# Patient Record
Sex: FEMALE | Race: WHITE | Employment: FULL TIME | URBAN - METROPOLITAN AREA
[De-identification: names, ages, dates, MRNs, and addresses within clinical notes are randomized per-mention and may not be internally consistent; named-entity substitution may affect disease eponyms.]

---

## 2017-03-22 ENCOUNTER — OFFICE VISIT (OUTPATIENT)
Dept: INTERNAL MEDICINE CLINIC | Facility: CLINIC | Age: 62
End: 2017-03-22

## 2017-03-22 VITALS
WEIGHT: 293 LBS | SYSTOLIC BLOOD PRESSURE: 152 MMHG | HEIGHT: 66 IN | OXYGEN SATURATION: 95 % | TEMPERATURE: 98 F | HEART RATE: 92 BPM | BODY MASS INDEX: 47.09 KG/M2 | DIASTOLIC BLOOD PRESSURE: 96 MMHG

## 2017-03-22 DIAGNOSIS — I10 ESSENTIAL HYPERTENSION: Primary | ICD-10-CM

## 2017-03-22 DIAGNOSIS — R05.9 COUGH: ICD-10-CM

## 2017-03-22 PROCEDURE — 99212 OFFICE O/P EST SF 10 MIN: CPT | Performed by: INTERNAL MEDICINE

## 2017-03-22 PROCEDURE — 99213 OFFICE O/P EST LOW 20 MIN: CPT | Performed by: INTERNAL MEDICINE

## 2017-03-22 RX ORDER — METOPROLOL SUCCINATE 50 MG/1
50 TABLET, EXTENDED RELEASE ORAL DAILY
Qty: 30 TABLET | Refills: 3 | Status: SHIPPED | OUTPATIENT
Start: 2017-03-22 | End: 2017-09-17

## 2017-03-22 RX ORDER — FLUTICASONE PROPIONATE 50 MCG
2 SPRAY, SUSPENSION (ML) NASAL DAILY
Qty: 1 BOTTLE | Refills: 3 | Status: SHIPPED | OUTPATIENT
Start: 2017-03-22 | End: 2018-03-25

## 2017-03-22 NOTE — PATIENT INSTRUCTIONS
ASSESSMENT/PLAN:   Diagnoses and all orders for this visit:    Essential hypertension  Patient intolerant of her last 2 BP meds, will try another medication. Recommend that she try toprol 50mg daily for her BP.   Also advised her that she needs to let me

## 2017-03-22 NOTE — PROGRESS NOTES
HPI:    Patient ID: Bette Avelar is a 62year old female. HPI  Hypertension  Patient is here for follow up of hypertension.  BP at home: not checking  Has not been compliant with medications (stopped her losartan in January due to cough) Exercise lev Comment:Other reaction(s): NAPROXEN  Nortriptyline               Comment:Other reaction(s): vivid, scary dreams  Sulfa Antibiotics           Comment:Other reaction(s): Rash - Moderate  PHYSICAL EXAM:    Physical Exam   Vitals reviewed.    Constitu

## 2017-04-21 ENCOUNTER — OFFICE VISIT (OUTPATIENT)
Dept: INTERNAL MEDICINE CLINIC | Facility: CLINIC | Age: 62
End: 2017-04-21

## 2017-04-21 VITALS
BODY MASS INDEX: 47.09 KG/M2 | HEART RATE: 78 BPM | SYSTOLIC BLOOD PRESSURE: 132 MMHG | HEIGHT: 66 IN | WEIGHT: 293 LBS | DIASTOLIC BLOOD PRESSURE: 78 MMHG | TEMPERATURE: 98 F | OXYGEN SATURATION: 95 %

## 2017-04-21 DIAGNOSIS — I10 ESSENTIAL HYPERTENSION: Primary | ICD-10-CM

## 2017-04-21 PROCEDURE — 99213 OFFICE O/P EST LOW 20 MIN: CPT | Performed by: INTERNAL MEDICINE

## 2017-04-21 PROCEDURE — 99212 OFFICE O/P EST SF 10 MIN: CPT | Performed by: INTERNAL MEDICINE

## 2017-04-21 RX ORDER — AMLODIPINE BESYLATE 2.5 MG/1
2.5 TABLET ORAL DAILY
Qty: 30 TABLET | Refills: 3 | Status: SHIPPED | OUTPATIENT
Start: 2017-04-21 | End: 2017-07-14

## 2017-04-21 NOTE — PATIENT INSTRUCTIONS
ASSESSMENT/PLAN:   Diagnoses and all orders for this visit:    Essential hypertension  BP overall much better, but she is concerned about the metoprolol. Reviewed with patient, she has had difficulty with multiple medications.  Recommend a low dose of 2 med

## 2017-04-21 NOTE — PROGRESS NOTES
HPI:    Patient ID: Melinda Bowman is a 62year old female. HPI  Hypertension  Patient is here for follow up of hypertension. BP at home: not checking  Has been compliant with medications.   Exercise level: somewhat active and has been following low sa vivid, scary dreams  Sulfa Antibiotics           Comment:Other reaction(s): Rash - Moderate  PHYSICAL EXAM:    Physical Exam   Vitals reviewed. Cardiovascular: Normal rate and regular rhythm. Edema not present.   Pulmonary/Chest: Effort normal and kelly

## 2017-06-02 ENCOUNTER — OFFICE VISIT (OUTPATIENT)
Dept: INTERNAL MEDICINE CLINIC | Facility: CLINIC | Age: 62
End: 2017-06-02

## 2017-06-02 VITALS
SYSTOLIC BLOOD PRESSURE: 138 MMHG | WEIGHT: 293 LBS | DIASTOLIC BLOOD PRESSURE: 86 MMHG | TEMPERATURE: 99 F | HEART RATE: 96 BPM | OXYGEN SATURATION: 94 % | BODY MASS INDEX: 48.82 KG/M2 | HEIGHT: 65 IN

## 2017-06-02 DIAGNOSIS — I10 ESSENTIAL HYPERTENSION: Primary | ICD-10-CM

## 2017-06-02 PROCEDURE — 99213 OFFICE O/P EST LOW 20 MIN: CPT | Performed by: INTERNAL MEDICINE

## 2017-06-02 PROCEDURE — 99212 OFFICE O/P EST SF 10 MIN: CPT | Performed by: INTERNAL MEDICINE

## 2017-06-02 RX ORDER — HYDROCHLOROTHIAZIDE 12.5 MG/1
12.5 TABLET ORAL DAILY
Qty: 30 TABLET | Refills: 3 | Status: SHIPPED | OUTPATIENT
Start: 2017-06-02 | End: 2017-10-21

## 2017-06-02 NOTE — PATIENT INSTRUCTIONS
ASSESSMENT/PLAN:   Diagnoses and all orders for this visit:    Essential hypertension  BP overall improved but concerned about the achiness of her joints, may be related to some fluid retention.  Recommend that we add a very small dose of water pill which m

## 2017-06-02 NOTE — PROGRESS NOTES
HPI:    Patient ID: Angela Wu is a 62year old female. HPI   Stress  Patient very stressed, her older sister is having open heart next week-valve replacement (72 yo). Hypertension  Patient is here for follow up of hypertension. BP at home:    Ness Ast Rfl: 3   Metoprolol Succinate ER 50 MG Oral Tablet 24 Hr Take 1 tablet (50 mg total) by mouth daily. (Patient taking differently: Take 0.25 mg by mouth daily.  ) Disp: 30 tablet Rfl: 3     Allergies:   Ace Inhibitors          Coughing  Azithromycin

## 2017-07-14 ENCOUNTER — OFFICE VISIT (OUTPATIENT)
Dept: INTERNAL MEDICINE CLINIC | Facility: CLINIC | Age: 62
End: 2017-07-14

## 2017-07-14 VITALS
WEIGHT: 293 LBS | DIASTOLIC BLOOD PRESSURE: 80 MMHG | SYSTOLIC BLOOD PRESSURE: 122 MMHG | HEIGHT: 65 IN | HEART RATE: 77 BPM | BODY MASS INDEX: 48.82 KG/M2 | TEMPERATURE: 99 F

## 2017-07-14 DIAGNOSIS — I10 ESSENTIAL HYPERTENSION: Primary | ICD-10-CM

## 2017-07-14 DIAGNOSIS — R06.02 SHORTNESS OF BREATH: ICD-10-CM

## 2017-07-14 LAB
ALBUMIN SERPL BCP-MCNC: 3.8 G/DL (ref 3.5–4.8)
ALBUMIN/GLOB SERPL: 1 {RATIO} (ref 1–2)
ALP SERPL-CCNC: 68 U/L (ref 32–100)
ALT SERPL-CCNC: 23 U/L (ref 14–54)
ANION GAP SERPL CALC-SCNC: 8 MMOL/L (ref 0–18)
AST SERPL-CCNC: 24 U/L (ref 15–41)
BASOPHILS # BLD: 0.1 K/UL (ref 0–0.2)
BASOPHILS NFR BLD: 1 %
BILIRUB SERPL-MCNC: 0.6 MG/DL (ref 0.3–1.2)
BUN SERPL-MCNC: 24 MG/DL (ref 8–20)
BUN/CREAT SERPL: 30.4 (ref 10–20)
CALCIUM SERPL-MCNC: 9.3 MG/DL (ref 8.5–10.5)
CHLORIDE SERPL-SCNC: 102 MMOL/L (ref 95–110)
CO2 SERPL-SCNC: 28 MMOL/L (ref 22–32)
CREAT SERPL-MCNC: 0.79 MG/DL (ref 0.5–1.5)
EOSINOPHIL # BLD: 0.2 K/UL (ref 0–0.7)
EOSINOPHIL NFR BLD: 2 %
ERYTHROCYTE [DISTWIDTH] IN BLOOD BY AUTOMATED COUNT: 14.3 % (ref 11–15)
GLOBULIN PLAS-MCNC: 3.8 G/DL (ref 2.5–3.7)
GLUCOSE SERPL-MCNC: 89 MG/DL (ref 70–99)
HCT VFR BLD AUTO: 44.2 % (ref 35–48)
HGB BLD-MCNC: 14.7 G/DL (ref 12–16)
LYMPHOCYTES # BLD: 2.3 K/UL (ref 1–4)
LYMPHOCYTES NFR BLD: 26 %
MCH RBC QN AUTO: 29.5 PG (ref 27–32)
MCHC RBC AUTO-ENTMCNC: 33.2 G/DL (ref 32–37)
MCV RBC AUTO: 88.8 FL (ref 80–100)
MONOCYTES # BLD: 0.6 K/UL (ref 0–1)
MONOCYTES NFR BLD: 7 %
NEUTROPHILS # BLD AUTO: 5.9 K/UL (ref 1.8–7.7)
NEUTROPHILS NFR BLD: 65 %
OSMOLALITY UR CALC.SUM OF ELEC: 290 MOSM/KG (ref 275–295)
PLATELET # BLD AUTO: 230 K/UL (ref 140–400)
PMV BLD AUTO: 10 FL (ref 7.4–10.3)
POTASSIUM SERPL-SCNC: 4.2 MMOL/L (ref 3.3–5.1)
PROT SERPL-MCNC: 7.6 G/DL (ref 5.9–8.4)
RBC # BLD AUTO: 4.98 M/UL (ref 3.7–5.4)
SODIUM SERPL-SCNC: 138 MMOL/L (ref 136–144)
TSH SERPL-ACNC: 2.12 UIU/ML (ref 0.45–5.33)
VIT B12 SERPL-MCNC: 435 PG/ML (ref 181–914)
WBC # BLD AUTO: 9.1 K/UL (ref 4–11)

## 2017-07-14 PROCEDURE — 99214 OFFICE O/P EST MOD 30 MIN: CPT | Performed by: INTERNAL MEDICINE

## 2017-07-14 PROCEDURE — 36415 COLL VENOUS BLD VENIPUNCTURE: CPT | Performed by: INTERNAL MEDICINE

## 2017-07-14 PROCEDURE — 99212 OFFICE O/P EST SF 10 MIN: CPT | Performed by: INTERNAL MEDICINE

## 2017-07-14 NOTE — PATIENT INSTRUCTIONS
ASSESSMENT/PLAN:   Diagnoses and all orders for this visit:    Essential hypertension  BP better but she states she is intolerant of the norvasc. Recommend that she stop the norvasc and continue the toprol 1/2 and water pill.  Needs to exercise more regular

## 2017-07-14 NOTE — PROGRESS NOTES
HPI:    Patient ID: Daniel Quesada is a 62year old female. HPI  Hypertension  Patient is here for follow up of hypertension. BP at home: not checking  Has been compliant with medications, but she feels that the BP meds are making her achy.  She also f Disp: 1 Bottle Rfl: 3   Metoprolol Succinate ER 50 MG Oral Tablet 24 Hr Take 1 tablet (50 mg total) by mouth daily.  (Patient taking differently: Take 0.25 mg by mouth daily.  ) Disp: 30 tablet Rfl: 3   ibuprofen 600 MG Oral Tab Take 1 tablet (600 mg total)

## 2017-07-17 LAB — 25(OH)D3 SERPL-MCNC: 20.9 NG/ML

## 2017-08-23 ENCOUNTER — HOSPITAL ENCOUNTER (OUTPATIENT)
Dept: CV DIAGNOSTICS | Facility: HOSPITAL | Age: 62
Discharge: HOME | End: 2017-08-23
Attending: INTERNAL MEDICINE
Payer: COMMERCIAL

## 2017-08-23 ENCOUNTER — HOSPITAL ENCOUNTER (OUTPATIENT)
Dept: GENERAL RADIOLOGY | Facility: HOSPITAL | Age: 62
Discharge: HOME | End: 2017-08-23
Attending: INTERNAL MEDICINE
Payer: COMMERCIAL

## 2017-08-23 DIAGNOSIS — R06.02 SHORTNESS OF BREATH: ICD-10-CM

## 2017-08-23 PROCEDURE — 93306 TTE W/DOPPLER COMPLETE: CPT | Performed by: INTERNAL MEDICINE

## 2017-08-23 PROCEDURE — 71020 XR CHEST PA + LAT CHEST (CPT=71020): CPT | Performed by: INTERNAL MEDICINE

## 2017-08-25 ENCOUNTER — MED REC SCAN ONLY (OUTPATIENT)
Dept: INTERNAL MEDICINE CLINIC | Facility: CLINIC | Age: 62
End: 2017-08-25

## 2017-08-25 ENCOUNTER — OFFICE VISIT (OUTPATIENT)
Dept: INTERNAL MEDICINE CLINIC | Facility: CLINIC | Age: 62
End: 2017-08-25

## 2017-08-25 VITALS
TEMPERATURE: 98 F | DIASTOLIC BLOOD PRESSURE: 84 MMHG | BODY MASS INDEX: 48.82 KG/M2 | HEIGHT: 65 IN | SYSTOLIC BLOOD PRESSURE: 144 MMHG | HEART RATE: 79 BPM | WEIGHT: 293 LBS

## 2017-08-25 DIAGNOSIS — I10 ESSENTIAL HYPERTENSION: ICD-10-CM

## 2017-08-25 DIAGNOSIS — Z20.2 STD EXPOSURE: Primary | ICD-10-CM

## 2017-08-25 DIAGNOSIS — F32.2 SEVERE SINGLE CURRENT EPISODE OF MAJOR DEPRESSIVE DISORDER, WITHOUT PSYCHOTIC FEATURES (HCC): ICD-10-CM

## 2017-08-25 PROCEDURE — 99212 OFFICE O/P EST SF 10 MIN: CPT | Performed by: INTERNAL MEDICINE

## 2017-08-25 PROCEDURE — 99213 OFFICE O/P EST LOW 20 MIN: CPT | Performed by: INTERNAL MEDICINE

## 2017-08-25 PROCEDURE — 36415 COLL VENOUS BLD VENIPUNCTURE: CPT | Performed by: INTERNAL MEDICINE

## 2017-08-25 NOTE — PATIENT INSTRUCTIONS
ASSESSMENT/PLAN:   Diagnoses and all orders for this visit:    STD exposure  Patient with HIV exposure, will need STD testing. Reassured her of the current excellent treatments available  Offered counseling which she currently declines.  Will try zoloft f
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
## 2017-08-27 LAB — HSV TYPE 1/2 COMBINED AB, IGG: >22.4 IV

## 2017-08-28 LAB
HIV1+2 AB SERPL QL IA: NONREACTIVE
T PALLIDUM AB SER QL: NEGATIVE

## 2017-09-18 RX ORDER — METOPROLOL SUCCINATE 50 MG/1
TABLET, EXTENDED RELEASE ORAL
Qty: 30 TABLET | Refills: 3 | Status: SHIPPED | OUTPATIENT
Start: 2017-09-18 | End: 2018-02-25

## 2017-10-06 PROBLEM — F32.1 MODERATE SINGLE CURRENT EPISODE OF MAJOR DEPRESSIVE DISORDER (HCC): Status: ACTIVE | Noted: 2017-10-06

## 2017-10-06 NOTE — PATIENT INSTRUCTIONS
ASSESSMENT/PLAN:   Diagnoses and all orders for this visit:    Moderate single current episode of major depressive disorder ([facility] [address]    patient overall improved but still with significant symptoms.  Recommend that she see counselor which she is now agreeable t

## 2017-10-06 NOTE — PROGRESS NOTES
HPI:    Patient ID: Bette Avelar is a 65year old female. HPI   STD exposure  Patient states that she last had sexual relations in June, had her HIV 8/25 which was negative. Hypertension  Patient is here for follow up of hypertension.  BP at home: Disp: 1 Bottle Rfl: 3   ibuprofen 600 MG Oral Tab Take 1 tablet (600 mg total) by mouth every 8 (eight) hours as needed for Pain. Disp: 90 tablet Rfl: 3     Allergies:   Ace Inhibitors          Coughing  Azithromycin                Comment:Other reaction(s)

## 2017-10-23 RX ORDER — IBUPROFEN 600 MG/1
TABLET ORAL
Qty: 90 TABLET | Refills: 3 | Status: SHIPPED | OUTPATIENT
Start: 2017-10-23 | End: 2021-07-26

## 2017-10-23 RX ORDER — HYDROCHLOROTHIAZIDE 12.5 MG/1
TABLET ORAL
Qty: 30 TABLET | Refills: 3 | Status: SHIPPED | OUTPATIENT
Start: 2017-10-23 | End: 2018-01-12

## 2018-01-12 NOTE — PATIENT INSTRUCTIONS
ASSESSMENT/PLAN:   Diagnoses and all orders for this visit:    Moderate single current episode of major depressive disorder [facility])  Patient overall much improved, but still would like to talk with counselor.  She was unable to touch base with the counselor

## 2018-01-12 NOTE — PROGRESS NOTES
HPI:    Patient ID: Darrell Crain is a 65year old female. HPI  Hypertension  Patient is here for follow up of hypertension.  BP at home: not checking Has been compliant with medications (but missed them yesterday) Exercise level: somewhat active or t mouth daily.  Disp: 30 tablet Rfl: 6   hydrochlorothiazide 12.5 MG Oral Tab TAKE 1 TABLET(12.5 MG) BY MOUTH DAILY Disp: 30 tablet Rfl: 6   IBUPROFEN 600 MG Oral Tab TAKE 1 TABLET(600 MG) BY MOUTH EVERY 8 HOURS AS NEEDED FOR PAIN Disp: 90 tablet Rfl: 3   MET

## 2018-02-25 RX ORDER — METOPROLOL SUCCINATE 50 MG/1
TABLET, EXTENDED RELEASE ORAL
Qty: 30 TABLET | Refills: 3 | Status: CANCELLED
Start: 2018-02-25

## 2018-02-25 RX ORDER — METOPROLOL SUCCINATE 50 MG/1
TABLET, EXTENDED RELEASE ORAL
Qty: 90 TABLET | Refills: 0 | Status: SHIPPED | OUTPATIENT
Start: 2018-02-25 | End: 2018-04-20

## 2018-02-26 NOTE — TELEPHONE ENCOUNTER
Refilled per Epic protocol.     Hypertensive Medications  Protocol Criteria:  · Appointment scheduled in the past 6 months or in the next 3 months  · BMP or CMP in the past 12 months  · Creatinine result < 2  Recent Outpatient Visits            1 month ago

## 2018-02-26 NOTE — TELEPHONE ENCOUNTER
From: Sharyle Inch  Sent: 2/25/2018 1:53 PM CST  Subject: Medication Renewal Request    Jennifer Flynn.  [place] would like a refill of the following medications:     METOPROLOL SUCCINATE ER 50 MG Oral Tablet 24 Hr Karishma Shahid MD]    Preferred pharma

## 2018-03-08 ENCOUNTER — PATIENT MESSAGE (OUTPATIENT)
Dept: INTERNAL MEDICINE CLINIC | Facility: CLINIC | Age: 63
End: 2018-03-08

## 2018-03-08 NOTE — TELEPHONE ENCOUNTER
From: Melinda Bowman  To: Arnulfo Lamb MD  Sent: 3/8/2018 1:42 PM CST  Subject: Referral Request    Thank you so much!

## 2018-03-12 ENCOUNTER — PATIENT MESSAGE (OUTPATIENT)
Dept: INTERNAL MEDICINE CLINIC | Facility: CLINIC | Age: 63
End: 2018-03-12

## 2018-03-25 RX ORDER — FLUTICASONE PROPIONATE 50 MCG
SPRAY, SUSPENSION (ML) NASAL
Qty: 1 BOTTLE | Refills: 3 | Status: SHIPPED | OUTPATIENT
Start: 2018-03-25

## 2018-04-11 ENCOUNTER — TELEPHONE (OUTPATIENT)
Dept: INTERNAL MEDICINE CLINIC | Facility: CLINIC | Age: 63
End: 2018-04-11

## 2018-04-11 NOTE — TELEPHONE ENCOUNTER
Tried calling [employer] for prior auth for counselor Abena Rosenbaum at [facility]. I have not NPI or tax ID information  After 40 minutes on the phone, [facility] states only this provider may call for a review of services for prior auth.

## 2018-04-20 NOTE — PROGRESS NOTES
HPI:    Patient ID: Allan Falcon is a 65year old female. HPI  Depression  Patient still quite depressed. She saw a counselor once and then had issues with the insurance and now needs to find another one. This seems to be very stressful for her.  Fee TAKE 1 TABLET(12.5 MG) BY MOUTH DAILY Disp: 30 tablet Rfl: 6   IBUPROFEN 600 MG Oral Tab TAKE 1 TABLET(600 MG) BY MOUTH EVERY 8 HOURS AS NEEDED FOR PAIN Disp: 90 tablet Rfl: 3     Allergies:   Ace Inhibitors          Coughing  Azithromycin                Co

## 2018-04-20 NOTE — PATIENT INSTRUCTIONS
ASSESSMENT/PLAN:   Diagnoses and all orders for this visit:    Moderate single current episode of major depressive disorder ([facility] [address]  Patient overall improved but still struggling.  She has benefited greatly from her therapy but the insurance is declining to p

## 2018-05-25 ENCOUNTER — PATIENT MESSAGE (OUTPATIENT)
Dept: INTERNAL MEDICINE CLINIC | Facility: CLINIC | Age: 63
End: 2018-05-25

## 2018-05-25 ENCOUNTER — OFFICE VISIT (OUTPATIENT)
Dept: INTERNAL MEDICINE CLINIC | Facility: CLINIC | Age: 63
End: 2018-05-25

## 2018-05-25 VITALS
BODY MASS INDEX: 48.82 KG/M2 | HEIGHT: 65 IN | DIASTOLIC BLOOD PRESSURE: 76 MMHG | HEART RATE: 73 BPM | SYSTOLIC BLOOD PRESSURE: 136 MMHG | WEIGHT: 293 LBS

## 2018-05-25 DIAGNOSIS — I10 ESSENTIAL HYPERTENSION: Primary | ICD-10-CM

## 2018-05-25 DIAGNOSIS — Z12.11 SCREENING FOR MALIGNANT NEOPLASM OF COLON: ICD-10-CM

## 2018-05-25 DIAGNOSIS — Z12.31 SCREENING MAMMOGRAM, ENCOUNTER FOR: ICD-10-CM

## 2018-05-25 DIAGNOSIS — Z12.11 SCREENING FOR COLON CANCER: Primary | ICD-10-CM

## 2018-05-25 DIAGNOSIS — F32.1 MODERATE SINGLE CURRENT EPISODE OF MAJOR DEPRESSIVE DISORDER (HCC): ICD-10-CM

## 2018-05-25 PROCEDURE — 99213 OFFICE O/P EST LOW 20 MIN: CPT | Performed by: INTERNAL MEDICINE

## 2018-05-25 PROCEDURE — 99212 OFFICE O/P EST SF 10 MIN: CPT | Performed by: INTERNAL MEDICINE

## 2018-05-25 NOTE — PROGRESS NOTES
HPI:    Patient ID: Greg Osman is a 65year old female. HPI   Depression  Patient on increased zoloft and seems to be improving. Seeing therapist and seems to be helping more. Hypertension  Patient is here for follow up of hypertension.  Patricia Fish AND USE 2 SPRAYS IN EACH NOSTRIL DAILY Disp: 1 Bottle Rfl: 3   hydrochlorothiazide 12.5 MG Oral Tab TAKE 1 TABLET(12.5 MG) BY MOUTH DAILY Disp: 30 tablet Rfl: 6   IBUPROFEN 600 MG Oral Tab TAKE 1 TABLET(600 MG) BY MOUTH EVERY 8 HOURS AS NEEDED FOR PAIN Dis

## 2018-05-25 NOTE — PATIENT INSTRUCTIONS
ASSESSMENT/PLAN:   Hypertension  BP sees to be doing much better.      Moderate single current episode of major depressive disorder ([facility] [address]  Patient overall improving, continue current meds    Patient advised to have her colonoscopy and mammogram, she will th

## 2018-07-26 ENCOUNTER — TELEPHONE (OUTPATIENT)
Dept: GASTROENTEROLOGY | Facility: CLINIC | Age: 63
End: 2018-07-26

## 2018-07-26 ENCOUNTER — OFFICE VISIT (OUTPATIENT)
Dept: GASTROENTEROLOGY | Facility: CLINIC | Age: 63
End: 2018-07-26

## 2018-07-26 VITALS
HEART RATE: 81 BPM | DIASTOLIC BLOOD PRESSURE: 89 MMHG | HEIGHT: 65 IN | TEMPERATURE: 99 F | BODY MASS INDEX: 48.82 KG/M2 | WEIGHT: 293 LBS | RESPIRATION RATE: 18 BRPM | SYSTOLIC BLOOD PRESSURE: 145 MMHG

## 2018-07-26 DIAGNOSIS — Z12.11 COLON CANCER SCREENING: Primary | ICD-10-CM

## 2018-07-26 DIAGNOSIS — Z12.12 SCREENING FOR COLORECTAL CANCER: ICD-10-CM

## 2018-07-26 DIAGNOSIS — K43.9 VENTRAL HERNIA WITHOUT OBSTRUCTION OR GANGRENE: ICD-10-CM

## 2018-07-26 DIAGNOSIS — R10.32 LLQ DISCOMFORT: Primary | ICD-10-CM

## 2018-07-26 DIAGNOSIS — Z12.11 SCREENING FOR COLORECTAL CANCER: ICD-10-CM

## 2018-07-26 PROCEDURE — 99243 OFF/OP CNSLTJ NEW/EST LOW 30: CPT | Performed by: INTERNAL MEDICINE

## 2018-07-26 PROCEDURE — 99212 OFFICE O/P EST SF 10 MIN: CPT | Performed by: INTERNAL MEDICINE

## 2018-07-26 RX ORDER — POLYETHYLENE GLYCOL 3350, SODIUM CHLORIDE, SODIUM BICARBONATE, POTASSIUM CHLORIDE 420; 11.2; 5.72; 1.48 G/4L; G/4L; G/4L; G/4L
4 POWDER, FOR SOLUTION ORAL ONCE
Qty: 4000 ML | Refills: 0 | Status: SHIPPED | OUTPATIENT
Start: 2018-07-26 | End: 2018-07-26

## 2018-07-26 RX ORDER — CHOLECALCIFEROL (VITAMIN D3) 125 MCG
CAPSULE ORAL DAILY
COMMUNITY

## 2018-07-26 NOTE — TELEPHONE ENCOUNTER
Scheduled for:  Colonoscopy 30133  Provider Name:   Date:  10/1/18  Location:  [facility]  Sedation:  Mac  Time:  2022 / Arrival 0630  Prep:Split dose Trilyte  Meds/Allergies Reconciled?:  Physician reviewed  Diagnosis with codes:  Colon Cancer Screening Z12

## 2018-07-27 NOTE — PROGRESS NOTES
HPI:    Patient ID: Melinda Bowman is a 59year old female. HPI  The patient is referred by Dr. Angel Connolly for colorectal cancer screening and for evaluation of \"a fluttering sensation\" in the left lower quadrant.   She has not had a prior colonoscopy Succinate ER 50 MG Oral Tablet 24 Hr Take 1 tablet (50 mg total) by mouth 2 (two) times daily. Disp: 180 tablet Rfl: 1   Sertraline HCl 100 MG Oral Tab Take 1 tablet (100 mg total) by mouth daily.  Disp: 90 tablet Rfl: 1   hydrochlorothiazide 12.5 MG Oral T Psychiatric: Her behavior is normal. Her mood appears anxious. Nursing note and vitals reviewed.     Component      Latest Ref Rng & Units 1/12/2018 7/14/2017   Glucose      70 - 99 mg/dL 99 89   Sodium      136 - 144 mmol/L 138 138   Potassium      3.3 left lower quadrant \"fluttering\" and chronic hemorrhoidal symptoms, the patient is asymptomatic from a lower gastrointestinal tract perspective and of average risk for colon cancer. The fluttering is likely functional or related to muscular spasm.   I do

## 2018-08-24 ENCOUNTER — TELEPHONE (OUTPATIENT)
Dept: INTERNAL MEDICINE CLINIC | Facility: CLINIC | Age: 63
End: 2018-08-24

## 2018-08-24 ENCOUNTER — OFFICE VISIT (OUTPATIENT)
Dept: INTERNAL MEDICINE CLINIC | Facility: CLINIC | Age: 63
End: 2018-08-24

## 2018-08-24 VITALS
BODY MASS INDEX: 52 KG/M2 | HEART RATE: 78 BPM | WEIGHT: 293 LBS | TEMPERATURE: 98 F | DIASTOLIC BLOOD PRESSURE: 88 MMHG | SYSTOLIC BLOOD PRESSURE: 139 MMHG

## 2018-08-24 DIAGNOSIS — L98.9 SKIN LESION OF HAND: ICD-10-CM

## 2018-08-24 DIAGNOSIS — M54.2 NECK PAIN ON LEFT SIDE: Primary | ICD-10-CM

## 2018-08-24 PROCEDURE — 99213 OFFICE O/P EST LOW 20 MIN: CPT | Performed by: INTERNAL MEDICINE

## 2018-08-24 RX ORDER — METOPROLOL SUCCINATE 100 MG/1
50 TABLET, EXTENDED RELEASE ORAL 2 TIMES DAILY
Qty: 90 TABLET | Refills: 1 | Status: SHIPPED | OUTPATIENT
Start: 2018-08-24 | End: 2019-09-20

## 2018-08-24 RX ORDER — HYDROCHLOROTHIAZIDE 12.5 MG/1
TABLET ORAL
Qty: 90 TABLET | Refills: 1 | Status: SHIPPED | OUTPATIENT
Start: 2018-08-24 | End: 2019-04-03
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
## 2018-08-24 NOTE — PATIENT INSTRUCTIONS
ASSESSMENT/PLAN:   Diagnoses and all orders for this visit:    Neck pain on left side  Patient with moderate left neck pain but good range of motion of her left shoulder. Recommend regular stretching and try heat and massage.

## 2018-08-24 NOTE — TELEPHONE ENCOUNTER
[facility] don[facility], tried to look him up and don't find him

## 2018-10-01 ENCOUNTER — ANESTHESIA EVENT (OUTPATIENT)
Dept: ENDOSCOPY | Facility: HOSPITAL | Age: 63
End: 2018-10-01

## 2018-10-01 ENCOUNTER — ANESTHESIA (OUTPATIENT)
Dept: ENDOSCOPY | Facility: HOSPITAL | Age: 63
End: 2018-10-01

## 2018-10-01 ENCOUNTER — HOSPITAL ENCOUNTER (OUTPATIENT)
Facility: HOSPITAL | Age: 63
Setting detail: HOSPITAL OUTPATIENT SURGERY
Discharge: HOME | End: 2018-10-01
Attending: INTERNAL MEDICINE | Admitting: INTERNAL MEDICINE
Payer: COMMERCIAL

## 2018-10-01 VITALS
HEART RATE: 72 BPM | BODY MASS INDEX: 47.09 KG/M2 | WEIGHT: 293 LBS | RESPIRATION RATE: 18 BRPM | HEIGHT: 66 IN | OXYGEN SATURATION: 97 % | DIASTOLIC BLOOD PRESSURE: 67 MMHG | SYSTOLIC BLOOD PRESSURE: 119 MMHG

## 2018-10-01 DIAGNOSIS — D17.9 LIPOMA: ICD-10-CM

## 2018-10-01 DIAGNOSIS — K57.90 DIVERTICULOSIS: ICD-10-CM

## 2018-10-01 DIAGNOSIS — K64.9 HEMORRHOIDS: ICD-10-CM

## 2018-10-01 DIAGNOSIS — K63.5 COLON POLYP: Primary | ICD-10-CM

## 2018-10-01 DIAGNOSIS — Z12.11 COLON CANCER SCREENING: ICD-10-CM

## 2018-10-01 PROCEDURE — 45385 COLONOSCOPY W/LESION REMOVAL: CPT | Performed by: INTERNAL MEDICINE

## 2018-10-01 PROCEDURE — 0DBL8ZX EXCISION OF TRANSVERSE COLON, VIA NATURAL OR ARTIFICIAL OPENING ENDOSCOPIC, DIAGNOSTIC: ICD-10-PCS | Performed by: INTERNAL MEDICINE

## 2018-10-01 PROCEDURE — 45380 COLONOSCOPY AND BIOPSY: CPT | Performed by: INTERNAL MEDICINE

## 2018-10-01 PROCEDURE — 0DBH8ZX EXCISION OF CECUM, VIA NATURAL OR ARTIFICIAL OPENING ENDOSCOPIC, DIAGNOSTIC: ICD-10-PCS | Performed by: INTERNAL MEDICINE

## 2018-10-01 RX ORDER — LIDOCAINE HYDROCHLORIDE 10 MG/ML
INJECTION, SOLUTION EPIDURAL; INFILTRATION; INTRACAUDAL; PERINEURAL AS NEEDED
Status: DISCONTINUED | OUTPATIENT
Start: 2018-10-01 | End: 2018-10-01 | Stop reason: SURG

## 2018-10-01 RX ORDER — NALOXONE HYDROCHLORIDE 0.4 MG/ML
80 INJECTION, SOLUTION INTRAMUSCULAR; INTRAVENOUS; SUBCUTANEOUS AS NEEDED
Status: DISCONTINUED | OUTPATIENT
Start: 2018-10-01 | End: 2018-10-01

## 2018-10-01 RX ORDER — SODIUM CHLORIDE, SODIUM LACTATE, POTASSIUM CHLORIDE, CALCIUM CHLORIDE 600; 310; 30; 20 MG/100ML; MG/100ML; MG/100ML; MG/100ML
INJECTION, SOLUTION INTRAVENOUS CONTINUOUS
Status: DISCONTINUED | OUTPATIENT
Start: 2018-10-01 | End: 2018-10-01

## 2018-10-01 RX ADMIN — SODIUM CHLORIDE, SODIUM LACTATE, POTASSIUM CHLORIDE, CALCIUM CHLORIDE: 600; 310; 30; 20 INJECTION, SOLUTION INTRAVENOUS at 08:01:00

## 2018-10-01 RX ADMIN — SODIUM CHLORIDE, SODIUM LACTATE, POTASSIUM CHLORIDE, CALCIUM CHLORIDE: 600; 310; 30; 20 INJECTION, SOLUTION INTRAVENOUS at 07:35:00

## 2018-10-01 RX ADMIN — LIDOCAINE HYDROCHLORIDE 50 MG: 10 INJECTION, SOLUTION EPIDURAL; INFILTRATION; INTRACAUDAL; PERINEURAL at 07:39:00

## 2018-10-01 NOTE — ANESTHESIA POSTPROCEDURE EVALUATION
Patient: Shanon Epley    Procedure Summary     Date:  10/01/18 Room / Location:  [facility] ENDOSCOPY 01 / [facility] ENDOSCOPY    Anesthesia Start:  9810 Anesthesia Stop:  5681    Procedure:  COLONOSCOPY (N/A ) Diagnosis:       Colon cancer screening      (divertic

## 2018-10-01 NOTE — OPERATIVE REPORT
[facility] Endoscopy Report      Date of Procedure:  10/01/18      Preoperative Diagnosis:  Colorectal cancer screening      Postoperative Diagnosis:  1. Diminutive colon polyps  2. Ascending colon lipoma  3.   Diverticulosis left colon  4 other colonic polyps, mass lesions, vascular anomalies or signs of inflammation seen. Retroflexion in the rectum revealed internal hemorrhoids. The procedure was well tolerated without immediate complication. Impression:  1.   Diminutive colon polyps

## 2018-10-01 NOTE — H&P
History & Physical Examination    Patient Name: Tameka Cavazos  MRN: [id number]  CSN: [id number]  YOB: 1955    Diagnosis: Colorectal cancer screening        Medications Prior to Admission:  hydrochlorothiazide 12.5 MG Oral Tab TAKE 1 TABLET nausea and vomiting)    • Pulmonary embolism ([facility] [address] 2012    post op     Past Surgical History:  No date: CHOLECYSTECTOMY  No date: [facility] IMPLANT OCULAR DEVICE INTRAOPERATIVE DETACHED RETINA   2012: HIP REPLACEMENT SURGERY  No date: HYSTERECTOMY      Comme

## 2018-10-01 NOTE — ANESTHESIA PREPROCEDURE EVALUATION
Anesthesia PreOp Note    HPI:     Karen Dunbar is a 59year old female who presents for preoperative consultation requested by: Adam Schwab MD    Date of Surgery: 10/1/2018    Procedure(s):  COLONOSCOPY  Indication: Colon cancer screening [Z1 TURMERIC OR Take by mouth. Disp:  Rfl:  9/30/2018 at 0800   Sertraline HCl 100 MG Oral Tab Take 1 tablet (100 mg total) by mouth daily.  Disp: 90 tablet Rfl: 1 9/30/2018 at 0800   FLUTICASONE PROPIONATE 50 MCG/ACT Nasal Suspension SHAKE LIQUID AND USE 2 SPR Sexual activity: Not on file    Other Topics      Concerns:         Service: No        Blood Transfusions: No        Caffeine Concern: Yes        Occupational Exposure: No        Hobby Hazards: No        Sleep Concern: Yes        Stress Concern I have informed [facility] and/or legal guardian or family member of the nature of the anesthetic plan, benefits, risks including possible dental damage if relevant, major complications, and any alternative forms of anesthetic management.    All of th

## 2018-10-03 ENCOUNTER — TELEPHONE (OUTPATIENT)
Dept: GASTROENTEROLOGY | Facility: CLINIC | Age: 63
End: 2018-10-03

## 2018-10-03 NOTE — TELEPHONE ENCOUNTER
----- Message from Buddy Connor MD sent at 10/2/2018  6:03 PM CDT -----  I spoke to the patient. Her polyps were subcentimeter hyperplastic polyps. I have discussed the significance. We discussed the lipoma and the diverticulosis.   I have recomm

## 2018-10-23 ENCOUNTER — OFFICE VISIT (OUTPATIENT)
Dept: INTERNAL MEDICINE CLINIC | Facility: CLINIC | Age: 63
End: 2018-10-23

## 2018-10-23 VITALS
BODY MASS INDEX: 49.41 KG/M2 | HEART RATE: 68 BPM | DIASTOLIC BLOOD PRESSURE: 92 MMHG | WEIGHT: 293 LBS | HEIGHT: 64.75 IN | SYSTOLIC BLOOD PRESSURE: 122 MMHG | OXYGEN SATURATION: 95 % | TEMPERATURE: 98 F

## 2018-10-23 DIAGNOSIS — I10 ESSENTIAL HYPERTENSION: Primary | ICD-10-CM

## 2018-10-23 DIAGNOSIS — Z12.39 BREAST CANCER SCREENING: ICD-10-CM

## 2018-10-23 DIAGNOSIS — Z00.00 ANNUAL PHYSICAL EXAM: ICD-10-CM

## 2018-10-23 DIAGNOSIS — M54.2 NECK PAIN ON LEFT SIDE: ICD-10-CM

## 2018-10-23 DIAGNOSIS — E55.9 VITAMIN D DEFICIENCY: ICD-10-CM

## 2018-10-23 DIAGNOSIS — M25.561 ACUTE PAIN OF RIGHT KNEE: ICD-10-CM

## 2018-10-23 PROCEDURE — 99214 OFFICE O/P EST MOD 30 MIN: CPT | Performed by: INTERNAL MEDICINE

## 2018-10-23 PROCEDURE — 90686 IIV4 VACC NO PRSV 0.5 ML IM: CPT | Performed by: INTERNAL MEDICINE

## 2018-10-23 PROCEDURE — 90471 IMMUNIZATION ADMIN: CPT | Performed by: INTERNAL MEDICINE

## 2018-10-23 RX ORDER — TIZANIDINE HYDROCHLORIDE 4 MG/1
4 CAPSULE, GELATIN COATED ORAL NIGHTLY
Qty: 5 CAPSULE | Refills: 0 | Status: SHIPPED | OUTPATIENT
Start: 2018-10-23 | End: 2018-10-28

## 2018-10-23 RX ORDER — METHYLPREDNISOLONE 4 MG/1
TABLET ORAL
Qty: 1 KIT | Refills: 0 | Status: SHIPPED | OUTPATIENT
Start: 2018-10-23
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
## 2018-10-23 NOTE — PATIENT INSTRUCTIONS
ASSESSMENT/PLAN:   Essential hypertension  (primary encounter diagnosis) ? Control. Careful with diet and excercise at least 30 minutes 3-4 times a week. Check blood pressures at different times on different days. Can purchase own blood pressure monitor.  I

## 2019-04-03 RX ORDER — SERTRALINE HYDROCHLORIDE 100 MG/1
TABLET, FILM COATED ORAL
Qty: 90 TABLET | Refills: 0 | Status: SHIPPED | OUTPATIENT
Start: 2019-04-03 | End: 2019-07-03

## 2019-04-03 RX ORDER — HYDROCHLOROTHIAZIDE 12.5 MG/1
TABLET ORAL
Qty: 90 TABLET | Refills: 0 | Status: SHIPPED | OUTPATIENT
Start: 2019-04-03 | End: 2019-07-03

## 2019-07-01 RX ORDER — HYDROCHLOROTHIAZIDE 12.5 MG/1
TABLET ORAL
Qty: 90 TABLET | Refills: 1 | OUTPATIENT
Start: 2019-07-01

## 2019-07-03 RX ORDER — SERTRALINE HYDROCHLORIDE 100 MG/1
TABLET, FILM COATED ORAL
Qty: 90 TABLET | Refills: 1 | Status: SHIPPED | OUTPATIENT
Start: 2019-07-03 | End: 2021-03-22

## 2019-07-03 RX ORDER — HYDROCHLOROTHIAZIDE 12.5 MG/1
TABLET ORAL
Qty: 90 TABLET | Refills: 1 | OUTPATIENT
Start: 2019-07-03

## 2019-07-03 RX ORDER — HYDROCHLOROTHIAZIDE 12.5 MG/1
TABLET ORAL
Qty: 90 TABLET | Refills: 0 | Status: SHIPPED | OUTPATIENT
Start: 2019-07-03 | End: 2019-10-19

## 2019-07-03 NOTE — TELEPHONE ENCOUNTER
Refill passed per [facility] protocol.   Refill Protocol Appointment Criteria  · Appointment scheduled in the past 6 months or in the next 3 months  Recent Outpatient Visits            8 months ago Essential hypertension    [facility], [address]

## 2019-08-07 PROBLEM — Z12.39 BREAST CANCER SCREENING: Status: ACTIVE | Noted: 2019-08-07

## 2019-08-08 ENCOUNTER — OFFICE VISIT (OUTPATIENT)
Dept: INTERNAL MEDICINE CLINIC | Facility: CLINIC | Age: 64
End: 2019-08-08
Payer: COMMERCIAL

## 2019-08-08 VITALS
BODY MASS INDEX: 49.41 KG/M2 | HEART RATE: 74 BPM | HEIGHT: 64.75 IN | SYSTOLIC BLOOD PRESSURE: 136 MMHG | OXYGEN SATURATION: 99 % | WEIGHT: 293 LBS | TEMPERATURE: 98 F | RESPIRATION RATE: 17 BRPM | DIASTOLIC BLOOD PRESSURE: 85 MMHG

## 2019-08-08 DIAGNOSIS — G89.29 CHRONIC PAIN OF RIGHT KNEE: ICD-10-CM

## 2019-08-08 DIAGNOSIS — E66.01 CLASS 3 SEVERE OBESITY DUE TO EXCESS CALORIES WITHOUT SERIOUS COMORBIDITY WITH BODY MASS INDEX (BMI) OF 50.0 TO 59.9 IN ADULT (HCC): ICD-10-CM

## 2019-08-08 DIAGNOSIS — Z12.39 BREAST CANCER SCREENING: ICD-10-CM

## 2019-08-08 DIAGNOSIS — Z12.83 SKIN EXAM, SCREENING FOR CANCER: ICD-10-CM

## 2019-08-08 DIAGNOSIS — H54.7 VISION PROBLEMS: ICD-10-CM

## 2019-08-08 DIAGNOSIS — Z00.00 ADULT GENERAL MEDICAL EXAM: ICD-10-CM

## 2019-08-08 DIAGNOSIS — I10 ESSENTIAL HYPERTENSION: Primary | ICD-10-CM

## 2019-08-08 DIAGNOSIS — M25.561 CHRONIC PAIN OF RIGHT KNEE: ICD-10-CM

## 2019-08-08 PROBLEM — H91.93 BILATERAL HEARING LOSS: Status: ACTIVE | Noted: 2019-08-08

## 2019-08-08 LAB
APPEARANCE: CLEAR
BILIRUBIN: NEGATIVE
GLUCOSE (URINE DIPSTICK): NEGATIVE MG/DL
KETONES (URINE DIPSTICK): NEGATIVE MG/DL
LEUKOCYTES: NEGATIVE
MULTISTIX LOT#: ABNORMAL NUMERIC
NITRITE, URINE: NEGATIVE
OCCULT BLOOD: NEGATIVE
PH, URINE: 5 (ref 4.5–8)
PROTEIN (URINE DIPSTICK): NEGATIVE MG/DL
SPECIFIC GRAVITY: >=1.03 (ref 1–1.03)
URINE-COLOR: YELLOW
UROBILINOGEN,SEMI-QN: 0.2 MG/DL (ref 0–1.9)

## 2019-08-08 PROCEDURE — 99396 PREV VISIT EST AGE 40-64: CPT | Performed by: INTERNAL MEDICINE

## 2019-08-08 PROCEDURE — 99214 OFFICE O/P EST MOD 30 MIN: CPT | Performed by: INTERNAL MEDICINE

## 2019-08-08 PROCEDURE — 81003 URINALYSIS AUTO W/O SCOPE: CPT | Performed by: INTERNAL MEDICINE

## 2019-08-08 RX ORDER — ACETAMINOPHEN AND CODEINE PHOSPHATE 300; 30 MG/1; MG/1
1 TABLET ORAL EVERY 6 HOURS PRN
Qty: 20 TABLET | Refills: 0 | Status: SHIPPED | OUTPATIENT
Start: 2019-08-08 | End: 2019-12-30

## 2019-08-08 NOTE — PROGRESS NOTES
HPI:    Patient ID: Gaetano Santamaria is a 62year old female. Blood Pressure   Associated symptoms include arthralgias (R knee pain worsening. More when begins to walk. No weakness. NO swelling. Ibuprofen. ).  Pertinent negatives include no abdominal andree  (H) 12/21/2016 12:53 PM    NONHDLC 142 (H) 12/21/2016 12:53 PM       No results found for: A1C   No results found for: VITD      Mammogram due on 07/21/2012      Current Outpatient Medications:  Acetaminophen-Codeine (TYLENOL WITH CODEINE #3) 300-3 Macular degeneration Mother    • Other (spinal stenosis [Other]) Mother    • Breast Cancer Sister    • Pacemaker Sister       Social History:  Social History    Socioeconomic History      Marital status:       Spouse name: Not on file      Number of  (H) 12/21/2016 12:53 PM    NONHDLC 142 (H) 12/21/2016 12:53 PM         Review of Systems   Constitutional: Negative. Negative for activity change, appetite change, chills, diaphoresis, fatigue, fever and unexpected weight change.    HENT: Negative All other systems reviewed and are negative. Current Outpatient Medications:  Acetaminophen-Codeine (TYLENOL WITH CODEINE #3) 300-30 MG Oral Tab Take 1 tablet by mouth every 6 (six) hours as needed for Pain.  Disp: 20 tablet Rfl: 0   Sertraline Fiona Moeller MD at [address] [place] [address] ENDOSCOPY   • [facility] IMPLANT OCULAR DEVICE INTRAOPERATIVE DETACHED RETINA      • HIP REPLACEMENT SURGERY  2012   • HYSTERECTOMY      2010   • LAPAROSCOPIC CHOLECYSTECTOMY  04/01/1990      Family History   Problem Relation Age of Onset tenderness and no frontal sinus tenderness. Left sinus exhibits no maxillary sinus tenderness and no frontal sinus tenderness.    Mouth/Throat: Uvula is midline, oropharynx is clear and moist and mucous membranes are normal. Mucous membranes are not pale, n No apnea, no tachypnea and no bradypnea. She is not intubated. No respiratory distress. She has no decreased breath sounds. She has no wheezes. She has no rhonchi. She has no rales. She exhibits no tenderness.  Right breast exhibits no inverted nipple, no m preauricular, no posterior auricular and no occipital adenopathy present. She has no cervical adenopathy. Right cervical: No superficial cervical, no deep cervical and no posterior cervical adenopathy present.        Left cervical: No superficial Reflex To Free T4 [E]      Meds This Visit:  Requested Prescriptions     Signed Prescriptions Disp Refills   • Acetaminophen-Codeine (TYLENOL WITH CODEINE #3) 300-30 MG Oral Tab 20 tablet 0     Sig: Take 1 tablet by mouth every 6 (six) hours as needed for

## 2019-08-08 NOTE — PATIENT INSTRUCTIONS
ASSESSMENT/PLAN:   Essential hypertension  (primary encounter diagnosis) Good control. Careful with diet and excercise at least 30 minutes 3-4 times a week. Check blood pressures at different times on different days.  Can purchase own blood pressure monit

## 2019-09-20 RX ORDER — METOPROLOL SUCCINATE 100 MG/1
TABLET, EXTENDED RELEASE ORAL
Qty: 90 TABLET | Refills: 1 | Status: SHIPPED | OUTPATIENT
Start: 2019-09-20 | End: 2020-04-01

## 2019-10-21 RX ORDER — HYDROCHLOROTHIAZIDE 12.5 MG/1
TABLET ORAL
Qty: 90 TABLET | Refills: 1 | Status: SHIPPED | OUTPATIENT
Start: 2019-10-21 | End: 2020-02-17

## 2019-10-22 NOTE — TELEPHONE ENCOUNTER
Please review; protocol failed.     Requested Prescriptions     Pending Prescriptions Disp Refills   • HYDROCHLOROTHIAZIDE 12.5 MG Oral Tab [Pharmacy Med Name: HYDROCHLOROTHIAZIDE 12.5MG TABLETS] 90 tablet 0     Sig: TAKE 1 TABLET BY MOUTH DAILY         Rec

## 2019-12-02 ENCOUNTER — OFFICE VISIT (OUTPATIENT)
Dept: INTERNAL MEDICINE CLINIC | Facility: CLINIC | Age: 64
End: 2019-12-02
Payer: COMMERCIAL

## 2019-12-02 VITALS
TEMPERATURE: 98 F | HEIGHT: 64.75 IN | OXYGEN SATURATION: 94 % | HEART RATE: 76 BPM | BODY MASS INDEX: 49.41 KG/M2 | SYSTOLIC BLOOD PRESSURE: 131 MMHG | WEIGHT: 293 LBS | DIASTOLIC BLOOD PRESSURE: 84 MMHG

## 2019-12-02 DIAGNOSIS — J06.9 URTI (ACUTE UPPER RESPIRATORY INFECTION): Primary | ICD-10-CM

## 2019-12-02 DIAGNOSIS — R05.9 COUGH: ICD-10-CM

## 2019-12-02 PROCEDURE — 99213 OFFICE O/P EST LOW 20 MIN: CPT | Performed by: INTERNAL MEDICINE

## 2019-12-02 RX ORDER — CETIRIZINE HYDROCHLORIDE 10 MG/1
10 TABLET ORAL DAILY
Qty: 10 TABLET | Refills: 0 | Status: SHIPPED | OUTPATIENT
Start: 2019-12-02

## 2019-12-02 RX ORDER — AMOXICILLIN AND CLAVULANATE POTASSIUM 875; 125 MG/1; MG/1
1 TABLET, FILM COATED ORAL 2 TIMES DAILY
Qty: 14 TABLET | Refills: 0 | Status: SHIPPED | OUTPATIENT
Start: 2019-12-02 | End: 2019-12-09

## 2019-12-02 RX ORDER — ALBUTEROL SULFATE 90 UG/1
2 AEROSOL, METERED RESPIRATORY (INHALATION) EVERY 6 HOURS PRN
Qty: 1 INHALER | Refills: 0 | Status: SHIPPED | OUTPATIENT
Start: 2019-12-02 | End: 2019-12-30

## 2019-12-02 NOTE — PROGRESS NOTES
Bette Avelar is a 62year old female. Patient presents with:  Cough: cough with green phlegm, sinus pressure, headache, otc meds not helping, wheezing, onset 10 days ago    HPI:   81-year-old pleasant lady with a past medical history of hypertension her (MEDROL) 4 MG Oral Tablet Therapy Pack As directed.  1 kit 0      Past Medical History:   Diagnosis Date   • Essential hypertension    • Moderate single current episode of major depressive disorder ([facility] [address] 10/6/2017   • Osteoarthrosis, pelvic region and thigh Genitourinary: Negative for hematuria.      Wt Readings from Last 5 Encounters:  12/02/19 : (!) 318 lb (144.2 kg)  08/08/19 : (!) 317 lb (143.8 kg)  10/23/18 : (!) 315 lb 9.6 oz (143.2 kg)  10/01/18 : (!) 312 lb (141.5 kg)  08/24/18 : (!) 313 lb (142 kg) above      The patient indicates understanding of these issues and agrees to the plan. No follow-ups on file.

## 2019-12-02 NOTE — PATIENT INSTRUCTIONS
ASSESSMENT AND PLAN:   1. URTI (acute upper respiratory infection)  -In light of duration of almost 2 weeks and fever and chills along with wheezing, I would like to get a chest x-ray. I have given her Augmentin to take for 7 days.   I have given her p

## 2019-12-03 ENCOUNTER — HOSPITAL ENCOUNTER (OUTPATIENT)
Dept: GENERAL RADIOLOGY | Age: 64
Discharge: HOME | End: 2019-12-03
Attending: INTERNAL MEDICINE
Payer: COMMERCIAL

## 2019-12-03 DIAGNOSIS — J06.9 URTI (ACUTE UPPER RESPIRATORY INFECTION): ICD-10-CM

## 2019-12-03 DIAGNOSIS — G89.29 CHRONIC PAIN OF RIGHT KNEE: ICD-10-CM

## 2019-12-03 DIAGNOSIS — R05.9 COUGH: ICD-10-CM

## 2019-12-03 DIAGNOSIS — M25.561 CHRONIC PAIN OF RIGHT KNEE: ICD-10-CM

## 2019-12-03 PROCEDURE — 73560 X-RAY EXAM OF KNEE 1 OR 2: CPT | Performed by: INTERNAL MEDICINE

## 2019-12-03 PROCEDURE — 71046 X-RAY EXAM CHEST 2 VIEWS: CPT | Performed by: INTERNAL MEDICINE

## 2019-12-30 RX ORDER — ALBUTEROL SULFATE 90 UG/1
AEROSOL, METERED RESPIRATORY (INHALATION)
Qty: 18 G | Refills: 0 | Status: SHIPPED | OUTPATIENT
Start: 2019-12-30

## 2019-12-30 RX ORDER — ACETAMINOPHEN AND CODEINE PHOSPHATE 300; 30 MG/1; MG/1
TABLET ORAL
Qty: 20 TABLET | Refills: 0 | Status: SHIPPED | OUTPATIENT
Start: 2019-12-30 | End: 2020-06-10

## 2019-12-31 NOTE — TELEPHONE ENCOUNTER
Refill passed per [place] [facility] protocol. However rx approved x1 on 12/02/19. pls advise if refills appropriate?       Requested Prescriptions   Pending Prescriptions Disp Refills   • ALBUTEROL SULFATE  (90 Base) MCG/ACT Inhalation Kavin Medeiros

## 2019-12-31 NOTE — TELEPHONE ENCOUNTER
Review pended refill request as it does not fall under a protocol.     Last Rx: 8/8/19  LOV: 4 weeks ago

## 2020-02-17 NOTE — TELEPHONE ENCOUNTER
Please review; protocol failed.     Requested Prescriptions     Pending Prescriptions Disp Refills   • HYDROCHLOROTHIAZIDE 12.5 MG Oral Tab [Pharmacy Med Name: HYDROCHLOROTHIAZIDE 12.5MG TABLETS] 90 tablet 1     Sig: TAKE 1 TABLET BY MOUTH DAILY         Rec

## 2020-02-18 RX ORDER — HYDROCHLOROTHIAZIDE 12.5 MG/1
TABLET ORAL
Qty: 90 TABLET | Refills: 1 | Status: SHIPPED | OUTPATIENT
Start: 2020-02-18 | End: 2020-06-10

## 2020-04-01 ENCOUNTER — PATIENT MESSAGE (OUTPATIENT)
Dept: INTERNAL MEDICINE CLINIC | Facility: CLINIC | Age: 65
End: 2020-04-01

## 2020-04-01 RX ORDER — METOPROLOL SUCCINATE 100 MG/1
TABLET, EXTENDED RELEASE ORAL
Qty: 90 TABLET | Refills: 1 | Status: SHIPPED | OUTPATIENT
Start: 2020-04-01 | End: 2020-04-01

## 2020-04-01 RX ORDER — METOPROLOL SUCCINATE 100 MG/1
TABLET, EXTENDED RELEASE ORAL
Qty: 90 TABLET | Refills: 1 | Status: SHIPPED | OUTPATIENT
Start: 2020-04-01 | End: 2020-09-21

## 2020-04-01 NOTE — TELEPHONE ENCOUNTER
Wilbert Conrad MD  Em Rn Triage 33 minutes ago (2:35 PM)      Erx approved x1 patient due for routine f/u appt in 3 to 6 months

## 2020-04-02 NOTE — TELEPHONE ENCOUNTER
Medication reordered as per Dr. Salinas Credit today and sent to [facility] in [address] per patient request

## 2020-04-02 NOTE — TELEPHONE ENCOUNTER
From: Tameka Cavazos  To: Edith Sue. Dana Martinez MD  Sent: 4/1/2020 11:54 AM CDT  Subject: Prescription Question    I need a refil on my hbp meds - metoprolol succinate er 100mg tablets 90 day refil.  Pls refill it at [address] [facility] in [facility].

## 2020-06-10 RX ORDER — HYDROCHLOROTHIAZIDE 12.5 MG/1
12.5 TABLET ORAL DAILY
Qty: 90 TABLET | Refills: 1 | Status: SHIPPED | OUTPATIENT
Start: 2020-06-10 | End: 2021-03-22

## 2020-06-10 RX ORDER — ACETAMINOPHEN AND CODEINE PHOSPHATE 300; 30 MG/1; MG/1
1 TABLET ORAL EVERY 6 HOURS PRN
Qty: 20 TABLET | Refills: 0 | Status: SHIPPED | OUTPATIENT
Start: 2020-06-10 | End: 2021-07-26

## 2020-09-21 RX ORDER — METOPROLOL SUCCINATE 100 MG/1
TABLET, EXTENDED RELEASE ORAL
Qty: 90 TABLET | Refills: 1 | Status: SHIPPED | OUTPATIENT
Start: 2020-09-21 | End: 2021-03-22

## 2020-09-21 NOTE — TELEPHONE ENCOUNTER
Medication approved. Couple of things  1. She needs to make appointment last seen December 2019    2.   She needs to make appointment for mammography

## 2020-09-22 ENCOUNTER — PATIENT MESSAGE (OUTPATIENT)
Dept: INTERNAL MEDICINE CLINIC | Facility: CLINIC | Age: 65
End: 2020-09-22

## 2020-09-23 RX ORDER — HYDROCHLOROTHIAZIDE 12.5 MG/1
12.5 TABLET ORAL DAILY
Qty: 90 TABLET | Refills: 1 | OUTPATIENT
Start: 2020-09-23

## 2020-09-23 NOTE — TELEPHONE ENCOUNTER
It's been little more than a year since I seen the patient. She saw Dr. Pia Vang for cough 9 months ago. She has no upcoming appointments. Can refill as soon as she makes an appointment.
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
## 2020-10-08 ENCOUNTER — TELEPHONE (OUTPATIENT)
Dept: INTERNAL MEDICINE CLINIC | Facility: CLINIC | Age: 65
End: 2020-10-08

## 2020-10-08 NOTE — TELEPHONE ENCOUNTER
[10/8/2020 10:19 AM]  Lisandra Garcia MD:      Karen Damon (Legal)     60year old, 7/27/1955         she needs to identify her PCP.  If its me she needs an appt in 4 weeks and she need to complete her mammogram . If its  [facility], please update PCP name Thanks

## 2020-10-30 ENCOUNTER — VIRTUAL PHONE E/M (OUTPATIENT)
Dept: INTERNAL MEDICINE CLINIC | Facility: CLINIC | Age: 65
End: 2020-10-30
Payer: COMMERCIAL

## 2020-10-30 ENCOUNTER — TELEPHONE (OUTPATIENT)
Dept: INTERNAL MEDICINE CLINIC | Facility: CLINIC | Age: 65
End: 2020-10-30

## 2020-10-30 DIAGNOSIS — R50.9 FEVER WITH EXPOSURE TO COVID-19 VIRUS: Primary | ICD-10-CM

## 2020-10-30 DIAGNOSIS — Z20.822 FEVER WITH EXPOSURE TO COVID-19 VIRUS: Primary | ICD-10-CM

## 2020-10-30 PROCEDURE — 99213 OFFICE O/P EST LOW 20 MIN: CPT | Performed by: PHYSICIAN ASSISTANT

## 2020-10-30 NOTE — TELEPHONE ENCOUNTER
Action Requested: Summary for Provider     []  Critical Lab, Recommendations Needed  [] Need Additional Advice  []   FYI    []   Need Orders  [] Need Medications Sent to Pharmacy  []  Other     SUMMARY: Virtual Visit scheduled with PA for further eval.

## 2020-10-30 NOTE — PROGRESS NOTES
Telephone Check-In    Aimee Walters verbally consents to a Virtual/Telephone Check-In service on 10/30/20. Patient understands and accepts financial responsibility for any deductible, co-insurance and/or co-pays associated with this service.     Pamela

## 2020-10-31 ENCOUNTER — APPOINTMENT (OUTPATIENT)
Dept: LAB | Facility: HOSPITAL | Age: 65
End: 2020-10-31
Attending: PHYSICIAN ASSISTANT
Payer: COMMERCIAL

## 2020-10-31 DIAGNOSIS — R50.9 FEVER WITH EXPOSURE TO COVID-19 VIRUS: ICD-10-CM

## 2020-10-31 DIAGNOSIS — Z20.822 FEVER WITH EXPOSURE TO COVID-19 VIRUS: ICD-10-CM

## 2021-03-21 PROBLEM — E55.9 VITAMIN D DEFICIENCY: Status: ACTIVE | Noted: 2021-03-21

## 2021-03-21 PROBLEM — Z71.85 VACCINE COUNSELING: Status: ACTIVE | Noted: 2021-03-21

## 2021-03-21 PROBLEM — Z00.00 ADULT GENERAL MEDICAL EXAM: Status: ACTIVE | Noted: 2021-03-21

## 2021-03-22 ENCOUNTER — OFFICE VISIT (OUTPATIENT)
Dept: INTERNAL MEDICINE CLINIC | Facility: CLINIC | Age: 66
End: 2021-03-22
Payer: COMMERCIAL

## 2021-03-22 ENCOUNTER — TELEPHONE (OUTPATIENT)
Dept: INTERNAL MEDICINE CLINIC | Facility: CLINIC | Age: 66
End: 2021-03-22

## 2021-03-22 VITALS
TEMPERATURE: 99 F | OXYGEN SATURATION: 96 % | RESPIRATION RATE: 18 BRPM | SYSTOLIC BLOOD PRESSURE: 144 MMHG | HEIGHT: 64.75 IN | WEIGHT: 293 LBS | HEART RATE: 76 BPM | DIASTOLIC BLOOD PRESSURE: 85 MMHG | BODY MASS INDEX: 49.41 KG/M2

## 2021-03-22 DIAGNOSIS — Z12.31 ENCOUNTER FOR SCREENING MAMMOGRAM FOR MALIGNANT NEOPLASM OF BREAST: ICD-10-CM

## 2021-03-22 DIAGNOSIS — Z00.00 ADULT GENERAL MEDICAL EXAM: ICD-10-CM

## 2021-03-22 DIAGNOSIS — Z00.00 ENCOUNTER FOR ANNUAL HEALTH EXAMINATION: ICD-10-CM

## 2021-03-22 DIAGNOSIS — I10 ESSENTIAL HYPERTENSION: Primary | ICD-10-CM

## 2021-03-22 DIAGNOSIS — E55.9 VITAMIN D DEFICIENCY: ICD-10-CM

## 2021-03-22 DIAGNOSIS — Z71.85 VACCINE COUNSELING: ICD-10-CM

## 2021-03-22 DIAGNOSIS — H91.93 BILATERAL HEARING LOSS, UNSPECIFIED HEARING LOSS TYPE: ICD-10-CM

## 2021-03-22 LAB
APPEARANCE: CLEAR
MULTISTIX LOT#: 5077 NUMERIC
PH, URINE: 5 (ref 4.5–8)
PROTEIN (URINE DIPSTICK): 30 MG/DL
SPECIFIC GRAVITY: 1.03 (ref 1–1.03)
URINE-COLOR: YELLOW
UROBILINOGEN,SEMI-QN: 1 MG/DL (ref 0–1.9)

## 2021-03-22 PROCEDURE — G0439 PPPS, SUBSEQ VISIT: HCPCS | Performed by: INTERNAL MEDICINE

## 2021-03-22 PROCEDURE — 3077F SYST BP >= 140 MM HG: CPT | Performed by: INTERNAL MEDICINE

## 2021-03-22 PROCEDURE — 3008F BODY MASS INDEX DOCD: CPT | Performed by: INTERNAL MEDICINE

## 2021-03-22 PROCEDURE — 81003 URINALYSIS AUTO W/O SCOPE: CPT | Performed by: INTERNAL MEDICINE

## 2021-03-22 PROCEDURE — 99497 ADVNCD CARE PLAN 30 MIN: CPT | Performed by: INTERNAL MEDICINE

## 2021-03-22 PROCEDURE — 3079F DIAST BP 80-89 MM HG: CPT | Performed by: INTERNAL MEDICINE

## 2021-03-22 RX ORDER — HYDROCHLOROTHIAZIDE 12.5 MG/1
12.5 TABLET ORAL DAILY
Qty: 90 TABLET | Refills: 1 | Status: SHIPPED | OUTPATIENT
Start: 2021-03-22 | End: 2021-07-26

## 2021-03-22 RX ORDER — METOPROLOL SUCCINATE 100 MG/1
50 TABLET, EXTENDED RELEASE ORAL 2 TIMES DAILY
Qty: 90 TABLET | Refills: 1 | Status: SHIPPED | OUTPATIENT
Start: 2021-03-22 | End: 2021-11-17

## 2021-03-22 NOTE — PATIENT INSTRUCTIONS
ASSESSMENT AND OTHER RELEVANT CHRONIC CONDITIONS:   Shad Sanchez is a 60year old female who presents for a Medicare Assessment. PLAN SUMMARY:   Diagnoses and all orders for this visit:    Essential hypertension ? Control.  Careful with diet and exc following:   • Overweight (BMI ³25 but <30)   • Family history of diabetes   • Age 72 years or older   • History of gestational diabetes or birth of baby weighing more than 9 pounds     Covered at least every 3 years,         Glucose (mg/dL)   Date Value flowsheet data found.  OK to schedule if you are in this risk group, make sure you have a referral   Bone Density Screening      Bone density screening   Covered every 2 yrs after age 72    Covered yearly for Long term Glucocorticoid medication (Steroids) R covered by Medicare Part B) No orders found for this or any previous visit.  This may be covered with your prescription benefits, but Medicare does not cover unless Medically needed    Zoster (Not covered by Medicare Part B) No orders found for this or any

## 2021-03-22 NOTE — PROGRESS NOTES
HPI:    Patient ID: Bette Avelar is a 60year old female.     Bette Avelar is a 60year old female who presents for a complete physical exam.   HPI:   Patient presents with:  Physical: annual exam     Hypertension  Patient is here for follow up of h 07/14/2017 02:48 PM     07/14/2017 02:48 PM      Lab Results   Component Value Date/Time    GLU 99 01/12/2018 02:55 PM     01/12/2018 02:55 PM    K 4.0 01/12/2018 02:55 PM     01/12/2018 02:55 PM    CO2 28 01/12/2018 02:55 PM    CREATSER IBUPROFEN 600 MG Oral Tab TAKE 1 TABLET(600 MG) BY MOUTH EVERY 8 HOURS AS NEEDED FOR PAIN 90 tablet 3   • methylPREDNISolone (MEDROL) 4 MG Oral Tablet Therapy Pack As directed.  (Patient not taking: Reported on 3/22/2021 ) 1 kit 0      Past Medical History: Expenses:   Food Insecurity:       Worried About [address] Your Mortgage in the Last Year:       Ran Out of Food in the Last Year:   Transportation Needs:       Lack of Transportation (Medical):       Lack of Transportation (Non-Medical):   Physical Activity: sores, nosebleeds, postnasal drip, rhinorrhea, sinus pressure, sinus pain, sneezing, sore throat, tinnitus, trouble swallowing and voice change. Eyes: Negative for photophobia, pain, discharge, redness, itching and visual disturbance.    Respiratory: Neg ALLERGY) 10 MG Oral Tab Take 1 tablet (10 mg total) by mouth daily. 10 tablet 0   • Sertraline HCl 100 MG Oral Tab TAKE 1 TABLET(100 MG) BY MOUTH DAILY 90 tablet 1   • Ascorbic Acid (VITAMIN C OR) Take by mouth.      • Cyanocobalamin (VITAMIN B-12 OR) Take Social History:   Social History    Tobacco Use      Smoking status: Never Smoker      Smokeless tobacco: Never Used    Alcohol use: No      Alcohol/week: 0.0 standard drinks    Drug use: No       PHYSICAL EXAM:   /85 (BP Location: Right arm, Pa eye: No foreign body, discharge or hordeolum. Left eye: No foreign body, discharge or hordeolum. Extraocular Movements:      Right eye: Normal extraocular motion and no nystagmus. Left eye: Normal extraocular motion and no nystagmus. There is no abdominal tenderness. There is no guarding or rebound. Genitourinary:     Exam position: Supine. Musculoskeletal:      Cervical back: Neck supple.    Lymphadenopathy:      Head:      Right side of head: No submental, submandibular, preauricu She has been screened for Falls and is low risk: Fall/Risk Scorin    Cognitive Assessment   She had a completely normal cognitive assessment- see flowsheet entries    Functional Ability/Status   Delfino Bridges has some abnormal functions as listed (H) 12/21/2016          Last Chemistry Labs:   Lab Results   Component Value Date    AST 24 07/14/2017    ALT 23 07/14/2017    CA 9.1 01/12/2018    ALB 3.8 07/14/2017    TSH 2.12 07/14/2017    CREATSERUM 0.77 01/12/2018    GLU 99 01/12/2018        CBC  (mo ; Laparoscopic cholecystectomy (04/01/1990); and colonoscopy (N/A, 10/1/2018).     Her family history includes Arthritis in her mother; Breast Cancer in her sister; Diabetes in her father; Macular degeneration in her mother; Pacemaker in her sister; sp social activities because I cannot hear well and fear I will reply improperly: No   Family members and friends have told me they think I may have hearing loss:  No               Visual Acuity  Right Eye Visual Acuity: Corrected Right Eye Chart Acuity: 20/20 Decrease sertaline 25 mg a day. RTC 4 months for follow up HTN. Diet assessment: fair     PLAN:  The patient indicates understanding of these issues and agrees to the plan. Reinforced healthy diet, lifestyle, and exercise.   Prescription medicati MALIKA 05/24/2007   No flowsheet data found. Pap and Pelvic      Pap: Every 3 yrs age 21-68 or Pap+HPV every 5 yrs age 33-67, age 72 and older at high risk There are no preventive care reminders to display for this patient.  Update Health Maintenance if ap Creatinine (mg/dL)   Date Value   01/12/2018 0.77    No flowsheet data found. Drug Serum Conc  Annually No results found for: DIGOXIN, DIG, VALP No flowsheet data found.        Advance Care Planning done today:     She does NOT have a Living Will on

## 2021-04-01 ENCOUNTER — TELEPHONE (OUTPATIENT)
Dept: INTERNAL MEDICINE CLINIC | Facility: CLINIC | Age: 66
End: 2021-04-01

## 2021-04-01 NOTE — TELEPHONE ENCOUNTER
Left message for patient letting her know she is due for her mammogram.    Mammogram order in system on 3/22/2021.

## 2021-04-30 ENCOUNTER — TELEPHONE (OUTPATIENT)
Dept: INTERNAL MEDICINE CLINIC | Facility: CLINIC | Age: 66
End: 2021-04-30

## 2021-04-30 NOTE — TELEPHONE ENCOUNTER
Left message for patient letting her know that she is due for her Mammogram since 2012. Order in the system. Left patient the central scheduling phone number.

## 2021-05-01 RX ORDER — SERTRALINE HYDROCHLORIDE 100 MG/1
TABLET, FILM COATED ORAL
Qty: 90 TABLET | Refills: 1 | Status: SHIPPED | OUTPATIENT
Start: 2021-05-01 | End: 2021-07-26 | Stop reason: DRUGHIGH

## 2021-07-26 ENCOUNTER — TELEPHONE (OUTPATIENT)
Dept: INTERNAL MEDICINE CLINIC | Facility: CLINIC | Age: 66
End: 2021-07-26

## 2021-07-26 ENCOUNTER — OFFICE VISIT (OUTPATIENT)
Dept: INTERNAL MEDICINE CLINIC | Facility: CLINIC | Age: 66
End: 2021-07-26
Payer: COMMERCIAL

## 2021-07-26 VITALS
HEIGHT: 64.75 IN | BODY MASS INDEX: 49.41 KG/M2 | WEIGHT: 293 LBS | SYSTOLIC BLOOD PRESSURE: 118 MMHG | DIASTOLIC BLOOD PRESSURE: 83 MMHG | OXYGEN SATURATION: 98 % | HEART RATE: 116 BPM | RESPIRATION RATE: 18 BRPM | TEMPERATURE: 99 F

## 2021-07-26 DIAGNOSIS — Z12.83 SKIN EXAM, SCREENING FOR CANCER: ICD-10-CM

## 2021-07-26 DIAGNOSIS — Z71.85 VACCINE COUNSELING: ICD-10-CM

## 2021-07-26 DIAGNOSIS — E55.9 VITAMIN D DEFICIENCY: ICD-10-CM

## 2021-07-26 DIAGNOSIS — H61.21 IMPACTED CERUMEN OF RIGHT EAR: ICD-10-CM

## 2021-07-26 DIAGNOSIS — Z01.00 EYE EXAM, ROUTINE: ICD-10-CM

## 2021-07-26 DIAGNOSIS — Z78.0 POST-MENOPAUSAL: ICD-10-CM

## 2021-07-26 DIAGNOSIS — I10 ESSENTIAL HYPERTENSION: Primary | ICD-10-CM

## 2021-07-26 PROCEDURE — 69210 REMOVE IMPACTED EAR WAX UNI: CPT | Performed by: INTERNAL MEDICINE

## 2021-07-26 PROCEDURE — 3008F BODY MASS INDEX DOCD: CPT | Performed by: INTERNAL MEDICINE

## 2021-07-26 PROCEDURE — 99215 OFFICE O/P EST HI 40 MIN: CPT | Performed by: INTERNAL MEDICINE

## 2021-07-26 PROCEDURE — 3074F SYST BP LT 130 MM HG: CPT | Performed by: INTERNAL MEDICINE

## 2021-07-26 PROCEDURE — 3079F DIAST BP 80-89 MM HG: CPT | Performed by: INTERNAL MEDICINE

## 2021-07-26 RX ORDER — ACETAMINOPHEN AND CODEINE PHOSPHATE 300; 30 MG/1; MG/1
1 TABLET ORAL EVERY 6 HOURS PRN
Qty: 20 TABLET | Refills: 0 | Status: SHIPPED | OUTPATIENT
Start: 2021-07-26 | End: 2022-01-24

## 2021-07-26 RX ORDER — IBUPROFEN 600 MG/1
600 TABLET ORAL EVERY 6 HOURS PRN
Qty: 90 TABLET | Refills: 1 | Status: SHIPPED | OUTPATIENT
Start: 2021-07-26 | End: 2022-01-24

## 2021-07-26 RX ORDER — SERTRALINE HYDROCHLORIDE 100 MG/1
TABLET, FILM COATED ORAL
Qty: 90 TABLET | Refills: 1 | Status: SHIPPED | OUTPATIENT
Start: 2021-07-26 | End: 2021-08-05 | Stop reason: DRUGHIGH

## 2021-07-26 RX ORDER — HYDROCHLOROTHIAZIDE 12.5 MG/1
12.5 TABLET ORAL DAILY
Qty: 90 TABLET | Refills: 1 | Status: SHIPPED | OUTPATIENT
Start: 2021-07-26 | End: 2022-01-24

## 2021-07-26 NOTE — PROGRESS NOTES
HPI:    Patient ID: Melinda Bowman is a 73year old female. Hypertension  Patient is here for follow up of hypertension. BP at home:   Not check. Has been compliant with medications.   Exercise level: trying to do more and has been following low salt d 12/21/2016 12:53 PM     (H) 12/21/2016 12:53 PM    NONHDLC 142 (H) 12/21/2016 12:53 PM          Ear Problem   There is pain in the right ear. This is a recurrent problem. The current episode started more than 1 month ago.  The problem occurs constant dental problem, drooling, ear discharge, ear pain, facial swelling, mouth sores, nosebleeds, postnasal drip, rhinorrhea, sinus pressure, sinus pain, sneezing, sore throat, tinnitus, trouble swallowing and voice change.     Eyes: Negative for photophobia, pa HOURS AS NEEDED FOR WHEEZING 18 g 0   • cetirizine (ZYRTEC ALLERGY) 10 MG Oral Tab Take 1 tablet (10 mg total) by mouth daily. 10 tablet 0   • Ascorbic Acid (VITAMIN C OR) Take by mouth. • Cyanocobalamin (VITAMIN B-12 OR) Take by mouth.      • Cholecalc tobacco: Never Used    Alcohol use: No      Alcohol/week: 0.0 standard drinks    Drug use: No       PHYSICAL EXAM:   /83 (BP Location: Left arm, Patient Position: Sitting, Cuff Size: large)   Pulse 116   Temp 98.6 °F (37 °C) (Tympanic)   Resp 18   Ht No complications. Neck:      Thyroid: No thyroid mass or thyromegaly. Trachea: Trachea and phonation normal.   Cardiovascular:      Rate and Rhythm: Normal rate and regular rhythm. Pulses: Normal pulses.            Carotid pulses are 2+ on the ri excercise at least 30 minutes 3-4 times a week. Check blood pressures at different times on different days. Can purchase own blood pressure monitor. If not, check at local pharmacy. Bake foods more and grill occasionally. Avoid fried foods. No salt.  Use ot

## 2021-07-26 NOTE — PATIENT INSTRUCTIONS
ASSESSMENT/PLAN:   Essential hypertension  (primary encounter diagnosis) Good control. Careful with diet and excercise at least 30 minutes 3-4 times a week. Check blood pressures at different times on different days.  Can purchase own blood pressure monitor stays inactive in the nerve cells. Years later, the virus can become active again and travel along the nerve to the skin. Most people have shingles only once. But it's possible to have it more than once. Who is at risk for shingles?   Anyone who has ever h away. To reduce symptoms:  · Apply ice packs or cool compresses, or soak in a cool bath. To make an ice pack, put ice cubes in a plastic bag that seals at the top. Wrap the bag in a clean, thin towel or cloth.  Never put ice or an ice pack directly on the s the past. Someone who never had chickenpox can get the virus from you. But instead of have shingles, the person may get chickenpox.  Until your blisters form scabs, don't have any contact with others, especially the following:  · Pregnant women who have nev help. Text STOP to end. Zoster Vaccine, Recombinant injection  Brand Name: [facility]  What is this medicine? ZOSTER VACCINE (ZOS ter vak SEEN) is used to prevent shingles in adults 48years old and over.  This vaccine is not used to treat shingles or nerv preservatives  · pregnant or trying to get pregnant  · breast-feeding  What should I watch for while using this medicine? Visit your doctor for regular check ups. This vaccine, like all vaccines, may not fully protect everyone.   NOTE:This sheet is a summ

## 2021-07-27 NOTE — TELEPHONE ENCOUNTER
Message # [phone]         2021 05:06p   [[email or web address]]  To:  From:  MICHAEAL Alexandra MD:  Phone#:  ----------------------------------------------------------------------  Aaliyah Clancy [phone] RE: Karishma Neumann,  57, DRUG INTERACTION, PCP C

## 2021-07-27 NOTE — TELEPHONE ENCOUNTER
[employer], spoke with  Anais Buckner And clarify what medications were they looking for the drug interaction? Stated that IBUPROFEN and SERTRALINE  Will increase risk of bleeding , asking it  this is ok to dispense.   Advised that per Dr Sharon Gama it is ok to d

## 2021-08-02 ENCOUNTER — TELEPHONE (OUTPATIENT)
Dept: INTERNAL MEDICINE CLINIC | Facility: CLINIC | Age: 66
End: 2021-08-02

## 2021-08-02 NOTE — TELEPHONE ENCOUNTER
Pharmacy would like to clarify if patient is taking 100 mg or 50 mg. Patient picked up the 100 mg script.     Sertraline HCl 100 MG Oral Tab 90 tablet 1 7/26/2021    Sig:   TAKE 1 TABLET(100 MG) BY MOUTH DAILY     Route:   (none)     Order #:   [id number]

## 2021-08-03 NOTE — TELEPHONE ENCOUNTER
On July 26 when she was here I gave her 100. I believe she should be on 100. We did increase it or decrease it on March to 25 mg . We can check with pt.

## 2021-08-03 NOTE — TELEPHONE ENCOUNTER
Contacted [facility], spoke with Joseph Causey and informed of last dose that was sent in on 7/26/2021 for Sertraline 100mh. RN reviewed chart from 7-26-21 visit, no documentation regarding change of dosage from 50 to 100.      Sertraline HCl 100 MG Oral Tab 90 ta

## 2021-08-05 RX ORDER — SERTRALINE HYDROCHLORIDE 25 MG/1
25 TABLET, FILM COATED ORAL DAILY
Qty: 90 TABLET | Refills: 0 | Status: SHIPPED | OUTPATIENT
Start: 2021-08-05 | End: 2021-11-18

## 2021-08-05 NOTE — TELEPHONE ENCOUNTER
Pt states that as of March 2021, she was prescribed 50mg tablets which she then cut in half. She has been taking 25mg since March. This has been working very well for her. She is asking for a refill now of the 50mg tablets. Please advise.

## 2021-08-06 NOTE — TELEPHONE ENCOUNTER
Refill of Sertraline 25 mg was sent by provider to pharmacy    sertraline 25 MG Oral Tab 90 tablet 0 8/5/2021     Sig - Route:  Take 1 tablet (25 mg total) by mouth daily. - Oral    Sent to pharmacy as: Sertraline HCl 25 MG Oral Tablet (ZOLOFT)    E-Prescri

## 2021-09-25 NOTE — TELEPHONE ENCOUNTER
Left a message to patient to see if she can come back to do the ear irrigation, but to come no later than 4:30pm
Message left for pt that she can schedule a nurse visit only to check her ears.
OK o do RN only visit and have me check her ears.
Patient called back she states if she can come back tomorrow after 4:30 to have ear irrigation
Please see message bellow.
cocaine, marihuana  and crystal meth per pt report. Last date of use reported for cocaine last week/Yes

## 2021-11-17 RX ORDER — METOPROLOL SUCCINATE 100 MG/1
50 TABLET, EXTENDED RELEASE ORAL 2 TIMES DAILY
Qty: 90 TABLET | Refills: 0 | Status: SHIPPED | OUTPATIENT
Start: 2021-11-17 | End: 2022-01-24

## 2021-11-17 NOTE — TELEPHONE ENCOUNTER
Please review. Protocol failed / No protocol.   Requested Prescriptions   Pending Prescriptions Disp Refills    METOPROLOL SUCCINATE 100 MG Oral Tablet 24 Hr [Pharmacy Med Name: METOPROLOL ER SUCCINATE 100MG TABS] 90 tablet 1     Sig: TAKE 1/2 TABLET(50 MG) BY MOUTH TWICE DAILY        Hypertensive Medications Protocol Failed - 11/17/2021  1:36 PM        Failed - CMP or BMP in past 12 months        Passed - Appointment in past 6 or next 3 months        Passed - GFR Non- > 50     Lab Results   Component Value Date    GFRNAA >60 01/12/2018                      Recent Outpatient Visits              3 months ago Essential hypertension    Laly Velez MD    Office Visit    8 months ago Essential hypertension    Laly Velez MD    Office Visit    1 year ago Fever with exposure to COVID-19 virus    [facility], [facility], [address] [place], [place] [address], [place]    Virtual Phone E/M    1 year ago URTI (acute upper respiratory infection)    Leila Velez MD    Office Visit    2 years ago Essential hypertension    Laly Velez MD    Office Visit          Future Appointments         Provider Department Appt Notes    In 3 weeks [facility] DEXA RM1; [address] Just the test    In 3 weeks [facility] DEV RM1; [address] Just the teat    In 2 months Mary Fisher MD [facility], [facility], [address] [facility] f/u

## 2021-11-18 RX ORDER — SERTRALINE HYDROCHLORIDE 25 MG/1
25 TABLET, FILM COATED ORAL DAILY
Qty: 90 TABLET | Refills: 1 | Status: SHIPPED | OUTPATIENT
Start: 2021-11-18 | End: 2022-08-23

## 2021-11-18 NOTE — TELEPHONE ENCOUNTER
Please see drug interaction and duplicate therapy. Medication pended for your review and approval.      Refill passed per [place] [facility] protocol.    Requested Prescriptions   Pending Prescriptions Disp Refills    SERTRALINE 25 MG Oral Tab [Pharmacy Med Name: SERTRALINE 25MG TABLETS] 90 tablet 0     Sig: TAKE 1 TABLET(25 MG) BY MOUTH DAILY        Psychiatric Non-Scheduled (Anti-Anxiety) Passed - 11/18/2021  3:43 PM        Passed - Appointment in last 6 or next 3 months               Recent Outpatient Visits              3 months ago Essential hypertension    [address] [facility], Bon Beth MD    Office Visit    8 months ago Essential hypertension    [facility] [address], Juju Santoyo MD    Office Visit    1 year ago Fever with exposure to COVID-19 virus    [facility] [address], [place], NANCY Nix    Virtual Phone E/M    1 year ago URTI (acute upper respiratory infection)    [facility] [address], MD Eva    Office Visit    2 years ago Essential hypertension    [facility], Bon Salvador., MD    Office Visit            Future Appointments         Provider Department Appt Notes    In 3 weeks [facility] DEXA RM1; [address] Just the test    In 2 months Vijaya Vee MD [facility] [address] [facility] 6 mth f/u

## 2021-12-14 ENCOUNTER — HOSPITAL ENCOUNTER (OUTPATIENT)
Dept: MAMMOGRAPHY | Age: 66
Discharge: HOME | End: 2021-12-14
Attending: INTERNAL MEDICINE
Payer: COMMERCIAL

## 2021-12-14 DIAGNOSIS — Z00.00 ADULT GENERAL MEDICAL EXAM: ICD-10-CM

## 2021-12-14 DIAGNOSIS — E55.9 VITAMIN D DEFICIENCY: ICD-10-CM

## 2021-12-14 DIAGNOSIS — Z12.31 ENCOUNTER FOR SCREENING MAMMOGRAM FOR MALIGNANT NEOPLASM OF BREAST: ICD-10-CM

## 2021-12-14 PROCEDURE — 77067 SCR MAMMO BI INCL CAD: CPT | Performed by: INTERNAL MEDICINE

## 2021-12-14 PROCEDURE — 77063 BREAST TOMOSYNTHESIS BI: CPT | Performed by: INTERNAL MEDICINE

## 2022-01-24 ENCOUNTER — OFFICE VISIT (OUTPATIENT)
Dept: INTERNAL MEDICINE CLINIC | Facility: CLINIC | Age: 67
End: 2022-01-24
Payer: COMMERCIAL

## 2022-01-24 VITALS
SYSTOLIC BLOOD PRESSURE: 107 MMHG | HEIGHT: 64 IN | DIASTOLIC BLOOD PRESSURE: 71 MMHG | WEIGHT: 263 LBS | BODY MASS INDEX: 44.9 KG/M2 | HEART RATE: 91 BPM

## 2022-01-24 DIAGNOSIS — I10 ESSENTIAL HYPERTENSION: Primary | ICD-10-CM

## 2022-01-24 DIAGNOSIS — E55.9 VITAMIN D DEFICIENCY: ICD-10-CM

## 2022-01-24 DIAGNOSIS — Z71.85 VACCINE COUNSELING: ICD-10-CM

## 2022-01-24 DIAGNOSIS — Z00.00 ADULT GENERAL MEDICAL EXAM: ICD-10-CM

## 2022-01-24 DIAGNOSIS — M17.11 PRIMARY OSTEOARTHRITIS OF RIGHT KNEE: ICD-10-CM

## 2022-01-24 PROCEDURE — 99214 OFFICE O/P EST MOD 30 MIN: CPT | Performed by: INTERNAL MEDICINE

## 2022-01-24 PROCEDURE — 3008F BODY MASS INDEX DOCD: CPT | Performed by: INTERNAL MEDICINE

## 2022-01-24 PROCEDURE — 3074F SYST BP LT 130 MM HG: CPT | Performed by: INTERNAL MEDICINE

## 2022-01-24 PROCEDURE — 90662 IIV NO PRSV INCREASED AG IM: CPT | Performed by: INTERNAL MEDICINE

## 2022-01-24 PROCEDURE — 3078F DIAST BP <80 MM HG: CPT | Performed by: INTERNAL MEDICINE

## 2022-01-24 PROCEDURE — 90471 IMMUNIZATION ADMIN: CPT | Performed by: INTERNAL MEDICINE

## 2022-01-24 RX ORDER — METOPROLOL SUCCINATE 100 MG/1
50 TABLET, EXTENDED RELEASE ORAL 2 TIMES DAILY
Qty: 90 TABLET | Refills: 1 | Status: SHIPPED | OUTPATIENT
Start: 2022-01-24

## 2022-01-24 RX ORDER — ACETAMINOPHEN AND CODEINE PHOSPHATE 300; 30 MG/1; MG/1
1 TABLET ORAL EVERY 6 HOURS PRN
Qty: 20 TABLET | Refills: 0 | Status: SHIPPED | OUTPATIENT
Start: 2022-01-24

## 2022-01-24 RX ORDER — IBUPROFEN 600 MG/1
600 TABLET ORAL EVERY 6 HOURS PRN
Qty: 90 TABLET | Refills: 1 | Status: SHIPPED | OUTPATIENT
Start: 2022-01-24

## 2022-01-24 RX ORDER — HYDROCHLOROTHIAZIDE 12.5 MG/1
12.5 TABLET ORAL DAILY
Qty: 90 TABLET | Refills: 1 | Status: SHIPPED | OUTPATIENT
Start: 2022-01-24

## 2022-01-24 NOTE — PROGRESS NOTES
HPI:    Patient ID: Erika Díaz is a 73year old female. Sister in hospital with CAD and MRSA CAP. Stressed. Hypertension  Patient is here for follow up of hypertension. BP at home: not check. Has been compliant with medications.   Exercise le (H) 12/21/2016 12:53 PM    NONHDLC 142 (H) 12/21/2016 12:53 PM          Shoulder Pain   The pain is present in the right shoulder. This is a recurrent problem. The current episode started more than 1 month ago. The problem occurs constantly.  The problem ha stridor. Cardiovascular: Negative for chest pain, palpitations and leg swelling. Gastrointestinal: Negative for abdominal distention and abdominal pain. Endocrine: Negative for cold intolerance, heat intolerance, polydipsia, polyphagia and polyuria. EACH NOSTRIL DAILY 1 Bottle 3     Allergies:   Ace Inhibitors          Coughing  Azithromycin                Comment:Other reaction(s): AZITHROMYCIN  Erythromycin                Comment:Other reaction(s): Rash - Mild  Naproxen                    Comment:Oth Constitutional:       General: She is not in acute distress. Appearance: Normal appearance. She is well-developed. She is not ill-appearing, toxic-appearing or diaphoretic. Interventions: She is not intubated.   Neck:      Thyroid: No thyroid mas lacerations, tenderness, bony tenderness or crepitus. Normal range of motion. Normal strength. Right knee: Swelling, deformity and crepitus present. No effusion, erythema, ecchymosis, lacerations or bony tenderness. Decreased range of motion.  No tende Encounter      FLU VACC HIGH DOSE PRSV FREE      Meds This Visit:  Requested Prescriptions     Signed Prescriptions Disp Refills   • metoprolol succinate 100 MG Oral Tablet 24 Hr 90 tablet 1     Sig: Take 0.5 tablets (50 mg total) by mouth 2 (two) times da

## 2022-01-24 NOTE — PATIENT INSTRUCTIONS
ASSESSMENT/PLAN:   Essential hypertension  (primary encounter diagnosis) Good control. Careful with diet and excercise at least 30 minutes 3-4 times a week. Check blood pressures at different times on different days.  Can purchase own blood pressure restricted movement in the shoulder. If you have frozen shoulder, this stretch may cause discomfort, especially when you first get started. A few months may pass before you achieve the results you want.  But once your shoulder heals, it rarely becomes froze program. If you have any questions, be sure to ask your doctor. Aron last reviewed this educational content on 3/1/2018  © 7287-2067 The Y[employer] 4037. 1407 [facility], [address] [place] [address]. All rights reserved.  This information is not int are given. If you feel pain, stop the exercise. If the pain continues after stopping, call your healthcare provider:  · Stand with your shoulder about 2 feet from the wall.   · Raise your arm to shoulder level and gently “walk” your fingers up the wall as h and keep your head level. Avoid arching your back or rounding your shoulders. Using a chair with arms may help you keep your balance. · Raise your arms, elbows bent, to shoulder height. · Slowly move your forearms together. Hold for 5 seconds.   · Return educational content on 11/1/2017  © 2695-8840 The [employer]37. 1407 [facility], [facility] [employer] [place]. All rights reserved. This information is not intended as a substitute for professional medical care.  Always follow your healthcare professio noting pain at the time. Other causes include:  · Overexertion, lifting, pushing, pulling incorrectly or too aggressively. · Sudden twisting, bending or stretching from an accident (car or fall), or accidental movement.   · Poor posture  · Poor conditionin problems or are taking other medicines. · You may use over-the-counter medicine to control pain, unless another pain medicine was prescribed.  If you have chronic conditions like diabetes, liver or kidney disease, stomach ulcers,  gastrointestinal bleeding tendons. Causes of cervical strain  Different types of stress on the neck can damage muscles and tendons (soft tissues) and cause cervical strain.  Cervical tissues can be damaged by:  · The neck being forced past its normal range of motion, such as in a c flat on the floor, or stand up. Relax your shoulders. 9. Look straight ahead. Gently glide your chin straight back. It’s a small movement. Don’t tilt your head up or down, or bend your neck forward. 10. Hold for 5 seconds. Then relax.   11. Repeat 5 times Keep your shoulders and neck aligned and your elbows behind your shoulders:  · With your palms facing the ceiling, turn your fingers inward. · Take a deep breath. Breathe out, and lower your elbows toward your buttocks.  Hold for 5 seconds, then return to 3/1/2018  © 8097-9641 [employer]. 1407 [facility], [facility] [place]. All rights reserved. This information is not intended as a substitute for professional medical care. Always follow your healthcare professional's instructions. shoulders, and hips aligned, but don’t press your lower back to the floor. Rest your hands on your pelvis. Breathe deeply and relax.   Here are the steps for the active neck rotation:  · Use your neck muscles to turn your head to one side until you feel a s are the steps for passive neck rotation:   · With your neck relaxed, place the palm of one hand on your forehead. Use your hand to turn your head to one side until you feel a stretch in the neck muscles. Do not push through pain. · Hold for 5 seconds.  The Repeat 5 times. For your safety, check with your healthcare provider before starting an exercise program.  Date Last Reviewed: 11/1/2017  © 8730-9481 The Yael 4037. 1407 [facility] [place], [address] [place] [address]. All rights reserved.  This informati help relieve your pain:  · Lying down for a short time takes pressure from the head off the neck. · Ice and heat can help reduce pain. To bring down swelling, rest an ice pack wrapped in a thin towel on your neck for 10 to 15 minutes.  To relax sore muscle help relieve pain and swelling. In some cases, surgery may be needed to treat neck problems. Date Last Reviewed: 10/1/2017  © 4597-6246 The [employer]. 1407 [facility], [address] [place] [address]. All rights reserved.  This information is not inte practice good body mechanics. Standing   To protect your neck while standing:  · Carry objects close to your body. · Keep your ears and shoulders in a line while standing or walking. · To lower yourself, bend at the knees with a straight back.  Do this i disease, arthritis in the spinal joints or spinal stenosis (narrowing of the spinal canal) can become chronic and last for months or years. Back and neck pain are common problems.  Most people feel better in 1 or 2 weeks, and most of the rest in 1 to 2 mon towards your chest and a pillow between your knees. · At first, do not try to stretch out the sore spots. If there is a strain, it is not like the good soreness you get after exercising without an injury. In this case, stretching may make it worse.   · Raul Priest breathing  · Confusion  · Very drowsy or trouble awakening  · Fainting or loss of consciousness  · Rapid or very slow heart rate  · Loss of bowel or bladder control  When to seek medical advice  Call your healthcare provider right away if any of these occu include:  · Over-the-counter or prescription pain medicine. These help relieve pain and inflammation. · Stretching exercises to decrease neck stiffness. · Massage to decrease neck stiffness. · Cold or heat pack. These help reduce pain and swelling.   Alejandro or sagging). Keep your ears in line with your shoulders. Hold for a few seconds before starting the exercise:  16. Tighten your belly muscles and raise one arm straight in front of you, palm down. Hold for 5 seconds, then lower. Repeat 5 times.   17. Do the position for a few seconds before starting your exercise. This helps increase your awareness of proper posture. · Imagine that your right shoulder is the center of a clock.  With the outer point of your shoulder, roll it around to slowly trace the outer ed Yael 4037. 1407 [facility], [address] [place] [address]. All rights reserved. This information is not intended as a substitute for professional medical care. Always follow your healthcare professional's instructions.         Shoulder Squeeze Exerci bent and feet flat on the floor. Keep your ears, shoulders, and hips aligned, but don’t press your lower back to the floor. Rest your hands on your pelvis. Breathe deeply and relax. Tighten the belly muscles to keep the back from arching.   Here are the christin wrist with your left hand. 20. Try to push your right arm outward, while pulling back with your left arm. Try not to let either arm move. Push and pull both arms firmly in opposite directions. 21. Hold for 5 seconds. Then relax. 22. Repeat 5 times.   23. switch to a counterclockwise direction, and repeat the exercise 5 times, or as instructed. Challenge yourself  You can also do this exercise while sitting at your work desk. Sit up straight with your back supported firmly against your chair.  You can do neck pain, your healthcare provider may suggest physical therapy. Physical therapy is done by a specialist trained to treat injuries and musculoskeletal disorders.  Your physical therapist (PT) will teach you how to strengthen muscles, improve the spine’s a matter? Posture is the way you hold your body. For many of us, this means hunching over, thrusting the chin forward, and slouching the shoulders.  But this kind of poor posture keeps muscles from properly supporting the neck and puts stress on muscles, dis protect your neck while sleeping:  · Sleep on your back with a pillow under your knees or on your side with a pillow between bent knees. This helps align the spine. · Avoid using pillows that are too high or too low.  Instead, use a neck roll or pillow und (stiff) shoulder firmly against your body. 18. Standing in the same spot, rotate your body away from the doorjamb. Stop when you feel the stretch in the shoulder. At first, try to hold the stretch for 5 seconds.   23. Work up to doing 3 sets of this stretc for 30 to 60 seconds. Note: Be sure to push your elbow across your chest, not up toward your chin. Over time, try to push your elbow farther across your chest to enhance the stretch.   Frozen shoulder is another name for adhesive capsulitis, which causes r content on 3/1/2018  © 6032-3226 The [employer]. 1407 [facility], [address] [place]. All rights reserved. This information is not intended as a substitute for professional medical care.  Always follow your healthcare professional's instruc counterclockwise. · Repeat 3 to 5 times. Switch shoulders. Aron last reviewed this educational content on 3/1/2018  © 6428-3330 The Yael 4037. 1407 [facility], [address] [place] [address]. All rights reserved.  This information is not intende professional's instructions. Shoulder Squeeze Exercise    To start, sit in a chair with your feet flat on the floor. Shift your weight slightly forward to avoid rounding your back. Relax.  Keep your ears, shoulders, and hips aligned:  · Raise your ar can spread or radiate upwards, downwards, to the front, or go down your arms  · Muscle spasm may occur. Most of the time mechanical problems with the muscles or spine cause the pain.  it is usually caused by an injury, whether known or not, to the muscles day.   · You can alternate ice and heat therapies. Talk with your healthcare provider about the best treatment for your back or neck pain. As a safety precaution, do not use a heating pad at bedtime.  Sleeping with a heating pad can lead to skin burns or ti   [address], [place], [address] [place]. All rights reserved. This information is not intended as a substitute for professional medical care. Always follow your healthcare professional's instructions.         Understanding Cervical Strain    There are 7 bones (vertebrae) i (38°C) or higher, or as directed  · Pain or stiffness that gets worse  · Symptoms that don’t get better, or get worse  · Numbness, tingling, weakness or shooting pains into the arms or legs  · New symptoms  Date Last Reviewed: 3/10/2016  © 3125-4949 [facility] [address] All rights reserved. This information is not intended as a substitute for professional medical care. Always follow your healthcare professional's instructions.         Shoulder and Upper Back Stretch  To start, stand tall with your ears, shoulders, and hips healthcare professional's instructions. Shoulder Exercises    To start, sit in a chair with your feet flat on the floor. Your weight should be slightly forward so that you’re balanced evenly on your buttocks.  Relax your shoulders and keep your head Return to starting position. · Repeat 5 times. For your safety, check with your healthcare provider before starting an exercise program.   Date Last Reviewed: 11/1/2017  © 4171-8158 The Yael 4037. 1407 [facility], [address] [place] [address]. Date Last Reviewed: 11/1/2017  © 0638-6380 The [employer]37. 1407 [facility], [address] [place]. All rights reserved. This information is not intended as a substitute for professional medical care.  Always follow your healthcare profession instructions. Exercises: Neck Isometrics  To start, sit in a chair with your feet flat on the floor. Your weight should be slightly forward so that you’re balanced evenly on your buttocks. Relax your shoulders and keep your head level.  Using a chair instructions. Neck Problems: Relieving Your Symptoms    The first goal of treatment is to relieve your symptoms. Your healthcare provider may recommend self-care treatments.  These include resting, applying ice and heat, taking medicine, and doing ex PT gently moves your vertebrae to help restore motion in your neck joints and reduce neck pain. · Soft tissue mobilization. The PT massages and stretches the muscles in your neck and shoulders. · Electrical stimulation.  Electrical impulses are sent into down? All these can lead to neck tension and pain. Improving your posture  Follow these steps to improve your posture:  · Pull your shoulders back. · Think of the ears, shoulders, and hips as a series of dots.  Now, adjust your body to connect the dots in follow your healthcare professional's instructions. No orders of the defined types were placed in this encounter.       Meds This Visit:  Requested Prescriptions      No prescriptions requested or ordered in this encounter       Imaging & Referrals:  N

## 2022-06-02 ENCOUNTER — TELEPHONE (OUTPATIENT)
Dept: INTERNAL MEDICINE CLINIC | Facility: CLINIC | Age: 67
End: 2022-06-02

## 2022-06-02 NOTE — TELEPHONE ENCOUNTER
patient came in to the [facility] office to drop off her handicap form to be completed .  Please call when completed to

## 2022-06-06 ENCOUNTER — MED REC SCAN ONLY (OUTPATIENT)
Dept: INTERNAL MEDICINE CLINIC | Facility: CLINIC | Age: 67
End: 2022-06-06

## 2022-06-06 NOTE — TELEPHONE ENCOUNTER
Message left for pt that handicap placard form is ready to be picked up at [address] [place] [address]. office.

## 2022-06-26 PROBLEM — Z12.11 COLON CANCER SCREENING: Status: ACTIVE | Noted: 2022-06-26

## 2022-06-27 ENCOUNTER — OFFICE VISIT (OUTPATIENT)
Dept: INTERNAL MEDICINE CLINIC | Facility: CLINIC | Age: 67
End: 2022-06-27
Payer: COMMERCIAL

## 2022-06-27 VITALS
WEIGHT: 237.38 LBS | OXYGEN SATURATION: 98 % | BODY MASS INDEX: 40.52 KG/M2 | RESPIRATION RATE: 17 BRPM | DIASTOLIC BLOOD PRESSURE: 65 MMHG | HEART RATE: 87 BPM | SYSTOLIC BLOOD PRESSURE: 98 MMHG | HEIGHT: 64 IN | TEMPERATURE: 98 F

## 2022-06-27 DIAGNOSIS — K42.9 UMBILICAL HERNIA WITHOUT OBSTRUCTION AND WITHOUT GANGRENE: ICD-10-CM

## 2022-06-27 DIAGNOSIS — H91.93 BILATERAL HEARING LOSS, UNSPECIFIED HEARING LOSS TYPE: ICD-10-CM

## 2022-06-27 DIAGNOSIS — E55.9 VITAMIN D DEFICIENCY: ICD-10-CM

## 2022-06-27 DIAGNOSIS — G89.29 CHRONIC LEFT SHOULDER PAIN: ICD-10-CM

## 2022-06-27 DIAGNOSIS — Z71.85 VACCINE COUNSELING: ICD-10-CM

## 2022-06-27 DIAGNOSIS — Z12.31 ENCOUNTER FOR SCREENING MAMMOGRAM FOR MALIGNANT NEOPLASM OF BREAST: ICD-10-CM

## 2022-06-27 DIAGNOSIS — M25.512 CHRONIC LEFT SHOULDER PAIN: ICD-10-CM

## 2022-06-27 DIAGNOSIS — Z12.11 COLON CANCER SCREENING: ICD-10-CM

## 2022-06-27 DIAGNOSIS — I10 ESSENTIAL HYPERTENSION: ICD-10-CM

## 2022-06-27 DIAGNOSIS — F32.1 MODERATE SINGLE CURRENT EPISODE OF MAJOR DEPRESSIVE DISORDER (HCC): ICD-10-CM

## 2022-06-27 DIAGNOSIS — Z00.00 ADULT GENERAL MEDICAL EXAM: Primary | ICD-10-CM

## 2022-06-27 DIAGNOSIS — Z86.711 HISTORY OF PULMONARY EMBOLISM: ICD-10-CM

## 2022-06-27 LAB
APPEARANCE: CLEAR
BILIRUBIN: NEGATIVE
GLUCOSE (URINE DIPSTICK): NEGATIVE MG/DL
KETONES (URINE DIPSTICK): NEGATIVE MG/DL
LEUKOCYTES: NEGATIVE
MULTISTIX LOT#: NORMAL NUMERIC
NITRITE, URINE: NEGATIVE
OCCULT BLOOD: NEGATIVE
PH, URINE: 5.5 (ref 4.5–8)
PROTEIN (URINE DIPSTICK): NEGATIVE MG/DL
SPECIFIC GRAVITY: 1.02 (ref 1–1.03)
URINE-COLOR: YELLOW
UROBILINOGEN,SEMI-QN: 1 MG/DL (ref 0–1.9)

## 2022-06-27 PROCEDURE — 81003 URINALYSIS AUTO W/O SCOPE: CPT | Performed by: INTERNAL MEDICINE

## 2022-06-27 PROCEDURE — 3008F BODY MASS INDEX DOCD: CPT | Performed by: INTERNAL MEDICINE

## 2022-06-27 PROCEDURE — 3078F DIAST BP <80 MM HG: CPT | Performed by: INTERNAL MEDICINE

## 2022-06-27 PROCEDURE — 99214 OFFICE O/P EST MOD 30 MIN: CPT | Performed by: INTERNAL MEDICINE

## 2022-06-27 PROCEDURE — 99397 PER PM REEVAL EST PAT 65+ YR: CPT | Performed by: INTERNAL MEDICINE

## 2022-06-27 PROCEDURE — 3074F SYST BP LT 130 MM HG: CPT | Performed by: INTERNAL MEDICINE

## 2022-08-21 RX ORDER — METOPROLOL SUCCINATE 100 MG/1
50 TABLET, EXTENDED RELEASE ORAL 2 TIMES DAILY
Qty: 90 TABLET | Refills: 1 | Status: SHIPPED | OUTPATIENT
Start: 2022-08-21 | End: 2023-03-16

## 2022-08-23 NOTE — TELEPHONE ENCOUNTER
Refill passed per [employer] protocol.      Requested Prescriptions   Pending Prescriptions Disp Refills    SERTRALINE 25 MG Oral Tab [Pharmacy Med Name: SERTRALINE 25MG TABLETS] 90 tablet 1     Sig: TAKE 1 TABLET(25 MG) BY MOUTH DAILY        Psychiatric Non-Scheduled (Anti-Anxiety) Passed - 8/23/2022  3:43 PM        Passed - In person appointment or virtual visit in the past 6 mos or appointment in next 3 mos       Recent Outpatient Visits              1 month ago Adult general medical exam    Corrinne Hilt., MD    Office Visit    7 months ago Essential hypertension    Lorenzo Nielson MD    Office Visit    1 year ago Essential hypertension    Lorenzo Nielson MD    Office Visit    1 year ago Essential hypertension    Lorenzo Nielson MD    Office Visit    1 year ago Fever with exposure to COVID-19 virus    [facility], [address] [facility], Aleksandra Ordonez, PA-C    Virtual Phone E/M     Future Appointments         Provider Department Appt Notes    In 1 month Larisa Banuelos MD [facility], [address], [facility]                       Recent Outpatient Visits              1 month ago Adult general medical exam    Lornezo Nielson MD    Office Visit    7 months ago Essential hypertension    Lorenzo Nielson MD    Office Visit    1 year ago Essential hypertension    Lorenzo Nielson MD    Office Visit    1 year ago Essential hypertension    Lorenzo Nielson MD    Office Visit    1 year ago Fever with exposure to COVID-19 virus    [facility], [address] [facility]-C    Virtual Phone E/M             Future Appointments         Provider Department Appt Notes    In 1 month Maryjane Thomas MD [address], [address] [facility] [address] [facility] [address], Mary

## 2022-08-24 RX ORDER — SERTRALINE HYDROCHLORIDE 25 MG/1
25 TABLET, FILM COATED ORAL DAILY
Qty: 90 TABLET | Refills: 1 | Status: SHIPPED | OUTPATIENT
Start: 2022-08-24 | End: 2023-03-16

## 2022-12-13 RX ORDER — ACETAMINOPHEN AND CODEINE PHOSPHATE 300; 30 MG/1; MG/1
1 TABLET ORAL EVERY 6 HOURS PRN
Qty: 20 TABLET | Refills: 0 | Status: CANCELLED | OUTPATIENT
Start: 2022-12-13

## 2023-03-16 RX ORDER — METOPROLOL SUCCINATE 100 MG/1
TABLET, EXTENDED RELEASE ORAL
Qty: 90 TABLET | Refills: 1 | Status: SHIPPED | OUTPATIENT
Start: 2023-03-16

## 2023-03-16 RX ORDER — SERTRALINE HYDROCHLORIDE 25 MG/1
25 TABLET, FILM COATED ORAL DAILY
Qty: 90 TABLET | Refills: 1 | Status: SHIPPED | OUTPATIENT
Start: 2023-03-16

## 2023-03-16 NOTE — TELEPHONE ENCOUNTER
Protocol failed or has No Protocol, please review  Requested Prescriptions   Pending Prescriptions Disp Refills    METOPROLOL SUCCINATE  MG Oral Tablet 24 Hr [Pharmacy Med Name: METOPROLOL ER SUCCINATE 100MG TABS] 90 tablet 1     Sig: TAKE 1/2 TABLET(50 MG) BY MOUTH TWICE DAILY       Hypertensive Medications Protocol Failed - 3/15/2023  7:54 PM        Failed - CMP or BMP in past 6 months     No results found for this or any previous visit (from the past 4392 hour(s)).             Failed - In person appointment or virtual visit in the past 6 months     Recent Outpatient Visits              8 months ago Adult general medical exam    Gómez Deutsch MD    Office Visit    1 year ago Essential hypertension    Gómez Deutsch., MD    Office Visit    1 year ago Essential hypertension    Gómez Deutsch., MD    Office Visit    1 year ago Essential hypertension    Opheliadaniel Mullen, [address], Gómez Carter MD    Office Visit    2 years ago Fever with exposure to COVID-19 virus    Sergiodaniel Mullen, [address] [facility] Namrata Rosario PA-C    Virtual Phone E/M                      Failed - EGFRCR or GFRNAA > 50     GFR Evaluation            Passed - In person appointment in the past 12 or next 3 months     Recent Outpatient Visits              8 months ago Adult general medical exam    Gómez Deutsch MD    Office Visit    1 year ago Essential hypertension    Gómez Deutsch., MD    Office Visit    1 year ago Essential hypertension    Gómez Deutsch., MD    Office Visit    1 year ago Essential hypertension    Ophelia Sebas, [address], Payton Palmer MD    Office Visit    2 years ago Fever with exposure to COVID-19 virus    [facility], [address] [place] [address], PA-C    Virtual Phone E/M                      Passed - Last BP reading less than 140/90     BP Readings from Last 1 Encounters:  06/27/22 : 98/65                SERTRALINE 25 MG Oral Tab [Pharmacy Med Name: SERTRALINE 25MG TABLETS] 90 tablet 1     Sig: TAKE 1 TABLET(25 MG) BY MOUTH DAILY       Psychiatric Non-Scheduled (Anti-Anxiety) Failed - 3/15/2023  7:54 PM        Failed - In person appointment or virtual visit in the past 6 mos or appointment in next 3 mos     Recent Outpatient Visits              8 months ago Adult general medical exam    Payton Gonzalez MD    Office Visit    1 year ago Essential hypertension    Payton Gonzalez MD    Office Visit    1 year ago Essential hypertension    Payton Gonzalez MD    Office Visit    1 year ago Essential hypertension    Payton Gonzalez MD    Office Visit    2 years ago Fever with exposure to COVID-19 virus    [facility], [address] [facility] Tamie Momin, NANCY    Virtual Phone E/M                           Recent Outpatient Visits              8 months ago Adult general medical exam    Payton Gonzalez MD    Office Visit    1 year ago Essential hypertension    Payton Gonzalez MD    Office Visit    1 year ago Essential hypertension    Payton Gonzalez MD    Office Visit    1 year ago Essential hypertension    [facility], [address] [facility],[address], Payton Palmer MD    Office Visit    2 years ago Fever with exposure to COVID-19 virus    [address], [address] Tamera Latch, NANCY    [employer] E/M

## 2023-10-22 RX ORDER — SERTRALINE HYDROCHLORIDE 25 MG/1
25 TABLET, FILM COATED ORAL DAILY
Qty: 90 TABLET | Refills: 0 | OUTPATIENT
Start: 2023-10-22

## 2023-10-22 RX ORDER — METOPROLOL SUCCINATE 100 MG/1
50 TABLET, EXTENDED RELEASE ORAL 2 TIMES DAILY
Qty: 90 TABLET | Refills: 0 | OUTPATIENT
Start: 2023-10-22

## 2023-10-22 NOTE — TELEPHONE ENCOUNTER
Please review. Protocol failed / No Protocol.         Requested Prescriptions   Pending Prescriptions Disp Refills    SERTRALINE 25 MG Oral Tab [Pharmacy Med Name: SERTRALINE 25MG TABLETS] 90 tablet 1     Sig: TAKE 1 TABLET(25 MG) BY MOUTH DAILY       Psychiatric Non-Scheduled (Anti-Anxiety) Failed - 10/20/2023  7:33 PM        Failed - In person appointment or virtual visit in the past 6 mos or appointment in next 3 mos     Recent Outpatient Visits              1 year ago Adult general medical exam    [address] [facility], Bon Beth MD    Office Visit    1 year ago Essential hypertension    [address] [facility] Bon Beth MD    Office Visit    2 years ago Essential hypertension    [address], [address], Bon Beth MD    Office Visit    2 years ago Essential hypertension    [address], [address], Bon Beth MD    Office Visit    2 years ago Fever with exposure to COVID-19 virus    [facility], [address] [place] [address]    Virtual Phone E/M                        METOPROLOL SUCCINATE  MG Oral Tablet 24 Hr [Pharmacy Med Name: METOPROLOL ER SUCCINATE 100MG TABS] 90 tablet 1     Sig: TAKE 1/2 TABLET(50 MG) BY MOUTH TWICE DAILY       Hypertensive Medications Protocol Failed - 10/20/2023  7:33 PM        Failed - In person appointment in the past 12 or next 3 months     Recent Outpatient Visits              1 year ago Adult general medical exam    [address] [facility], Bon Beth MD    Office Visit    1 year ago Essential hypertension    [address] [facility] Bon Beth MD    Office Visit    2 years ago Essential hypertension    [address], [address], Bon Beth MD    Office Visit    2 years ago Essential hypertension    [address], [address], Rose Cuellar MD    Office Visit    2 years ago Fever with exposure to COVID-19 virus    [facility], [address] [place] [address], [facility]    Virtual Phone E/M                      Failed - CMP or BMP in past 6 months     No results found for this or any previous visit (from the past 4392 hour(s)).             Failed - In person appointment or virtual visit in the past 6 months     Recent Outpatient Visits              1 year ago Adult general medical exam    Rose Gomez., MD    Office Visit    1 year ago Essential hypertension    Rose Gomez., MD    Office Visit    2 years ago Essential hypertension    Rose Gomez MD    Office Visit    2 years ago Essential hypertension    [address], [address], Rose Cuellar MD    Office Visit    2 years ago Fever with exposure to COVID-19 virus    [address], [address] [facility], PA-C    Virtual Phone E/M                      Failed - EGFRCR or GFRNAA > 50     GFR Evaluation            Passed - Last BP reading less than 140/90     BP Readings from Last 1 Encounters:  06/27/22 : 98/65

## 2023-11-11 ENCOUNTER — TELEPHONE (OUTPATIENT)
Dept: INTERNAL MEDICINE CLINIC | Facility: CLINIC | Age: 68
End: 2023-11-11

## 2023-11-11 DIAGNOSIS — Z00.00 ADULT GENERAL MEDICAL EXAM: ICD-10-CM

## 2023-11-11 DIAGNOSIS — I10 ESSENTIAL HYPERTENSION: ICD-10-CM

## 2023-11-11 DIAGNOSIS — Z78.0 POSTMENOPAUSAL: ICD-10-CM

## 2023-11-11 DIAGNOSIS — Z12.31 ENCOUNTER FOR SCREENING MAMMOGRAM FOR MALIGNANT NEOPLASM OF BREAST: ICD-10-CM

## 2023-11-11 DIAGNOSIS — E55.9 VITAMIN D DEFICIENCY: Primary | ICD-10-CM

## 2023-11-13 RX ORDER — METOPROLOL SUCCINATE 100 MG/1
50 TABLET, EXTENDED RELEASE ORAL 2 TIMES DAILY
Qty: 30 TABLET | Refills: 0 | OUTPATIENT
Start: 2023-11-13

## 2023-11-13 RX ORDER — SERTRALINE HYDROCHLORIDE 25 MG/1
25 TABLET, FILM COATED ORAL DAILY
Qty: 30 TABLET | Refills: 0 | OUTPATIENT
Start: 2023-11-13

## 2023-11-13 NOTE — TELEPHONE ENCOUNTER
Please review. Protocol failed / Has no protocol. BULX message sent to patient to schedule an office visit with PCP. Requested Prescriptions   Pending Prescriptions Disp Refills    metoprolol succinate  MG Oral Tablet 24 Hr 90 tablet 1     Sig: Take 0.5 tablets (50 mg total) by mouth 2 (two) times daily. Hypertensive Medications Protocol Failed - 11/11/2023  6:03 PM        Failed - In person appointment in the past 12 or next 3 months     Recent Outpatient Visits              1 year ago Adult general medical exam    [id number] [facility] MD    Office Visit    1 year ago Essential hypertension    [address] [facility] MD    Office Visit    2 years ago Essential hypertension    [address], [address], [facility]., MD    Office Visit    2 years ago Essential hypertension    [address], [address], [facility] MD    Office Visit    3 years ago Fever with exposure to COVID-19 virus    [facility] [address] [place] [address], PA-C    Virtual Phone E/M                      Failed - CMP or BMP in past 6 months     No results found for this or any previous visit (from the past 4392 hour(s)).             Failed - In person appointment or virtual visit in the past 6 months     Recent Outpatient Visits              1 year ago Adult general medical exam    [address] [facility] MD    Office Visit    1 year ago Essential hypertension    [address] [facility] MD    Office Visit    2 years ago Essential hypertension    [address] [facility] MD    Office Visit    2 years ago Essential hypertension    [address], [address], Philip Perez, Adrianna Munoz MD    Office Visit    3 years ago Fever with exposure to COVID-19 virus    [facility], [address] [facility], PARaniC    Virtual Phone E/M                      Failed - EGFRCR or GFRNAA > 50     GFR Evaluation            Passed - Last BP reading less than 140/90     BP Readings from Last 1 Encounters:   06/27/22 98/65                 sertraline 25 MG Oral Tab 90 tablet 1     Sig: Take 1 tablet (25 mg total) by mouth daily.        Psychiatric Non-Scheduled (Anti-Anxiety) Failed - 11/11/2023  6:03 PM        Failed - In person appointment or virtual visit in the past 6 mos or appointment in next 3 mos     Recent Outpatient Visits              1 year ago Adult general medical exam    [facility] Adan MD    Office Visit    1 year ago Essential hypertension    [facility] Adan MD    Office Visit    2 years ago Essential hypertension    [facility] Lorenzo Adan MD    Office Visit    2 years ago Essential hypertension    [address], [address] [facility],[address], Lorenzo Mcgovern MD    Office Visit    3 years ago Fever with exposure to COVID-19 virus    [address], [address] [facility] Bon Flores PA-C    Virtual Phone E/M                            Recent Outpatient Visits              1 year ago Adult general medical exam    [facility] Adan MD    Office Visit    1 year ago Essential hypertension    [facility] Adan MD    Office Visit    2 years ago Essential hypertension    [facility] Lorenzo Adan MD    Office Visit    2 years ago Essential hypertension    [facility] Adan MD Office Visit    3 years ago Fever with exposure to COVID-19 virus    [address] [facility], [facility], Sergio Smith, PA-C    [employer] E/M

## 2023-11-14 NOTE — TELEPHONE ENCOUNTER
It is better patient's make appointment ahead of time. She had no upcoming appointments and and she would not of known unless would called her to make the appointment. We can look at the first available and then put her on a wait list for physical.  I put in orders for blood work and for mammogram and bone density. She can go ahead and call and schedule those.

## 2023-11-14 NOTE — TELEPHONE ENCOUNTER
Dr Saul Parkinson: see mychart response from patient: do you approve to use a res24 or TCM slot? Also patient's labs are  (dated 22 tsh, cmp, lipid)  Can labs be re-ordered?

## 2023-11-15 ENCOUNTER — PATIENT MESSAGE (OUTPATIENT)
Dept: INTERNAL MEDICINE CLINIC | Facility: CLINIC | Age: 68
End: 2023-11-15

## 2023-11-15 ENCOUNTER — HOSPITAL ENCOUNTER (OUTPATIENT)
Dept: MAMMOGRAPHY | Age: 68
Discharge: HOME | End: 2023-11-15
Attending: INTERNAL MEDICINE
Payer: MEDICARE

## 2023-11-15 ENCOUNTER — HOSPITAL ENCOUNTER (OUTPATIENT)
Dept: GENERAL RADIOLOGY | Age: 68
Discharge: HOME | End: 2023-11-15
Attending: INTERNAL MEDICINE
Payer: MEDICARE

## 2023-11-15 DIAGNOSIS — Z12.83 SCREENING FOR MALIGNANT NEOPLASM OF SKIN: ICD-10-CM

## 2023-11-15 DIAGNOSIS — M25.512 CHRONIC LEFT SHOULDER PAIN: ICD-10-CM

## 2023-11-15 DIAGNOSIS — G89.29 CHRONIC LEFT SHOULDER PAIN: ICD-10-CM

## 2023-11-15 DIAGNOSIS — Z12.31 ENCOUNTER FOR SCREENING MAMMOGRAM FOR MALIGNANT NEOPLASM OF BREAST: ICD-10-CM

## 2023-11-15 DIAGNOSIS — I10 ESSENTIAL HYPERTENSION: Primary | ICD-10-CM

## 2023-11-15 DIAGNOSIS — Z01.00 ROUTINE EYE EXAM: ICD-10-CM

## 2023-11-15 PROCEDURE — 73030 X-RAY EXAM OF SHOULDER: CPT | Performed by: INTERNAL MEDICINE

## 2023-11-15 PROCEDURE — 77063 BREAST TOMOSYNTHESIS BI: CPT | Performed by: INTERNAL MEDICINE

## 2023-11-15 PROCEDURE — 77067 SCR MAMMO BI INCL CAD: CPT | Performed by: INTERNAL MEDICINE

## 2023-11-15 RX ORDER — METOPROLOL SUCCINATE 100 MG/1
50 TABLET, EXTENDED RELEASE ORAL 2 TIMES DAILY
Qty: 90 TABLET | Refills: 1 | Status: CANCELLED | OUTPATIENT
Start: 2023-11-15

## 2023-11-15 RX ORDER — SERTRALINE HYDROCHLORIDE 25 MG/1
25 TABLET, FILM COATED ORAL DAILY
Qty: 90 TABLET | Refills: 1 | Status: SHIPPED | OUTPATIENT
Start: 2023-11-15

## 2023-11-15 RX ORDER — SERTRALINE HYDROCHLORIDE 25 MG/1
25 TABLET, FILM COATED ORAL DAILY
Qty: 90 TABLET | Refills: 1 | Status: CANCELLED | OUTPATIENT
Start: 2023-11-15

## 2023-11-15 RX ORDER — METOPROLOL SUCCINATE 100 MG/1
50 TABLET, EXTENDED RELEASE ORAL 2 TIMES DAILY
Qty: 90 TABLET | Refills: 1 | Status: SHIPPED | OUTPATIENT
Start: 2023-11-15

## 2023-11-15 NOTE — TELEPHONE ENCOUNTER
Dr. Jennifer Diaz, patient scheduled in your first availableMay 2024. Please advise on refills or if patient should schedule with open provider.      Future Appointments   Date Time Provider Ann [place]   12/13/2023 11:40 AM WDR DEXA RM1 WDR DEXA EDW J[place]   5/21/2024  2:00 PM Larisa Banuelos MD [id number]

## 2023-11-16 NOTE — TELEPHONE ENCOUNTER
She needs a physical at least once a year. If she is running low they should have a physical set up. She has orders in the system for blood work she can do that fasting. I did enough refills to get her until her appointment.

## 2023-11-20 NOTE — TELEPHONE ENCOUNTER
Dr Brittany Pate, please advise on patient request for referrals to Derm and Ophthalmology, pended for review. She also wants an order to check magnesium level. Pended.

## 2023-12-06 LAB
AMB EXT BILIRUBIN, TOTAL: 0.6 MG/DL
AMB EXT BUN: 12 MG/DL
AMB EXT CALCIUM: 9.1
AMB EXT CARBON DIOXIDE: 25
AMB EXT CHLORIDE: 104
AMB EXT CHOL/HDL RATIO: 3.6
AMB EXT CHOLESTEROL, TOTAL: 115 MG/DL
AMB EXT CMP ALT: 14 U/L
AMB EXT CMP AST: 14 U/L
AMB EXT CREATININE: 0.62 MG/DL
AMB EXT EGFR NON-AA: 98
AMB EXT GLUCOSE: 121 MG/DL
AMB EXT HDL CHOLESTEROL: 32 MG/DL
AMB EXT HEMATOCRIT: 28.5
AMB EXT HEMOGLOBIN: 8.5
AMB EXT LDL CHOLESTEROL, DIRECT: 68 MG/DL
AMB EXT MCV: 71.8
AMB EXT PLATELETS: 652
AMB EXT POSTASSIUM: 4.4 MMOL/L
AMB EXT SODIUM: 140 MMOL/L
AMB EXT TOTAL PROTEIN: 8
AMB EXT TRIGLYCERIDES: 69 MG/DL
AMB EXT VITAMIN D, 25-HYDROXY: 50
AMB EXT WBC: 9.7 X10(3)UL

## 2023-12-07 PROBLEM — D64.9 ANEMIA OF UNKNOWN ETIOLOGY: Status: ACTIVE | Noted: 2023-12-07

## 2023-12-07 LAB
ABSOLUTE BASOPHILS: 78 CELLS/UL (ref 0–200)
ABSOLUTE EOSINOPHILS: 78 CELLS/UL (ref 15–500)
ABSOLUTE LYMPHOCYTES: 2241 CELLS/UL (ref 850–3900)
ABSOLUTE MONOCYTES: 931 CELLS/UL (ref 200–950)
ABSOLUTE NEUTROPHILS: 6373 CELLS/UL (ref 1500–7800)
ALBUMIN/GLOBULIN RATIO: 0.5 (CALC) (ref 1–2.5)
ALBUMIN: 2.8 G/DL (ref 3.6–5.1)
ALKALINE PHOSPHATASE: 97 U/L (ref 37–153)
ALT: 14 U/L (ref 6–29)
AST: 14 U/L (ref 10–35)
BASOPHILS: 0.8 %
BILIRUBIN, TOTAL: 0.6 MG/DL (ref 0.2–1.2)
BUN: 12 MG/DL (ref 7–25)
CALCIUM: 9.1 MG/DL (ref 8.6–10.4)
CARBON DIOXIDE: 25 MMOL/L (ref 20–32)
CHLORIDE: 104 MMOL/L (ref 98–110)
CHOL/HDLC RATIO: 3.6 (CALC)
CHOLESTEROL, TOTAL: 115 MG/DL
CREATININE: 0.62 MG/DL (ref 0.5–1.05)
EGFR: 98 ML/MIN/1.73M2
EOSINOPHILS: 0.8 %
GLOBULIN: 5.2 G/DL (CALC) (ref 1.9–3.7)
GLUCOSE: 121 MG/DL (ref 65–99)
HDL CHOLESTEROL: 32 MG/DL
HEMATOCRIT: 28.5 % (ref 35–45)
HEMOGLOBIN: 8.5 G/DL (ref 11.7–15.5)
LDL-CHOLESTEROL: 68 MG/DL (CALC)
LYMPHOCYTES: 23.1 %
MAGNESIUM: 2.2 MG/DL (ref 1.5–2.5)
MCH: 21.4 PG (ref 27–33)
MCHC: 29.8 G/DL (ref 32–36)
MCV: 71.8 FL (ref 80–100)
MONOCYTES: 9.6 %
MPV: 9.8 FL (ref 7.5–12.5)
NEUTROPHILS: 65.7 %
NON-HDL CHOLESTEROL: 83 MG/DL (CALC)
PLATELET COUNT: 652 THOUSAND/UL (ref 140–400)
POTASSIUM: 4.4 MMOL/L (ref 3.5–5.3)
PROTEIN, TOTAL: 8 G/DL (ref 6.1–8.1)
RDW: 15.9 % (ref 11–15)
RED BLOOD CELL COUNT: 3.97 MILLION/UL (ref 3.8–5.1)
SODIUM: 140 MMOL/L (ref 135–146)
TRIGLYCERIDES: 69 MG/DL
TSH W/REFLEX TO FT4: 1.89 MIU/L (ref 0.4–4.5)
VITAMIN D, 25-OH, TOTAL: 50 NG/ML (ref 30–100)
WHITE BLOOD CELL COUNT: 9.7 THOUSAND/UL (ref 3.8–10.8)

## 2023-12-08 ENCOUNTER — PATIENT MESSAGE (OUTPATIENT)
Dept: INTERNAL MEDICINE CLINIC | Facility: CLINIC | Age: 68
End: 2023-12-08

## 2023-12-10 NOTE — TELEPHONE ENCOUNTER
From: Yesenia Gonsalez  To: Tran Cason  Sent: 12/8/2023 6:09 PM CST  Subject: Dec 7 labs ordered    Hi dr Jessica Cason. Thank you for your response. I see you placed 4 lab test referrals in my chart today. Am I to get all 4 of these asap or is one of them to be done in 3 mos? Each one is numbered so please let me know which referral is to be done in 3 mos. I do not want to mess anything up so I just want a confirmation please. Thank you again.

## 2023-12-22 ENCOUNTER — TELEPHONE (OUTPATIENT)
Dept: INTERNAL MEDICINE CLINIC | Facility: CLINIC | Age: 68
End: 2023-12-22

## 2023-12-22 DIAGNOSIS — D64.9 ANEMIA OF UNKNOWN ETIOLOGY: Primary | ICD-10-CM

## 2023-12-22 DIAGNOSIS — E78.2 MIXED HYPERLIPIDEMIA: ICD-10-CM

## 2023-12-22 DIAGNOSIS — R73.9 HYPERGLYCEMIA: ICD-10-CM

## 2023-12-22 NOTE — TELEPHONE ENCOUNTER
Did not receive any labs after the initial set of labs. So anemia workup is not complete. I see the orders in the system that were dated from December 7. But there is no results.   We pay for extra service to integrate their lab results into our system so I am not sure why they are having difficulty sending me the results that I ordered from [address] [place] [address].

## 2023-12-22 NOTE — TELEPHONE ENCOUNTER
Crayton Callander, CMA   to [address] [place] [address]      12/21/23  7:26 AM  Hello,     Just wanted to let you know that we did receive the results, however, there are a couple of tests still pending, which is probably why they have not sent us a copy nor uploaded into [facility] [address] Just wanted to let you be aware of this. We will call or send you MyChart regarding the other test results we did receive, once the doctor has reviewed them. Last read by Alicia Damian at 11:04 AM on 12/22/2023.

## 2023-12-22 NOTE — TELEPHONE ENCOUNTER
Patient called (identified name and ),   States [facility] in [place]. This RN called Julian, spoke with Ze Smith, gave date. Brody Owen is a hemoglobinopathy result. Still waiting for alpha thalassemia  DNA mutation analysis. He will release the resulted labs to [facility]. States this can take 15 minutes to go through.

## 2023-12-22 NOTE — TELEPHONE ENCOUNTER
From Pt message encounter 12/19 regarding 12/13 [facility] lab blood draw. Per clinical staff, they received partial results. Please advise. December 21, 2023  Amanda Interiano, CMA   to Richard Christine      12/21/23  7:26 AM  Hello,     Just wanted to let you know that we did receive the results, however, there are a couple of tests still pending, which is probably why they have not sent us a copy nor uploaded into [facility] Just wanted to let you be aware of this. We will call or send you MyChart regarding the other test results we did receive, once the doctor has reviewed them. Last read by Cristina Palmer at 11:04 AM on 12/22/2023.

## 2023-12-22 NOTE — TELEPHONE ENCOUNTER
Again please review message. I did not receive the other stuff that was official results became just those results.

## 2023-12-22 NOTE — TELEPHONE ENCOUNTER
Left message to call us back at [phone]  Would like to confirm what dates she had labs drawn. Once we have that we can try to contact [employer] to see if labs can be placed in EPIC for review. What lab location did patient have [facility] labs drawn at?  Was it in [place]?

## 2023-12-22 NOTE — TELEPHONE ENCOUNTER
Verified name and . Patient was advised of the provider's result notes. Patient verbalizes understanding and agrees with plan. Patient states she will await update from RN message to Dr. Jennifer Diaz as seen below.

## 2023-12-22 NOTE — TELEPHONE ENCOUNTER
Left message to call back. See also result notes from labs drawn 12/06 and mychart message from 12/21. Dr. Jessica Ha, please clarify, did you receive the labs that were drawn on 12/13? They were to be faxed to the office. Is anemia work up complete at this time? Recommendation is to repeat labs in 3 months?

## 2023-12-22 NOTE — TELEPHONE ENCOUNTER
Cholesterol looks good except HDL is slightly low. Goal HDL is above 50. At 32 which slightly low. H improves with cardiovascular sustained aerobic exercise at least 30 minutes 3-4 times a week. Recheck in 3 months. Orders written. Kidney and liver functions are okay liver enzymes okay. Anemia is present with a hemoglobin 8.5. Sugar is elevated. Can do an A1c but can do that in 3 months when do the other blood work. Lower carbs. White blood cell cell counts are okay. Vitamin D level looks good. Continue the same B12 level looks okay. Iron levels are little bit low. There is some other blood test in the system from December 7. That was in response to the ones that were done on December 6. But iron storage was not done. Wrote orders to check iron levels and lipid in 3 months. Hemoglobin A1c can be done sooner.

## 2023-12-23 ENCOUNTER — HOSPITAL ENCOUNTER (OUTPATIENT)
Dept: BONE DENSITY | Age: 68
Discharge: HOME | End: 2023-12-23
Attending: INTERNAL MEDICINE
Payer: MEDICARE

## 2023-12-23 DIAGNOSIS — Z78.0 POSTMENOPAUSAL: ICD-10-CM

## 2023-12-23 PROCEDURE — 77080 DXA BONE DENSITY AXIAL: CPT | Performed by: INTERNAL MEDICINE

## 2024-01-03 PROBLEM — M81.0 AGE-RELATED OSTEOPOROSIS WITHOUT CURRENT PATHOLOGICAL FRACTURE: Status: ACTIVE | Noted: 2024-01-03

## 2024-01-12 ENCOUNTER — PATIENT MESSAGE (OUTPATIENT)
Dept: INTERNAL MEDICINE CLINIC | Facility: CLINIC | Age: 69
End: 2024-01-12

## 2024-01-16 ENCOUNTER — TELEPHONE (OUTPATIENT)
Dept: INTERNAL MEDICINE CLINIC | Facility: CLINIC | Age: 69
End: 2024-01-16

## 2024-01-16 NOTE — TELEPHONE ENCOUNTER
Received similar results from [employer].  B12 level looks good.  They did not do the TSH PTH phosphorus cortisol all results.  Even the hemoglobin A1c.  But she will need blood work in 3 months time she can do that then?

## 2024-01-17 NOTE — TELEPHONE ENCOUNTER
Spoke with patient regarding provider's message and recommendations.   Patient verbalized understanding.

## 2024-02-07 ENCOUNTER — LAB ENCOUNTER (OUTPATIENT)
Dept: LAB | Age: 69
End: 2024-02-07
Attending: INTERNAL MEDICINE
Payer: MEDICARE

## 2024-02-07 DIAGNOSIS — M81.0 AGE-RELATED OSTEOPOROSIS WITHOUT CURRENT PATHOLOGICAL FRACTURE: Primary | ICD-10-CM

## 2024-02-07 LAB — IGA SERPL-MCNC: 470.9 MG/DL (ref 70–312)

## 2024-02-07 PROCEDURE — 83970 ASSAY OF PARATHORMONE: CPT | Performed by: INTERNAL MEDICINE

## 2024-02-07 PROCEDURE — 82784 ASSAY IGA/IGD/IGG/IGM EACH: CPT

## 2024-02-07 PROCEDURE — 36415 COLL VENOUS BLD VENIPUNCTURE: CPT | Performed by: INTERNAL MEDICINE

## 2024-02-07 PROCEDURE — 84443 ASSAY THYROID STIM HORMONE: CPT | Performed by: INTERNAL MEDICINE

## 2024-02-07 PROCEDURE — 82533 TOTAL CORTISOL: CPT | Performed by: INTERNAL MEDICINE

## 2024-02-07 PROCEDURE — 86364 TISS TRNSGLTMNASE EA IG CLAS: CPT

## 2024-02-07 PROCEDURE — 84100 ASSAY OF PHOSPHORUS: CPT | Performed by: INTERNAL MEDICINE

## 2024-02-08 LAB — TTG IGA SER-ACNC: 0.8 U/ML (ref ?–7)

## 2024-02-09 ENCOUNTER — LAB ENCOUNTER (OUTPATIENT)
Dept: LAB | Age: 69
End: 2024-02-09
Attending: INTERNAL MEDICINE
Payer: MEDICARE

## 2024-03-07 ENCOUNTER — PATIENT MESSAGE (OUTPATIENT)
Dept: INTERNAL MEDICINE CLINIC | Facility: CLINIC | Age: 69
End: 2024-03-07

## 2024-03-07 DIAGNOSIS — E78.2 MIXED HYPERLIPIDEMIA: ICD-10-CM

## 2024-03-07 DIAGNOSIS — D64.9 ANEMIA OF UNKNOWN ETIOLOGY: ICD-10-CM

## 2024-03-07 DIAGNOSIS — E55.9 VITAMIN D DEFICIENCY: Primary | ICD-10-CM

## 2024-03-08 NOTE — TELEPHONE ENCOUNTER
Dr Stover: does patient need any additional testing?    From: Lary Wallis  To: Fernanda Stover  Sent: 3/7/2024  3:23 PM CST  Subject: Endocrinologist     Hi Dr Stover.  I scheduled my appt to see the oncologist hematologist Dr Nikolay Corado at [facility] location for Mon 3/11/24.      However I can’t find the message to schedule appt with endocrinologist that provided a phone number     and lastly - what blood tests am I supposed to be taking this month (I think it was only one?) and what is the fasting prep and supplement prep for it.  The many tests I have listed on my chart are confusing me.  Thank you for clarifying this matter.

## 2024-03-08 NOTE — TELEPHONE ENCOUNTER
There is lab orders in the system from me from December 7, 2023.  That is from me for iron levels to be checked.  I do not know what the endocrinologist wants.  I would have her send a message to them through [employer] since they have [employer] access and find out what they want.

## 2024-03-11 ENCOUNTER — NURSE ONLY (OUTPATIENT)
Dept: HEMATOLOGY/ONCOLOGY | Facility: HOSPITAL | Age: 69
End: 2024-03-11
Attending: INTERNAL MEDICINE
Payer: MEDICARE

## 2024-03-11 VITALS
WEIGHT: 188 LBS | TEMPERATURE: 99 F | DIASTOLIC BLOOD PRESSURE: 63 MMHG | HEART RATE: 88 BPM | RESPIRATION RATE: 18 BRPM | HEIGHT: 65 IN | BODY MASS INDEX: 31.32 KG/M2 | OXYGEN SATURATION: 96 % | SYSTOLIC BLOOD PRESSURE: 112 MMHG

## 2024-03-11 DIAGNOSIS — D50.9 IRON DEFICIENCY ANEMIA, UNSPECIFIED IRON DEFICIENCY ANEMIA TYPE: Primary | ICD-10-CM

## 2024-03-11 LAB
BASOPHILS # BLD AUTO: 0.09 X10(3) UL (ref 0–0.2)
BASOPHILS NFR BLD AUTO: 0.8 %
DEPRECATED HBV CORE AB SER IA-ACNC: 612.9 NG/ML
DEPRECATED RDW RBC AUTO: 45.4 FL (ref 35.1–46.3)
EOSINOPHIL # BLD AUTO: 0.1 X10(3) UL (ref 0–0.7)
EOSINOPHIL NFR BLD AUTO: 0.9 %
ERYTHROCYTE [DISTWIDTH] IN BLOOD BY AUTOMATED COUNT: 17.2 % (ref 11–15)
HCT VFR BLD AUTO: 27.2 %
HGB BLD-MCNC: 7.9 G/DL
IMM GRANULOCYTES # BLD AUTO: 0.05 X10(3) UL (ref 0–1)
IMM GRANULOCYTES NFR BLD: 0.5 %
IRON SATN MFR SERPL: 15 %
IRON SERPL-MCNC: 28 UG/DL
LYMPHOCYTES # BLD AUTO: 1.95 X10(3) UL (ref 1–4)
LYMPHOCYTES NFR BLD AUTO: 18.1 %
MCH RBC QN AUTO: 21.4 PG (ref 26–34)
MCHC RBC AUTO-ENTMCNC: 29 G/DL (ref 31–37)
MCV RBC AUTO: 73.7 FL
MONOCYTES # BLD AUTO: 1.08 X10(3) UL (ref 0.1–1)
MONOCYTES NFR BLD AUTO: 10 %
NEUTROPHILS # BLD AUTO: 7.49 X10 (3) UL (ref 1.5–7.7)
NEUTROPHILS # BLD AUTO: 7.49 X10(3) UL (ref 1.5–7.7)
NEUTROPHILS NFR BLD AUTO: 69.7 %
PLATELET # BLD AUTO: 651 10(3)UL (ref 150–450)
RBC # BLD AUTO: 3.69 X10(6)UL
TIBC SERPL-MCNC: 185 UG/DL (ref 250–425)
TRANSFERRIN SERPL-MCNC: 124 MG/DL (ref 250–380)
WBC # BLD AUTO: 10.8 X10(3) UL (ref 4–11)

## 2024-03-11 PROCEDURE — 82728 ASSAY OF FERRITIN: CPT | Performed by: INTERNAL MEDICINE

## 2024-03-11 PROCEDURE — 83540 ASSAY OF IRON: CPT | Performed by: INTERNAL MEDICINE

## 2024-03-11 PROCEDURE — 84466 ASSAY OF TRANSFERRIN: CPT | Performed by: INTERNAL MEDICINE

## 2024-03-11 PROCEDURE — 99211 OFF/OP EST MAY X REQ PHY/QHP: CPT

## 2024-03-11 PROCEDURE — 36415 COLL VENOUS BLD VENIPUNCTURE: CPT

## 2024-03-11 PROCEDURE — 85025 COMPLETE CBC W/AUTO DIFF WBC: CPT | Performed by: INTERNAL MEDICINE

## 2024-03-11 NOTE — PROGRESS NOTES
Hematology Consult    Requesting: Dr. Stover    3/11/24    HPI:  68 year old with iron def anemia.     12/23 iron sat 6%, Hgb 8    She denies bruising or bleeding    She feels fatigued    Past Medical History:   Diagnosis Date    Essential hypertension     Mixed hyperlipidemia 12/22/2023    Moderate single current episode of major depressive disorder (HCC) 10/6/2017    Osteoarthrosis, pelvic region and thigh 3/21/2012    PONV (postoperative nausea and vomiting)      Social History     Socioeconomic History    Marital status:     Number of children: 4   Tobacco Use    Smoking status: Never    Smokeless tobacco: Never   Substance and Sexual Activity    Alcohol use: No     Alcohol/week: 0.0 standard drinks of alcohol    Drug use: No   Other Topics Concern     Service No    Blood Transfusions No    Caffeine Concern Yes    Occupational Exposure No    Hobby Hazards No    Sleep Concern Yes    Stress Concern No    Weight Concern Yes    Special Diet No    Back Care No    Exercise No    Bike Helmet No    Seat Belt Yes    Self-Exams Yes     Family History   Problem Relation Age of Onset    Diabetes Father     Arthritis Mother     Macular degeneration Mother     Other (spinal stenosis) Mother     Breast Cancer Sister 62    Pacemaker Sister      Current Outpatient Medications on File Prior to Visit   Medication Sig Dispense Refill    metoprolol succinate  MG Oral Tablet 24 Hr Take 0.5 tablets (50 mg total) by mouth 2 (two) times daily. (Patient taking differently: Take 1 tablet (100 mg total) by mouth daily.) 90 tablet 1    acetaminophen-codeine 300-30 MG Oral Tab Take 1 tablet by mouth every 6 (six) hours as needed for Pain. 20 tablet 0    ibuprofen 600 MG Oral Tab Take 1 tablet (600 mg total) by mouth every 6 (six) hours as needed for Pain. 90 tablet 1    Sertraline HCl 50 MG Oral Tab Take 1 tablet (50 mg total) by mouth daily. TAKE 1 TABLET(50 MG) BY MOUTH DAILY (Patient taking differently: Take 0.5  tablets (25 mg total) by mouth daily.) 90 tablet 1     No current facility-administered medications on file prior to visit.     Vitals:    03/11/24 1414   BP: 112/63   Pulse: 88   Resp: 18   Temp: 98.8 °F (37.1 °C)     Nad, rr, nd, no edema, amaya, nl skin, nl mood, no defomrity    A/P:  68 year old with iron def anemia.   No uterine bleeding.  Suspect chronic GI blood loss  - We will set her up for IV iron  - Will notify her gastroenterologist for consideration of EGD/colon last colonoscopy 2018    Rtc 3 months

## 2024-03-14 ENCOUNTER — TELEPHONE (OUTPATIENT)
Dept: HEMATOLOGY/ONCOLOGY | Facility: HOSPITAL | Age: 69
End: 2024-03-14

## 2024-03-15 ENCOUNTER — TELEPHONE (OUTPATIENT)
Dept: HEMATOLOGY/ONCOLOGY | Facility: HOSPITAL | Age: 69
End: 2024-03-15

## 2024-03-30 ENCOUNTER — TELEPHONE (OUTPATIENT)
Facility: CLINIC | Age: 69
End: 2024-03-30

## 2024-03-30 NOTE — TELEPHONE ENCOUNTER
GI RNs: I received a message from Dr. Corado regarding a diagnosis of iron deficiency anemia.  Can we arrange to see the patient in the office in the next few weeks.

## 2024-04-01 ENCOUNTER — OFFICE VISIT (OUTPATIENT)
Dept: HEMATOLOGY/ONCOLOGY | Facility: HOSPITAL | Age: 69
End: 2024-04-01
Attending: INTERNAL MEDICINE
Payer: MEDICARE

## 2024-04-01 VITALS
WEIGHT: 185.63 LBS | BODY MASS INDEX: 31 KG/M2 | HEART RATE: 76 BPM | TEMPERATURE: 98 F | RESPIRATION RATE: 16 BRPM | OXYGEN SATURATION: 95 % | SYSTOLIC BLOOD PRESSURE: 105 MMHG | DIASTOLIC BLOOD PRESSURE: 48 MMHG

## 2024-04-01 DIAGNOSIS — D50.9 IRON DEFICIENCY ANEMIA, UNSPECIFIED IRON DEFICIENCY ANEMIA TYPE: Primary | ICD-10-CM

## 2024-04-01 PROCEDURE — 96365 THER/PROPH/DIAG IV INF INIT: CPT

## 2024-04-01 PROCEDURE — 96376 TX/PRO/DX INJ SAME DRUG ADON: CPT

## 2024-04-01 NOTE — PROGRESS NOTES
Pt here for Infed with test dose. Pt denies any issues or concerns. Procedure for Infed infusion explained, pt stated understanding.     Ordering MD: Mak Ravi Exp: After this dose     Pt tolerated infusion without difficulty or complaint. Reviewed next apt date/time: 7/1 @ 12pm for labs and follow-up      Education Record  Learner:  Patient and Spouse  Disease / Diagnosis: ROSHNI  Barriers / Limitations:  None  Method:  Reinforcement  General Topics:  Plan of care reviewed  Outcome:  Shows understanding

## 2024-04-05 NOTE — TELEPHONE ENCOUNTER
Called and spoke to the patient, /name verified.    I offered 2024, patient declined.    Your Appointments      2024  3:20 PM  Consult with Shiva Clemente MD  [facility] [address] [address]  [phone]   Please bring in any pertinent lab or diagnostic test results with you to your appointment.

## 2024-04-19 ENCOUNTER — LAB ENCOUNTER (OUTPATIENT)
Dept: LAB | Facility: HOSPITAL | Age: 69
End: 2024-04-19
Attending: INTERNAL MEDICINE
Payer: MEDICARE

## 2024-04-19 ENCOUNTER — OFFICE VISIT (OUTPATIENT)
Facility: CLINIC | Age: 69
End: 2024-04-19
Payer: COMMERCIAL

## 2024-04-19 VITALS
HEART RATE: 96 BPM | HEIGHT: 65 IN | DIASTOLIC BLOOD PRESSURE: 77 MMHG | SYSTOLIC BLOOD PRESSURE: 130 MMHG | BODY MASS INDEX: 30.32 KG/M2 | WEIGHT: 182 LBS

## 2024-04-19 DIAGNOSIS — D50.9 IRON DEFICIENCY ANEMIA, UNSPECIFIED IRON DEFICIENCY ANEMIA TYPE: Primary | ICD-10-CM

## 2024-04-19 LAB
BASOPHILS # BLD AUTO: 0.06 X10(3) UL (ref 0–0.2)
BASOPHILS NFR BLD AUTO: 0.5 %
DEPRECATED HBV CORE AB SER IA-ACNC: 1164.6 NG/ML
DEPRECATED RDW RBC AUTO: 48.1 FL (ref 35.1–46.3)
EOSINOPHIL # BLD AUTO: 0.05 X10(3) UL (ref 0–0.7)
EOSINOPHIL NFR BLD AUTO: 0.4 %
ERYTHROCYTE [DISTWIDTH] IN BLOOD BY AUTOMATED COUNT: 17.9 % (ref 11–15)
HCT VFR BLD AUTO: 29.9 %
HGB BLD-MCNC: 8.3 G/DL
IMM GRANULOCYTES # BLD AUTO: 0.06 X10(3) UL (ref 0–1)
IMM GRANULOCYTES NFR BLD: 0.5 %
IRON SATN MFR SERPL: 21 %
IRON SERPL-MCNC: 34 UG/DL
LYMPHOCYTES # BLD AUTO: 2.17 X10(3) UL (ref 1–4)
LYMPHOCYTES NFR BLD AUTO: 16.8 %
MCH RBC QN AUTO: 20.9 PG (ref 26–34)
MCHC RBC AUTO-ENTMCNC: 27.8 G/DL (ref 31–37)
MCV RBC AUTO: 75.1 FL
MONOCYTES # BLD AUTO: 1.05 X10(3) UL (ref 0.1–1)
MONOCYTES NFR BLD AUTO: 8.1 %
NEUTROPHILS # BLD AUTO: 9.5 X10 (3) UL (ref 1.5–7.7)
NEUTROPHILS # BLD AUTO: 9.5 X10(3) UL (ref 1.5–7.7)
NEUTROPHILS NFR BLD AUTO: 73.7 %
PLATELET # BLD AUTO: 771 10(3)UL (ref 150–450)
RBC # BLD AUTO: 3.98 X10(6)UL
TIBC SERPL-MCNC: 161 UG/DL (ref 250–425)
TRANSFERRIN SERPL-MCNC: 108 MG/DL (ref 250–380)
WBC # BLD AUTO: 12.9 X10(3) UL (ref 4–11)

## 2024-04-19 PROCEDURE — 3075F SYST BP GE 130 - 139MM HG: CPT | Performed by: INTERNAL MEDICINE

## 2024-04-19 PROCEDURE — 3008F BODY MASS INDEX DOCD: CPT | Performed by: INTERNAL MEDICINE

## 2024-04-19 PROCEDURE — 84466 ASSAY OF TRANSFERRIN: CPT | Performed by: INTERNAL MEDICINE

## 2024-04-19 PROCEDURE — 36415 COLL VENOUS BLD VENIPUNCTURE: CPT | Performed by: INTERNAL MEDICINE

## 2024-04-19 PROCEDURE — 83615 LACTATE (LD) (LDH) ENZYME: CPT | Performed by: INTERNAL MEDICINE

## 2024-04-19 PROCEDURE — 83540 ASSAY OF IRON: CPT | Performed by: INTERNAL MEDICINE

## 2024-04-19 PROCEDURE — 85025 COMPLETE CBC W/AUTO DIFF WBC: CPT | Performed by: INTERNAL MEDICINE

## 2024-04-19 PROCEDURE — 99204 OFFICE O/P NEW MOD 45 MIN: CPT | Performed by: INTERNAL MEDICINE

## 2024-04-19 PROCEDURE — 82728 ASSAY OF FERRITIN: CPT | Performed by: INTERNAL MEDICINE

## 2024-04-19 PROCEDURE — 85045 AUTOMATED RETICULOCYTE COUNT: CPT | Performed by: INTERNAL MEDICINE

## 2024-04-19 PROCEDURE — 3078F DIAST BP <80 MM HG: CPT | Performed by: INTERNAL MEDICINE

## 2024-04-20 DIAGNOSIS — D64.9 ANEMIA, UNSPECIFIED TYPE: Primary | ICD-10-CM

## 2024-04-20 LAB
HGB RETIC QN AUTO: 21.4 PG (ref 28.2–36.6)
IMM RETICS NFR: 0.21 RATIO (ref 0.1–0.3)
LDH SERPL L TO P-CCNC: 440 U/L
RETICS # AUTO: 71.2 X10(3) UL (ref 22.5–147.5)
RETICS/RBC NFR AUTO: 1.8 %

## 2024-04-20 NOTE — PATIENT INSTRUCTIONS
1.  Repeat blood work.  2.  Probable upper and lower endoscopy versus a CT scan of the abdomen and pelvis as next steps.   [facility]  General Surgery  Progress Note    Subjective:     Interval History: o/n no acute events. Continues to have some pain at hernia site but states he feels better from previous exams. No N/V. +flatus. Would like to take PO    Post-Op Info:  * No surgery found *          Medications:  Continuous Infusions:   dextrose 5 % and 0.45 % NaCl with KCl 20 mEq 125 mL/hr at 09/24/17 1744     Scheduled Meds:   amlodipine  10 mg Oral Daily    carvedilol  12.5 mg Oral BID    heparin (porcine)  5,000 Units Subcutaneous Q8H    lisinopril  40 mg Oral Daily    pantoprazole  40 mg Intravenous Daily    sodium chloride 0.9%  3 mL Intravenous Q8H     PRN Meds:acetaminophen, morphine, ondansetron, promethazine (PHENERGAN) IVPB     Objective:     Vital Signs (Most Recent):  Temp: 96.8 °F (36 °C) (09/24/17 1600)  Pulse: (!) 52 (09/24/17 1600)  Resp: 19 (09/24/17 1600)  BP: 138/74 (09/24/17 1600)  SpO2: 98 % (09/24/17 1600) Vital Signs (24h Range):  Temp:  [96.8 °F (36 °C)-99.2 °F (37.3 °C)] 96.8 °F (36 °C)  Pulse:  [52-80] 52  Resp:  [18-19] 19  SpO2:  [95 %-100 %] 98 %  BP: (138-189)/(61-84) 138/74       Intake/Output Summary (Last 24 hours) at 09/24/17 1754  Last data filed at 09/24/17 0914   Gross per 24 hour   Intake                0 ml   Output              500 ml   Net             -500 ml       Physical Exam  NAD, AAO  CTABL  RRR  Soft, Nd, Nt, incisions x4 c/d/i, ecchymosis around umbilical site unchanged from previous exam.   Right inguinal hernia soft but tender  No edema    Significant Labs:  CBC:   Recent Labs  Lab 09/24/17  0441   WBC 6.29   RBC 3.57*   HGB 10.7*   HCT 32.4*      MCV 91   MCH 30.0   MCHC 33.0     CMP:   Recent Labs  Lab 09/24/17  0441      CALCIUM 9.3      K 4.6   CO2 20*      BUN 16   CREATININE 1.6*       Significant Diagnostics:  I have reviewed all pertinent imaging results/findings within the past 24 hours.    Assessment/Plan:   83 y/o male s/p  lap kaushal on 9/20 for choledocholithiasis presents with likely post operative ileus vs SBO  Begin clears and see if tolerates  ALEC slightly better Cr from 1.8 to 1.6  IVF  Check labs and KUB in AM    Active Diagnoses:    Diagnosis Date Noted POA    Ileus [K56.7] 09/23/2017 Yes      Problems Resolved During this Admission:    Diagnosis Date Noted Date Resolved POA         Magaly Rae MD  General Surgery  [facility]

## 2024-04-20 NOTE — PROGRESS NOTES
Subjective:   Patient ID: Lary MAGANA Thtavia is a 68 year old female.    HPI  Lary returns today at the request of Dr. Corado to discuss a new onset iron deficiency anemia.  She was last seen by myself at colonoscopy in October 2018.    As per previous notes Lary underwent a screening colonoscopy in October 2018.  She was found to have diminutive nonadenomatous polyps, an ascending colon lipoma, uncomplicated diverticulosis of the left colon and internal hemorrhoids.  Routine screening was advised.    Lary states that beginning in the fall 2023 she felt fatigued.  Her insurance was changing and she presented for full laboratory testing.  She was found to have a hemoglobin of 8.5 with an MCV of 71.  This contrasts with a hemoglobin of 14.7 in July 2017.  Iron saturation was 6%.  Interestingly her ferritin in February was 547.  Iron saturation in March was 15% with a concurrent hemoglobin of 7.9.  Sprue serology was negative.  A hemoglobin electrophoresis was negative for thalassemia.    The patient contracted COVID-19 in October 2020 and lost her sense of taste and smell.  She slowly lost #138 pounds since that time.  Her weight had stabilized for 1 year until the fall of 2023 when her weight declined by an additional several pounds.    The patient endorses a poor appetite which she feels may be related to the lack of return of taste/smell.  She denies nausea, vomiting, dysphagia, odynophagia or heartburn.  She has no abdominal pain.  She has a known large ventral hernia post open cholecystectomy.  She developed constipation after the iron infusion without other bowel changes.  She has symptomatic hemorrhoids but has noticed no significant bleeding.    The patient does not take aspirin or NSAIDs.    She received IV iron (INFeD) on April 1.    History/Other:   Review of Systems  See above    Wt Readings from Last 7 Encounters:   04/19/24 182 lb (82.6 kg)   04/01/24 185 lb 9.6 oz (84.2 kg)   03/11/24 188 lb (85.3 kg)    06/27/22 237 lb 6.4 oz (107.7 kg)   01/24/22 263 lb (119.3 kg)   07/26/21 295 lb 6.4 oz (134 kg)   03/22/21 (!) 307 lb 12.8 oz (139.6 kg)       Current Outpatient Medications   Medication Sig Dispense Refill    metoprolol succinate  MG Oral Tablet 24 Hr Take 0.5 tablets (50 mg total) by mouth 2 (two) times daily. (Patient taking differently: Take 1 tablet (100 mg total) by mouth daily.) 90 tablet 1    Sertraline HCl 50 MG Oral Tab Take 1 tablet (50 mg total) by mouth daily. TAKE 1 TABLET(50 MG) BY MOUTH DAILY (Patient taking differently: Take 0.5 tablets (25 mg total) by mouth daily.) 90 tablet 1    acetaminophen-codeine 300-30 MG Oral Tab Take 1 tablet by mouth every 6 (six) hours as needed for Pain. (Patient not taking: Reported on 4/19/2024) 20 tablet 0    ibuprofen 600 MG Oral Tab Take 1 tablet (600 mg total) by mouth every 6 (six) hours as needed for Pain. (Patient not taking: Reported on 4/19/2024) 90 tablet 1     Allergies:  Allergies   Allergen Reactions    Ace Inhibitors Coughing    Azithromycin      Other reaction(s): AZITHROMYCIN    Erythromycin      Other reaction(s): Rash - Mild    Naproxen      Other reaction(s): NAPROXEN    Nortriptyline      Other reaction(s): vivid, scary dreams    Sulfa Antibiotics      Other reaction(s): Rash - Moderate       Objective:   Physical Exam  Vitals and nursing note reviewed.   Constitutional:       General: She is not in acute distress.     Appearance: She is well-developed.   HENT:      Head: Normocephalic and atraumatic.      Mouth/Throat:      Pharynx: No oropharyngeal exudate.   Eyes:      General: No scleral icterus.     Conjunctiva/sclera: Conjunctivae normal.   Neck:      Thyroid: No thyromegaly.   Cardiovascular:      Rate and Rhythm: Normal rate and regular rhythm.      Heart sounds: Normal heart sounds.   Pulmonary:      Effort: Pulmonary effort is normal. No respiratory distress.      Breath sounds: Normal breath sounds. No wheezing or rales.    Abdominal:      General: Bowel sounds are normal. There is no distension.      Palpations: Abdomen is soft. There is no mass.      Tenderness: There is no abdominal tenderness. There is no guarding or rebound.      Hernia: A hernia is present.      Comments: Upper midline surgical scar.  Large ventral hernia that is reducible.   Musculoskeletal:      Cervical back: Neck supple.   Lymphadenopathy:      Cervical: No cervical adenopathy.   Neurological:      Mental Status: She is alert and oriented to person, place, and time.   Psychiatric:         Behavior: Behavior normal.       Component      Latest Ref Rng 7/14/2017 12/6/2023 12/13/2023 2/7/2024   WBC      4.0 - 11.0 x10(3) uL 9.1  9.7      WBC        9.7 (E)     RBC      3.80 - 5.30 x10(6)uL 4.98  3.97  3.90     Hemoglobin      12.0 - 16.0 g/dL 14.7  8.5 (L)  8.4 (L)     Hemoglobin        8.5 (E)     Hematocrit      35.0 - 48.0 % 44.2  28.5 (L)  29.4 (L)     Hematocrit        28.5 (E)     MCV      80.0 - 100.0 fL 88.8  71.8 (L)  75.4 (L)     MCV        71.8 (E)     MCH      26.0 - 34.0 pg 29.5  21.4 (L)  21.5 (L)     MCHC      31.0 - 37.0 g/dL 33.2  29.8 (L)  28.6 (L)     RDW-SD      35.1 - 46.3 fL       RDW      11.0 - 15.0 % 14.3  15.9 (H)  16.3 (H)     Platelet Count      150.0 - 450.0 10(3)uL 230  652 (H)      Platelet Count        652 (E)     Prelim Neutrophil Abs      1.50 - 7.70 x10 (3) uL       Neutrophils Absolute      1.50 - 7.70 x10(3) uL 5.9  6,373      Lymphocytes Absolute      1.00 - 4.00 x10(3) uL 2.3  2,241      Monocytes Absolute      0.10 - 1.00 x10(3) uL 0.6  931      Eosinophils Absolute      0.00 - 0.70 x10(3) uL 0.2  78      Basophils Absolute      0.00 - 0.20 x10(3) uL 0.1  78      Immature Granulocyte Absolute      0.00 - 1.00 x10(3) uL       Neutrophils %      % 65  65.7      Lymphocytes %      % 26  23.1      Monocytes %      % 7  9.6      Eosinophils %      % 2  0.8      Basophils %      % 1  0.8      Immature Granulocyte %      %        MEAN PLATELET VOLUME      7.4 - 10.3 fL 10.0       MPV      7.5 - 12.5 fL  9.8      Glucose      65 - 99 mg/dL  121 (H)      BUN      7 - 25 mg/dL  12      CREATININE      0.50 - 1.05 mg/dL  0.62      EGFR      > OR = 60 mL/min/1.73m2  98      BUN/CREATININE RATIO      6 - 22 (calc)  SEE NOTE:      Sodium      135 - 146 mmol/L  140      Potassium      3.5 - 5.3 mmol/L  4.4      Chloride      98 - 110 mmol/L  104      Carbon Dioxide, Total      20 - 32 mmol/L  25      CALCIUM      8.6 - 10.4 mg/dL  9.1      PROTEIN, TOTAL      6.1 - 8.1 g/dL  8.0      Albumin      3.6 - 5.1 g/dL  2.8 (L)      Globulin      1.9 - 3.7 g/dL (calc)  5.2 (H)      A/G Ratio      1.0 - 2.5 (calc)  0.5 (L)      Total Bilirubin      0.2 - 1.2 mg/dL  0.6      Alkaline Phosphatase      37 - 153 U/L  97      AST (SGOT)      10 - 35 U/L  14      ALT (SGPT)      6 - 29 U/L  14      INTERPRETATION   see note     REVIEWED BY   see note     HEMOGLOBIN A      >96.0 %   98.0     HEMOGLOBIN F      <2.0 %   0.0     HEMOGLOBIN A2 (QUANT)      2.2 - 3.2 %   2.0 (L)     C-Z ELECTROPHORESIS   Confirms     FERRITIN      18.0 - 340.0 ng/mL   547 (H)     Iron, Serum      50 - 170 ug/dL       Transferrin      250 - 380 mg/dL       Iron Bind.Cap.(TIBC)      250 - 425 ug/dL       Iron Saturation      15 - 50 %       IRON, TOTAL      45 - 160 mcg/dL   14 (L)     IRON BINDING CAPACITY      250 - 450 mcg/dL (calc)   253     % SATURATION      16 - 45 % (calc)   6 (L)     LD      120 - 250 U/L   265 (H)     Vitamin B12      200 - 1,100 pg/mL   448     IMMUNOGLOBULIN A      70.00 - 312.00 mg/dL    470.90 (H)    Tissue Transglutaminase IgA Ab      <7.0 U/mL    0.8      Component      Latest Ref Rng 3/11/2024   WBC      4.0 - 11.0 x10(3) uL 10.8    RBC      3.80 - 5.30 x10(6)uL 3.69 (L)    Hemoglobin      12.0 - 16.0 g/dL 7.9 (L)    Hematocrit      35.0 - 48.0 % 27.2 (L)    MCV      80.0 - 100.0 fL 73.7 (L)    MCH      26.0 - 34.0 pg 21.4 (L)    MCHC      31.0 - 37.0  g/dL 29.0 (L)    RDW-SD      35.1 - 46.3 fL 45.4    RDW      11.0 - 15.0 % 17.2 (H)    Platelet Count      150.0 - 450.0 10(3)uL 651.0 (H)    Prelim Neutrophil Abs      1.50 - 7.70 x10 (3) uL 7.49    Neutrophils Absolute      1.50 - 7.70 x10(3) uL 7.49    Lymphocytes Absolute      1.00 - 4.00 x10(3) uL 1.95    Monocytes Absolute      0.10 - 1.00 x10(3) uL 1.08 (H)    Eosinophils Absolute      0.00 - 0.70 x10(3) uL 0.10    Basophils Absolute      0.00 - 0.20 x10(3) uL 0.09    Immature Granulocyte Absolute      0.00 - 1.00 x10(3) uL 0.05    Neutrophils %      % 69.7    Lymphocytes %      % 18.1    Monocytes %      % 10.0    Eosinophils %      % 0.9    Basophils %      % 0.8    Immature Granulocyte %      % 0.5    MEAN PLATELET VOLUME      7.4 - 10.3 fL    MPV      7.5 - 12.5 fL    Glucose      65 - 99 mg/dL    BUN      7 - 25 mg/dL    CREATININE      0.50 - 1.05 mg/dL    EGFR      > OR = 60 mL/min/1.73m2    BUN/CREATININE RATIO      6 - 22 (calc)    Sodium      135 - 146 mmol/L    Potassium      3.5 - 5.3 mmol/L    Chloride      98 - 110 mmol/L    Carbon Dioxide, Total      20 - 32 mmol/L    CALCIUM      8.6 - 10.4 mg/dL    PROTEIN, TOTAL      6.1 - 8.1 g/dL    Albumin      3.6 - 5.1 g/dL    Globulin      1.9 - 3.7 g/dL (calc)    A/G Ratio      1.0 - 2.5 (calc)    Total Bilirubin      0.2 - 1.2 mg/dL    Alkaline Phosphatase      37 - 153 U/L    AST (SGOT)      10 - 35 U/L    ALT (SGPT)      6 - 29 U/L    INTERPRETATION    REVIEWED BY    HEMOGLOBIN A      >96.0 %    HEMOGLOBIN F      <2.0 %    HEMOGLOBIN A2 (QUANT)      2.2 - 3.2 %    C-Z ELECTROPHORESIS    FERRITIN      18.0 - 340.0 ng/mL 612.9 (H)    Iron, Serum      50 - 170 ug/dL 28 (L)    Transferrin      250 - 380 mg/dL 124 (L)    Iron Bind.Cap.(TIBC)      250 - 425 ug/dL 185 (L)    Iron Saturation      15 - 50 % 15    IRON, TOTAL      45 - 160 mcg/dL    IRON BINDING CAPACITY      250 - 450 mcg/dL (calc)    % SATURATION      16 - 45 % (calc)    LD      120 - 250  U/L    Vitamin B12      200 - 1,100 pg/mL    IMMUNOGLOBULIN A      70.00 - 312.00 mg/dL    Tissue Transglutaminase IgA Ab      <7.0 U/mL       Legend:  (L) Low  (H) High  (E) External lab result    Component      Latest Ref Rng 12/13/2023   LD      120 - 250 U/L 265 (H)       Legend:  (H) High  Assessment & Plan:   1. Iron deficiency anemia, unspecified iron deficiency anemia type    Lary presents with a significant microcytic anemia since at least December 2023, however, the patient's last previous recorded CBC was in 2017.  Although the iron saturation was initially 6%, the ferritin was elevated and the patient's hemoglobin remained at 7.9 despite an iron saturation that had increased to 15%.  Although the patient has a component of iron deficiency, the ferritin level and subsequent near normal iron saturation is somewhat unusual.  The history of a profound weight loss is noted.  We have discussed possibilities including inflammatory, vascular or neoplastic lesions of the upper or lower digestive tract versus a concurrent hematologic abnormality.  I am recommending that we repeat a CBC and iron studies today.  We discussed next steps which could include upper and lower endoscopy versus initial CT scanning.  I will await the results of today's laboratory testing and we will contact the patient with next steps.      Orders Placed This Encounter   Procedures    Ferritin    Iron And Tibc    CBC W Differential W Platelet       Meds This Visit:  Requested Prescriptions      No prescriptions requested or ordered in this encounter       Imaging & Referrals:  None

## 2024-04-23 ENCOUNTER — TELEPHONE (OUTPATIENT)
Facility: CLINIC | Age: 69
End: 2024-04-23

## 2024-04-23 DIAGNOSIS — R63.4 WEIGHT LOSS: Primary | ICD-10-CM

## 2024-04-23 RX ORDER — SODIUM, POTASSIUM,MAG SULFATES 17.5-3.13G
SOLUTION, RECONSTITUTED, ORAL ORAL
Qty: 1 EACH | Refills: 0 | Status: SHIPPED | OUTPATIENT
Start: 2024-04-23

## 2024-04-23 NOTE — TELEPHONE ENCOUNTER
GI RNs: Please contact the patient to schedule a colonoscopy and upper endoscopy for an iron deficiency anemia following a split dose Suprep and monitored anesthesia care.  I would prefer May 10 if the patient is available.  I also sent a [employer] message that I would like a CT scan of the abdomen and pelvis to be done before the endoscopies (at least 3 days before).  I have entered the order.

## 2024-05-06 ENCOUNTER — TELEPHONE (OUTPATIENT)
Dept: INTERNAL MEDICINE CLINIC | Facility: CLINIC | Age: 69
End: 2024-05-06

## 2024-05-06 NOTE — TELEPHONE ENCOUNTER
Patient is looking for the names of the two endocrinologists the doctor recommended to her back in February or March.

## 2024-05-10 PROCEDURE — 88305 TISSUE EXAM BY PATHOLOGIST: CPT | Performed by: INTERNAL MEDICINE

## 2024-05-10 PROCEDURE — 88312 SPECIAL STAINS GROUP 1: CPT | Performed by: INTERNAL MEDICINE

## 2024-05-14 ENCOUNTER — TELEPHONE (OUTPATIENT)
Facility: CLINIC | Age: 69
End: 2024-05-14

## 2024-05-14 NOTE — TELEPHONE ENCOUNTER
----- Message from Shiva Clemente sent at 5/11/2024  1:46 PM CDT -----  Please see the following kapturemt message sent to the patient:    \"Denis Barth,    As you know, your colonoscopy was normal with the exception of diverticulosis.    The upper endoscopic examination revealed a hiatal hernia.  The small stomach polyp is a fundic gland polyp which is not precancerous, completely benign and of no concern.  The remainder of the biopsies look good.    Maintain a high-fiber diet for the diverticulosis.  Since you have had #2 negative colonoscopies, I would not recommend a repeat examination unless anything changes.    I will call you with the results of your upcoming CT scan.    Please let me know if you have any questions.    Dr. Clemente\"    GI RNs: Please enter in health maintenance that a colonoscopy was performed.  No recall.

## 2024-05-19 ENCOUNTER — HOSPITAL ENCOUNTER (OUTPATIENT)
Dept: CT IMAGING | Facility: HOSPITAL | Age: 69
Discharge: HOME | End: 2024-05-19
Attending: INTERNAL MEDICINE

## 2024-05-19 ENCOUNTER — HOSPITAL ENCOUNTER (OUTPATIENT)
Dept: CT IMAGING | Facility: HOSPITAL | Age: 69
End: 2024-05-19
Attending: INTERNAL MEDICINE

## 2024-05-19 DIAGNOSIS — R63.4 WEIGHT LOSS: ICD-10-CM

## 2024-05-19 LAB
CREAT BLD-MCNC: 0.6 MG/DL
EGFRCR SERPLBLD CKD-EPI 2021: 98 ML/MIN/1.73M2 (ref 60–?)

## 2024-05-19 PROCEDURE — 74177 CT ABD & PELVIS W/CONTRAST: CPT | Performed by: INTERNAL MEDICINE

## 2024-05-19 PROCEDURE — 82565 ASSAY OF CREATININE: CPT

## 2024-05-20 ENCOUNTER — TELEPHONE (OUTPATIENT)
Dept: HEMATOLOGY/ONCOLOGY | Facility: HOSPITAL | Age: 69
End: 2024-05-20

## 2024-05-20 ENCOUNTER — TELEPHONE (OUTPATIENT)
Facility: CLINIC | Age: 69
End: 2024-05-20

## 2024-05-20 NOTE — TELEPHONE ENCOUNTER
Called patient to coordinate medical oncology consult re:  5/19/24 CT a/p, suspicious for renal cell carcinoma.    Scheduled with Dr. Weile on 5/21/24 at 4 pm.  Has appt with PCP at 2 pm.  May be late for 4 pm consult but will drive right over after 2 pm appt.  Encouraged to call prn.

## 2024-05-20 NOTE — TELEPHONE ENCOUNTER
Ramakrishna, can you arrange for Lary to be seen by one of the oncologist as soon as possible.  She was seeing Dr. Corado previously.  A CT scan was done for unexplained anemia and weight loss with findings of a large renal cell mass with apparent metastatic disease.  She is aware of the CT findings.    HOLLAND Michael.

## 2024-05-21 ENCOUNTER — OFFICE VISIT (OUTPATIENT)
Dept: HEMATOLOGY/ONCOLOGY | Facility: HOSPITAL | Age: 69
End: 2024-05-21
Attending: INTERNAL MEDICINE

## 2024-05-21 ENCOUNTER — OFFICE VISIT (OUTPATIENT)
Dept: INTERNAL MEDICINE CLINIC | Facility: CLINIC | Age: 69
End: 2024-05-21

## 2024-05-21 VITALS
HEIGHT: 65 IN | HEART RATE: 106 BPM | WEIGHT: 176.38 LBS | OXYGEN SATURATION: 100 % | SYSTOLIC BLOOD PRESSURE: 103 MMHG | TEMPERATURE: 98 F | BODY MASS INDEX: 29.38 KG/M2 | DIASTOLIC BLOOD PRESSURE: 64 MMHG

## 2024-05-21 VITALS
WEIGHT: 176.38 LBS | RESPIRATION RATE: 20 BRPM | HEIGHT: 65 IN | HEART RATE: 102 BPM | DIASTOLIC BLOOD PRESSURE: 51 MMHG | TEMPERATURE: 98 F | OXYGEN SATURATION: 100 % | BODY MASS INDEX: 29.38 KG/M2 | SYSTOLIC BLOOD PRESSURE: 101 MMHG

## 2024-05-21 DIAGNOSIS — E55.9 VITAMIN D DEFICIENCY: ICD-10-CM

## 2024-05-21 DIAGNOSIS — Z00.00 ADULT GENERAL MEDICAL EXAM: Primary | ICD-10-CM

## 2024-05-21 DIAGNOSIS — I10 ESSENTIAL HYPERTENSION: ICD-10-CM

## 2024-05-21 DIAGNOSIS — C79.9 METASTATIC DISEASE (HCC): ICD-10-CM

## 2024-05-21 DIAGNOSIS — F32.1 MODERATE SINGLE CURRENT EPISODE OF MAJOR DEPRESSIVE DISORDER (HCC): ICD-10-CM

## 2024-05-21 DIAGNOSIS — R31.21 ASYMPTOMATIC MICROSCOPIC HEMATURIA: ICD-10-CM

## 2024-05-21 DIAGNOSIS — E78.2 MIXED HYPERLIPIDEMIA: ICD-10-CM

## 2024-05-21 DIAGNOSIS — Z12.11 COLON CANCER SCREENING: ICD-10-CM

## 2024-05-21 DIAGNOSIS — D64.9 ANEMIA OF UNKNOWN ETIOLOGY: ICD-10-CM

## 2024-05-21 DIAGNOSIS — D50.8 OTHER IRON DEFICIENCY ANEMIA: ICD-10-CM

## 2024-05-21 DIAGNOSIS — R93.5 ABNORMAL CT OF THE ABDOMEN: ICD-10-CM

## 2024-05-21 DIAGNOSIS — R73.9 HYPERGLYCEMIA: ICD-10-CM

## 2024-05-21 DIAGNOSIS — Z86.711 HISTORY OF PULMONARY EMBOLISM: ICD-10-CM

## 2024-05-21 DIAGNOSIS — H91.8X3 OTHER SPECIFIED HEARING LOSS OF BOTH EARS: ICD-10-CM

## 2024-05-21 DIAGNOSIS — M17.11 PRIMARY OSTEOARTHRITIS OF RIGHT KNEE: ICD-10-CM

## 2024-05-21 DIAGNOSIS — Z00.00 ENCOUNTER FOR ANNUAL HEALTH EXAMINATION: ICD-10-CM

## 2024-05-21 DIAGNOSIS — Z12.31 ENCOUNTER FOR SCREENING MAMMOGRAM FOR MALIGNANT NEOPLASM OF BREAST: ICD-10-CM

## 2024-05-21 DIAGNOSIS — N28.89 RENAL MASS: Primary | ICD-10-CM

## 2024-05-21 DIAGNOSIS — D64.9 ANEMIA, UNSPECIFIED TYPE: ICD-10-CM

## 2024-05-21 DIAGNOSIS — M81.0 AGE-RELATED OSTEOPOROSIS WITHOUT CURRENT PATHOLOGICAL FRACTURE: ICD-10-CM

## 2024-05-21 DIAGNOSIS — Z71.85 VACCINE COUNSELING: ICD-10-CM

## 2024-05-21 LAB
APPEARANCE: CLEAR
BILIRUB UR QL: NEGATIVE
COLOR UR: YELLOW
GLUCOSE (URINE DIPSTICK): NEGATIVE MG/DL
GLUCOSE UR-MCNC: NORMAL MG/DL
HYALINE CASTS #/AREA URNS AUTO: PRESENT /LPF
KETONES UR-MCNC: NEGATIVE MG/DL
LEUKOCYTE ESTERASE UR QL STRIP.AUTO: 250
MULTISTIX LOT#: ABNORMAL NUMERIC
NITRITE UR QL STRIP.AUTO: NEGATIVE
NITRITE, URINE: NEGATIVE
PH UR: 5.5 [PH] (ref 5–8)
PH, URINE: 5.5 (ref 4.5–8)
PROT UR-MCNC: 50 MG/DL
PROTEIN (URINE DIPSTICK): 30 MG/DL
SP GR UR STRIP: 1.02 (ref 1–1.03)
SPECIFIC GRAVITY: 1.02 (ref 1–1.03)
UROBILINOGEN UR STRIP-ACNC: 3
UROBILINOGEN,SEMI-QN: 2 MG/DL (ref 0–1.9)

## 2024-05-21 PROCEDURE — 99215 OFFICE O/P EST HI 40 MIN: CPT | Performed by: INTERNAL MEDICINE

## 2024-05-21 PROCEDURE — 99397 PER PM REEVAL EST PAT 65+ YR: CPT | Performed by: INTERNAL MEDICINE

## 2024-05-21 PROCEDURE — 81003 URINALYSIS AUTO W/O SCOPE: CPT | Performed by: INTERNAL MEDICINE

## 2024-05-21 PROCEDURE — G2212 PROLONG OUTPT/OFFICE VIS: HCPCS | Performed by: STUDENT IN AN ORGANIZED HEALTH CARE EDUCATION/TRAINING PROGRAM

## 2024-05-21 PROCEDURE — 3008F BODY MASS INDEX DOCD: CPT | Performed by: INTERNAL MEDICINE

## 2024-05-21 PROCEDURE — 99215 OFFICE O/P EST HI 40 MIN: CPT | Performed by: STUDENT IN AN ORGANIZED HEALTH CARE EDUCATION/TRAINING PROGRAM

## 2024-05-21 PROCEDURE — G2211 COMPLEX E/M VISIT ADD ON: HCPCS | Performed by: STUDENT IN AN ORGANIZED HEALTH CARE EDUCATION/TRAINING PROGRAM

## 2024-05-21 PROCEDURE — 1124F ACP DISCUSS-NO DSCNMKR DOCD: CPT | Performed by: INTERNAL MEDICINE

## 2024-05-21 PROCEDURE — 3074F SYST BP LT 130 MM HG: CPT | Performed by: INTERNAL MEDICINE

## 2024-05-21 PROCEDURE — 3078F DIAST BP <80 MM HG: CPT | Performed by: INTERNAL MEDICINE

## 2024-05-21 PROCEDURE — 99497 ADVNCD CARE PLAN 30 MIN: CPT | Performed by: INTERNAL MEDICINE

## 2024-05-21 RX ORDER — METOPROLOL SUCCINATE 100 MG/1
50 TABLET, EXTENDED RELEASE ORAL DAILY
Qty: 90 TABLET | Refills: 1 | Status: SHIPPED | OUTPATIENT
Start: 2024-05-21

## 2024-05-21 RX ORDER — METOPROLOL SUCCINATE 100 MG/1
50 TABLET, EXTENDED RELEASE ORAL 2 TIMES DAILY
Qty: 90 TABLET | Refills: 1 | Status: SHIPPED | OUTPATIENT
Start: 2024-05-21 | End: 2024-05-21

## 2024-05-21 RX ORDER — CELECOXIB 200 MG/1
200 CAPSULE ORAL DAILY PRN
Qty: 90 CAPSULE | Refills: 1 | Status: SHIPPED | OUTPATIENT
Start: 2024-05-21

## 2024-05-21 RX ORDER — ONDANSETRON 4 MG/1
4 TABLET, ORALLY DISINTEGRATING ORAL EVERY 8 HOURS PRN
Qty: 30 TABLET | Refills: 2 | Status: SHIPPED | OUTPATIENT
Start: 2024-05-21

## 2024-05-21 RX ORDER — SERTRALINE HYDROCHLORIDE 25 MG/1
25 TABLET, FILM COATED ORAL DAILY
Qty: 90 TABLET | Refills: 1 | Status: SHIPPED | OUTPATIENT
Start: 2024-05-21

## 2024-05-21 NOTE — PATIENT INSTRUCTIONS
ASSESSMENT/PLAN:     Encounter Diagnoses   Name Primary?    Adult general medical exam Check urine.    Yes    Age-related osteoporosis without current pathological fracture Take calcium 600 every 12 hrs. With vitamin D 400 IU every  12 hrs. Excerise at least 30 minutes 3-4 times a week. May use calcium citrate as opposed to calcium carbonate which may be better absorbed in the setting of PPI use.     lifelong physical activity at all ages is strongly endorsed by the [employer]opor[employer]. Exercise recommendations generally should include weight-bearing, muscle-strengthening, and balance training exercises for 30 minutes 5 days per week or 75 minutes twice weekly, often consistent with other general health recommendations.   Weight Bearing  There are two types of osteoporosis exercises that are important for building and maintaining bone density: weight-bearing and muscle-strengthening exercises.  Weight-bearing Exercises  These exercises include activities that make you move against gravity while staying upright. Weight-bearing exercises can be high-impact or low-impact.  High-impact weight-bearing exercises help build bones and keep them strong. If you have broken a bone due to osteoporosis or are at risk of breaking a bone, you may need to avoid high-impact exercises. If you’re not sure, you should check with your healthcare provider.  Examples of high-impact weight-bearing exercises are:  Dancing   Doing high-impact aerobics   Hiking   Jogging/running   Jumping Rope   Stair climbing   Tennis  Low-impact weight-bearing exercises can also help keep bones strong and are a safe alternative if you cannot do high-impact exercises. Examples of low-impact weight-bearing exercises are:  Using elliptical training machines   Doing low-impact aerobics   Using stair-step machines   Fast walking on a treadmill or outside            Anemia of unknown etiology Stable.        Other specified hearing loss of both  ears Stable.        Encounter for screening mammogram for malignant neoplasm of breast Check mammogram. Continue self breast exam every month.         Colon cancer screening Up to date.        Essential hypertension Lower. Lower metoprolol to 50 mg a day. Careful with diet and excercise at least 30 minutes 3-4 times a week. Call in 2 weeks. Check blood pressures at different times on different days. Can purchase own blood pressure monitor. If not, check at local pharmacy. Bake foods more and grill occasionally. Avoid fried foods. No salt. Use other seasonings.         History of pulmonary embolism       Hyperglycemia Stable.        Other iron deficiency anemia Stable.        Vitamin D deficiency Stable.        Vaccine counseling Holding shingrix and PCV 20.        Primary osteoarthritis of right knee Stable.        Moderate single current episode of major depressive disorder (HCC) Hold counselor.        Mixed hyperlipidemia       Encounter for annual health examination      Nausea. Not sure if related to GERD.  History of hiatal hernia.  Careful with diet. Avoid hot spicy foods and caffeine and caffinated beverages. Careful with acidic foods. Elevate head of bed. Wait 2 hrs. after eating before going to bed to allow digestion. Avoid caffeinated or carbonated beverages, fried foods, spicy foods or highly acidic foods (citrus, onions, tomatoes, etc  -don’t lay down 20-30 minutes after eating or drinking  -use wedge pillow under mattress or use cinder blocks to elevate head of bed-this will prevent reflux at night  -take medications as directed  -quit smoking if applicable  -call if symptoms do not improve within 2-3 days or if symptoms worsen  Follow up with Gastroenterologist  STOP ibuprofen. Try celebrex 200 mg a day. Discussed with patient of side effects and use of these medications.  Try zofran as needed. Discussed with patient of side effects and use of these medications.      AbNL CT abd.   Has jameel't with  oncology today. Check urine.     Umbilical hernia.  Pl will need surgery.  ER if worsening or changing symptoms.      Orders Placed This Encounter   Procedures    URINALYSIS, AUTO, W/O SCOPE    Advance Care Planning- 1st 30 minutes       Meds This Visit:  Requested Prescriptions     Signed Prescriptions Disp Refills    sertraline 25 MG Oral Tab 90 tablet 1     Sig: Take 1 tablet (25 mg total) by mouth daily.    metoprolol succinate  MG Oral Tablet 24 Hr 90 tablet 1     Sig: Take 0.5 tablets (50 mg total) by mouth daily.       Imaging & Referrals:  [facility] 2D+3D SCREENING BILAT (CPT=77067/29737)      RTC 1-2 months for FU.     Understanding Advance Care Planning  Advance care planning is the process of deciding one’s own future medical care. It helps assure that if you can’t speak for yourself, your wishes can still be carried out. The plan is a series of legal documents that note a person’s wishes. The documents vary by state. Advance care planning should be discussed at a regular office visit with your primary care provider before an acute illness. Advance care planning is encouraged when a person has a serious illness that is expected to get worse. It may also be done before major surgery. And it can help you and your family be prepared in case of a major illness or injury. Advance care planning helps with making decisions at these times.     Who will speak on your behalf?  A healthcare proxy is a person who acts as the voice of a patient when the patient can’t speak for himself or herself. The name of this role varies by state. It may be called a Durable Medical Power of  or Durable Power of  for Healthcare. It may be called an agent, surrogate, or advocate. Or it may be called a representative or decision maker. It's an official duty that is identified by a legal document. The document also varies by state.   Why is advance care planning important?   If a person communicates his or her  healthcare wishes:   He or she will be given medical care that matches his or her values and goals.  Family members will not be forced to make decisions in a crisis with no guidance.  Creating a plan  Making an advance care plan is often done in 3 steps:   Thinking about one’s wishes. To create an advance care plan, think about what kind of medical treatment you would want if you lose the ability to communicate. Are there any situations in which you would refuse or stop treatment? Are there therapies you would want or not want? And whom do you want to make decisions for you? There are many places to learn more about how to plan for your care. Ask your healthcare provider or  for resources.  Picking a healthcare proxy. This means choosing a trusted person to speak for you only when you can’t speak for yourself. When you can't make medical decisions, your proxy makes sure the instructions in your advance care plan are followed. A proxy doesn't make decisions based on his or her own opinions. They must put aside those opinions and values if needed, and carry out your wishes.  Filling out the legal documents. There are several kinds of legal documents for advance care planning. Each one tells healthcare providers your wishes. The documents may vary by state. They must be signed and may need to be witnessed or notarized. You can cancel or change them whenever you wish. Depending on your state, the documents may include a Healthcare Proxy form, Living Will, Durable Medical Power of , and Advance Directive.  The family’s role  The best help a family can give is to support their loved one’s wishes. Open and honest communication is vital. Family should express any concerns they have about the patient’s choices while the patient can still make decisions in the event that his or her illness prevents communicating those wishes at a later time.    Aron last reviewed this educational content on  12/1/2019 © 2000-2021 [employer]. All rights reserved. This information is not intended as a substitute for professional medical care. Always follow your healthcare professional's instructions.          Advance Medical Directive  An advance medical directive is a form that lets you plan ahead for the care you’d want if you could no longer express your wishes. This statement outlines the medical treatment you’d want or names the person you’d wish to make healthcare decisions for you. Be aware that laws vary from state to state, and it may be worthwhile to talk with an .     Writing down your wishes  An advance directive is important whether you’re young or old. Injury or illness can strike at any age.  Decide what is important to you and the kind of treatment you’d want, or not want to have.  Some states allow only one kind of advance directive. Some let you do both a durable power of  for healthcare and a living will. Some states put both kinds on the same form.    A durable power of  (POA) for healthcare   This form lets you name someone else that you have chosen and trust to speak and make decisions on your behalf.  This person can decide on treatment for you only when you can’t speak for yourself.  You don't need to be at the end of your life. They could speak for you if you were in a coma but were likely to recover.    A living will  This form lets you list the care you want at the end of your life.  A living will applies only if you won’t live without medical treatment. It would apply if you had advanced cancer, a massive stroke, or other serious illness from which you will not recover.  It takes effect only when you can no longer express your wishes yourself.    F[employer] last reviewed this educational content on 2/1/2021    © 1312-9338 [employer]. All rights reserved. This information is not intended as a substitute for professional medical care. Always  follow your healthcare professional's instructions.          Choosing an Agent  A durable power of  for healthcare is only as good as the person you name to be your agent. Your agent is the person you have chosen to speak and make decisions on your behalf. If this person knows your treatment wishes and is willing to carry them out, you’ll probably be well represented. Be sure to tell your agent what’s important to you.     Who to choose  Here are suggestions for choosing an agent:  You can name a family member, close friend, , , or rabbi.  You should name one person as your agent. Then name one or two alternates. You need a backup person in case your first choice can’t be reached when needed.  Talk with each person you're thinking of naming as your agent or alternate. Do this before you decide who should carry out your wishes.  Your agent should be ...  A competent adult, age 18 or older  Someone you trust and can talk to about the care you want and what's important to you  Someone who supports your treatment choices    In many states, your agent can’t be ...   Your healthcare provider  An employee of your provider or of a hospital, nursing home, or hospice program where you receive care  Some states have other restrictions on who can be named as an agent for an advance directive.   Be prepared  Tip: It's a good idea to write down your wishes and give a copy to your agent and all others who are involved with your healthcare.   Aron last reviewed this educational content on 8/1/2021    © 1015-7362 [employer]. All rights reserved. This information is not intended as a substitute for professional medical care. Always follow your healthcare professional's instructions.          Understanding DNR Orders  Do not resuscitate (DNR) orders tell hospital staff not to do potentially life-restoring measures, such as CPR, if you or your loved one's heart and lungs stop working. It allows  a natural death, and prevents healthcare staff from artificially reviving a person and placing them on life support. In many states, a DNR order also applies to staff outside the hospital such as in nursing homes and emergency medical services. A DNR order must be written by a healthcare provider. Or in some cases, certain other healthcare workers write it. This can only be done with the person’s or family’s consent. If a person has not written an advance directive, their family will decide on a DNR with the help of the healthcare team.   The person can cancel a DNR order at any time. The healthcare team can answer questions about the DNR form. Have copies of a DNR form are readily available so that your wishes can be faithfully followed.   Writing a DNR order  When might a DNR order be written? When the person’s health condition is such that, in the case of cardiac arrest, CPR and other resuscitation methods are not desired. This could be because the chance of successful resuscitation is very low. Or it could be because the care plan now focuses on comfort measures instead of life-sustaining measures. Coma and terminal illness are instances when a DNR order might be used.   Irreversible coma  In a coma, a person does not respond to sight, sound, or touch. The heart and lungs could be working, but brain function is damaged due to trauma or disease.   Terminal illness  In the last stages of heart disease, AIDS, cancer, and other illnesses, some people don’t want to prolong their suffering. If recovery isn’t likely and quality of life is poor or getting worse, a person or their family may agree to a DNR order.   DNR orders and hospice care  A hospice program can offer care during the final weeks of life. Hospice programs provide dignity, pain control and comfort care in the home or at special facilities. Hospice does not provide aggressive treatment. In fact, a DNR order will likely be discussed before a person is  admitted to hospice. A  or  may be able to help you arrange for hospice support.   Documenting end-of-life wishes  In addition to DNR orders, a person with a serious, life-limiting illness may wish to document their treatment wishes. This is called a POST form or by different names, depending on the state. It's meant to provide a portable medical order to help guide healthcare providers on the specific medical treatments a person wants during a medical emergency. It's meant to complement a DNR order, not replace it. Your healthcare provider can tell you more and help you complete the forms. The form may be called one of these:   MOLST (medical orders for life-sustaining treatment)  POLST (physician orders for life-sustaining treatment)  MOST (medical orders for scope of treatment)  POST (physician orders for scope of treatment)  TPOPP (transportable physician orders for patient preferences)  Aron last reviewed this educational content on 7/1/2021    © 8305-7142 [employer]. All rights reserved. This information is not intended as a substitute for professional medical care. Always follow your healthcare professional's instructions.          An Agent’s Role for Durable Power of  for Health Care   It’s impossible to know which medical treatment choices you might face in the future. What if you aren't able to make these decisions for yourself? A durable power of  for healthcare lets you name an agent to speak and carry out your wishes on your behalf. This happens only if you can’t express your wishes yourself.     An agent’s duty  Your agent respects your wishes in the following ways:    Your agent’s duty is to see that your wishes are followed.  If your wishes aren’t known, your agent should try to decide what you want.  Your agent’s choices come before anyone else’s wishes for you.  A durable power of  for healthcare doesn't not give your agent control  over your money . Your agent also can’t be made to pay your bills.  Find out what your agent can do  Restrictions on what an agent can and can’t do vary by state. Check your state laws. In most states your agent can:   Choose or refuse life-sustaining and other medical treatment on your behalf  Consent to treatment, and then stop treatment if your condition doesn’t improve  Access and release your medical records  Request an autopsy and donate your organs, unless you’ve stated otherwise in your advance directive  Find out whether your state allows your agent to do the following:   Refuse or withdraw life-enhancing care  Refuse or stop tube feeding or other life-sustaining care--even if you haven’t stated on your advance directive that you don’t want these treatments  Order sterilization or   Aron last reviewed this educational content on 2021 [employer]. All rights reserved. This information is not intended as a substitute for professional medical care. Always follow your healthcare professional's instructions.          Being a Healthcare Proxy  A healthcare proxy is someone who represents a person who can’t speak for themself. The name of this role varies by state. It may be called a Durable Medical Power of . It may be called a Durable Power of  for Healthcare. It may be called an agent, surrogate, or advocate. Or it may be called a representative or decision maker. It's an official duty that is noted by a legal document. The document also varies by state. The person must name you as his or her proxy on the document.      A healthcare proxy speaks for another person when he or she is not able to do so. The proxy helps make sure the person’s healthcare wishes are known and followed.     What it means to be a healthcare proxy   Your role as healthcare proxy starts when the person can’t make medical decisions. This assessment can only be made by a licensed  doctor. You then make the healthcare decisions as needed. You do this by carrying out the person’s wishes. These wishes are noted in his or her advance care planning documents. These declare what kind of treatment the person wishes to have or not have. You may need to put aside your own values and opinions to carry out the person’s wishes. This may include refusing or stopping life-sustaining treatments.   Documenting end-of-life wishes   As a healthcare proxy, encourage the person to discuss his or her wishes, while they are able. They can do this with their healthcare provider and then document the wishes as a medical order. The provider can help the person complete the form. The forms are known by different names depending on the state. The form may be called one of these:     MOLST (medical orders for life-sustaining treatment)  POLST (physician orders for life-sustaining treatment)  MOST (medical orders for scope of treatment)  POST (physician orders for scope of treatment)  TPOPP (transportable physician orders for patient preferences)    The form documents the person’s wishes at the end of life. It's not tied to a certain healthcare provider or facility. It's different than a living will. The form is an order written according to state regulations by a healthcare provider. To complete one, the person must express his or her wishes to an advanced healthcare provider. If the person can’t make his or her own decisions, then this is done by the person’s healthcare proxy.   Carrying out your role  Your duties depend on what the person’s advance care planning documents say. Your duties may also depend on state law. In general:   Before accepting a role as a proxy, talk with the person. Be sure you know his or her wishes. Ask questions. This will help you be his or her voice if and when it is needed.  Be sure that the person’s healthcare team knows that you are his or her proxy. Carry a copy of the document and  proof of your identity.  Make sure the healthcare team has a copy of the advance care planning documents.  Talk to the healthcare team. Ask questions as often as you need. Stay informed about the person’s condition.  Ask for any help you need to understand the medical situation. Ask about the person’s condition and prognosis. Ask about risks and benefits of tests and treatments. Find out all the facts and options.  Speak on the person’s behalf with the healthcare team when needed.  Talk with family members and keep them informed.  Know your rights. You have the right to ask for information. You can ask for consultations and second opinions. You have the right to request or refuse treatment for the person. You may be able to review his or her medical chart. You can authorize the person’s transfer to another facility. You can also request a new healthcare provider for him or her. If you are not sure what your rights are at any time, ask a .  When it’s time to make decisions  If the person’s wishes are clear in the advance care plan documents, ask for them to be carried out as noted. If they are not clear, talk with the healthcare team. Listen to the team’s recommendations. Talk with a  or counselor. It may be hard for you to make a decision at times. You may feel sad or upset about a decision. Being a healthcare proxy is not an easy role. But it's an important one. Remember that the person trusts you to carry out his or her wishes.   If you need help  Ask the healthcare team if you have trouble with a decision. The healthcare team will help you.  Encourage the person you are helping to have a conversation with their provider about their end-of-life wishes. The provider can help them fill out the form.  You may need help in resolving family conflicts. Ask the hospital or clinic , ethics consultant, or a  for help.  If you are having trouble talking with the  healthcare team while the person is in the hospital, reach out to the patient relations department. Or ask to speak to the hospital ombudsman or ethics committee.    Aron last reviewed this educational content on 9/1/2019 © 2000-2021 [employer]. All rights reserved. This information is not intended as a substitute for professional medical care. Always follow your healthcare professional's instructions.          Life Support  If you understand how specific treatments may affect your quality of life, you can decide which ones you’d choose or refuse. You may want to talk to your healthcare provider about the possible benefits and risks of treatments. The chance of good results from these therapies varies based on each individual clinical situation and can be very hard to predict. Medical treatment, if your life is in danger, falls into 3 main categories.     Life supporting  This care keeps your heart and lungs going when they can no longer work on their own.  CPR restarts your heart and lungs if they stop working.  A respirator (or ventilator) keeps you breathing. Air is pumped into your lungs through a tube that’s put into your windpipe.    Life sustaining  This care keeps you alive longer when you have an illness that can’t be cured.  Tube feeding or TPN (total parenteral nutrition) provides food and fluids through a tube or IV (intravenous). It is given if you can’t chew or swallow on your own.  Dialysis is a kidney machine that cleans your blood when your kidneys can no longer work on their own.    Life enhancing  This care controls pain and discomfort, such as nausea or trouble breathing. This type of care is not designed to prolong your life, but to enhance comfort and quality of life. Nothing is done to keep you alive longer.  Hospice care is comfort care. It might provide food and fluids by mouth or help with bathing. Hospice care is given during the last stages of a terminal  illness.  Strong pain medicine can be given to help keep you comfortable.    Do Not Resuscitate (DNR)  Would you want CPR if your heart stops while you’re a patient in a hospital or nursing home? If not, talk to your healthcare provider about issuing a DNR (Do-Not-Resuscitate) order.   DNRs and advance directives may not apply during anesthesia, in emergency rooms, or when emergency medical teams respond to a 911 call. Ask your healthcare provider how you can make sure your wishes will be followed. Also, a DNR will not prevent you from getting other kinds of needed medical care such as treatment for pain, or bleeding.   Art[employer] last reviewed this educational content on 8/1/2021 © 2000-2021 [employer]. All rights reserved. This information is not intended as a substitute for professional medical care. Always follow your healthcare professional's instructions.          Stopping Life-Sustaining Treatments  Certain treatments can help sustain life when you have a serious illness. But as your illness progresses, there may come a time when these treatments are no longer a benefit. Decisions must then be made whether to continue or stop these treatments. This can be a hard task for you and your loved ones, but know that you’re not alone. Your healthcare provider and healthcare team will guide you through these treatment decisions. They will also help answer any questions you may have.     What are life-sustaining treatments?  Life-sustaining treatments help keep you alive if a vital body function fails. These treatments can include CPR and the use of machines to help with heart, lung, or kidney function. They can also include the use of tubes to deliver food, fluids, blood, and medicines to the body. Blood transfusions and antibiotics are also types of life-sustaining treatments.   What happens if life-sustaining treatments are continued?  These treatments can help extend your life. But they will not  cure your illness. If you are near the end of your life, you may find it hard to handle the side effects and problems that can occur with these treatments. In this case, the treatments may be too much of a burden on your body. They may cause more harm than good. They may also disrupt the natural dying process and prolong suffering.   What happens if life-sustaining treatments are stopped?  If these treatments are stopped, the focus of treatment will shift to comfort care. This involves measures to control pain and other symptoms you may have. These measures are not meant to cure your illness or help you live longer. Instead, they are meant to improve your quality of life during the time you have left. If you are in the end stage of your illness, you may be referred to hospice by your healthcare provider. Hospice provides end-of-life care. This includes emotional, spiritual, and social support for you and your loved ones.   How do I decide whether to stop life-sustaining treatments?  Your healthcare provider and healthcare team will talk with you about the specific treatments that are part of your care plan. If you want, you may include family and friends in these meetings. Here are some questions to think about or ask your healthcare team:   Is there is a chance that my illness will improve? Or will it continue to get worse?  What are my goals of care? Do I want to extend the time I have left? Or do I want to focus my care on comfort and managing symptoms?  How will stopping or continuing these treatments affect my health? Will they enhance my comfort and quality of life? Or will they cause more problems?  Consider your own values or sarah beth. Also ask for advice from those who share your values.  How do I state my decisions about life-sustaining treatments?  Once you’ve made your decision to continue or stop specific treatments, you can tell your healthcare provider directly. It's best to also put your treatment  wishes in writing with advance directives. These are legal forms related to healthcare decisions. Laws about advance directives vary from state to state. Ask your healthcare provider about what forms are needed to make sure your wishes will be followed. Some common forms include:   A durable power of  for healthcare or a healthcare proxy form.  This form lets you name a person to make treatment decisions for you when you can’t. This person is often called a healthcare proxy, medical or healthcare power of , or agent.  A living will.  This form tells others the kinds of treatment you want or don’t want if you become too ill or injured to speak for yourself.  Orders for life-sustaining treatments.  These are actual healthcare provider's orders that must be followed by other medical providers. The form belongs to you, not to the healthcare provider or hospital.). These are legal forms obtained from your healthcare provider or hospital that document your wishes. The forms are known by different names depending on the state. Common names include:  MOLST (medical orders for life-sustaining treatment)  POLST (physician orders for life-sustaining treatment)  MOST (medical orders for scope of treatment)  POST (physician orders for scope of treatment)  TPOPP (transportable physician orders for patient preferences)     Keep in mind that you can change or cancel an advance directive at any time. Make it a practice to review your decisions each time there is a change in your health or goals of care. Also be sure to tell your healthcare proxy and loved ones of any changes in your decisions.   Deciding whether to stop life-sustaining treatments for a loved one   The decision to stop treatment ideally is made with the person’s consent. If the person is not capable of making decisions and has no advance directive, the decision falls to the person’s healthcare proxy or other adult. If you need to make a decision about  stopping treatment for a loved one, start by talking to his or her healthcare provider. Review the goals of care and the benefits and burdens of specific treatments on your loved one’s health. Also think about your loved one’s wishes and values. If needed, seek advice from other healthcare team members, like a  or .   [employer] last reviewed this educational content on 12/1/2019    © 2753-7871 [employer]. All rights reserved. This information is not intended as a substitute for professional medical care. Always follow your healthcare professional's instructions.   Lary Wallis's SCREENING SCHEDULE   Tests on this list are recommended by your physician but may not be covered, or covered at this frequency, by your insurer.   Please check with your insurance carrier before scheduling to verify coverage.   PREVENTATIVE SERVICES FREQUENCY &  COVERAGE DETAILS LAST COMPLETION DATE   Diabetes Screening    Fasting Blood Sugar /  Glucose    One screening every 12 months if never tested or if previously tested but not diagnosed with pre-diabetes   One screening every 6 months if diagnosed with pre-diabetes Lab Results   Component Value Date    GLUCOSE 93 05/21/2007     (H) 12/06/2023        Cardiovascular Disease Screening    Lipid Panel  Cholesterol  Lipoprotein (HDL)  Triglycerides Covered every 5 years for all Medicare beneficiaries without apparent signs or symptoms of cardiovascular disease Lab Results   Component Value Date    CHOLEST 115 12/06/2023    HDL 32 (L) 12/06/2023    LDL 68 12/06/2023    LDLD 68 12/06/2023    TRIG 69 12/06/2023         Electrocardiogram (EKG)   Covered if needed at Welcome to Medicare, and non-screening if indicated for medical reasons -      Ultrasound Screening for Abdominal Aortic Aneurysm (AAA) Covered once in a lifetime for one of the following risk factors    Men who are 65-75 years old and have ever smoked    Anyone with a family  history -     Colorectal Cancer Screening  Covered for ages 50-85; only need ONE of the following:    Colonoscopy   Covered every 10 years    Covered every 2 years if patient is at high risk or previous colonoscopy was abnormal 05/10/2024    Health Maintenance   Topic Date Due    Colorectal Cancer Screening  05/10/2034       Flexible Sigmoidoscopy   Covered every 4 years -    Fecal Occult Blood Test Covered annually -   Bone Density Screening    Bone density screening    Covered every 2 years after age 65 if diagnosed with risk of osteoporosis or estrogen deficiency.    Covered yearly for long-term glucocorticoid medication use (Steroids) Last Dexa Scan:    XR DEXA BONE DENSITOMETRY (CPT=77080) 12/28/2023      No recommendations at this time   Pap and Pelvic    Pap   Covered every 2 years for women at normal risk; Annually if at high risk -  No recommendations at this time    Chlamydia Annually if high risk -  No recommendations at this time   Screening Mammogram    Mammogram     Recommend annually for all female patients aged 40 and older    One baseline mammogram covered for patients aged 35-39 11/15/2023    Health Maintenance   Topic Date Due    Mammogram  11/15/2024       Immunizations    Influenza Covered once per flu season  Please get every year -  No recommendations at this time    Pneumococcal Each vaccine ([id number] & [id number]) covered once after 65 Prevnar 13: -    [id number]: -     Pneumococcal Vaccination(1 of 1 - PCV) Never done    Hepatitis B One screening covered for patients with certain risk factors   -  No recommendations at this time    Tetanus Toxoid Not covered by Medicare Part B unless medically necessary (cut with metal); may be covered with your pharmacy prescription benefits -    Tetanus, Diptheria and Pertusis TD and TDaP Not covered by Medicare Part B -  No recommendations at this time    Zoster Not covered by Medicare Part B; may be covered with your pharmacy  prescription benefits  -  Zoster Vaccines(1 of 2) Never done

## 2024-05-21 NOTE — PROGRESS NOTES
HPI:    Patient ID: Lary Wallis is a 68 year old female.    Lary Wallis is a 68 year old female who presents for a complete physical exam.   Lary Wallis is a 68 year old female who presents for a Medicare Assessment.     Chief Complaint   Patient presents with    Wellness Visit     Patient states she is here for Medicare Wellness Visit.         Labs:   Lab Results   Component Value Date/Time    WBC 12.9 (H) 04/19/2024 04:29 PM    HGB 8.3 (L) 04/19/2024 04:29 PM    .0 (H) 04/19/2024 04:29 PM      Lab Results   Component Value Date/Time     (H) 12/06/2023 01:57 PM     12/06/2023 01:57 PM    K 4.4 12/06/2023 01:57 PM     12/06/2023 01:57 PM    CO2 25 12/06/2023 01:57 PM    CREATSERUM 0.62 12/06/2023 01:57 PM    CA 9.1 12/06/2023 01:57 PM    ALB 2.8 (L) 12/06/2023 01:57 PM    TP 8.0 12/06/2023 01:57 PM    ALKPHO 97 12/06/2023 01:57 PM    AST 14 12/06/2023 01:57 PM    ALT 14 12/06/2023 01:57 PM    BILT 0.6 12/06/2023 01:57 PM    TSH 2.484 02/07/2024 02:25 PM        Lab Results   Component Value Date/Time    CHOLEST 115 12/06/2023 01:57 PM    HDL 32 (L) 12/06/2023 01:57 PM    TRIG 69 12/06/2023 01:57 PM    LDL 68 12/06/2023 01:57 PM    NONHDLC 83 12/06/2023 01:57 PM       No results found for: \"A1C\"   Lab Results   Component Value Date    VITD 50 12/06/2023         Health Maintenance   Topic Date Due    Mammogram  11/15/2024       Allergies:  Allergies   Allergen Reactions    Ace Inhibitors Coughing    Azithromycin      Other reaction(s): AZITHROMYCIN    Erythromycin      Other reaction(s): Rash - Mild    Naproxen      Other reaction(s): NAPROXEN    Nortriptyline      Other reaction(s): vivid, scary dreams    Sulfa Antibiotics      Other reaction(s): Rash - Moderate     Current Outpatient Medications   Medication Sig Dispense Refill    sertraline 25 MG Oral Tab Take 1 tablet (25 mg total) by mouth daily. 90 tablet 1    metoprolol succinate  MG Oral Tablet 24 Hr Take 0.5  tablets (50 mg total) by mouth daily. 90 tablet 1    Na Sulfate-K Sulfate-Mg Sulf (SUPREP BOWEL PREP KIT) 17.5-3.13-1.6 GM/177ML Oral Solution Take as directed 1 each 0    acetaminophen-codeine 300-30 MG Oral Tab Take 1 tablet by mouth every 6 (six) hours as needed for Pain. (Patient not taking: Reported on 4/19/2024) 20 tablet 0    ibuprofen 600 MG Oral Tab Take 1 tablet (600 mg total) by mouth every 6 (six) hours as needed for Pain. (Patient not taking: Reported on 4/19/2024) 90 tablet 1      Past Medical History:    Essential hypertension    High blood pressure    Mixed hyperlipidemia    Moderate single current episode of major depressive disorder (HCC)    Osteoarthrosis, pelvic region and thigh    PONV (postoperative nausea and vomiting)      Past Surgical History:   Procedure Laterality Date    Cholecystectomy      Colonoscopy N/A 10/1/2018    Procedure: COLONOSCOPY;  Surgeon: Shiva Clemente MD;  Location: [facility] ENDOSCOPY    Hc implant ocular device intraoperative detached retina [id number]      Hip replacement surgery  2012    Hysterectomy      2010    Laparoscopic cholecystectomy  04/01/1990      Family History   Problem Relation Age of Onset    Diabetes Father     Arthritis Mother     Macular degeneration Mother     Other (spinal stenosis) Mother     Breast Cancer Sister 62    Pacemaker Sister       Social History:  Social History     Socioeconomic History    Marital status:     Number of children: 4   Tobacco Use    Smoking status: Never    Smokeless tobacco: Never   Vaping Use    Vaping status: Never Used   Substance and Sexual Activity    Alcohol use: Yes     Comment: occasional    Drug use: No   Other Topics Concern     Service No    Blood Transfusions No    Caffeine Concern Yes    Occupational Exposure No    Hobby Hazards No    Sleep Concern Yes    Stress Concern No    Weight Concern Yes    Special Diet No    Back Care No    Exercise No    Bike Helmet No    Seat Belt Yes    Self-Exams Yes        History/Other:     Patient Active Problem List   Diagnosis    History of pulmonary embolism    Osteoarthrosis, pelvic region and thigh    Essential hypertension    Moderate single current episode of major depressive disorder (HCC)    Breast cancer screening    Bilateral hearing loss    Adult general medical exam    Vitamin D deficiency    Vaccine counseling    Primary osteoarthritis of right knee    Colon cancer screening    Anemia of unknown etiology    Hyperglycemia    Mixed hyperlipidemia    Age-related osteoporosis without current pathological fracture    Iron deficiency anemia     OB History    Para Term  AB Living   4 4 0 0 0 0   SAB IAB Ectopic Multiple Live Births   0 0 0 0 0        No LMP recorded. Patient has had a hysterectomy.    Hx of Pap: all Paps normal           Hypertension  Patient is here for follow up of hypertension. BP at home: not take.   Patient was only taking half of 100 mg tablet a day.  Has been compliant with medications.  Was decreased after blood pressure was lower.  Was never on 100 twice a day.  Exercise level: not active, somewhat active and has been following low salt diet.  Weight has been stable. Cooks more. No added salt. Eats more salty.   Wt Readings from Last 3 Encounters:   24 176 lb 6.4 oz (80 kg)   24 180 lb (81.6 kg)   24 182 lb (82.6 kg)     BP Readings from Last 3 Encounters:   24 103/64   05/10/24 98/58   24 130/77     Labs:   Lab Results   Component Value Date/Time     (H) 2023 01:57 PM     2023 01:57 PM    K 4.4 2023 01:57 PM     2023 01:57 PM    CO2 25 2023 01:57 PM    CREATSERUM 0.62 2023 01:57 PM    CA 9.1 2023 01:57 PM    AST 14 2023 01:57 PM    ALT 14 2023 01:57 PM    TSH 2.484 2024 02:25 PM        Lab Results   Component Value Date/Time    CHOLEST 115 2023 01:57 PM    HDL 32 (L) 2023 01:57 PM    TRIG 69 2023 01:57 PM     LDL 68 12/06/2023 01:57 PM    NONHDLC 83 12/06/2023 01:57 PM            Tobacco:  She has never smoked tobacco.    CAGE Alcohol Screen:   CAGE screening score of 0 on 5/19/2024, showing low risk of alcohol abuse.        Patient Care Team:  Fernanda Stover MD as PCP - General (Internal Medicine)  Take 400 of ibuprofen and felt a little nauseous.  Has a hiatal hernia on CAT scan.  Does not eat late at night.  He eats and waits 4 hours before going to bed.  Has 1 cup of coffee in the morning.    Nausea  This is a new problem. The current episode started more than 1 month ago. The problem occurs daily. The problem has been waxing and waning. Associated symptoms include nausea. Pertinent negatives include no abdominal pain, change in bowel habit, chest pain, chills, congestion, coughing, diaphoresis, fatigue, fever (Was stable with weight loss then noticed approximately 1 to 2 pounds per week was losing but also was eating last and more active doing things.), headaches, numbness, rash, sore throat, vomiting or weakness. Nothing aggravates the symptoms. She has tried nothing for the symptoms.       Review of Systems   Constitutional:  Positive for unexpected weight change (12-23 decorating for holidays and worn out.). Negative for activity change, appetite change, chills, diaphoresis, fatigue and fever (Was stable with weight loss then noticed approximately 1 to 2 pounds per week was losing but also was eating last and more active doing things.).   HENT:  Negative for congestion, dental problem, drooling, ear discharge, ear pain, facial swelling, hearing loss, mouth sores, nosebleeds, postnasal drip, rhinorrhea, sinus pressure, sinus pain, sneezing, sore throat, tinnitus, trouble swallowing and voice change.    Eyes:  Negative for photophobia, pain, discharge, redness, itching and visual disturbance.   Respiratory:  Negative for apnea, cough, choking, chest tightness, shortness of breath, wheezing and stridor.     Cardiovascular:  Negative for chest pain, palpitations and leg swelling.   Gastrointestinal:  Positive for nausea. Negative for abdominal distention, abdominal pain, anal bleeding, blood in stool, change in bowel habit, constipation, diarrhea, rectal pain and vomiting.   Endocrine: Negative for cold intolerance, heat intolerance, polydipsia, polyphagia and polyuria.   Genitourinary:  Negative for decreased urine volume, difficulty urinating, dysuria, flank pain, frequency, hematuria, menstrual problem, pelvic pain, urgency, vaginal bleeding, vaginal discharge and vaginal pain.   Skin:  Negative for rash.   Neurological:  Negative for dizziness, tremors, seizures, syncope, facial asymmetry, speech difficulty, weakness, light-headedness, numbness and headaches.   Psychiatric/Behavioral:  Positive for agitation (Due to recent CT results.) and sleep disturbance. Negative for behavioral problems, confusion, decreased concentration, dysphoric mood, hallucinations, self-injury and suicidal ideas. The patient is not nervous/anxious and is not hyperactive.    All other systems reviewed and are negative.          Functional Ability/Status:   Lary Wallis has some abnormal functions as listed below:  She has difficulties Shopping for Groceries based on screening of functional status. She has Vision problems based on screening of functional status. She has Walking problems based on screening of functional status.         Fall Risk Assessment:   She has been screened for Falls and is High Risk. Fall Prevention information provided to patient in After Visit Summary.    Do you feel unsteady when standing or walking?: Yes  Do you worry about falling?: Yes  Have you fallen in the past year?: Yes  How many times have you fallen?: (P) 2  Were you injured?: (P) No       Medicare Hearing Assessment:   Hearing Screening    Time taken: 5/21/2024  2:00 PM  Entry User: Juanis Gama  Screening Method: Finger Rub  Finger Rub Result:  Pass         Depression Screening (PHQ-2/PHQ-9): PHQ-2 SCORE: 0  , done 5/19/2024          Cognitive Assessment:   She had a completely normal cognitive assessment - see flowsheet entries       Supplementary Documentation:     In the past six months, have you lost more than 10 pounds without trying?: 3 - Don't know  Has your appetite been poor?: Yes  Type of Diet: Balanced  How does the patient maintain a good energy level?: Stretching;Other  How would you describe your daily physical activity?: None  How would you describe your current health state?: Fair  How do you maintain positive mental well-being?: Social Interaction;Puzzles;Visiting Friends;Visiting Family  On a scale of 0 to 10, with 0 being no pain and 10 being severe pain, what is your pain level?: 6 - (Moderate)  In the past six months, have you experienced urine leakage?: 1-Yes  At any time do you feel concerned for the safety/well-being of yourself and/or your children, in your home or elsewhere?: No  Have you had any immunizations at another office such as Influenza, Hepatitis B, Tetanus, or Pneumococcal?: No    Visual Acuity:   Right Eye Visual Acuity: Corrected Right Eye Chart Acuity: 20/70   Left Eye Visual Acuity: Corrected Left Eye Chart Acuity: 20/70   Both Eyes Visual Acuity: Corrected Both Eyes Chart Acuity: 20/70   Able To Tolerate Visual Acuity: Yes        PHYSICAL EXAM:   /64 (BP Location: Left arm, Patient Position: Sitting, Cuff Size: adult)   Pulse 106   Temp 98.2 °F (36.8 °C) (Oral)   Ht 5' 5\" (1.651 m)   Wt 176 lb 6.4 oz (80 kg)   SpO2 100%   BMI 29.35 kg/m²   BP Readings from Last 3 Encounters:   05/21/24 103/64   05/10/24 98/58   04/19/24 130/77     Wt Readings from Last 3 Encounters:   05/21/24 176 lb 6.4 oz (80 kg)   05/06/24 180 lb (81.6 kg)   04/19/24 182 lb (82.6 kg)      Body mass index is 29.35 kg/m².   Physical Exam  Vitals and nursing note reviewed.   Constitutional:       General: She is not in acute distress.      Appearance: Normal appearance. She is well-developed and well-groomed. She is not ill-appearing, toxic-appearing or diaphoretic.      Interventions: She is not intubated.  HENT:      Head: Normocephalic and atraumatic.      Right Ear: Tympanic membrane, ear canal and external ear normal. No decreased hearing noted. No laceration, drainage, swelling or tenderness. No middle ear effusion. There is no impacted cerumen. No foreign body. No mastoid tenderness. No PE tube. No hemotympanum. Tympanic membrane is not injected, scarred, perforated, erythematous, retracted or bulging. Tympanic membrane has normal mobility.      Left Ear: Tympanic membrane, ear canal and external ear normal. No decreased hearing noted. No laceration, drainage, swelling or tenderness.  No middle ear effusion. There is no impacted cerumen. No foreign body. No mastoid tenderness. No PE tube. No hemotympanum. Tympanic membrane is not injected, scarred, perforated, erythematous, retracted or bulging. Tympanic membrane has normal mobility.      Nose:      Right Sinus: No maxillary sinus tenderness or frontal sinus tenderness.      Left Sinus: No maxillary sinus tenderness or frontal sinus tenderness.      Mouth/Throat:      Lips: Pink. No lesions.      Mouth: Mucous membranes are moist. No injury, lacerations, oral lesions or angioedema.      Dentition: No dental tenderness, gingival swelling, dental abscesses or gum lesions.      Tongue: No lesions. Tongue does not deviate from midline.      Palate: No mass and lesions.      Pharynx: Oropharynx is clear. Uvula midline. No pharyngeal swelling, oropharyngeal exudate, posterior oropharyngeal erythema or uvula swelling.      Tonsils: No tonsillar exudate or tonsillar abscesses.   Eyes:      General: Lids are normal. No scleral icterus.        Right eye: No foreign body, discharge or hordeolum.         Left eye: No foreign body, discharge or hordeolum.      Extraocular Movements: Extraocular movements  intact.      Right eye: Normal extraocular motion and no nystagmus.      Left eye: Normal extraocular motion and no nystagmus.      Conjunctiva/sclera: Conjunctivae normal.      Right eye: Right conjunctiva is not injected. No chemosis, exudate or hemorrhage.     Left eye: Left conjunctiva is not injected. No chemosis, exudate or hemorrhage.     Pupils: Pupils are equal, round, and reactive to light.   Neck:      Thyroid: No thyroid mass, thyromegaly or thyroid tenderness.      Vascular: Normal carotid pulses. No carotid bruit, hepatojugular reflux or JVD.      Trachea: Trachea and phonation normal. No tracheal tenderness, tracheostomy, abnormal tracheal secretions or tracheal deviation.   Cardiovascular:      Rate and Rhythm: Normal rate and regular rhythm.      Pulses: Normal pulses.           Carotid pulses are 2+ on the right side and 2+ on the left side.       Radial pulses are 2+ on the right side and 2+ on the left side.        Dorsalis pedis pulses are 2+ on the right side and 2+ on the left side.        Posterior tibial pulses are 2+ on the right side and 2+ on the left side.      Heart sounds: Normal heart sounds, S1 normal and S2 normal.   Pulmonary:      Effort: Pulmonary effort is normal. No tachypnea, bradypnea, accessory muscle usage, prolonged expiration, respiratory distress or retractions. She is not intubated.      Breath sounds: Normal breath sounds and air entry. No stridor, decreased air movement or transmitted upper airway sounds. No decreased breath sounds, wheezing, rhonchi or rales.   Chest:      Chest wall: No tenderness.   Breasts:     Breasts are symmetrical.      Right: No swelling, bleeding, inverted nipple, mass, nipple discharge, skin change or tenderness.      Left: No swelling, bleeding, inverted nipple, mass, nipple discharge, skin change or tenderness.   Abdominal:      General: Bowel sounds are normal. There is no distension.      Palpations: Abdomen is soft. Abdomen is not  rigid. There is no fluid wave, hepatomegaly, splenomegaly or mass.      Tenderness: There is no abdominal tenderness. There is no right CVA tenderness, left CVA tenderness, guarding or rebound.      Hernia: A hernia is present. Hernia is present in the umbilical area. Right inguinal: Reducible nontender to palpation..      Genitourinary:     Exam position: Supine.   Musculoskeletal:      Cervical back: Neck supple. No tenderness.      Right lower leg: No edema.      Left lower leg: No edema.   Lymphadenopathy:      Head:      Right side of head: No submental, submandibular, preauricular, posterior auricular or occipital adenopathy.      Left side of head: No submental, submandibular, preauricular, posterior auricular or occipital adenopathy.      Cervical: No cervical adenopathy.      Right cervical: No superficial, deep or posterior cervical adenopathy.     Left cervical: No superficial, deep or posterior cervical adenopathy.      Upper Body:      Right upper body: No supraclavicular, axillary or pectoral adenopathy.      Left upper body: No supraclavicular, axillary or pectoral adenopathy.   Skin:     General: Skin is warm and dry.      Coloration: Skin is not pale.      Findings: No erythema or rash.   Neurological:      Mental Status: She is alert and oriented to person, place, and time.   Psychiatric:         Mood and Affect: Mood normal.         Speech: Speech normal.         Behavior: Behavior normal. Behavior is cooperative.              ASSESSMENT/PLAN:     Encounter Diagnoses   Name Primary?    Adult general medical exam Check urine.    Yes    Age-related osteoporosis without current pathological fracture Take calcium 600 every 12 hrs. With vitamin D 400 IU every  12 hrs. Excerise at least 30 minutes 3-4 times a week. May use calcium citrate as opposed to calcium carbonate which may be better absorbed in the setting of PPI use.   lifelong physical activity at all ages is strongly endorsed by the National  Osteoporosis Foundation. Exercise recommendations generally should include weight-bearing, muscle-strengthening, and balance training exercises for 30 minutes 5 days per week or 75 minutes twice weekly, often consistent with other general health recommendations.   Weight Bearing  There are two types of osteoporosis exercises that are important for building and maintaining bone density: weight-bearing and muscle-strengthening exercises.  Weight-bearing Exercises  These exercises include activities that make you move against gravity while staying upright. Weight-bearing exercises can be high-impact or low-impact.  High-impact weight-bearing exercises help build bones and keep them strong. If you have broken a bone due to osteoporosis or are at risk of breaking a bone, you may need to avoid high-impact exercises. If you’re not sure, you should check with your healthcare provider.  Examples of high-impact weight-bearing exercises are:  Dancing   Doing high-impact aerobics   Hiking   Jogging/running   Jumping Rope   Stair climbing   Tennis  Low-impact weight-bearing exercises can also help keep bones strong and are a safe alternative if you cannot do high-impact exercises. Examples of low-impact weight-bearing exercises are:  Using elliptical training machines   Doing low-impact aerobics   Using stair-step machines   Fast walking on a treadmill or outside            Anemia of unknown etiology Stable.        Other specified hearing loss of both ears Stable.        Encounter for screening mammogram for malignant neoplasm of breast Check mammogram. Continue self breast exam every month.         Colon cancer screening Up to date.        Essential hypertension Lower. Lower metoprolol to 50 mg a day. Careful with diet and excercise at least 30 minutes 3-4 times a week. Call in 2 weeks. Check blood pressures at different times on different days. Can purchase own blood pressure monitor. If not, check at local pharmacy. Ruby  foods more and grill occasionally. Avoid fried foods. No salt. Use other seasonings.         History of pulmonary embolism       Hyperglycemia Stable.        Other iron deficiency anemia Stable.        Vitamin D deficiency Stable.        Vaccine counseling Holding shingrix and PCV 20.        Primary osteoarthritis of right knee Stable.         Moderate single current episode of major depressive disorder (HCC) Hold counselor.        Mixed hyperlipidemia Stable.        Encounter for annual health examination Check urine.       Nausea.  Not sure if related to GERD.  History of hiatal hernia.  Careful with diet. Avoid hot spicy foods and caffeine and caffinated beverages. Careful with acidic foods. Elevate head of bed. Wait 2 hrs. after eating before going to bed to allow digestion. Avoid caffeinated or carbonated beverages, fried foods, spicy foods or highly acidic foods (citrus, onions, tomatoes, etc  -don’t lay down 20-30 minutes after eating or drinking  -use wedge pillow under mattress or use cinder blocks to elevate head of bed-this will prevent reflux at night  -take medications as directed  -quit smoking if applicable  -call if symptoms do not improve within 2-3 days or if symptoms worsen  Follow up with Gastroenterologist  STOP ibuprofen. Try celebrex 200 mg a day. Discussed with patient of side effects and use of these medications.  Try zofran as needed. Discussed with patient of side effects and use of these medications.  Hold other medications for now per patient.    AbNL CT abd.   Has jameel't with oncology today. Check urine.      Umbilical hernia.  Pl will need surgery.  ER if worsening or changing symptoms.      Orders Placed This Encounter   Procedures    URINALYSIS, AUTO, W/O SCOPE    Advance Care Planning- 1st 30 minutes       Meds This Visit:  Requested Prescriptions     Signed Prescriptions Disp Refills    sertraline 25 MG Oral Tab 90 tablet 1     Sig: Take 1 tablet (25 mg total) by mouth daily.     metoprolol succinate  MG Oral Tablet 24 Hr 90 tablet 1     Sig: Take 0.5 tablets (50 mg total) by mouth daily.       Imaging & Referrals:  [facility] CAMILO 2D+3D SCREENING BILAT (CPT=77067/64579)      RTC 1-2 months for FU.     1. Adult general medical exam (Primary)  2. Age-related osteoporosis without current pathological fracture  3. Anemia of unknown etiology  Overview:  Endoscopy and colonoscopy done May 10, 2024: 1.  Uncomplicated colonic diverticulosis as described above  2.  Otherwise normal colonoscopy to the terminal ileum  3.  2-2.5 cm hiatal hernia without Leroy's erosions  4.  Gastric polyp likely fundic gland in nature  5.  No cause for the patient's symptoms/signs seen on these studies  4. Other specified hearing loss of both ears  5. Encounter for screening mammogram for malignant neoplasm of breast  -     [facility] CAMILO 2D+3D SCREENING BILAT (CPT=77067/22092); Future; Expected date: 11/15/2024  6. Colon cancer screening  7. Essential hypertension  8. History of pulmonary embolism  Overview:  Post op, treated for 6 months  9. Hyperglycemia  -     URINALYSIS, AUTO, W/O SCOPE  10. Other iron deficiency anemia  11. Vitamin D deficiency  12. Vaccine counseling  13. Primary osteoarthritis of right knee  14. Moderate single current episode of major depressive disorder (HCC)  15. Mixed hyperlipidemia  16. Encounter for annual health examination  -     Advance Care Planning- 1st 30 minutes  Other orders  -     Sertraline HCl; Take 1 tablet (25 mg total) by mouth daily.  Dispense: 90 tablet; Refill: 1  -     Discontinue: Metoprolol Succinate ER; Take 0.5 tablets (50 mg total) by mouth in the morning and 0.5 tablets (50 mg total) before bedtime.  Dispense: 90 tablet; Refill: 1  -     Metoprolol Succinate ER; Take 0.5 tablets (50 mg total) by mouth daily.  Dispense: 90 tablet; Refill: 1      The patient indicates understanding of these issues and agrees to the plan.  Consult ordered.  Further testing  ordered.  Imaging studies ordered.  Lab work ordered.  Reinforced healthy diet, lifestyle, and exercise.      No follow-ups on file.    Advanced Directives:   She does NOT have a Living Will. [Do you have a living will?: No]  She does NOT have a Power of  for Health Care. [Do you have a healthcare power of ?: No]  Discussed Advance Care Planning with patient (and family/surrogate if present). Standard forms made available to patient in After Visit Summary.  16+ minutes was spent with all Advanced Care Planning elements today (up to 30 minutes for CPT 17248)    MD Lary Chan's SCREENING SCHEDULE   Tests on this list are recommended by your physician but may not be covered, or covered at this frequency, by your insurer.   Please check with your insurance carrier before scheduling to verify coverage.   PREVENTATIVE SERVICES FREQUENCY &  COVERAGE DETAILS LAST COMPLETION DATE   Diabetes Screening    Fasting Blood Sugar /  Glucose    One screening every 12 months if never tested or if previously tested but not diagnosed with pre-diabetes   One screening every 6 months if diagnosed with pre-diabetes Lab Results   Component Value Date    GLUCOSE 93 05/21/2007     (H) 12/06/2023        Cardiovascular Disease Screening    Lipid Panel  Cholesterol  Lipoprotein (HDL)  Triglycerides Covered every 5 years for all Medicare beneficiaries without apparent signs or symptoms of cardiovascular disease Lab Results   Component Value Date    CHOLEST 115 12/06/2023    HDL 32 (L) 12/06/2023    LDL 68 12/06/2023    LDLD 68 12/06/2023    TRIG 69 12/06/2023         Electrocardiogram (EKG)   Covered if needed at Welcome to Medicare, and non-screening if indicated for medical reasons -      Ultrasound Screening for Abdominal Aortic Aneurysm (AAA) Covered once in a lifetime for one of the following risk factors    Men who are 65-75 years old and have ever smoked    Anyone with a family history  -     Colorectal Cancer Screening  Covered for ages 50-85; only need ONE of the following:    Colonoscopy   Covered every 10 years    Covered every 2 years if patient is at high risk or previous colonoscopy was abnormal 05/10/2024    Health Maintenance   Topic Date Due    Colorectal Cancer Screening  05/10/2034       Flexible Sigmoidoscopy   Covered every 4 years -    Fecal Occult Blood Test Covered annually -   Bone Density Screening    Bone density screening    Covered every 2 years after age 65 if diagnosed with risk of osteoporosis or estrogen deficiency.    Covered yearly for long-term glucocorticoid medication use (Steroids) Last Dexa Scan:    XR DEXA BONE DENSITOMETRY (CPT=77080) 12/28/2023      No recommendations at this time   Pap and Pelvic    Pap   Covered every 2 years for women at normal risk; Annually if at high risk -  No recommendations at this time    Chlamydia Annually if high risk -  No recommendations at this time   Screening Mammogram    Mammogram     Recommend annually for all female patients aged 40 and older    One baseline mammogram covered for patients aged 35-39 11/15/2023    Health Maintenance   Topic Date Due    Mammogram  11/15/2024       Immunizations    Influenza Covered once per flu season  Please get every year -  No recommendations at this time    Pneumococcal Each vaccine (Shsezeh87 & [id number]) covered once after 65 Prevnar 13: -    Zcedyshwa82: -     Pneumococcal Vaccination(1 of 1 - PCV) Never done    Hepatitis B One screening covered for patients with certain risk factors   -  No recommendations at this time    Tetanus Toxoid Not covered by Medicare Part B unless medically necessary (cut with metal); may be covered with your pharmacy prescription benefits -    Tetanus, Diptheria and Pertusis TD and TDaP Not covered by Medicare Part B -  No recommendations at this time    Zoster Not covered by Medicare Part B; may be covered with your pharmacy  prescription benefits -  Zoster  Vaccines(1 of 2) Never done                Understanding Advance Care Planning  Advance care planning is the process of deciding one’s own future medical care. It helps assure that if you can’t speak for yourself, your wishes can still be carried out. The plan is a series of legal documents that note a person’s wishes. The documents vary by state. Advance care planning should be discussed at a regular office visit with your primary care provider before an acute illness. Advance care planning is encouraged when a person has a serious illness that is expected to get worse. It may also be done before major surgery. And it can help you and your family be prepared in case of a major illness or injury. Advance care planning helps with making decisions at these times.     Who will speak on your behalf?  A healthcare proxy is a person who acts as the voice of a patient when the patient can’t speak for himself or herself. The name of this role varies by state. It may be called a Durable Medical Power of  or Durable Power of  for Healthcare. It may be called an agent, surrogate, or advocate. Or it may be called a representative or decision maker. It's an official duty that is identified by a legal document. The document also varies by state.   Why is advance care planning important?   If a person communicates his or her healthcare wishes:   He or she will be given medical care that matches his or her values and goals.  Family members will not be forced to make decisions in a crisis with no guidance.  Creating a plan  Making an advance care plan is often done in 3 steps:   Thinking about one’s wishes. To create an advance care plan, think about what kind of medical treatment you would want if you lose the ability to communicate. Are there any situations in which you would refuse or stop treatment? Are there therapies you would want or not want? And whom do you want to make decisions for you? There are many  places to learn more about how to plan for your care. Ask your healthcare provider or  for resources.  Picking a healthcare proxy. This means choosing a trusted person to speak for you only when you can’t speak for yourself. When you can't make medical decisions, your proxy makes sure the instructions in your advance care plan are followed. A proxy doesn't make decisions based on his or her own opinions. They must put aside those opinions and values if needed, and carry out your wishes.  Filling out the legal documents. There are several kinds of legal documents for advance care planning. Each one tells healthcare providers your wishes. The documents may vary by state. They must be signed and may need to be witnessed or notarized. You can cancel or change them whenever you wish. Depending on your state, the documents may include a Healthcare Proxy form, Living Will, Durable Medical Power of , and Advance Directive.  The family’s role  The best help a family can give is to support their loved one’s wishes. Open and honest communication is vital. Family should express any concerns they have about the patient’s choices while the patient can still make decisions in the event that his or her illness prevents communicating those wishes at a later time.    [employer] last reviewed this educational content on 12/1/2019    © 5464-0618 [employer]. All rights reserved. This information is not intended as a substitute for professional medical care. Always follow your healthcare professional's instructions.          Advance Medical Directive  An advance medical directive is a form that lets you plan ahead for the care you’d want if you could no longer express your wishes. This statement outlines the medical treatment you’d want or names the person you’d wish to make healthcare decisions for you. Be aware that laws vary from state to state, and it may be worthwhile to talk with an .      Writing down your wishes  An advance directive is important whether you’re young or old. Injury or illness can strike at any age.  Decide what is important to you and the kind of treatment you’d want, or not want to have.  Some states allow only one kind of advance directive. Some let you do both a durable power of  for healthcare and a living will. Some states put both kinds on the same form.    A durable power of  (POA) for healthcare   This form lets you name someone else that you have chosen and trust to speak and make decisions on your behalf.  This person can decide on treatment for you only when you can’t speak for yourself.  You don't need to be at the end of your life. They could speak for you if you were in a coma but were likely to recover.    A living will  This form lets you list the care you want at the end of your life.  A living will applies only if you won’t live without medical treatment. It would apply if you had advanced cancer, a massive stroke, or other serious illness from which you will not recover.  It takes effect only when you can no longer express your wishes yourself.    [employer] last reviewed this educational content on 2/1/2021 © 2000-2021 [employer]. All rights reserved. This information is not intended as a substitute for professional medical care. Always follow your healthcare professional's instructions.          Choosing an Agent  A durable power of  for healthcare is only as good as the person you name to be your agent. Your agent is the person you have chosen to speak and make decisions on your behalf. If this person knows your treatment wishes and is willing to carry them out, you’ll probably be well represented. Be sure to tell your agent what’s important to you.     Who to choose  Here are suggestions for choosing an agent:  You can name a family member, close friend, , , or rabbi.  You should name one person as your  agent. Then name one or two alternates. You need a backup person in case your first choice can’t be reached when needed.  Talk with each person you're thinking of naming as your agent or alternate. Do this before you decide who should carry out your wishes.  Your agent should be ...  A competent adult, age 18 or older  Someone you trust and can talk to about the care you want and what's important to you  Someone who supports your treatment choices    In many states, your agent can’t be ...   Your healthcare provider  An employee of your provider or of a hospital, nursing home, or hospice program where you receive care  Some states have other restrictions on who can be named as an agent for an advance directive.   Be prepared  Tip: It's a good idea to write down your wishes and give a copy to your agent and all others who are involved with your healthcare.   [employer] last reviewed this educational content on 8/1/2021 © 2000-2021 [employer]. All rights reserved. This information is not intended as a substitute for professional medical care. Always follow your healthcare professional's instructions.          Understanding DNR Orders  Do not resuscitate (DNR) orders tell hospital staff not to do potentially life-restoring measures, such as CPR, if you or your loved one's heart and lungs stop working. It allows a natural death, and prevents healthcare staff from artificially reviving a person and placing them on life support. In many states, a DNR order also applies to staff outside the hospital such as in nursing homes and emergency medical services. A DNR order must be written by a healthcare provider. Or in some cases, certain other healthcare workers write it. This can only be done with the person’s or family’s consent. If a person has not written an advance directive, their family will decide on a DNR with the help of the healthcare team.   The person can cancel a DNR order at any time. The  healthcare team can answer questions about the DNR form. Have copies of a DNR form are readily available so that your wishes can be faithfully followed.   Writing a DNR order  When might a DNR order be written? When the person’s health condition is such that, in the case of cardiac arrest, CPR and other resuscitation methods are not desired. This could be because the chance of successful resuscitation is very low. Or it could be because the care plan now focuses on comfort measures instead of life-sustaining measures. Coma and terminal illness are instances when a DNR order might be used.   Irreversible coma  In a coma, a person does not respond to sight, sound, or touch. The heart and lungs could be working, but brain function is damaged due to trauma or disease.   Terminal illness  In the last stages of heart disease, AIDS, cancer, and other illnesses, some people don’t want to prolong their suffering. If recovery isn’t likely and quality of life is poor or getting worse, a person or their family may agree to a DNR order.   DNR orders and hospice care  A hospice program can offer care during the final weeks of life. Hospice programs provide dignity, pain control and comfort care in the home or at special facilities. Hospice does not provide aggressive treatment. In fact, a DNR order will likely be discussed before a person is admitted to hospice. A  or  may be able to help you arrange for hospice support.   Documenting end-of-life wishes  In addition to DNR orders, a person with a serious, life-limiting illness may wish to document their treatment wishes. This is called a POST form or by different names, depending on the state. It's meant to provide a portable medical order to help guide healthcare providers on the specific medical treatments a person wants during a medical emergency. It's meant to complement a DNR order, not replace it. Your healthcare provider can tell you more and help  you complete the forms. The form may be called one of these:   MOLST (medical orders for life-sustaining treatment)  POLST (physician orders for life-sustaining treatment)  MOST (medical orders for scope of treatment)  POST (physician orders for scope of treatment)  TPOPP (transportable physician orders for patient preferences)  [employer] last reviewed this educational content on 7/1/2021 © 2000-2021 [employer]. All rights reserved. This information is not intended as a substitute for professional medical care. Always follow your healthcare professional's instructions.          An Agent’s Role for Durable Power of  for Health Care   It’s impossible to know which medical treatment choices you might face in the future. What if you aren't able to make these decisions for yourself? A durable power of  for healthcare lets you name an agent to speak and carry out your wishes on your behalf. This happens only if you can’t express your wishes yourself.     An agent’s duty  Your agent respects your wishes in the following ways:    Your agent’s duty is to see that your wishes are followed.  If your wishes aren’t known, your agent should try to decide what you want.  Your agent’s choices come before anyone else’s wishes for you.  A durable power of  for healthcare doesn't not give your agent control over your money . Your agent also can’t be made to pay your bills.  Find out what your agent can do  Restrictions on what an agent can and can’t do vary by state. Check your state laws. In most states your agent can:   Choose or refuse life-sustaining and other medical treatment on your behalf  Consent to treatment, and then stop treatment if your condition doesn’t improve  Access and release your medical records  Request an autopsy and donate your organs, unless you’ve stated otherwise in your advance directive  Find out whether your state allows your agent to do the following:   Refuse or  withdraw life-enhancing care  Refuse or stop tube feeding or other life-sustaining care--even if you haven’t stated on your advance directive that you don’t want these treatments  Order sterilization or   [employer] last reviewed this educational content on 2021 [employer]. All rights reserved. This information is not intended as a substitute for professional medical care. Always follow your healthcare professional's instructions.          Being a Healthcare Proxy  A healthcare proxy is someone who represents a person who can’t speak for themself. The name of this role varies by state. It may be called a Durable Medical Power of . It may be called a Durable Power of  for Healthcare. It may be called an agent, surrogate, or advocate. Or it may be called a representative or decision maker. It's an official duty that is noted by a legal document. The document also varies by state. The person must name you as his or her proxy on the document.      A healthcare proxy speaks for another person when he or she is not able to do so. The proxy helps make sure the person’s healthcare wishes are known and followed.     What it means to be a healthcare proxy   Your role as healthcare proxy starts when the person can’t make medical decisions. This assessment can only be made by a licensed doctor. You then make the healthcare decisions as needed. You do this by carrying out the person’s wishes. These wishes are noted in his or her advance care planning documents. These declare what kind of treatment the person wishes to have or not have. You may need to put aside your own values and opinions to carry out the person’s wishes. This may include refusing or stopping life-sustaining treatments.   Documenting end-of-life wishes   As a healthcare proxy, encourage the person to discuss his or her wishes, while they are able. They can do this with their healthcare provider and then  document the wishes as a medical order. The provider can help the person complete the form. The forms are known by different names depending on the state. The form may be called one of these:     MOLST (medical orders for life-sustaining treatment)  POLST (physician orders for life-sustaining treatment)  MOST (medical orders for scope of treatment)  POST (physician orders for scope of treatment)  TPOPP (transportable physician orders for patient preferences)    The form documents the person’s wishes at the end of life. It's not tied to a certain healthcare provider or facility. It's different than a living will. The form is an order written according to state regulations by a healthcare provider. To complete one, the person must express his or her wishes to an advanced healthcare provider. If the person can’t make his or her own decisions, then this is done by the person’s healthcare proxy.   Carrying out your role  Your duties depend on what the person’s advance care planning documents say. Your duties may also depend on state law. In general:   Before accepting a role as a proxy, talk with the person. Be sure you know his or her wishes. Ask questions. This will help you be his or her voice if and when it is needed.  Be sure that the person’s healthcare team knows that you are his or her proxy. Carry a copy of the document and proof of your identity.  Make sure the healthcare team has a copy of the advance care planning documents.  Talk to the healthcare team. Ask questions as often as you need. Stay informed about the person’s condition.  Ask for any help you need to understand the medical situation. Ask about the person’s condition and prognosis. Ask about risks and benefits of tests and treatments. Find out all the facts and options.  Speak on the person’s behalf with the healthcare team when needed.  Talk with family members and keep them informed.  Know your rights. You have the right to ask for information.  You can ask for consultations and second opinions. You have the right to request or refuse treatment for the person. You may be able to review his or her medical chart. You can authorize the person’s transfer to another facility. You can also request a new healthcare provider for him or her. If you are not sure what your rights are at any time, ask a .  When it’s time to make decisions  If the person’s wishes are clear in the advance care plan documents, ask for them to be carried out as noted. If they are not clear, talk with the healthcare team. Listen to the team’s recommendations. Talk with a  or counselor. It may be hard for you to make a decision at times. You may feel sad or upset about a decision. Being a healthcare proxy is not an easy role. But it's an important one. Remember that the person trusts you to carry out his or her wishes.   If you need help  Ask the healthcare team if you have trouble with a decision. The healthcare team will help you.  Encourage the person you are helping to have a conversation with their provider about their end-of-life wishes. The provider can help them fill out the form.  You may need help in resolving family conflicts. Ask the hospital or clinic , ethics consultant, or a  for help.  If you are having trouble talking with the healthcare team while the person is in the hospital, reach out to the patient relations department. Or ask to speak to the hospital ombudsman or ethics committee.    [employer] last reviewed this educational content on 9/1/2019 © 2000-2021 [employer]. All rights reserved. This information is not intended as a substitute for professional medical care. Always follow your healthcare professional's instructions.          Life Support  If you understand how specific treatments may affect your quality of life, you can decide which ones you’d choose or refuse. You may want to talk to  your healthcare provider about the possible benefits and risks of treatments. The chance of good results from these therapies varies based on each individual clinical situation and can be very hard to predict. Medical treatment, if your life is in danger, falls into 3 main categories.     Life supporting  This care keeps your heart and lungs going when they can no longer work on their own.  CPR restarts your heart and lungs if they stop working.  A respirator (or ventilator) keeps you breathing. Air is pumped into your lungs through a tube that’s put into your windpipe.    Life sustaining  This care keeps you alive longer when you have an illness that can’t be cured.  Tube feeding or TPN (total parenteral nutrition) provides food and fluids through a tube or IV (intravenous). It is given if you can’t chew or swallow on your own.  Dialysis is a kidney machine that cleans your blood when your kidneys can no longer work on their own.    Life enhancing  This care controls pain and discomfort, such as nausea or trouble breathing. This type of care is not designed to prolong your life, but to enhance comfort and quality of life. Nothing is done to keep you alive longer.  Hospice care is comfort care. It might provide food and fluids by mouth or help with bathing. Hospice care is given during the last stages of a terminal illness.  Strong pain medicine can be given to help keep you comfortable.    Do Not Resuscitate (DNR)  Would you want CPR if your heart stops while you’re a patient in a hospital or nursing home? If not, talk to your healthcare provider about issuing a DNR (Do-Not-Resuscitate) order.   DNRs and advance directives may not apply during anesthesia, in emergency rooms, or when emergency medical teams respond to a 911 call. Ask your healthcare provider how you can make sure your wishes will be followed. Also, a DNR will not prevent you from getting other kinds of needed medical care such as treatment for pain,  or bleeding.   [employer] last reviewed this educational content on 8/1/2021 © 2000-2021 [employer]. All rights reserved. This information is not intended as a substitute for professional medical care. Always follow your healthcare professional's instructions.          Stopping Life-Sustaining Treatments  Certain treatments can help sustain life when you have a serious illness. But as your illness progresses, there may come a time when these treatments are no longer a benefit. Decisions must then be made whether to continue or stop these treatments. This can be a hard task for you and your loved ones, but know that you’re not alone. Your healthcare provider and healthcare team will guide you through these treatment decisions. They will also help answer any questions you may have.     What are life-sustaining treatments?  Life-sustaining treatments help keep you alive if a vital body function fails. These treatments can include CPR and the use of machines to help with heart, lung, or kidney function. They can also include the use of tubes to deliver food, fluids, blood, and medicines to the body. Blood transfusions and antibiotics are also types of life-sustaining treatments.   What happens if life-sustaining treatments are continued?  These treatments can help extend your life. But they will not cure your illness. If you are near the end of your life, you may find it hard to handle the side effects and problems that can occur with these treatments. In this case, the treatments may be too much of a burden on your body. They may cause more harm than good. They may also disrupt the natural dying process and prolong suffering.   What happens if life-sustaining treatments are stopped?  If these treatments are stopped, the focus of treatment will shift to comfort care. This involves measures to control pain and other symptoms you may have. These measures are not meant to cure your illness or help you live  longer. Instead, they are meant to improve your quality of life during the time you have left. If you are in the end stage of your illness, you may be referred to hospice by your healthcare provider. Hospice provides end-of-life care. This includes emotional, spiritual, and social support for you and your loved ones.   How do I decide whether to stop life-sustaining treatments?  Your healthcare provider and healthcare team will talk with you about the specific treatments that are part of your care plan. If you want, you may include family and friends in these meetings. Here are some questions to think about or ask your healthcare team:   Is there is a chance that my illness will improve? Or will it continue to get worse?  What are my goals of care? Do I want to extend the time I have left? Or do I want to focus my care on comfort and managing symptoms?  How will stopping or continuing these treatments affect my health? Will they enhance my comfort and quality of life? Or will they cause more problems?  Consider your own values or sarah beth. Also ask for advice from those who share your values.  How do I state my decisions about life-sustaining treatments?  Once you’ve made your decision to continue or stop specific treatments, you can tell your healthcare provider directly. It's best to also put your treatment wishes in writing with advance directives. These are legal forms related to healthcare decisions. Laws about advance directives vary from state to state. Ask your healthcare provider about what forms are needed to make sure your wishes will be followed. Some common forms include:   A durable power of  for healthcare or a healthcare proxy form.  This form lets you name a person to make treatment decisions for you when you can’t. This person is often called a healthcare proxy, medical or healthcare power of , or agent.  A living will.  This form tells others the kinds of treatment you want or don’t  want if you become too ill or injured to speak for yourself.  Orders for life-sustaining treatments.  These are actual healthcare provider's orders that must be followed by other medical providers. The form belongs to you, not to the healthcare provider or hospital.). These are legal forms obtained from your healthcare provider or hospital that document your wishes. The forms are known by different names depending on the state. Common names include:  MOLST (medical orders for life-sustaining treatment)  POLST (physician orders for life-sustaining treatment)  MOST (medical orders for scope of treatment)  POST (physician orders for scope of treatment)  TPOPP (transportable physician orders for patient preferences)     Keep in mind that you can change or cancel an advance directive at any time. Make it a practice to review your decisions each time there is a change in your health or goals of care. Also be sure to tell your healthcare proxy and loved ones of any changes in your decisions.   Deciding whether to stop life-sustaining treatments for a loved one   The decision to stop treatment ideally is made with the person’s consent. If the person is not capable of making decisions and has no advance directive, the decision falls to the person’s healthcare proxy or other adult. If you need to make a decision about stopping treatment for a loved one, start by talking to his or her healthcare provider. Review the goals of care and the benefits and burdens of specific treatments on your loved one’s health. Also think about your loved one’s wishes and values. If needed, seek advice from other healthcare team members, like a  or .   [employer] last reviewed this educational content on 12/1/2019    © 7481-1471 [employer]. All rights reserved. This information is not intended as a substitute for professional medical care. Always follow your healthcare professional's  instructions.  Advance Care Planning done today:   She does NOT have a Living Will. [Do you have a living will?: No]  She does NOT have a Power of  for Health Care. [Do you have a healthcare power of ?: No]     Discussed Advance Care Planning with patient and patient declined resource information.  16+ minutes was spent with all Advanced Care Planning elements today (up to 30 minutes for CPT 50091)  Advance care planning including the explanation and discussion of advance directives standard forms performed Face to Face with patient and Family/surrogate (if present), and forms available to patient in AVS

## 2024-05-22 ENCOUNTER — TELEPHONE (OUTPATIENT)
Dept: HEMATOLOGY/ONCOLOGY | Facility: HOSPITAL | Age: 69
End: 2024-05-22

## 2024-05-22 ENCOUNTER — HOSPITAL ENCOUNTER (OUTPATIENT)
Dept: CT IMAGING | Facility: HOSPITAL | Age: 69
Discharge: HOME | End: 2024-05-22
Attending: STUDENT IN AN ORGANIZED HEALTH CARE EDUCATION/TRAINING PROGRAM

## 2024-05-22 DIAGNOSIS — N28.89 RENAL MASS: Primary | ICD-10-CM

## 2024-05-22 DIAGNOSIS — C79.31 MALIGNANT NEOPLASM METASTATIC TO BRAIN (HCC): ICD-10-CM

## 2024-05-22 DIAGNOSIS — N28.89 RENAL MASS: ICD-10-CM

## 2024-05-22 DIAGNOSIS — C79.9 METASTATIC DISEASE (HCC): ICD-10-CM

## 2024-05-22 PROCEDURE — 71260 CT THORAX DX C+: CPT | Performed by: STUDENT IN AN ORGANIZED HEALTH CARE EDUCATION/TRAINING PROGRAM

## 2024-05-22 PROCEDURE — 70470 CT HEAD/BRAIN W/O & W/DYE: CPT | Performed by: STUDENT IN AN ORGANIZED HEALTH CARE EDUCATION/TRAINING PROGRAM

## 2024-05-22 NOTE — TELEPHONE ENCOUNTER
I called patient, CT head shows a 1.1 cm hypodense lesion of the lateral left temporal lobe most concerning for metastatic lesion.  She has a history of a scleral buckle but this should not be metal.  We will further define with a brain MRI and if findings remain consistent with brain metastasis will refer to interventional radiology.  We will still plan for biopsy of right kidney mass ASAP.    Archie Crocker DO  [facility]  [facility]

## 2024-05-22 NOTE — CONSULTS
[facility]  Initial Oncology Clinic Consult Note    Patient Name: Lary Wallis   YOB: 1955   Medical Record Number: [id number]    Reason for consultation: concern for metastatic RCC    Subjective:   Lary Wallis is a 68 year old female with a hx of HTN, HLD, depression and OA who presents to medical oncology with concerns for metastatic renal cell carcinoma.  Briefly, the patient began to feel fatigued in the Fall 2023 and she was found to have a hemoglobin of 8.5 with an MCV of 71 with last lab values in 2017 showing a normal hemoglobin.  In March 2024, iron saturation was 15% and ferritin was 612.  She was given IV iron and referred to GI which prompted a CT abdomen pelvis which unfortunately revealed an 11 cm enhancing mass within the right kidney and extensive bibasilar pleural metastases concerning for metastatic renal cell carcinoma.  She presents today to discuss these results and the next steps in her care.    Clinically, she complains of fatigue.  Upon reflection, she began feeling tired in the late summer early fall 2023.  She has a history of obesity and over the last couple years intentionally lost 80 to 100 pounds but over the last 6 to 8 months she has lost more weight than she would have expected.  Her appetite is still okay.  Over the last month or 2 she has developed a general discomfort to her right flank and sometimes has her  massage the area.  She denies fevers, soaking night sweats, lymphadenopathy, headaches, vision changes, palpitations, chest pain, shortness of breath, nausea vomiting diarrhea, urinary or bowel complaints.  She has chronic knee pain secondary to osteoarthritis and uses a cane for this but otherwise her performance status is not significantly limited with few comorbidities.    Socially, she is  and her  Christ is present today.  They have 4 grown children.  She worked in disability claims and retired in 2021.   Denies tobacco or alcohol.    Review of Systems:  Hematology/Oncology ROS performed and negative except as above in HPI    History/Other:   Past Medical History:  Past Medical History:    Essential hypertension    High blood pressure    Mixed hyperlipidemia    Moderate single current episode of major depressive disorder (HCC)    Osteoarthrosis, pelvic region and thigh    PONV (postoperative nausea and vomiting)     Past Surgical History:  Past Surgical History:   Procedure Laterality Date    Cholecystectomy      Colonoscopy N/A 10/1/2018    Procedure: COLONOSCOPY;  Surgeon: Shiva Clemente MD;  Location: [facility] ENDOSCOPY    Hc implant ocular device intraoperative detached retina c1784      Hip replacement surgery  2012    Hysterectomy      2010    Laparoscopic cholecystectomy  04/01/1990     Current Medications:   sertraline 25 MG Oral Tab Take 1 tablet (25 mg total) by mouth daily. 90 tablet 1    metoprolol succinate  MG Oral Tablet 24 Hr Take 0.5 tablets (50 mg total) by mouth daily. 90 tablet 1    celecoxib (CELEBREX) 200 MG Oral Cap Take 1 capsule (200 mg total) by mouth daily as needed for Pain. 90 capsule 1    ondansetron 4 MG Oral Tablet Dispersible Take 1 tablet (4 mg total) by mouth every 8 (eight) hours as needed for Nausea. 30 tablet 2     Allergies:   Allergies   Allergen Reactions    Ace Inhibitors Coughing    Azithromycin      Other reaction(s): AZITHROMYCIN    Erythromycin      Other reaction(s): Rash - Mild    Naproxen      Other reaction(s): NAPROXEN    Nortriptyline      Other reaction(s): vivid, scary dreams    Sulfa Antibiotics      Other reaction(s): Rash - Moderate     Family Medical History:  Family History   Problem Relation Age of Onset    Diabetes Father     Arthritis Mother     Macular degeneration Mother     Other (spinal stenosis) Mother     Breast Cancer Sister 62    Pacemaker Sister        Social History:  Social History     Socioeconomic History    Marital status:       Spouse name: Not on file    Number of children: 4    Years of education: Not on file    Highest education level: Not on file   Occupational History    Not on file   Tobacco Use    Smoking status: Never    Smokeless tobacco: Never   Vaping Use    Vaping status: Never Used   Substance and Sexual Activity    Alcohol use: Yes     Comment: occasional    Drug use: No    Sexual activity: Not on file   Other Topics Concern     Service No    Blood Transfusions No    Caffeine Concern Yes    Occupational Exposure No    Hobby Hazards No    Sleep Concern Yes    Stress Concern No    Weight Concern Yes    Special Diet No    Back Care No    Exercise No    Bike Helmet No    Seat Belt Yes    Self-Exams Yes   Social History Narrative    Not on file     Social Determinants of Health     Financial Resource Strain: Not on file   Food Insecurity: Not on file   Transportation Needs: Not on file   Physical Activity: Not on file   Stress: Not on file   Social Connections: Not on file   Housing Stability: Not on file     Objective:   Blood pressure 101/51, pulse 102, temperature 98.2 °F (36.8 °C), temperature source Oral, resp. rate 20, height 1.651 m (5' 5\"), weight 80 kg (176 lb 6.4 oz), SpO2 100%, not currently breastfeeding.  Physical Exam:  ECOG PS: 1  General: A&Ox3, NAD  HEENT: PERRL, OP clear  CV: RRR, no murmurs, + pulses  Pulm: CTA b/l, no w/r/r, normal effort  Abd: soft, ntnd, normal bowel sounds, no HSM or masses  Lymph: no palpable lymphadenopathy throughout the cervical, supraclavicular, axillary regions  Extremities: no edema  Neurological: grossly intact    Results:   Labs:  Lab Results   Component Value Date    WBC 12.9 (H) 04/19/2024    HGB 8.3 (L) 04/19/2024    HCT 29.9 (L) 04/19/2024    .0 (H) 04/19/2024    CREATSERUM 0.62 12/06/2023    BUN 12 12/06/2023     12/06/2023    K 4.4 12/06/2023     12/06/2023    CO2 25 12/06/2023     (H) 12/06/2023    CA 9.1 12/06/2023    ALB 2.8 (L) 12/06/2023     ALKPHO 97 12/06/2023    BILT 0.6 12/06/2023    TP 8.0 12/06/2023    AST 14 12/06/2023    ALT 14 12/06/2023    TSH 2.484 02/07/2024    PHOS 4.8 02/07/2024    B12 435 07/14/2017     Imaging:  CT ABDOMEN+PELVIS(CONTRAST ONLY)(CPT=74177)    Result Date: 5/19/2024  CONCLUSION: 11.1 x 10.2 centimeter enhancing mass replacing the mid to lower right kidney with extensive bibasilar enhancing pleural tumor, trace pleural effusions, intrathoracic adenopathy.  This is likely renal cell carcinoma with extensive bibasilar thoracic involvement     Dictated by (CST): Awais Raymundo MD on 5/19/2024 at 12:51 PM     Finalized by (CST): Awais Raymundo MD on 5/19/2024 at 12:58 PM               Assessment & Plan:   Lary Wallis is a 68 year old female with a hx of HTN, HLD, depression and OA who presents to medical oncology with concerns for metastatic renal cell carcinoma after workup for recently developed fatigue, anemia, weight loss revealed a large right renal mass with bilateral pleural metastases.      We reviewed the patient's presenting history and reviewed the imaging findings.  We personally reviewed the images together and I expressed my concerns for metastatic renal cell carcinoma.  We discussed the natural history of RCC, the metastatic nature of her disease, necessary workup and also spent some time discussing prognosis and potential treatments in anticipation of ccRCC.     We will obtain a CT chest to further define her pulmonary burden of disease. We will obtain brain imaging, she has hardware so we will obtain a CT head with and without contrast. We will refer to IR for right renal mass biopsy over lung biopsy in order to obtain an accurate histological diagnosis of her disease. If clear cell RCC is confirmed, she would have IMDC poor risk disease, though she is not severely symptomatic. Thus we would likely proceed with dual checkpoint inhibition ipilimumab and nivoluamb per  in an effort to induce a  complete response despite the high risk of immune mediated toxicities.     She and her  had a number of questions which I answered to their satisfaction. We will see her back after imaging and IR biopsy to discuss the next steps in her cancer care.     110 minutes were spent in patient discussion, coordination of care, record review, lab review, imaging review. The diagnosis, prognosis, and general treatment was explained and all questions answered.     Archie Crocker DO    [facility]  [facility]    This note was created using a voice-recognition transcribing system. Incorrect words or phrases may have been missed during proofreading. Please interpret accordingly.

## 2024-05-22 NOTE — TELEPHONE ENCOUNTER
Writer called patient and informed her of STAT brain MRI order. She is aware that writer left a message for the department regarding STAT order. Patient was also provided with the number for central scheduling to call tomorrow morning to complete MRI checklist. Patient verbalized understanding and thanked writer for calling.

## 2024-05-28 ENCOUNTER — TELEPHONE (OUTPATIENT)
Dept: INTERNAL MEDICINE CLINIC | Facility: CLINIC | Age: 69
End: 2024-05-28

## 2024-05-28 NOTE — TELEPHONE ENCOUNTER
Patient said she is returning Dr Stover's phone call and wants to speak to Dr Stover.  I transferred patient to triage nurse since they left her a message too.

## 2024-05-28 NOTE — TELEPHONE ENCOUNTER
Patient states that she is asymptomatic. She will start antibiotic.     Fernanda Stover MD  5/27/2024 11:11 AM CDT       Check on how patient doing.  Will send antibiotics to the pharmacy.  Culture came back with E. coli.  Will do twice a day for 5 days.  Start as soon as possible.  Hydrate at least 48 ounces of water a day.  Urinate after sexual activity.  Wipe front and back separately if possible.

## 2024-05-30 ENCOUNTER — TELEPHONE (OUTPATIENT)
Dept: HEMATOLOGY/ONCOLOGY | Facility: HOSPITAL | Age: 69
End: 2024-05-30

## 2024-05-30 ENCOUNTER — HOSPITAL ENCOUNTER (OUTPATIENT)
Dept: MRI IMAGING | Facility: HOSPITAL | Age: 69
Discharge: HOME | End: 2024-05-30
Attending: STUDENT IN AN ORGANIZED HEALTH CARE EDUCATION/TRAINING PROGRAM
Payer: MEDICARE

## 2024-05-30 DIAGNOSIS — C79.31 MALIGNANT NEOPLASM METASTATIC TO BRAIN (HCC): ICD-10-CM

## 2024-05-30 PROCEDURE — A9575 INJ GADOTERATE MEGLUMI 0.1ML: HCPCS | Performed by: STUDENT IN AN ORGANIZED HEALTH CARE EDUCATION/TRAINING PROGRAM

## 2024-05-30 PROCEDURE — 70553 MRI BRAIN STEM W/O & W/DYE: CPT | Performed by: STUDENT IN AN ORGANIZED HEALTH CARE EDUCATION/TRAINING PROGRAM

## 2024-05-30 RX ORDER — GADOTERATE MEGLUMINE 376.9 MG/ML
20 INJECTION INTRAVENOUS
Status: COMPLETED | OUTPATIENT
Start: 2024-05-30 | End: 2024-05-30

## 2024-05-30 RX ADMIN — GADOTERATE MEGLUMINE 13 ML: 376.9 INJECTION INTRAVENOUS at 08:43:00

## 2024-05-30 NOTE — TELEPHONE ENCOUNTER
Writer called patient for review of MRI. Patient made aware that there is not evidence of metastatic disease. Meningioma identified. Patient verbalized understanding and thanked writer for calling.

## 2024-05-31 NOTE — DISCHARGE INSTRUCTIONS
Procedure performed by DR MTZ, ZULY QUEZADA RN, [facility] TECH     Biopsy/Aspiration of RIGHT KIDNEY.    DISCHARGE INSTRUCTIONS                               Report any bloody urine, bleeding at the site of insertion, swelling                            or pain, to your physician.                                 DO NOT TAKE aspirin-containing products, Ibuprofen, Vitamin E, or                              blood thinning products for three (3) days after the procedure.  You may                             take Tylenol (1 or 2 tablets every 4-6 hours) for mild discomfort at the                             biopsy site.  Rest quietly for 24 hours, no physical activity.  Avoid                              straining, heavy lifting, or strenuous physical activity for approximately                             2 days.  Report any bleeding at aspiration/biopsy site, redness,                             swelling, odor, discharge, pain or fever that does not lessen after one                              day, to your physician.  Resume a regular diet.  Call your physician with                             questions or test results.     Other: YOU MAY REMOVE YOUR DRESSING IN 48 HOURS. WHILE THE DRESSING IS ON, YOU MAY SHOWER. DO NOT SOAK IN WATER FOR 5 DAYS.    BRING THIS SHEET WITH YOU SHOULD YOU HAVE TO VISIT AN EMERGENCY ROOM OR SEE YOUR DOCTOR IN THE NEXT 24 HOURS.    THANK YOU, WE WISH YOU WELL.     PLEASE FOLLOW W/PRIMARY MD   DISCUSS BLOOD PRESSURE, HTN MEDS. (PT STATES LIGHTHEADED/DIZZINESS IN AM -FEELS D/T BP & BP MEDS-INSTRUCTED TO DISCUSS W/MD)  OXYGEN SATS  88 - 92 ON ROOM AIR    ALERT & ORIENT, PALE.   RENAL SITE DRY INTACT - NO REDNESS/SWELLING.   TOLERATED AMBULATING - POST SATS 91-92% - NO SOB DURING OR POST.    PT STATES HAS TENDENCY TO HOLD BREATH - INSTRUCTED BREATHING TECH. AM/HS AND BEFORE ACTIVITY - DEMO BACK.

## 2024-06-04 ENCOUNTER — PATIENT MESSAGE (OUTPATIENT)
Dept: INTERNAL MEDICINE CLINIC | Facility: CLINIC | Age: 69
End: 2024-06-04

## 2024-06-04 DIAGNOSIS — N30.00 ACUTE CYSTITIS WITHOUT HEMATURIA: Primary | ICD-10-CM

## 2024-06-04 NOTE — TELEPHONE ENCOUNTER
Patient is finished with cipro and is asking for next steps.     Dr. Stover please advise if you would like additional testing.

## 2024-06-04 NOTE — TELEPHONE ENCOUNTER
From: Lary Wallis  To: Fernanda Stover  Sent: 6/4/2024 11:57 AM CDT  Subject: E Coli issue    I finished the cipro 5 day prescription Sunday. I’m just wondering how do I find out if it’s cleared up ? Do I wait til my next visit in July? Please give me some guidance on what’s next.     Thank you

## 2024-06-05 ENCOUNTER — HOSPITAL ENCOUNTER (OUTPATIENT)
Dept: CT IMAGING | Facility: HOSPITAL | Age: 69
Discharge: HOME | End: 2024-06-05
Attending: STUDENT IN AN ORGANIZED HEALTH CARE EDUCATION/TRAINING PROGRAM
Payer: MEDICARE

## 2024-06-05 VITALS
BODY MASS INDEX: 29.32 KG/M2 | DIASTOLIC BLOOD PRESSURE: 48 MMHG | SYSTOLIC BLOOD PRESSURE: 91 MMHG | RESPIRATION RATE: 20 BRPM | WEIGHT: 176 LBS | HEART RATE: 96 BPM | OXYGEN SATURATION: 93 % | HEIGHT: 65 IN

## 2024-06-05 DIAGNOSIS — N28.89 RENAL MASS: ICD-10-CM

## 2024-06-05 PROBLEM — C64.1 RENAL CELL CARCINOMA OF RIGHT KIDNEY (HCC): Status: ACTIVE | Noted: 2024-06-05

## 2024-06-05 LAB
DEPRECATED RDW RBC AUTO: 47.1 FL (ref 35.1–46.3)
ERYTHROCYTE [DISTWIDTH] IN BLOOD BY AUTOMATED COUNT: 17.8 % (ref 11–15)
HCT VFR BLD AUTO: 27.9 %
HGB BLD-MCNC: 7.8 G/DL
MCH RBC QN AUTO: 20.5 PG (ref 26–34)
MCHC RBC AUTO-ENTMCNC: 28 G/DL (ref 31–37)
MCV RBC AUTO: 73.2 FL
PLATELET # BLD AUTO: 712 10(3)UL (ref 150–450)
RBC # BLD AUTO: 3.81 X10(6)UL
WBC # BLD AUTO: 12.1 X10(3) UL (ref 4–11)

## 2024-06-05 PROCEDURE — 36415 COLL VENOUS BLD VENIPUNCTURE: CPT | Performed by: RADIOLOGY

## 2024-06-05 PROCEDURE — 50200 RENAL BIOPSY PERQ: CPT | Performed by: STUDENT IN AN ORGANIZED HEALTH CARE EDUCATION/TRAINING PROGRAM

## 2024-06-05 PROCEDURE — 88342 IMHCHEM/IMCYTCHM 1ST ANTB: CPT | Performed by: STUDENT IN AN ORGANIZED HEALTH CARE EDUCATION/TRAINING PROGRAM

## 2024-06-05 PROCEDURE — 88334 PATH CONSLTJ SURG CYTO XM EA: CPT | Performed by: STUDENT IN AN ORGANIZED HEALTH CARE EDUCATION/TRAINING PROGRAM

## 2024-06-05 PROCEDURE — 88333 PATH CONSLTJ SURG CYTO XM 1: CPT | Performed by: STUDENT IN AN ORGANIZED HEALTH CARE EDUCATION/TRAINING PROGRAM

## 2024-06-05 PROCEDURE — 88305 TISSUE EXAM BY PATHOLOGIST: CPT | Performed by: STUDENT IN AN ORGANIZED HEALTH CARE EDUCATION/TRAINING PROGRAM

## 2024-06-05 PROCEDURE — 77012 CT SCAN FOR NEEDLE BIOPSY: CPT | Performed by: STUDENT IN AN ORGANIZED HEALTH CARE EDUCATION/TRAINING PROGRAM

## 2024-06-05 PROCEDURE — 85027 COMPLETE CBC AUTOMATED: CPT | Performed by: RADIOLOGY

## 2024-06-05 PROCEDURE — 99152 MOD SED SAME PHYS/QHP 5/>YRS: CPT | Performed by: STUDENT IN AN ORGANIZED HEALTH CARE EDUCATION/TRAINING PROGRAM

## 2024-06-05 PROCEDURE — 88341 IMHCHEM/IMCYTCHM EA ADD ANTB: CPT | Performed by: STUDENT IN AN ORGANIZED HEALTH CARE EDUCATION/TRAINING PROGRAM

## 2024-06-05 RX ORDER — ONDANSETRON 2 MG/ML
4 INJECTION INTRAMUSCULAR; INTRAVENOUS ONCE
Status: COMPLETED | OUTPATIENT
Start: 2024-06-05 | End: 2024-06-05

## 2024-06-05 RX ORDER — FLUMAZENIL 0.1 MG/ML
0.2 INJECTION INTRAVENOUS AS NEEDED
Status: DISCONTINUED | OUTPATIENT
Start: 2024-06-05 | End: 2024-06-07

## 2024-06-05 RX ORDER — MIDAZOLAM HYDROCHLORIDE 1 MG/ML
INJECTION INTRAMUSCULAR; INTRAVENOUS
Status: COMPLETED
Start: 2024-06-05 | End: 2024-06-05

## 2024-06-05 RX ORDER — SODIUM CHLORIDE 9 MG/ML
INJECTION, SOLUTION INTRAVENOUS CONTINUOUS
Status: DISCONTINUED | OUTPATIENT
Start: 2024-06-05 | End: 2024-06-07

## 2024-06-05 RX ORDER — MIDAZOLAM HYDROCHLORIDE 1 MG/ML
1 INJECTION INTRAMUSCULAR; INTRAVENOUS EVERY 5 MIN PRN
Status: DISCONTINUED | OUTPATIENT
Start: 2024-06-05 | End: 2024-06-07

## 2024-06-05 RX ORDER — ONDANSETRON 2 MG/ML
INJECTION INTRAMUSCULAR; INTRAVENOUS
Status: COMPLETED
Start: 2024-06-05 | End: 2024-06-05

## 2024-06-05 RX ORDER — NALOXONE HYDROCHLORIDE 0.4 MG/ML
80 INJECTION, SOLUTION INTRAMUSCULAR; INTRAVENOUS; SUBCUTANEOUS AS NEEDED
Status: DISCONTINUED | OUTPATIENT
Start: 2024-06-05 | End: 2024-06-07

## 2024-06-05 RX ADMIN — SODIUM CHLORIDE: 9 INJECTION, SOLUTION INTRAVENOUS at 10:06:00

## 2024-06-05 RX ADMIN — MIDAZOLAM HYDROCHLORIDE 1 MG: 1 INJECTION INTRAMUSCULAR; INTRAVENOUS at 10:29:00

## 2024-06-05 RX ADMIN — SODIUM CHLORIDE: 9 INJECTION, SOLUTION INTRAVENOUS at 13:35:00

## 2024-06-05 RX ADMIN — ONDANSETRON 4 MG: 2 INJECTION INTRAMUSCULAR; INTRAVENOUS at 10:20:00

## 2024-06-05 NOTE — H&P
History & Physical Examination    Patient Name: Lary Wallis  MRN: [id number]  CSN: [id number]  YOB: 1955    Diagnosis: R renal mass    Present Illness: 67 yo with R renal mass for biopsyx     (Not in a hospital admission)    Current Facility-Administered Medications   Medication Dose Route Frequency    sodium chloride 0.9% infusion   Intravenous Continuous    midazolam (Versed) 2 MG/2ML injection 1 mg  1 mg Intravenous Q5 Min PRN    fentaNYL (Sublimaze) 50 mcg/mL injection 50 mcg  50 mcg Intravenous Q5 Min PRN    naloxone (Narcan) 0.4 MG/ML injection 80 mcg  80 mcg Intravenous PRN    flumazenil (Romazicon) 0.5 mg/5mL injection 0.2 mg  0.2 mg Intravenous PRN    ondansetron (Zofran) 4 MG/2ML injection 4 mg  4 mg Intravenous Once       Allergies:   Allergies   Allergen Reactions    Ace Inhibitors Coughing    Azithromycin      Other reaction(s): AZITHROMYCIN    Erythromycin      Other reaction(s): Rash - Mild    Naproxen      Other reaction(s): NAPROXEN    Nortriptyline      Other reaction(s): vivid, scary dreams    Sulfa Antibiotics      Other reaction(s): Rash - Moderate       Past Medical History:    Essential hypertension    High blood pressure    Mixed hyperlipidemia    Moderate single current episode of major depressive disorder (HCC)    Osteoarthritis    KNEE - RIGHT    Osteoarthrosis, pelvic region and thigh    PONV (postoperative nausea and vomiting)    Pulmonary embolism (HCC)    after hip surgery     Past Surgical History:   Procedure Laterality Date    Cholecystectomy      Colonoscopy N/A 10/1/2018    Procedure: COLONOSCOPY;  Surgeon: Shiva Clemente MD;  Location: [facility] ENDOSCOPY    Hc implant ocular device intraoperative detached retina c1784      Hip replacement surgery  2012    Hysterectomy      2010    Laparoscopic cholecystectomy  04/01/1990     Family History   Problem Relation Age of Onset    Diabetes Father     Arthritis Mother     Macular degeneration Mother     Other (spinal  stenosis) Mother     Breast Cancer Sister 62    Pacemaker Sister      Social History     Tobacco Use    Smoking status: Never    Smokeless tobacco: Never   Substance Use Topics    Alcohol use: Yes     Comment: occasional       SYSTEM Check if Review is Normal Check if Physical Exam is Normal If not normal, please explain:   HEENT x x          HEART x x    LUNGS x x    ABDOMEN x x                        [ x ] I have discussed the risks and benefits and alternatives with the patient/family.  They understand and agree to proceed with plan of care.  [ x ] I have reviewed the History and Physical done within the last 30 days.  Any changes noted above.    Archie Blackburn MD  6/5/2024  10:20 AM

## 2024-06-06 ENCOUNTER — OFFICE VISIT (OUTPATIENT)
Dept: HEMATOLOGY/ONCOLOGY | Facility: HOSPITAL | Age: 69
End: 2024-06-06
Attending: INTERNAL MEDICINE
Payer: MEDICARE

## 2024-06-06 VITALS
DIASTOLIC BLOOD PRESSURE: 53 MMHG | RESPIRATION RATE: 16 BRPM | HEIGHT: 65 IN | WEIGHT: 173.81 LBS | HEART RATE: 114 BPM | BODY MASS INDEX: 28.96 KG/M2 | TEMPERATURE: 100 F | SYSTOLIC BLOOD PRESSURE: 117 MMHG | OXYGEN SATURATION: 94 %

## 2024-06-06 DIAGNOSIS — C64.1 RENAL CELL CARCINOMA OF RIGHT KIDNEY (HCC): Primary | ICD-10-CM

## 2024-06-06 DIAGNOSIS — D64.9 ANEMIA, UNSPECIFIED TYPE: ICD-10-CM

## 2024-06-06 DIAGNOSIS — Z51.12 ENCOUNTER FOR ANTINEOPLASTIC IMMUNOTHERAPY: ICD-10-CM

## 2024-06-06 DIAGNOSIS — C78.01 MALIGNANT NEOPLASM METASTATIC TO BOTH LUNGS (HCC): ICD-10-CM

## 2024-06-06 DIAGNOSIS — Z51.11 CHEMOTHERAPY MANAGEMENT, ENCOUNTER FOR: ICD-10-CM

## 2024-06-06 DIAGNOSIS — C78.02 MALIGNANT NEOPLASM METASTATIC TO BOTH LUNGS (HCC): ICD-10-CM

## 2024-06-06 PROCEDURE — G2211 COMPLEX E/M VISIT ADD ON: HCPCS | Performed by: STUDENT IN AN ORGANIZED HEALTH CARE EDUCATION/TRAINING PROGRAM

## 2024-06-06 PROCEDURE — 99215 OFFICE O/P EST HI 40 MIN: CPT | Performed by: STUDENT IN AN ORGANIZED HEALTH CARE EDUCATION/TRAINING PROGRAM

## 2024-06-06 NOTE — PROGRESS NOTES
[facility]  Oncology Clinic Progress Note    Patient Name: Lary Wallis   YOB: 1955   Medical Record Number: [id number]    Reason for visit: metastatic clear cell RCC    Subjective:   Lary Wallis is a 68 year old female with a hx of HTN, HLD, depression and OA who presents for medical oncology followup regarding metastatic clear cell renal cell carcinoma. She presents after brain MRI and kidney biopsy to discuss the next steps in her cancer care.     Interval history:  Continues to decline. More fatigue, low appetite, weight loss, feels weak and is not getting around the house well.  She is using a walker.    Oncology history:  Briefly, the patient began to feel fatigued in the Fall 2023 and she was found to have a hemoglobin of 8.5 with an MCV of 71 with last lab values in 2017 showing a normal hemoglobin.  In March 2024, iron saturation was 15% and ferritin was 612.  She was given IV iron and referred to GI which prompted a CT abdomen pelvis which unfortunately revealed an 11 cm enhancing mass within the right kidney and extensive bibasilar pleural metastases concerning for metastatic renal cell carcinoma.     5/21/24: Initial medical oncology consultation, will obtain brain imaging and refer to IR for kidney biopsy.  Expressed concern for metastatic kidney cancer.  -Brain MRI showed a meningioma, no intracranial metastatic disease.  -IR guided biopsy 6/5 shows high-grade clear-cell renal cell carcinoma, with extensive necrosis but no sarcomatoid or rhabdoid features    6/6/24: Medical oncology follow-up.  Patient continues to decline.  Patient has poor risk disease, very symptomatic and concerned with need for rapid response upfront.  Therefore, we will proceed with cabozantinib nivolumab per [employer] 9ER    Review of Systems:  Hematology/Oncology ROS performed and negative except as above in HPI    History/Other:   Current Medications:   docusate sodium 50 MG Oral Cap  Take 1 capsule (50 mg total) by mouth as needed for constipation.      sertraline 25 MG Oral Tab Take 1 tablet (25 mg total) by mouth daily. 90 tablet 1    metoprolol succinate  MG Oral Tablet 24 Hr Take 0.5 tablets (50 mg total) by mouth daily. 90 tablet 1    celecoxib (CELEBREX) 200 MG Oral Cap Take 1 capsule (200 mg total) by mouth daily as needed for Pain. 90 capsule 1    ondansetron 4 MG Oral Tablet Dispersible Take 1 tablet (4 mg total) by mouth every 8 (eight) hours as needed for Nausea. 30 tablet 2    acetaminophen-codeine 300-30 MG Oral Tab Take 1 tablet by mouth every 6 (six) hours as needed for Pain. 20 tablet 0    ibuprofen 600 MG Oral Tab Take 1 tablet (600 mg total) by mouth every 6 (six) hours as needed for Pain. 90 tablet 1     Allergies:   Allergies   Allergen Reactions    Ace Inhibitors Coughing    Azithromycin      Other reaction(s): AZITHROMYCIN    Erythromycin      Other reaction(s): Rash - Mild    Naproxen      Other reaction(s): NAPROXEN    Nortriptyline      Other reaction(s): vivid, scary dreams    Sulfa Antibiotics      Other reaction(s): Rash - Moderate     Objective:   Blood pressure 117/53, pulse 114, temperature 99.9 °F (37.7 °C), temperature source Oral, resp. rate 16, height 1.651 m (5' 5\"), weight 78.8 kg (173 lb 12.8 oz), SpO2 94%, not currently breastfeeding.  Physical Exam:  ECOG PS: 1-2  General: A&Ox3, NAD  HEENT: PERRL, OP clear  CV: RRR, no murmurs  Pulm: CTA b/l, nl effort  Abd: soft, ntnd, no HSM or masses  Lymph: no palpable lymphadenopathy  Extremities: no edema  Neurological: grossly intact    Results:   Labs:  Lab Results   Component Value Date    WBC 12.1 (H) 06/05/2024    HGB 7.8 (L) 06/05/2024    HCT 27.9 (L) 06/05/2024    .0 (H) 06/05/2024    CREATSERUM 0.62 12/06/2023    BUN 12 12/06/2023     12/06/2023    K 4.4 12/06/2023     12/06/2023    CO2 25 12/06/2023     (H) 12/06/2023    CA 9.1 12/06/2023    ALB 2.8 (L) 12/06/2023    ALKPHO  97 12/06/2023    BILT 0.6 12/06/2023    TP 8.0 12/06/2023    AST 14 12/06/2023    ALT 14 12/06/2023    TSH 2.484 02/07/2024    PHOS 4.8 02/07/2024    B12 435 07/14/2017     Imaging:  CT BIOPSY RENAL (CPT=50200/80820)    Result Date: 6/5/2024  CONCLUSION: 1. CT-guided percutaneous core biopsy of right renal mass    Dictated by (CST): Archie Blackburn MD on 6/05/2024 at 11:51 AM     Finalized by (CST): Archie Blackburn MD on 6/05/2024 at 11:52 AM          MRI BRAIN (W+WO) (CPT=70553)    Result Date: 5/30/2024  CONCLUSION:  A partially calcified 1.4 cm dural-based enhancing lesion of the left lateral temporal region along the tentorial leaflet, corresponding to the finding on recent CT brain.  Appearance is most consistent with a meningioma.  No evidence of intracranial metastatic disease.     Dictated by (CST): Kay Good MD on 5/30/2024 at 9:02 AM     Finalized by (CST): Kay Good MD on 5/30/2024 at 9:17 AM           CT a/p 5/19/24:  CONCLUSION: 11.1 x 10.2 centimeter enhancing mass replacing the mid to lower right kidney with extensive bibasilar enhancing pleural tumor, trace pleural effusions, intrathoracic adenopathy.  This is likely renal cell carcinoma with extensive bibasilar thoracic involvement     CT chest 5/22/24:  CONCLUSION: Extensive pleural masses with associated small bilateral pleural effusions , as well as mediastinal and hilar lymphadenopathy and bilateral pulmonary nodules.  These are highly concerning for sites of metastatic disease in this setting.    A questioned focal sclerosis of the left lateral first rib adjacent to a tiny pleural mass could represent a site of osseous involvement of disease.     Right kidney, mass; CT-guided core biopsy 6/5/24:   Clear-cell renal cell carcinoma in a background of extensive necrosis, see comment.    Assessment & Plan:   Lary Wallis is a 68 year old female with a hx of HTN, HLD, depression and OA who presents for medical oncology followup  regarding poor- risk metastatic clear cell renal cell carcinoma. She presents after brain MRI and kidney biopsy to discuss the next steps in her cancer care.     # Metastatic poor-risk ccRCC.   -large right renal mass, extensive pleural metastases, anemia, neutropenia, thrombocytosis  -she is progressively symptomatic, with fatigue, dyspnea, low appetite and weight loss  -discussed metastatic, incurable nature of disease and palliative intent of systemic therapy  -given progressive symptoms and poor risk disease, discussed the need for a treatment response asap, therefore we discussed proceeding with Nivolumab and Cabozantinib per  9ER (DOI: 10.[facility]6/[id number]) over ipilimumab and nivolumab with concerns for rapid response  -chemo education asap, cycle #1 Nivolumab asap, PRIOR to receiving Cabozantinib, we will work to obtain Cabo asap    # Symptom management  -minimal pain, tylenol prn  -zofran ODT for nausea  -poor appetite, weight loss, hopeful for treatment response    # Comorbidities  -HTN, hold metoprolol given hypotension with weight loss    MDM High: malignancy; review of results with independent interpretation and discussion of management; drug therapy requiring intensive monitoring for toxicity;  ongoing continuity of complex care    Archie Crocker DO    [facility]  [facility]    This note was created using a voice-recognition transcribing system. Incorrect words or phrases may have been missed during proofreading. Please interpret accordingly.

## 2024-06-07 ENCOUNTER — NURSE ONLY (OUTPATIENT)
Dept: HEMATOLOGY/ONCOLOGY | Facility: HOSPITAL | Age: 69
End: 2024-06-07
Attending: INTERNAL MEDICINE
Payer: MEDICARE

## 2024-06-07 ENCOUNTER — TELEPHONE (OUTPATIENT)
Dept: HEMATOLOGY/ONCOLOGY | Facility: HOSPITAL | Age: 69
End: 2024-06-07

## 2024-06-07 DIAGNOSIS — C64.1 RENAL CELL CARCINOMA OF RIGHT KIDNEY (HCC): ICD-10-CM

## 2024-06-07 DIAGNOSIS — C64.1 RENAL CELL CARCINOMA OF RIGHT KIDNEY (HCC): Primary | ICD-10-CM

## 2024-06-07 LAB
ALBUMIN SERPL-MCNC: 3.6 G/DL (ref 3.2–4.8)
ALBUMIN/GLOB SERPL: 0.8 {RATIO} (ref 1–2)
ALP LIVER SERPL-CCNC: 93 U/L
ALT SERPL-CCNC: 41 U/L
ANION GAP SERPL CALC-SCNC: 7 MMOL/L (ref 0–18)
AST SERPL-CCNC: 47 U/L (ref ?–34)
BASOPHILS # BLD AUTO: 0.07 X10(3) UL (ref 0–0.2)
BASOPHILS NFR BLD AUTO: 0.6 %
BILIRUB SERPL-MCNC: 0.3 MG/DL (ref 0.2–1.1)
BUN BLD-MCNC: 17 MG/DL (ref 9–23)
BUN/CREAT SERPL: 26.6 (ref 10–20)
CALCIUM BLD-MCNC: 9.2 MG/DL (ref 8.7–10.4)
CHLORIDE SERPL-SCNC: 107 MMOL/L (ref 98–112)
CO2 SERPL-SCNC: 26 MMOL/L (ref 21–32)
CREAT BLD-MCNC: 0.64 MG/DL
DEPRECATED RDW RBC AUTO: 46.7 FL (ref 35.1–46.3)
EGFRCR SERPLBLD CKD-EPI 2021: 97 ML/MIN/1.73M2 (ref 60–?)
EOSINOPHIL # BLD AUTO: 0.15 X10(3) UL (ref 0–0.7)
EOSINOPHIL NFR BLD AUTO: 1.2 %
ERYTHROCYTE [DISTWIDTH] IN BLOOD BY AUTOMATED COUNT: 17.6 % (ref 11–15)
GLOBULIN PLAS-MCNC: 4.8 G/DL (ref 2–3.5)
GLUCOSE BLD-MCNC: 108 MG/DL (ref 70–99)
HCT VFR BLD AUTO: 28.1 %
HGB BLD-MCNC: 7.7 G/DL
IMM GRANULOCYTES # BLD AUTO: 0.07 X10(3) UL (ref 0–1)
IMM GRANULOCYTES NFR BLD: 0.6 %
LYMPHOCYTES # BLD AUTO: 2.19 X10(3) UL (ref 1–4)
LYMPHOCYTES NFR BLD AUTO: 17.4 %
MCH RBC QN AUTO: 20.3 PG (ref 26–34)
MCHC RBC AUTO-ENTMCNC: 27.4 G/DL (ref 31–37)
MCV RBC AUTO: 74.1 FL
MONOCYTES # BLD AUTO: 1.1 X10(3) UL (ref 0.1–1)
MONOCYTES NFR BLD AUTO: 8.7 %
NEUTROPHILS # BLD AUTO: 9.01 X10 (3) UL (ref 1.5–7.7)
NEUTROPHILS # BLD AUTO: 9.01 X10(3) UL (ref 1.5–7.7)
NEUTROPHILS NFR BLD AUTO: 71.5 %
OSMOLALITY SERPL CALC.SUM OF ELEC: 292 MOSM/KG (ref 275–295)
PLATELET # BLD AUTO: 654 10(3)UL (ref 150–450)
POTASSIUM SERPL-SCNC: 4.4 MMOL/L (ref 3.5–5.1)
PROT SERPL-MCNC: 8.4 G/DL (ref 5.7–8.2)
RBC # BLD AUTO: 3.79 X10(6)UL
SODIUM SERPL-SCNC: 140 MMOL/L (ref 136–145)
T4 FREE SERPL-MCNC: 1 NG/DL (ref 0.8–1.7)
TSI SER-ACNC: 4.15 MIU/ML (ref 0.55–4.78)
WBC # BLD AUTO: 12.6 X10(3) UL (ref 4–11)

## 2024-06-07 PROCEDURE — 99215 OFFICE O/P EST HI 40 MIN: CPT | Performed by: PHYSICIAN ASSISTANT

## 2024-06-07 PROCEDURE — 36415 COLL VENOUS BLD VENIPUNCTURE: CPT

## 2024-06-07 PROCEDURE — 80053 COMPREHEN METABOLIC PANEL: CPT

## 2024-06-07 PROCEDURE — 84443 ASSAY THYROID STIM HORMONE: CPT

## 2024-06-07 PROCEDURE — 84439 ASSAY OF FREE THYROXINE: CPT

## 2024-06-07 PROCEDURE — 85025 COMPLETE CBC W/AUTO DIFF WBC: CPT

## 2024-06-07 RX ORDER — PROCHLORPERAZINE MALEATE 10 MG
10 TABLET ORAL EVERY 6 HOURS PRN
Qty: 60 TABLET | Refills: 3 | Status: SHIPPED | OUTPATIENT
Start: 2024-06-07

## 2024-06-07 NOTE — PATIENT INSTRUCTIONS
Medication Education Record:  IV/Oral Cancer    Learner:  Patient and Spouse    Barriers / Limitations:  None    Psychosocial Assessment:  patient psychosocial response appropriate    Diagnosis:   Renal cell cancer    Readiness of the patient to learn and adhere to oral cancer treatment outside of the cancer center:  Yes    IV Cancer Treatment Name(s): Nivolumab  IV Cancer Treatment Frequency Once every 2 weeks    Number of cycles planned Based on response and tolerability    Oral Cancer Treatment Medication(s):  Medication Name Dose/Strength Frequency   Cabozantinib 40 mg ONE Tablet Once daily               Schedule of oral medication(s):Mornings, does not have to be at an exact time    Verified Consent to Chemotherapy/Biotherapy Cancer Treatment:  form signed by patient and provider:  Yes    RN/PA Verified prescription bottle against physician order: no    Confirm patient informs his/her Cancer Care team of any treatment received in a setting other than at the [facility] (such as inpatient or outpatient at another hospital or clinic, locally or out of state) so that this medical information can accurately be reflected in his/her medical record.  This vital information will provide an accurate picture for the physician prescribing the current cancer treatment. Yes  Start date of treatment: Nivolumab will be scheduled the week of 6/10/24.  Cabozantinib will be added on once received.    How to take your medication(s):  Swallow each tablet whole.  Do not break, crush, or chew, Take 1 hour prior to or 2 hours after a meal, and Avoid grapefruit juice    Plan for appointments and lab testing: Every 2 weeks     Missed Dose Management:   If you miss a dose, call your doctor for further instructions.  If you vomit or throw up your medication, call your doctor for further instructions.  Do not take 2 doses at the same time to make up for a missed dose.      Drug / Drug or Drug / Food Interactions:  Some  vitamins and herbal supplements ([facility])    Cancer Treatment Side Effects (refer to Chemo Care Handouts for further information): Allergic reactions  Constipation  Diarrhea  Eye disorders  Fatigue  Heart effects  Hormone gland changes involving the thyroid, pituitary, adrenal, and pancreas  Kidney / Bladder effects  Liver effects  Loss of appetite  Low red blood cell count / Anemia  Low White Blood Cell Count/Risk of infection  Low Platelet Count/Risk of Bleeding  Lung Effects  Mouth or Throat Sores  Muscle / Bone Effects  Nausea / Vomiting  Nerve Effects  Skin Effects  Taste Changes    IV administration risks:  Potential leaking of drug outside of vein during administration   Signs/symptoms include redness, swelling, pain, burning at the site of administration  Notify Infusion Nurse immediately if any of these symptoms occur during or after the infusion  Allergic reaction: There is a chance for allergic reaction with some medications.  If your prescribed therapy has a higher risk for this, steps will be taken to prevent and minimize this from occurring.    Recommended anti-nausea medications (as directed by your provider):   Prochloperazine (Compazine) 10 mg every 6 hours and Ondansetron (Zofran) 8 mg every 8 hours  Take as needed      Helpful hints during cancer treatment:    Diet:  Avoid greasy or spicy foods on days surrounding chemotherapy  Eat small frequent meals per day (6-7 meals) rather than 3 large meals  Choose high calorie/high protein foods (chicken, hard cooked eggs, peanut butter, cheese)  If nauseated, try dry foods, such as toast, crackers or pretzels; light or bland foods, such as applesauce or oatmeal.    Fluid intake:  Drink 8-10 cups of liquid a day and take a water bottle wherever you go.  Any fluid is acceptable, but caffeinated products do not count towards your intake and should be limited to 1-2 drinks/day.    Physical Activity  If your doctor approves, be as physically active as  you can, but start out slowly, and increase your activity over time as you feel stronger.  Listen to your body and rest when you need to.  Do what you can when you feel up to it.      Oral Care  Keep your mouth clean.  Rinse your mouth before and after meals with plain water or with a mild mouth rinse (made with 1 quart water, 1 teaspoon salt, and 1 teaspoon baking soda, shake before using)   Avoid commercial mouthwashes that contain alcohol, alcoholic or acidic drinks or tobacco  An acceptable mouthwash is Biotene®   If soreness or sores develop, contact the office.    Diarrhea or Constipation  Imodium A-D (Loperamide) use for diarrhea:  Take 2 tabs (4mg) at the first sign of diarrhea; then take 1 tab (2mg) every 2 hours until you have had no diarrhea for at least 12 hours; during the night take 2 tabs (4mg) every 4 hours as needed.  Maximum of 8 tablets in 24 hours.    Constipation:  Over-the-counter recommendations include:  Senokot, Ducolax, Milk of Magnesia or Miralax (generics are ok)    Skin Care  Avoid direct sunlight.  Wear a broad-spectrum sunscreen with an SPF of 30 or higher on any skin exposed to the sun.  Re-apply every 2 hours if in the sun and after bathing or sweating.  For dry skin, use an alcohol-free lotion twice per day, especially after baths.  If scalp hair loss has occurred, protect the scalp from the sun by wearing a hat and use sunscreen.  Apply alcohol-free moisturizer as needed.  Hand/foot rash:  Udderbalm cream, Aquaphor cream or any 10% urea-based cream      When to call the doctor:  Fever of 100.5 or greater or shaking chills  Nausea/vomiting not controlled with anti-nausea medications: Unable to drink for 24 hours or have signs of dehydration: tiredness, thirst, dry mouth, dark and decreased amount of urine  Diarrhea - not controlled with Imodium AD or more than 6 episodes in 24 hours  Constipation -no bowel movement x 3 days, no response to stool softeners or laxatives  Mouth sores,  sore throat or blisters on the lips affecting oral intake  Difficulty breathing, chest pain, or new cough  Excessive tiredness or weakness, confusion or loss of balance  New rash  Tingling or burning, redness, swelling of the palms of hands or soles of feet  Sudden new unexpected symptom -such as change in vision, swelling in arm or leg  Increase in numbness and tingling of hands or feet  Unusual bleeding (nose bleeds, blood in urine, stool or phlegm)  Pain with urination  Persistent mood changes, depression, nervousness, difficulty sleeping   Pain, redness, swelling or blistering at the IV site  If you go to Emergency Room for any reason or seek medical attention elsewhere  If you should need to cancel or reschedule any visit, it is important that you contact the office    What Phone Number to Call:   [facility] [phone] / Triage Nurse    Teaching Materials Provided:   Chemo Care chemotherapy information sheets  and Safety and Handling Sheet     Please refer to the following link if you are interested in additional information about chemotherapy for yourself or family members: [email or web address]    Safety and Handling of Chemotherapy  While you or your family member is receiving chemotherapy, whether in the clinic or at home, the following precautions must be taken to lessen any exposure to the medication.       Handling Body Waste:   Caregivers must wear gloves if exposed to the patient’s blood, urine, stool or vomit.  Dispose of the used gloves after each use and wash hands with soap and water.  Any sheets or clothes soiled with the bodily fluids should be machine washed twice in hot water with regular laundry detergent.  Do not wash soiled garments with hands.  If the soiled garments cannot be washed right away, place them in a sealed plastic bag until they can be washed.   Absorbable undergarments, or any other items contaminated with chemotherapy, should be placed in a  sealed plastic bag for disposal, separate from other trash.  Toilets should be flushed twice with the lid closed while taking this medication and for 48 hours after the last dose.  Wash your hands after using the toilet.  Wash any skin area that has come in contact with urine or stool.    Safety for my family and friends:  Due to safety concerns and the nature of this treatment environment, children are not permitted in the infusion center.  Please make appropriate arrangements.  Hugging and kissing is safe for you and your partner or family members.  You can visit, sit with, hug and kiss the children in your life.    You can be around pregnant women, though (if possible) they should not clean up any of your body fluids after you have treatment.   Sexual activity is safe while throughout treatment.  It is possible that traces of the oral chemotherapy may be present in vaginal fluid or semen for 48 hours after taking.  A condom should be used during this time.  Effective birth control should be used throughout treatment to prevent pregnancy while on these medications and for several months or years after therapy.  Chemotherapy can have harmful side effects to the fetus, especially in the first trimester.  In addition, menstrual cycles can become irregular during and after treatment, so you may not know if you are at a time in your cycle when you could become pregnant or if you are actually pregnant.    Handling oral medication:    Confirm that the medication is appropriately labeled.  Only patients who need chemotherapy should take or touch the medication.    If caregivers are involved, caregivers should wear gloves when administering the medication to protect against exposure.  Discard used gloves immediately - do not use for anything else.  Wash hands thoroughly before and after contact with this medication.  Women of child bearing age, pregnant women or women who are nursing should not handle this medication or  any contaminated waste.     If the medication is provided in a blister pack, care should be taken when opening the packet to avoid breathing in any powder that may be aerosolized.   Do not crush, break or open any pills or capsules unless instructed by your doctor.  Do not chew tablets.      Storage:   Store medication in sealed containers, out of reach of children and pets.  Do not store medication in the bathroom, as high humidity may damage the medication.    Check the medication label to confirm if medication should be stored in the refrigerator or away from light.  Store the medicine container inside another container or in a sealed bag.  Make sure it does not touch any food.    The medication should remain in its original packaging until it is ready for ingestion.  Pill containers can be used but should not be used for other medications, and as few people as possible should come in contact with it.  Empty pill containers should be washed with soap and water or disposed within a plastic bag.    Disposal:  The unused medication should not be flushed down the toilet or placed in the trash (preventing contamination of landfills and water supply).  Please contact your local police department for collection of unused prescription medications.

## 2024-06-07 NOTE — PROGRESS NOTES
Medication Education Record:  IV/Oral Cancer    Learner:  Patient and Spouse    Barriers / Limitations:  None    Psychosocial Assessment:  patient psychosocial response appropriate    Diagnosis:   Renal cell cancer    Readiness of the patient to learn and adhere to oral cancer treatment outside of the cancer center:  Yes    IV Cancer Treatment Name(s): Nivolumab  IV Cancer Treatment Frequency Once every 2 weeks    Number of cycles planned Based on response and tolerability    Oral Cancer Treatment Medication(s):  Medication Name Dose/Strength Frequency   Cabozantinib 40 mg ONE Tablet Once daily               Schedule of oral medication(s):Mornings, does not have to be at an exact time    Verified Consent to Chemotherapy/Biotherapy Cancer Treatment:  form signed by patient and provider:  Yes    RN/PA Verified prescription bottle against physician order: no    Confirm patient informs his/her Cancer Care team of any treatment received in a setting other than at the [facility] (such as inpatient or outpatient at another hospital or clinic, locally or out of state) so that this medical information can accurately be reflected in his/her medical record.  This vital information will provide an accurate picture for the physician prescribing the current cancer treatment. Yes  Start date of treatment: Nivolumab will be scheduled the week of 6/10/24.  Cabozantinib will be added on once received.    How to take your medication(s):  Swallow each tablet whole.  Do not break, crush, or chew, Take 1 hour prior to or 2 hours after a meal, and Avoid grapefruit juice    Plan for appointments and lab testing: Every 2 weeks     Missed Dose Management:   If you miss a dose, call your doctor for further instructions.  If you vomit or throw up your medication, call your doctor for further instructions.  Do not take 2 doses at the same time to make up for a missed dose.      Drug / Drug or Drug / Food Interactions:  Some  vitamins and herbal supplements ([facility])    Cancer Treatment Side Effects (refer to Chemo Care Handouts for further information): Allergic reactions  Constipation  Diarrhea  Eye disorders  Fatigue  Heart effects  Hormone gland changes involving the thyroid, pituitary, adrenal, and pancreas  Kidney / Bladder effects  Liver effects  Loss of appetite  Low red blood cell count / Anemia  Low White Blood Cell Count/Risk of infection  Low Platelet Count/Risk of Bleeding  Lung Effects  Mouth or Throat Sores  Muscle / Bone Effects  Nausea / Vomiting  Nerve Effects  Skin Effects  Taste Changes    IV administration risks:  Potential leaking of drug outside of vein during administration   Signs/symptoms include redness, swelling, pain, burning at the site of administration  Notify Infusion Nurse immediately if any of these symptoms occur during or after the infusion  Allergic reaction: There is a chance for allergic reaction with some medications.  If your prescribed therapy has a higher risk for this, steps will be taken to prevent and minimize this from occurring.    Recommended anti-nausea medications (as directed by your provider):   Prochloperazine (Compazine) 10 mg every 6 hours and Ondansetron (Zofran) 8 mg every 8 hours  Take as needed      Helpful hints during cancer treatment:    Diet:  Avoid greasy or spicy foods on days surrounding chemotherapy  Eat small frequent meals per day (6-7 meals) rather than 3 large meals  Choose high calorie/high protein foods (chicken, hard cooked eggs, peanut butter, cheese)  If nauseated, try dry foods, such as toast, crackers or pretzels; light or bland foods, such as applesauce or oatmeal.    Fluid intake:  Drink 8-10 cups of liquid a day and take a water bottle wherever you go.  Any fluid is acceptable, but caffeinated products do not count towards your intake and should be limited to 1-2 drinks/day.    Physical Activity  If your doctor approves, be as physically active as  you can, but start out slowly, and increase your activity over time as you feel stronger.  Listen to your body and rest when you need to.  Do what you can when you feel up to it.      Oral Care  Keep your mouth clean.  Rinse your mouth before and after meals with plain water or with a mild mouth rinse (made with 1 quart water, 1 teaspoon salt, and 1 teaspoon baking soda, shake before using)   Avoid commercial mouthwashes that contain alcohol, alcoholic or acidic drinks or tobacco  An acceptable mouthwash is Biotene®   If soreness or sores develop, contact the office.    Diarrhea or Constipation  Imodium A-D (Loperamide) use for diarrhea:  Take 2 tabs (4mg) at the first sign of diarrhea; then take 1 tab (2mg) every 2 hours until you have had no diarrhea for at least 12 hours; during the night take 2 tabs (4mg) every 4 hours as needed.  Maximum of 8 tablets in 24 hours.    Constipation:  Over-the-counter recommendations include:  Senokot, Ducolax, Milk of Magnesia or Miralax (generics are ok)    Skin Care  Avoid direct sunlight.  Wear a broad-spectrum sunscreen with an SPF of 30 or higher on any skin exposed to the sun.  Re-apply every 2 hours if in the sun and after bathing or sweating.  For dry skin, use an alcohol-free lotion twice per day, especially after baths.  If scalp hair loss has occurred, protect the scalp from the sun by wearing a hat and use sunscreen.  Apply alcohol-free moisturizer as needed.  Hand/foot rash:  Udderbalm cream, Aquaphor cream or any 10% urea-based cream      When to call the doctor:  Fever of 100.5 or greater or shaking chills  Nausea/vomiting not controlled with anti-nausea medications: Unable to drink for 24 hours or have signs of dehydration: tiredness, thirst, dry mouth, dark and decreased amount of urine  Diarrhea - not controlled with Imodium AD or more than 6 episodes in 24 hours  Constipation -no bowel movement x 3 days, no response to stool softeners or laxatives  Mouth sores,  sore throat or blisters on the lips affecting oral intake  Difficulty breathing, chest pain, or new cough  Excessive tiredness or weakness, confusion or loss of balance  New rash  Tingling or burning, redness, swelling of the palms of hands or soles of feet  Sudden new unexpected symptom -such as change in vision, swelling in arm or leg  Increase in numbness and tingling of hands or feet  Unusual bleeding (nose bleeds, blood in urine, stool or phlegm)  Pain with urination  Persistent mood changes, depression, nervousness, difficulty sleeping   Pain, redness, swelling or blistering at the IV site  If you go to Emergency Room for any reason or seek medical attention elsewhere  If you should need to cancel or reschedule any visit, it is important that you contact the office    What Phone Number to Call:   [facility] [phone] / Triage Nurse    Teaching Materials Provided:   Chemo Care chemotherapy information sheets  and Safety and Handling Sheet     Please refer to the following link if you are interested in additional information about chemotherapy for yourself or family members: [email or web address]    Cycle 1 scheduled on Wednesday, 6/12/24, at 2 pm.  Safety and Handling of Chemotherapy  While you or your family member is receiving chemotherapy, whether in the clinic or at home, the following precautions must be taken to lessen any exposure to the medication.       Handling Body Waste:   Caregivers must wear gloves if exposed to the patient’s blood, urine, stool or vomit.  Dispose of the used gloves after each use and wash hands with soap and water.  Any sheets or clothes soiled with the bodily fluids should be machine washed twice in hot water with regular laundry detergent.  Do not wash soiled garments with hands.  If the soiled garments cannot be washed right away, place them in a sealed plastic bag until they can be washed.   Absorbable undergarments, or any other items  contaminated with chemotherapy, should be placed in a sealed plastic bag for disposal, separate from other trash.  Toilets should be flushed twice with the lid closed while taking this medication and for 48 hours after the last dose.  Wash your hands after using the toilet.  Wash any skin area that has come in contact with urine or stool.    Safety for my family and friends:  Due to safety concerns and the nature of this treatment environment, children are not permitted in the infusion center.  Please make appropriate arrangements.  Hugging and kissing is safe for you and your partner or family members.  You can visit, sit with, hug and kiss the children in your life.    You can be around pregnant women, though (if possible) they should not clean up any of your body fluids after you have treatment.   Sexual activity is safe while throughout treatment.  It is possible that traces of the oral chemotherapy may be present in vaginal fluid or semen for 48 hours after taking.  A condom should be used during this time.  Effective birth control should be used throughout treatment to prevent pregnancy while on these medications and for several months or years after therapy.  Chemotherapy can have harmful side effects to the fetus, especially in the first trimester.  In addition, menstrual cycles can become irregular during and after treatment, so you may not know if you are at a time in your cycle when you could become pregnant or if you are actually pregnant.    Handling oral medication:    Confirm that the medication is appropriately labeled.  Only patients who need chemotherapy should take or touch the medication.    If caregivers are involved, caregivers should wear gloves when administering the medication to protect against exposure.  Discard used gloves immediately - do not use for anything else.  Wash hands thoroughly before and after contact with this medication.  Women of child bearing age, pregnant women or women  who are nursing should not handle this medication or any contaminated waste.     If the medication is provided in a blister pack, care should be taken when opening the packet to avoid breathing in any powder that may be aerosolized.   Do not crush, break or open any pills or capsules unless instructed by your doctor.  Do not chew tablets.      Storage:   Store medication in sealed containers, out of reach of children and pets.  Do not store medication in the bathroom, as high humidity may damage the medication.    Check the medication label to confirm if medication should be stored in the refrigerator or away from light.  Store the medicine container inside another container or in a sealed bag.  Make sure it does not touch any food.    The medication should remain in its original packaging until it is ready for ingestion.  Pill containers can be used but should not be used for other medications, and as few people as possible should come in contact with it.  Empty pill containers should be washed with soap and water or disposed within a plastic bag.    Disposal:  The unused medication should not be flushed down the toilet or placed in the trash (preventing contamination of landfills and water supply).  Please contact your local police department for collection of unused prescription medications.    Cancer treatment education, including treatment plan, supportive medications, and post-treatment care was provided to the patient.  The patient/support person  was attentive during education, verbalized understanding, all questions were answered and patient was instructed to call with further questions.     Reinforcement of education is needed at next visit? Yes  60 minutes appointment with at minimum 50% time spent counseling/coordinating care

## 2024-06-12 ENCOUNTER — SOCIAL WORK SERVICES (OUTPATIENT)
Dept: HEMATOLOGY/ONCOLOGY | Facility: HOSPITAL | Age: 69
End: 2024-06-12

## 2024-06-12 ENCOUNTER — NURSE ONLY (OUTPATIENT)
Dept: HEMATOLOGY/ONCOLOGY | Facility: HOSPITAL | Age: 69
End: 2024-06-12
Attending: INTERNAL MEDICINE
Payer: MEDICARE

## 2024-06-12 VITALS
SYSTOLIC BLOOD PRESSURE: 143 MMHG | RESPIRATION RATE: 16 BRPM | WEIGHT: 168.38 LBS | OXYGEN SATURATION: 96 % | TEMPERATURE: 99 F | HEART RATE: 77 BPM | DIASTOLIC BLOOD PRESSURE: 72 MMHG | BODY MASS INDEX: 28.06 KG/M2 | HEIGHT: 65 IN

## 2024-06-12 DIAGNOSIS — C64.1 RENAL CELL CARCINOMA OF RIGHT KIDNEY (HCC): ICD-10-CM

## 2024-06-12 DIAGNOSIS — Z51.11 CHEMOTHERAPY MANAGEMENT, ENCOUNTER FOR: Primary | ICD-10-CM

## 2024-06-12 DIAGNOSIS — Z51.12 ENCOUNTER FOR ANTINEOPLASTIC IMMUNOTHERAPY: ICD-10-CM

## 2024-06-12 PROCEDURE — 96413 CHEMO IV INFUSION 1 HR: CPT

## 2024-06-12 NOTE — PROGRESS NOTES
Pt here for C1 D1 OPDIVO.  Arrives Via wheelchair, accompanied by Spouse   Patient was evaluated today by Treatment Nurse.  Oral medications included in this regimen:  yes - cabozantinib. Pt has not received supply of cabozantinib yet and pt's  said he is working on getting an ETA for the medication.    Patient confirms comprehension of cancer treatment schedule:  yes  Pregnancy screening:  Denies possibility of pregnancy  Modifications in dose or schedule:  No    Medications appearance and physical integrity checked by RN: yes.  Chemotherapy IV pump settings verified by 2 RNs:  No due to targeted therapy IV administration.  Frequency of blood return and site check throughout administration:    Infusion/treatment outcome:  patient tolerated treatment without incident    Education Record    Learner:  Patient and Spouse  Barriers / Limitations:  None  Method:  Discussion and Reinforcement  Education / instructions given:  Plan of care reviewed, side effects, anti-emetic use, triage RN #, chemo ed handout  Outcome:  Shows understanding    Discharged Home, Via wheelchair, accompanied by:Spouse    Patient/family verbalized understanding of future appointments: by printed AVS

## 2024-06-12 NOTE — PATIENT INSTRUCTIONS
[phone] / Triage Nurse    Recommended anti-nausea medications (as directed by your provider):     Ondansetron (Zofran) 8 mg every 8 hours  Take as needed  Prochloperazine (Compazine) 10 mg every 6 hours Take as needed    When to call the doctor:  Fever of 100.5 or greater or shaking chills  Nausea/vomiting not controlled with anti-nausea medications: Unable to drink for 24 hours or have signs of dehydration: tiredness, thirst, dry mouth, dark and decreased amount of urine  Diarrhea - not controlled with Imodium AD or more than 6 episodes in 24 hours  Constipation -no bowel movement x 3 days, no response to stool softeners or laxatives  Mouth sores, sore throat or blisters on the lips affecting oral intake  Difficulty breathing, chest pain, or new cough  Excessive tiredness or weakness, confusion or loss of balance  New rash  Tingling or burning, redness, swelling of the palms of hands or soles of feet  Sudden new unexpected symptom -such as change in vision, swelling in arm or leg  Increase in numbness and tingling of hands or feet  Unusual bleeding (nose bleeds, blood in urine, stool or phlegm)  Pain with urination  Persistent mood changes, depression, nervousness, difficulty sleeping   Pain, redness, swelling or blistering at the IV site  If you go to Emergency Room for any reason or seek medical attention elsewhere  If you should need to cancel or reschedule any visit, it is important that you contact the office    What Phone Number to Call:   Cancer Center [phone] / Triage Nurse      Oral Cancer Treatment Medication(s):  Medication Name Dose/Strength Frequency   Cabozantinib 40 mg ONE Tablet Once daily               Schedule of oral medication(s):Mornings, does not have to be at an exact time    How to take your medication(s):  Swallow each tablet whole.  Do not break, crush, or chew, Take 1 hour prior to or 2 hours after a meal, and Avoid grapefruit juice    Missed Dose Management:   If you  miss a dose, call your doctor for further instructions.  If you vomit or throw up your medication, call your doctor for further instructions.  Do not take 2 doses at the same time to make up for a missed dose.      Drug / Drug or Drug / Food Interactions:  Some vitamins and herbal supplements ([facility])    Cancer Treatment Side Effects (refer to Chemo Care Handouts for further information): Allergic reactions  Constipation  Diarrhea  Eye disorders  Fatigue  Heart effects  Hormone gland changes involving the thyroid, pituitary, adrenal, and pancreas  Kidney / Bladder effects  Liver effects  Loss of appetite  Low red blood cell count / Anemia  Low White Blood Cell Count/Risk of infection  Low Platelet Count/Risk of Bleeding  Lung Effects  Mouth or Throat Sores  Muscle / Bone Effects  Nausea / Vomiting  Nerve Effects  Skin Effects  Taste Changes    Allergic reaction: There is a chance for allergic reaction with some medications.  If your prescribed therapy has a higher risk for this, steps will be taken to prevent and minimize this from occurring.    Helpful hints during cancer treatment:    Diet:  Avoid greasy or spicy foods on days surrounding chemotherapy  Eat small frequent meals per day (6-7 meals) rather than 3 large meals  Choose high calorie/high protein foods (chicken, hard cooked eggs, peanut butter, cheese)  If nauseated, try dry foods, such as toast, crackers or pretzels; light or bland foods, such as applesauce or oatmeal.    Fluid intake:  Drink 8-10 cups of liquid a day and take a water bottle wherever you go.  Any fluid is acceptable, but caffeinated products do not count towards your intake and should be limited to 1-2 drinks/day.    Physical Activity  If your doctor approves, be as physically active as you can, but start out slowly, and increase your activity over time as you feel stronger.  Listen to your body and rest when you need to.  Do what you can when you feel up to it.      Oral  Care  Keep your mouth clean.  Rinse your mouth before and after meals with plain water or with a mild mouth rinse (made with 1 quart water, 1 teaspoon salt, and 1 teaspoon baking soda, shake before using)   Avoid commercial mouthwashes that contain alcohol, alcoholic or acidic drinks or tobacco  An acceptable mouthwash is Biotene®   If soreness or sores develop, contact the office.    Diarrhea or Constipation  Imodium A-D (Loperamide) use for diarrhea:  Take 2 tabs (4mg) at the first sign of diarrhea; then take 1 tab (2mg) every 2 hours until you have had no diarrhea for at least 12 hours; during the night take 2 tabs (4mg) every 4 hours as needed.  Maximum of 8 tablets in 24 hours.    Constipation:  Over-the-counter recommendations include:  Senokot, Ducolax, Milk of Magnesia or Miralax (generics are ok)    Skin Care  Avoid direct sunlight.  Wear a broad-spectrum sunscreen with an SPF of 30 or higher on any skin exposed to the sun.  Re-apply every 2 hours if in the sun and after bathing or sweating.  For dry skin, use an alcohol-free lotion twice per day, especially after baths.  If scalp hair loss has occurred, protect the scalp from the sun by wearing a hat and use sunscreen.  Apply alcohol-free moisturizer as needed.  Hand/foot rash:  Udderbalm cream, Aquaphor cream or any 10% urea-based cream      Teaching Materials Provided:   Chemo Care chemotherapy information sheets  and Safety and Handling Sheet     Please refer to the following link if you are interested in additional information about chemotherapy for yourself or family members: [email or web address]-[email or web address]  Safety and Handling of Chemotherapy  While you or your family member is receiving chemotherapy, whether in the clinic or at home, the following precautions must be taken to lessen any exposure to the medication.       Handling Body Waste:   Caregivers must wear gloves if exposed to the patient’s blood, urine, stool or vomit.   Dispose of the used gloves after each use and wash hands with soap and water.  Any sheets or clothes soiled with the bodily fluids should be machine washed twice in hot water with regular laundry detergent.  Do not wash soiled garments with hands.  If the soiled garments cannot be washed right away, place them in a sealed plastic bag until they can be washed.   Absorbable undergarments, or any other items contaminated with chemotherapy, should be placed in a sealed plastic bag for disposal, separate from other trash.  Toilets should be flushed twice with the lid closed while taking this medication and for 48 hours after the last dose.  Wash your hands after using the toilet.  Wash any skin area that has come in contact with urine or stool.    Safety for my family and friends:  Due to safety concerns and the nature of this treatment environment, children are not permitted in the infusion center.  Please make appropriate arrangements.  Hugging and kissing is safe for you and your partner or family members.  You can visit, sit with, hug and kiss the children in your life.    You can be around pregnant women, though (if possible) they should not clean up any of your body fluids after you have treatment.   Sexual activity is safe while throughout treatment.  It is possible that traces of the oral chemotherapy may be present in vaginal fluid or semen for 48 hours after taking.  A condom should be used during this time.  Effective birth control should be used throughout treatment to prevent pregnancy while on these medications and for several months or years after therapy.  Chemotherapy can have harmful side effects to the fetus, especially in the first trimester.  In addition, menstrual cycles can become irregular during and after treatment, so you may not know if you are at a time in your cycle when you could become pregnant or if you are actually pregnant.    Handling oral medication:    Confirm that the medication is  appropriately labeled.  Only patients who need chemotherapy should take or touch the medication.    If caregivers are involved, caregivers should wear gloves when administering the medication to protect against exposure.  Discard used gloves immediately - do not use for anything else.  Wash hands thoroughly before and after contact with this medication.  Women of child bearing age, pregnant women or women who are nursing should not handle this medication or any contaminated waste.     If the medication is provided in a blister pack, care should be taken when opening the packet to avoid breathing in any powder that may be aerosolized.   Do not crush, break or open any pills or capsules unless instructed by your doctor.  Do not chew tablets.      Storage:   Store medication in sealed containers, out of reach of children and pets.  Do not store medication in the bathroom, as high humidity may damage the medication.    Check the medication label to confirm if medication should be stored in the refrigerator or away from light.  Store the medicine container inside another container or in a sealed bag.  Make sure it does not touch any food.    The medication should remain in its original packaging until it is ready for ingestion.  Pill containers can be used but should not be used for other medications, and as few people as possible should come in contact with it.  Empty pill containers should be washed with soap and water or disposed within a plastic bag.    Disposal:  The unused medication should not be flushed down the toilet or placed in the trash (preventing contamination of landfills and water supply).  Please contact your local police department for collection of unused prescription medications.

## 2024-06-12 NOTE — PROGRESS NOTES
SW completed an assessment with pt to provide support and encouragement due to new chemo starting today.      Pt is a 69 y/o woman who  lives in [place], [place] with her spouse.    She has four adult children    Patient is retired from the [employer].    Social determinants of health assessment completed with pt and no needs identified at this time.    Pt presents with appropriate affect and mood.      SW reviewed cancer support resources provided by [employer]. SW provided a pack of resources including information on transportation assistance, homecare/home health agencies and counseling resources. SW reviewed Advance Directives and importance of completion. SW explained role of SW in Cancer Center and provided [employer] gift bag. SW contact information given. Coping skills reviewed and support services encouraged due to new cancer dx.

## 2024-06-13 ENCOUNTER — TELEPHONE (OUTPATIENT)
Dept: HEMATOLOGY/ONCOLOGY | Facility: HOSPITAL | Age: 69
End: 2024-06-13

## 2024-06-13 NOTE — TELEPHONE ENCOUNTER
Spouse called requesting call back from [facility].  [employer] called requesting a six thousand copay.

## 2024-06-13 NOTE — TELEPHONE ENCOUNTER
Let Christ know to reach out to [employer] for financial assistance.  He verbalizes understanding.

## 2024-06-13 NOTE — TELEPHONE ENCOUNTER
Toxicities: C1 D1 Nivolumab/Cabozantinib on 6/12/2024    Lary reports that she feels \"good.\" No side effects at this time. I encouraged her to please call the office if she is not feeling well or she has any questions or concerns. She agreed and thanked me for checking on her.

## 2024-06-18 ENCOUNTER — TELEPHONE (OUTPATIENT)
Dept: HEMATOLOGY/ONCOLOGY | Facility: HOSPITAL | Age: 69
End: 2024-06-18

## 2024-06-26 ENCOUNTER — NURSE ONLY (OUTPATIENT)
Dept: HEMATOLOGY/ONCOLOGY | Facility: HOSPITAL | Age: 69
End: 2024-06-26
Attending: INTERNAL MEDICINE

## 2024-06-26 VITALS
RESPIRATION RATE: 16 BRPM | TEMPERATURE: 98 F | DIASTOLIC BLOOD PRESSURE: 69 MMHG | OXYGEN SATURATION: 96 % | HEART RATE: 104 BPM | BODY MASS INDEX: 27.32 KG/M2 | HEIGHT: 65 IN | WEIGHT: 164 LBS | SYSTOLIC BLOOD PRESSURE: 128 MMHG

## 2024-06-26 VITALS
DIASTOLIC BLOOD PRESSURE: 79 MMHG | OXYGEN SATURATION: 96 % | RESPIRATION RATE: 20 BRPM | HEART RATE: 116 BPM | SYSTOLIC BLOOD PRESSURE: 136 MMHG | TEMPERATURE: 98 F

## 2024-06-26 DIAGNOSIS — Z51.11 CHEMOTHERAPY MANAGEMENT, ENCOUNTER FOR: Primary | ICD-10-CM

## 2024-06-26 DIAGNOSIS — C78.01 MALIGNANT NEOPLASM METASTATIC TO BOTH LUNGS (HCC): ICD-10-CM

## 2024-06-26 DIAGNOSIS — Z51.12 ENCOUNTER FOR ANTINEOPLASTIC IMMUNOTHERAPY: ICD-10-CM

## 2024-06-26 DIAGNOSIS — C64.1 RENAL CELL CARCINOMA OF RIGHT KIDNEY (HCC): ICD-10-CM

## 2024-06-26 DIAGNOSIS — Z51.11 CHEMOTHERAPY MANAGEMENT, ENCOUNTER FOR: ICD-10-CM

## 2024-06-26 DIAGNOSIS — C64.1 RENAL CELL CARCINOMA OF RIGHT KIDNEY (HCC): Primary | ICD-10-CM

## 2024-06-26 DIAGNOSIS — Z51.12 ENCOUNTER FOR ANTINEOPLASTIC IMMUNOTHERAPY: Primary | ICD-10-CM

## 2024-06-26 DIAGNOSIS — D64.9 ANEMIA, UNSPECIFIED TYPE: ICD-10-CM

## 2024-06-26 DIAGNOSIS — C78.02 MALIGNANT NEOPLASM METASTATIC TO BOTH LUNGS (HCC): ICD-10-CM

## 2024-06-26 LAB
ALBUMIN SERPL-MCNC: 3.8 G/DL (ref 3.2–4.8)
ALBUMIN/GLOB SERPL: 0.7 {RATIO} (ref 1–2)
ALP LIVER SERPL-CCNC: 122 U/L
ALT SERPL-CCNC: 79 U/L
ANION GAP SERPL CALC-SCNC: 5 MMOL/L (ref 0–18)
AST SERPL-CCNC: 74 U/L (ref ?–34)
BASOPHILS # BLD AUTO: 0.07 X10(3) UL (ref 0–0.2)
BASOPHILS NFR BLD AUTO: 0.8 %
BILIRUB SERPL-MCNC: 0.2 MG/DL (ref 0.2–1.1)
BUN BLD-MCNC: 17 MG/DL (ref 9–23)
BUN/CREAT SERPL: 24.6 (ref 10–20)
CALCIUM BLD-MCNC: 9.3 MG/DL (ref 8.7–10.4)
CHLORIDE SERPL-SCNC: 105 MMOL/L (ref 98–112)
CO2 SERPL-SCNC: 30 MMOL/L (ref 21–32)
CREAT BLD-MCNC: 0.69 MG/DL
DEPRECATED RDW RBC AUTO: 48.4 FL (ref 35.1–46.3)
EGFRCR SERPLBLD CKD-EPI 2021: 95 ML/MIN/1.73M2 (ref 60–?)
EOSINOPHIL # BLD AUTO: 0.11 X10(3) UL (ref 0–0.7)
EOSINOPHIL NFR BLD AUTO: 1.2 %
ERYTHROCYTE [DISTWIDTH] IN BLOOD BY AUTOMATED COUNT: 18.6 % (ref 11–15)
FASTING STATUS PATIENT QL REPORTED: NO
GLOBULIN PLAS-MCNC: 5.5 G/DL (ref 2–3.5)
GLUCOSE BLD-MCNC: 179 MG/DL (ref 70–99)
HCT VFR BLD AUTO: 36.8 %
HGB BLD-MCNC: 10.3 G/DL
IMM GRANULOCYTES # BLD AUTO: 0.05 X10(3) UL (ref 0–1)
IMM GRANULOCYTES NFR BLD: 0.5 %
LYMPHOCYTES # BLD AUTO: 1.99 X10(3) UL (ref 1–4)
LYMPHOCYTES NFR BLD AUTO: 21.7 %
MCH RBC QN AUTO: 20.6 PG (ref 26–34)
MCHC RBC AUTO-ENTMCNC: 28 G/DL (ref 31–37)
MCV RBC AUTO: 73.7 FL
MONOCYTES # BLD AUTO: 0.66 X10(3) UL (ref 0.1–1)
MONOCYTES NFR BLD AUTO: 7.2 %
NEUTROPHILS # BLD AUTO: 6.27 X10 (3) UL (ref 1.5–7.7)
NEUTROPHILS # BLD AUTO: 6.27 X10(3) UL (ref 1.5–7.7)
NEUTROPHILS NFR BLD AUTO: 68.6 %
OSMOLALITY SERPL CALC.SUM OF ELEC: 296 MOSM/KG (ref 275–295)
PLATELET # BLD AUTO: 652 10(3)UL (ref 150–450)
POTASSIUM SERPL-SCNC: 4.3 MMOL/L (ref 3.5–5.1)
PROT SERPL-MCNC: 9.3 G/DL (ref 5.7–8.2)
RBC # BLD AUTO: 4.99 X10(6)UL
SODIUM SERPL-SCNC: 140 MMOL/L (ref 136–145)
WBC # BLD AUTO: 9.2 X10(3) UL (ref 4–11)

## 2024-06-26 PROCEDURE — 99215 OFFICE O/P EST HI 40 MIN: CPT | Performed by: STUDENT IN AN ORGANIZED HEALTH CARE EDUCATION/TRAINING PROGRAM

## 2024-06-26 PROCEDURE — 96413 CHEMO IV INFUSION 1 HR: CPT

## 2024-06-26 PROCEDURE — G2211 COMPLEX E/M VISIT ADD ON: HCPCS | Performed by: STUDENT IN AN ORGANIZED HEALTH CARE EDUCATION/TRAINING PROGRAM

## 2024-06-26 PROCEDURE — 96375 TX/PRO/DX INJ NEW DRUG ADDON: CPT

## 2024-06-26 PROCEDURE — 80053 COMPREHEN METABOLIC PANEL: CPT

## 2024-06-26 PROCEDURE — S0028 INJECTION, FAMOTIDINE, 20 MG: HCPCS

## 2024-06-26 PROCEDURE — 85025 COMPLETE CBC W/AUTO DIFF WBC: CPT

## 2024-06-26 RX ORDER — FAMOTIDINE 10 MG/ML
20 INJECTION, SOLUTION INTRAVENOUS ONCE
Status: COMPLETED | OUTPATIENT
Start: 2024-06-26 | End: 2024-06-26

## 2024-06-26 RX ORDER — FAMOTIDINE 10 MG/ML
INJECTION, SOLUTION INTRAVENOUS
Status: COMPLETED
Start: 2024-06-26 | End: 2024-06-26

## 2024-06-26 RX ADMIN — FAMOTIDINE 20 MG: 10 INJECTION, SOLUTION INTRAVENOUS at 13:54:00

## 2024-06-26 NOTE — PROGRESS NOTES
Acute Care Visit for Infusion Reaction    Patient's RN called reporting Lary was having back spasms, feeling very hot, and feeling like she may pass out.  She was approximately 10 minutes into C1D15 of Opdivo infusion.  Patient noted to be tachycardic to ~125 with oxygen level in low to mid 90s.      I saw patient in the infusion center, we stopped the medication and evaluated patient.  Her heart rate improved to low 100s (history of tachycardic per review of vitals) and her symptoms almost completely resolved.  Patient to be given 20mg Famotidine and restart Opdivo at 50% rate.  Patient will be continue to be closely monitored.     EPIFANIO Hess  Hematology/Oncology

## 2024-06-26 NOTE — PROGRESS NOTES
Pt here for C1D15 Drug(s)Opdivo.  Arrives Via wheelchair, accompanied by Spouse     Patient was evaluated today by MD.  Oral medications included in this regimen:  yes - Cabozantinib  Patient confirms comprehension of cancer treatment schedule:  yes  Pregnancy screening:  Denies possibility of pregnancy  Modifications in dose or schedule:  No  Medications appearance and physical integrity checked by RN: yes.  Chemotherapy IV pump settings verified by 2 RNs:  No due to targeted therapy IV administration.  Frequency of blood return and site check throughout administration: Prior to administration, Prior to each drug, and At completion of therapy   Infusion/treatment outcome:  hypersensitivity protocol initiated - see documentation.     Education Record    Learner:  Patient  Barriers / Limitations:  None  Method:  Reinforcement  Education / instructions given:  Plan of care.  Outcome:  Shows understanding    Discharged Home, Via wheelchair, accompanied by:Spouse    Patient/family verbalized understanding of future appointments: by [employer] messaging

## 2024-06-27 ENCOUNTER — TELEPHONE (OUTPATIENT)
Dept: HEMATOLOGY/ONCOLOGY | Facility: HOSPITAL | Age: 69
End: 2024-06-27

## 2024-06-27 NOTE — PROGRESS NOTES
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 She began Nivolumab Cabozantinib 6/12/24 and presents for followup.     Interval history:  Energy slightly improved and she is eating better. Some slight changes to the tastes of food but doing her best. Not sleeping as much during the daytime. No pain.     Oncology history:  Briefly, the patient began to feel fatigued in the Fall 2023 and she was found to have a hemoglobin of 8.5 with an MCV of 71 with last lab values in 2017 showing a normal hemoglobin.  In March 2024, iron saturation was 15% and ferritin was 612.  She was given IV iron and referred to GI which prompted a CT abdomen pelvis which unfortunately revealed an 11 cm enhancing mass within the right kidney and extensive bibasilar pleural metastases concerning for metastatic renal cell carcinoma.     5/21/24: Initial medical oncology consultation, will obtain brain imaging and refer to IR for kidney biopsy.  Expressed concern for metastatic kidney cancer.  -Brain MRI showed a meningioma, no intracranial metastatic disease.  -IR guided biopsy 6/5 shows high-grade clear-cell renal cell carcinoma, with extensive necrosis but no sarcomatoid or rhabdoid features    6/6/24: Medical oncology follow-up.  Patient continues to decline.  Patient has poor risk disease, very symptomatic and concerned with need for rapid response upfront.  Therefore, we will proceed with cabozantinib nivolumab per CheckMate 9ER    6/12/24: began Cabozantinib Nivolumab.     6/26/24: slight improvement, no dose limiting toxicities, continue treatment.      Review of Systems:  Hematology/Oncology ROS performed and negative except as above in HPI    History/Other:   Current Medications:   prochlorperazine (COMPAZINE) 10 mg tablet Take 1 tablet (10 mg total) by mouth every 6 (six) hours as needed for Nausea. 60 tablet 3    cabozantinib 40 MG Oral Tab Take 1 tablet (40 mg total) by mouth daily. 28 tablet 11    docusate sodium 50 MG Oral Cap Take 1 capsule (50 mg total) by mouth as needed for constipation.      sertraline 25 MG Oral Tab Take 1 tablet (25 mg total) by mouth daily. 90 tablet 1    metoprolol succinate  MG Oral Tablet 24 Hr Take 0.5 tablets (50 mg total) by mouth daily. 90 tablet 1    celecoxib (CELEBREX) 200 MG Oral Cap Take 1 capsule (200 mg total) by mouth daily as needed for Pain. 90 capsule 1    ondansetron 4 MG Oral Tablet Dispersible Take 1 tablet (4 mg total) by mouth every 8 (eight) hours as needed for Nausea. 30 tablet 2    acetaminophen-codeine 300-30 MG Oral Tab Take 1 tablet by mouth every 6 (six) hours as needed for Pain. 20 tablet 0    ibuprofen 600 MG Oral Tab Take 1 tablet (600 mg total) by mouth every 6 (six) hours as needed for Pain. 90 tablet 1     Allergies:   Allergies   Allergen Reactions    Ace Inhibitors Coughing    Azithromycin      Other reaction(s): AZITHROMYCIN    Erythromycin      Other reaction(s): Rash - Mild    Naproxen      Other reaction(s): NAPROXEN    Nortriptyline      Other reaction(s): vivid, scary dreams    Sulfa Antibiotics      Other reaction(s): Rash - Moderate     Objective:   Blood pressure 128/69, pulse 104, temperature 98.2 °F (36.8 °C), temperature source Oral, resp. rate 16, height 1.651 m (5' 5\"), weight 74.4 kg (164 lb), SpO2 96%, not currently breastfeeding.  Physical Exam:  ECOG PS: 1-2  General: A&Ox3, NAD  HEENT: PERRL, OP clear  CV: RRR, no murmurs  Pulm: CTA b/l, nl effort  Abd: soft, ntnd, no HSM or masses  Lymph: no palpable lymphadenopathy  Extremities: no edema  Neurological: grossly  intact    Results:   Labs:  Lab Results   Component Value Date    WBC 9.2 06/26/2024    HGB 10.3 (L) 06/26/2024    HCT 36.8 06/26/2024    .0 (H) 06/26/2024    CREATSERUM 0.69 06/26/2024    BUN 17 06/26/2024     06/26/2024    K 4.3 06/26/2024     06/26/2024    CO2 30.0 06/26/2024     (H) 06/26/2024    CA 9.3 06/26/2024    ALB 3.8 06/26/2024    ALKPHO 122 06/26/2024    BILT 0.2 06/26/2024    TP 9.3 (H) 06/26/2024    AST 74 (H) 06/26/2024    ALT 79 (H) 06/26/2024    T4F 1.0 06/07/2024    TSH 4.147 06/07/2024    PHOS 4.8 02/07/2024    B12 435 07/14/2017     Imaging:        CT a/p 5/19/24:  CONCLUSION: 11.1 x 10.2 centimeter enhancing mass replacing the mid to lower right kidney with extensive bibasilar enhancing pleural tumor, trace pleural effusions, intrathoracic adenopathy.  This is likely renal cell carcinoma with extensive bibasilar thoracic involvement     CT chest 5/22/24:  CONCLUSION: Extensive pleural masses with associated small bilateral pleural effusions , as well as mediastinal and hilar lymphadenopathy and bilateral pulmonary nodules.  These are highly concerning for sites of metastatic disease in this setting.    A questioned focal sclerosis of the left lateral first rib adjacent to a tiny pleural mass could represent a site of osseous involvement of disease.     Right kidney, mass; CT-guided core biopsy 6/5/24:   Clear-cell renal cell carcinoma in a background of extensive necrosis, see comment.    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 She began Nivolumab Cabozantinib 6/12/24 and presents for followup.     # Metastatic poor-risk ccRCC.   -large right renal mass, extensive pleural metastases, anemia, neutropenia, thrombocytosis, quite symptomatic on presentation.  Given progressive symptoms and poor risk disease, discussed the need for a treatment response asap, therefore we discussed proceeding with Nivolumab and Cabozantinib per CM 9ER over ipilimumab and nivolumab with concerns for rapid response  -began treatment 6/12/24  -cleared for next dose of nivolumab, continue cabozantinib, thus far some early signs suggestive of response  -follow q2 weeks for now, restaging in early September or sooner if concerns for PD    # Symptom management  -minimal pain, tylenol prn  -zofran ODT for nausea  -poor appetite, weight loss, slightly improving     # Comorbidities  -HTN, hold metoprolol given hypotension with weight loss    MDM High: malignancy; review of results with independent interpretation and discussion of management; drug therapy requiring intensive monitoring for toxicity;  ongoing continuity of complex care    Archie Crocker DO    [facility]  [facility]    This note was created using a voice-recognition transcribing system. Incorrect words or phrases may have been missed during proofreading. Please interpret accordingly.

## 2024-07-03 ENCOUNTER — APPOINTMENT (OUTPATIENT)
Dept: HEMATOLOGY/ONCOLOGY | Facility: HOSPITAL | Age: 69
End: 2024-07-03
Attending: INTERNAL MEDICINE
Payer: MEDICARE

## 2024-07-10 ENCOUNTER — NURSE ONLY (OUTPATIENT)
Dept: HEMATOLOGY/ONCOLOGY | Facility: HOSPITAL | Age: 69
End: 2024-07-10
Attending: INTERNAL MEDICINE
Payer: MEDICARE

## 2024-07-10 VITALS
RESPIRATION RATE: 18 BRPM | HEIGHT: 65 IN | HEART RATE: 93 BPM | WEIGHT: 162 LBS | OXYGEN SATURATION: 98 % | DIASTOLIC BLOOD PRESSURE: 72 MMHG | BODY MASS INDEX: 26.99 KG/M2 | TEMPERATURE: 98 F | SYSTOLIC BLOOD PRESSURE: 125 MMHG

## 2024-07-10 DIAGNOSIS — C78.01 MALIGNANT NEOPLASM METASTATIC TO BOTH LUNGS (HCC): ICD-10-CM

## 2024-07-10 DIAGNOSIS — C78.02 MALIGNANT NEOPLASM METASTATIC TO BOTH LUNGS (HCC): ICD-10-CM

## 2024-07-10 DIAGNOSIS — C64.1 RENAL CELL CARCINOMA OF RIGHT KIDNEY (HCC): ICD-10-CM

## 2024-07-10 DIAGNOSIS — Z51.11 CHEMOTHERAPY MANAGEMENT, ENCOUNTER FOR: Primary | ICD-10-CM

## 2024-07-10 DIAGNOSIS — Z51.12 ENCOUNTER FOR ANTINEOPLASTIC IMMUNOTHERAPY: Primary | ICD-10-CM

## 2024-07-10 DIAGNOSIS — Z51.12 ENCOUNTER FOR ANTINEOPLASTIC IMMUNOTHERAPY: ICD-10-CM

## 2024-07-10 DIAGNOSIS — C64.1 RENAL CELL CARCINOMA OF RIGHT KIDNEY (HCC): Primary | ICD-10-CM

## 2024-07-10 DIAGNOSIS — Z51.11 CHEMOTHERAPY MANAGEMENT, ENCOUNTER FOR: ICD-10-CM

## 2024-07-10 LAB
ALBUMIN SERPL-MCNC: 3.6 G/DL (ref 3.2–4.8)
ALBUMIN/GLOB SERPL: 0.7 {RATIO} (ref 1–2)
ALP LIVER SERPL-CCNC: 127 U/L
ALT SERPL-CCNC: 47 U/L
ANION GAP SERPL CALC-SCNC: 8 MMOL/L (ref 0–18)
AST SERPL-CCNC: 61 U/L (ref ?–34)
BASOPHILS # BLD AUTO: 0.05 X10(3) UL (ref 0–0.2)
BASOPHILS NFR BLD AUTO: 0.7 %
BILIRUB SERPL-MCNC: 0.2 MG/DL (ref 0.2–1.1)
BUN BLD-MCNC: 16 MG/DL (ref 9–23)
BUN/CREAT SERPL: 25 (ref 10–20)
CALCIUM BLD-MCNC: 8.9 MG/DL (ref 8.7–10.4)
CHLORIDE SERPL-SCNC: 103 MMOL/L (ref 98–112)
CO2 SERPL-SCNC: 28 MMOL/L (ref 21–32)
CREAT BLD-MCNC: 0.64 MG/DL
DEPRECATED RDW RBC AUTO: 53.1 FL (ref 35.1–46.3)
EGFRCR SERPLBLD CKD-EPI 2021: 97 ML/MIN/1.73M2 (ref 60–?)
EOSINOPHIL # BLD AUTO: 0.14 X10(3) UL (ref 0–0.7)
EOSINOPHIL NFR BLD AUTO: 1.9 %
ERYTHROCYTE [DISTWIDTH] IN BLOOD BY AUTOMATED COUNT: 21.4 % (ref 11–15)
GLOBULIN PLAS-MCNC: 5.5 G/DL (ref 2–3.5)
GLUCOSE BLD-MCNC: 108 MG/DL (ref 70–99)
HCT VFR BLD AUTO: 39 %
HGB BLD-MCNC: 11.1 G/DL
IMM GRANULOCYTES # BLD AUTO: 0.02 X10(3) UL (ref 0–1)
IMM GRANULOCYTES NFR BLD: 0.3 %
LYMPHOCYTES # BLD AUTO: 2.15 X10(3) UL (ref 1–4)
LYMPHOCYTES NFR BLD AUTO: 28.6 %
MCH RBC QN AUTO: 21.3 PG (ref 26–34)
MCHC RBC AUTO-ENTMCNC: 28.5 G/DL (ref 31–37)
MCV RBC AUTO: 74.7 FL
MONOCYTES # BLD AUTO: 0.34 X10(3) UL (ref 0.1–1)
MONOCYTES NFR BLD AUTO: 4.5 %
NEUTROPHILS # BLD AUTO: 4.82 X10 (3) UL (ref 1.5–7.7)
NEUTROPHILS # BLD AUTO: 4.82 X10(3) UL (ref 1.5–7.7)
NEUTROPHILS NFR BLD AUTO: 64 %
OSMOLALITY SERPL CALC.SUM OF ELEC: 290 MOSM/KG (ref 275–295)
PLATELET # BLD AUTO: 411 10(3)UL (ref 150–450)
POTASSIUM SERPL-SCNC: 5 MMOL/L (ref 3.5–5.1)
PROT SERPL-MCNC: 9.1 G/DL (ref 5.7–8.2)
RBC # BLD AUTO: 5.22 X10(6)UL
SODIUM SERPL-SCNC: 139 MMOL/L (ref 136–145)
WBC # BLD AUTO: 7.5 X10(3) UL (ref 4–11)

## 2024-07-10 PROCEDURE — 96375 TX/PRO/DX INJ NEW DRUG ADDON: CPT

## 2024-07-10 PROCEDURE — 85025 COMPLETE CBC W/AUTO DIFF WBC: CPT

## 2024-07-10 PROCEDURE — S0028 INJECTION, FAMOTIDINE, 20 MG: HCPCS

## 2024-07-10 PROCEDURE — 80053 COMPREHEN METABOLIC PANEL: CPT

## 2024-07-10 PROCEDURE — 96413 CHEMO IV INFUSION 1 HR: CPT

## 2024-07-10 PROCEDURE — 99215 OFFICE O/P EST HI 40 MIN: CPT | Performed by: NURSE PRACTITIONER

## 2024-07-10 RX ORDER — FAMOTIDINE 10 MG/ML
20 INJECTION, SOLUTION INTRAVENOUS 2 TIMES DAILY
Status: CANCELLED
Start: 2024-07-10

## 2024-07-10 RX ORDER — FAMOTIDINE 10 MG/ML
20 INJECTION, SOLUTION INTRAVENOUS ONCE
Status: COMPLETED | OUTPATIENT
Start: 2024-07-10 | End: 2024-07-10

## 2024-07-10 RX ORDER — FAMOTIDINE 10 MG/ML
INJECTION, SOLUTION INTRAVENOUS
Status: COMPLETED
Start: 2024-07-10 | End: 2024-07-10

## 2024-07-10 RX ADMIN — FAMOTIDINE 20 MG: 10 INJECTION, SOLUTION INTRAVENOUS at 15:32:00

## 2024-07-10 NOTE — PROGRESS NOTES
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 She began Nivolumab Cabozantinib 6/12/24 and presents for followup.     Interval history:  Energy slightly improved, appetite still waxing and waning. Drinking premier shakes but hesitant bc of ingredients on shakes. Wt slightly lower.  No pain. No diarrhea or skin changes. No new acute complaints.    Oncology history:  Briefly, the patient began to feel fatigued in the Fall 2023 and she was found to have a hemoglobin of 8.5 with an MCV of 71 with last lab values in 2017 showing a normal hemoglobin.  In March 2024, iron saturation was 15% and ferritin was 612.  She was given IV iron and referred to GI which prompted a CT abdomen pelvis which unfortunately revealed an 11 cm enhancing mass within the right kidney and extensive bibasilar pleural metastases concerning for metastatic renal cell carcinoma.     5/21/24: Initial medical oncology consultation, will obtain brain imaging and refer to IR for kidney biopsy.  Expressed concern for metastatic kidney cancer.  -Brain MRI showed a meningioma, no intracranial metastatic disease.  -IR guided biopsy 6/5 shows high-grade clear-cell renal cell carcinoma, with extensive necrosis but no sarcomatoid or rhabdoid features    6/6/24: Medical oncology follow-up.  Patient continues to decline.  Patient has poor risk disease, very symptomatic and concerned with need for rapid response upfront.  Therefore, we will proceed with cabozantinib nivolumab per CheckMate 9ER    6/12/24: began Cabozantinib Nivolumab.     6/26/24: slight improvement, no  dose limiting toxicities, continue treatment.     Review of Systems:  Hematology/Oncology ROS performed and negative except as above in HPI    History/Other:   Current Medications:  No outpatient medications have been marked as taking for the 7/10/24 encounter (Appointment) with Samaria Bob APRN.     Allergies:   Allergies   Allergen Reactions    Ace Inhibitors Coughing    Azithromycin      Other reaction(s): AZITHROMYCIN    Erythromycin      Other reaction(s): Rash - Mild    Naproxen      Other reaction(s): NAPROXEN    Nortriptyline      Other reaction(s): vivid, scary dreams    Sulfa Antibiotics      Other reaction(s): Rash - Moderate     Objective:   Blood pressure 125/72, pulse 93, temperature 98.1 °F (36.7 °C), temperature source Oral, resp. rate 18, height 1.651 m (5' 5\"), weight 73.5 kg (162 lb), SpO2 98%, not currently breastfeeding.  Physical Exam:  ECOG PS: 1-2  General: A&Ox3, NAD  HEENT: PERRL, OP clear  CV: RRR, no murmurs  Pulm: CTA b/l, nl effort  Abd: soft, ntnd, no HSM or masses  Lymph: no palpable lymphadenopathy  Extremities: no edema  Neurological: grossly intact    Results:   Labs:  Lab Results   Component Value Date    WBC 9.2 06/26/2024    HGB 10.3 (L) 06/26/2024    HCT 36.8 06/26/2024    .0 (H) 06/26/2024    CREATSERUM 0.69 06/26/2024    BUN 17 06/26/2024     06/26/2024    K 4.3 06/26/2024     06/26/2024    CO2 30.0 06/26/2024     (H) 06/26/2024    CA 9.3 06/26/2024    ALB 3.8 06/26/2024    ALKPHO 122 06/26/2024    BILT 0.2 06/26/2024    TP 9.3 (H) 06/26/2024    AST 74 (H) 06/26/2024    ALT 79 (H) 06/26/2024    T4F 1.0 06/07/2024    TSH 4.147 06/07/2024    PHOS 4.8 02/07/2024    B12 435 07/14/2017     Imaging:        CT a/p 5/19/24:  CONCLUSION: 11.1 x 10.2 centimeter enhancing mass replacing the mid to lower right kidney with extensive bibasilar enhancing pleural tumor, trace pleural effusions, intrathoracic adenopathy.  This is likely renal cell carcinoma  with extensive bibasilar thoracic involvement     CT chest 5/22/24:  CONCLUSION: Extensive pleural masses with associated small bilateral pleural effusions , as well as mediastinal and hilar lymphadenopathy and bilateral pulmonary nodules.  These are highly concerning for sites of metastatic disease in this setting.    A questioned focal sclerosis of the left lateral first rib adjacent to a tiny pleural mass could represent a site of osseous involvement of disease.     Right kidney, mass; CT-guided core biopsy 6/5/24:   Clear-cell renal cell carcinoma in a background of extensive necrosis, see comment.    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 She began Nivolumab Cabozantinib 6/12/24 and presents for followup.     # Metastatic poor-risk ccRCC.   -large right renal mass, extensive pleural metastases, anemia, neutropenia, thrombocytosis, quite symptomatic on presentation. Given progressive symptoms and poor risk disease, discussed the need for a treatment response asap, therefore we discussed proceeding with Nivolumab and Cabozantinib per CM 9ER over ipilimumab and nivolumab with concerns for rapid response  -began treatment 6/12/24  -cleared for next dose of nivolumab, continue cabozantinib, thus far some early signs suggestive of response; Anemia near normal, plt normal today.  -follow q2 weeks for now, restaging in early September or sooner if concerns for PD    # Symptom management  -minimal pain, tylenol prn  -zofran ODT for nausea  -poor appetite, weight loss ongoing but was hesitant with certain supplement use. Discussed ok to drink ensure or premier protein and try up to 3-4 times/day if not eating. Can discuss appetite stimulant at next appt prn.    # Comorbidities  -HTN, hold metoprolol given  hypotension with weight loss    MDM High: malignancy; review of results with independent interpretation and discussion of management; drug therapy requiring intensive monitoring for toxicity;  ongoing continuity of complex care    LAILA Asher

## 2024-07-10 NOTE — PROGRESS NOTES
Patient here for C2D1 Opdivo.  Arrives Ambulating independently, accompanied by Family member. Patient denies new issues or complaints, labs reviewed.    Patient was evaluated today by DAVID.    Oral medications included in this regimen:  yes - Cabozantinib    Patient confirms comprehension of cancer treatment schedule:  yes    Pregnancy screening:  Denies possibility of pregnancy    Modifications in dose or schedule: 20mg IV Pepcid added as pre medication, patient reacted with C1 Opdivo on 6/26.    Medications appearance and physical integrity checked by RN: yes.    Chemotherapy IV pump settings verified by 2 RNs:  No due to targeted therapy IV administration.  Frequency of blood return and site check throughout administration: Prior to administration and At completion of therapy     Infusion/treatment outcome:  patient tolerated treatment without incident. No s/s of adverse reaction noted.     Education Record    Learner:  Patient  Barriers / Limitations:  None  Method:  Reinforcement  Education / instructions given: Reinforced plan of care and addition of premedication to prevent infusion related reaction.  Outcome:  Shows understanding    Discharged Home, Via wheelchair, accompanied by:Family member    Patient/family verbalized understanding of future appointments: by MyChart messaging

## 2024-07-11 ENCOUNTER — APPOINTMENT (OUTPATIENT)
Dept: HEMATOLOGY/ONCOLOGY | Facility: HOSPITAL | Age: 69
End: 2024-07-11
Attending: INTERNAL MEDICINE
Payer: MEDICARE

## 2024-07-24 ENCOUNTER — OFFICE VISIT (OUTPATIENT)
Dept: HEMATOLOGY/ONCOLOGY | Facility: HOSPITAL | Age: 69
End: 2024-07-24
Attending: INTERNAL MEDICINE
Payer: MEDICARE

## 2024-07-24 VITALS
OXYGEN SATURATION: 96 % | SYSTOLIC BLOOD PRESSURE: 129 MMHG | DIASTOLIC BLOOD PRESSURE: 69 MMHG | HEIGHT: 65 IN | HEART RATE: 96 BPM | WEIGHT: 161 LBS | BODY MASS INDEX: 26.82 KG/M2 | TEMPERATURE: 98 F | RESPIRATION RATE: 18 BRPM

## 2024-07-24 DIAGNOSIS — C64.1 RENAL CELL CARCINOMA OF RIGHT KIDNEY (HCC): Primary | ICD-10-CM

## 2024-07-24 DIAGNOSIS — Z51.12 ENCOUNTER FOR ANTINEOPLASTIC IMMUNOTHERAPY: ICD-10-CM

## 2024-07-24 DIAGNOSIS — D64.9 ANEMIA, UNSPECIFIED TYPE: ICD-10-CM

## 2024-07-24 DIAGNOSIS — Z51.11 CHEMOTHERAPY MANAGEMENT, ENCOUNTER FOR: ICD-10-CM

## 2024-07-24 DIAGNOSIS — Z51.11 CHEMOTHERAPY MANAGEMENT, ENCOUNTER FOR: Primary | ICD-10-CM

## 2024-07-24 DIAGNOSIS — C78.02 MALIGNANT NEOPLASM METASTATIC TO BOTH LUNGS (HCC): ICD-10-CM

## 2024-07-24 DIAGNOSIS — C64.1 RENAL CELL CARCINOMA OF RIGHT KIDNEY (HCC): ICD-10-CM

## 2024-07-24 DIAGNOSIS — C78.01 MALIGNANT NEOPLASM METASTATIC TO BOTH LUNGS (HCC): ICD-10-CM

## 2024-07-24 LAB
ALBUMIN SERPL-MCNC: 3.6 G/DL (ref 3.2–4.8)
ALBUMIN/GLOB SERPL: 0.7 {RATIO} (ref 1–2)
ALP LIVER SERPL-CCNC: 149 U/L
ALT SERPL-CCNC: 52 U/L
ANION GAP SERPL CALC-SCNC: 6 MMOL/L (ref 0–18)
AST SERPL-CCNC: 54 U/L (ref ?–34)
BASOPHILS # BLD AUTO: 0.02 X10(3) UL (ref 0–0.2)
BASOPHILS NFR BLD AUTO: 0.4 %
BILIRUB SERPL-MCNC: 0.3 MG/DL (ref 0.2–1.1)
BUN BLD-MCNC: 19 MG/DL (ref 9–23)
BUN/CREAT SERPL: 30.2 (ref 10–20)
CALCIUM BLD-MCNC: 8.9 MG/DL (ref 8.7–10.4)
CHLORIDE SERPL-SCNC: 104 MMOL/L (ref 98–112)
CO2 SERPL-SCNC: 29 MMOL/L (ref 21–32)
CREAT BLD-MCNC: 0.63 MG/DL
DEPRECATED RDW RBC AUTO: 62.9 FL (ref 35.1–46.3)
EGFRCR SERPLBLD CKD-EPI 2021: 97 ML/MIN/1.73M2 (ref 60–?)
EOSINOPHIL # BLD AUTO: 0.23 X10(3) UL (ref 0–0.7)
EOSINOPHIL NFR BLD AUTO: 4.4 %
ERYTHROCYTE [DISTWIDTH] IN BLOOD BY AUTOMATED COUNT: 24 % (ref 11–15)
GLOBULIN PLAS-MCNC: 5.4 G/DL (ref 2–3.5)
GLUCOSE BLD-MCNC: 110 MG/DL (ref 70–99)
HCT VFR BLD AUTO: 40.3 %
HGB BLD-MCNC: 11.6 G/DL
IMM GRANULOCYTES # BLD AUTO: 0.01 X10(3) UL (ref 0–1)
IMM GRANULOCYTES NFR BLD: 0.2 %
LYMPHOCYTES # BLD AUTO: 1.8 X10(3) UL (ref 1–4)
LYMPHOCYTES NFR BLD AUTO: 34.2 %
MCH RBC QN AUTO: 21.9 PG (ref 26–34)
MCHC RBC AUTO-ENTMCNC: 28.8 G/DL (ref 31–37)
MCV RBC AUTO: 76.2 FL
MONOCYTES # BLD AUTO: 0.3 X10(3) UL (ref 0.1–1)
MONOCYTES NFR BLD AUTO: 5.7 %
NEUTROPHILS # BLD AUTO: 2.91 X10 (3) UL (ref 1.5–7.7)
NEUTROPHILS # BLD AUTO: 2.91 X10(3) UL (ref 1.5–7.7)
NEUTROPHILS NFR BLD AUTO: 55.1 %
OSMOLALITY SERPL CALC.SUM OF ELEC: 291 MOSM/KG (ref 275–295)
PLATELET # BLD AUTO: 308 10(3)UL (ref 150–450)
PLATELET MORPHOLOGY: NORMAL
POTASSIUM SERPL-SCNC: 4.3 MMOL/L (ref 3.5–5.1)
PROT SERPL-MCNC: 9 G/DL (ref 5.7–8.2)
RBC # BLD AUTO: 5.29 X10(6)UL
SODIUM SERPL-SCNC: 139 MMOL/L (ref 136–145)
WBC # BLD AUTO: 5.3 X10(3) UL (ref 4–11)

## 2024-07-24 PROCEDURE — G2211 COMPLEX E/M VISIT ADD ON: HCPCS | Performed by: STUDENT IN AN ORGANIZED HEALTH CARE EDUCATION/TRAINING PROGRAM

## 2024-07-24 PROCEDURE — 96413 CHEMO IV INFUSION 1 HR: CPT

## 2024-07-24 PROCEDURE — 99215 OFFICE O/P EST HI 40 MIN: CPT | Performed by: STUDENT IN AN ORGANIZED HEALTH CARE EDUCATION/TRAINING PROGRAM

## 2024-07-24 PROCEDURE — S0028 INJECTION, FAMOTIDINE, 20 MG: HCPCS

## 2024-07-24 PROCEDURE — 80053 COMPREHEN METABOLIC PANEL: CPT

## 2024-07-24 PROCEDURE — 96375 TX/PRO/DX INJ NEW DRUG ADDON: CPT

## 2024-07-24 PROCEDURE — 85025 COMPLETE CBC W/AUTO DIFF WBC: CPT

## 2024-07-24 RX ORDER — FAMOTIDINE 10 MG/ML
INJECTION, SOLUTION INTRAVENOUS
Status: COMPLETED
Start: 2024-07-24 | End: 2024-07-24

## 2024-07-24 RX ORDER — FAMOTIDINE 10 MG/ML
20 INJECTION, SOLUTION INTRAVENOUS 2 TIMES DAILY
Status: CANCELLED
Start: 2024-07-24

## 2024-07-24 RX ORDER — FAMOTIDINE 10 MG/ML
20 INJECTION, SOLUTION INTRAVENOUS 2 TIMES DAILY
Status: DISCONTINUED | OUTPATIENT
Start: 2024-07-24 | End: 2024-07-24

## 2024-07-24 RX ADMIN — FAMOTIDINE 20 MG: 10 INJECTION, SOLUTION INTRAVENOUS at 15:14:00

## 2024-07-24 NOTE — PROGRESS NOTES
Pt here for C2D15 Drug(s)OPDIVO.  Arrives Via wheelchair, accompanied by Spouse     Patient was evaluated today by MD.    Oral medications included in this regimen:  no    Patient confirms comprehension of cancer treatment schedule:  yes    Pregnancy screening:  Denies possibility of pregnancy    Modifications in dose or schedule:  No    Medications appearance and physical integrity checked by RN: yes.    Chemotherapy IV pump settings verified by 2 RNs:  n/a.  Frequency of blood return and site check throughout administration: Prior to administration and At completion of therapy     Infusion/treatment outcome:  patient tolerated treatment without incident    Education Record    Learner:  Patient and Spouse  Barriers / Limitations:  None  Method:  Discussion  Education / instructions given:  POC  Outcome:  Shows understanding    Discharged Home, Via wheelchair, accompanied by:Spouse    Patient/family verbalized understanding of future appointments: by printed AVS

## 2024-07-25 NOTE — PROGRESS NOTES
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 She began Nivolumab Cabozantinib 6/12/24 and presents for followup.     Interval history:  Energy slightly improved, appetite still waxing and waning.  Overall clinically stable, walking more at home as well.     Oncology history:  Briefly, the patient began to feel fatigued in the Fall 2023 and she was found to have a hemoglobin of 8.5 with an MCV of 71 with last lab values in 2017 showing a normal hemoglobin.  In March 2024, iron saturation was 15% and ferritin was 612.  She was given IV iron and referred to GI which prompted a CT abdomen pelvis which unfortunately revealed an 11 cm enhancing mass within the right kidney and extensive bibasilar pleural metastases concerning for metastatic renal cell carcinoma.     5/21/24: Initial medical oncology consultation, will obtain brain imaging and refer to IR for kidney biopsy.  Expressed concern for metastatic kidney cancer.  -Brain MRI showed a meningioma, no intracranial metastatic disease.  -IR guided biopsy 6/5 shows high-grade clear-cell renal cell carcinoma, with extensive necrosis but no sarcomatoid or rhabdoid features    6/6/24: Medical oncology follow-up.  Patient continues to decline.  Patient has poor risk disease, very symptomatic and concerned with need for rapid response upfront.  Therefore, we will proceed with cabozantinib nivolumab per [employer] 9ER    6/12/24: began Cabozantinib Nivolumab.     6/26/24: slight improvement, no dose limiting toxicities, continue treatment.     7/24/24: Feeling little stronger,  tolerating therapy well    Review of Systems:  Hematology/Oncology ROS performed and negative except as above in HPI    History/Other:   Current Medications:   prochlorperazine (COMPAZINE) 10 mg tablet Take 1 tablet (10 mg total) by mouth every 6 (six) hours as needed for Nausea. 60 tablet 3    cabozantinib 40 MG Oral Tab Take 1 tablet (40 mg total) by mouth daily. 28 tablet 11    docusate sodium 50 MG Oral Cap Take 1 capsule (50 mg total) by mouth as needed for constipation.      sertraline 25 MG Oral Tab Take 1 tablet (25 mg total) by mouth daily. 90 tablet 1    metoprolol succinate  MG Oral Tablet 24 Hr Take 0.5 tablets (50 mg total) by mouth daily. 90 tablet 1    celecoxib (CELEBREX) 200 MG Oral Cap Take 1 capsule (200 mg total) by mouth daily as needed for Pain. 90 capsule 1    ondansetron 4 MG Oral Tablet Dispersible Take 1 tablet (4 mg total) by mouth every 8 (eight) hours as needed for Nausea. 30 tablet 2    acetaminophen-codeine 300-30 MG Oral Tab Take 1 tablet by mouth every 6 (six) hours as needed for Pain. 20 tablet 0     Allergies:   Allergies   Allergen Reactions    Ace Inhibitors Coughing    Azithromycin      Other reaction(s): AZITHROMYCIN    Erythromycin      Other reaction(s): Rash - Mild    Naproxen      Other reaction(s): NAPROXEN    Nortriptyline      Other reaction(s): vivid, scary dreams    Sulfa Antibiotics      Other reaction(s): Rash - Moderate     Objective:   Blood pressure 129/69, pulse 96, temperature 98.2 °F (36.8 °C), temperature source Oral, resp. rate 18, height 1.651 m (5' 5\"), weight 73 kg (161 lb), SpO2 96%, not currently breastfeeding.  Physical Exam:  ECOG PS: 1-2  General: A&Ox3, NAD  HEENT: PERRL, OP clear  CV: RRR, no murmurs  Pulm: CTA b/l, nl effort  Abd: soft, ntnd, no HSM or masses  Lymph: no palpable lymphadenopathy  Extremities: no edema  Neurological: grossly intact    Results:   Labs:  Lab Results   Component Value Date    WBC 5.3 07/24/2024    HGB 11.6 (L)  07/24/2024    HCT 40.3 07/24/2024    .0 07/24/2024    CREATSERUM 0.63 07/24/2024    BUN 19 07/24/2024     07/24/2024    K 4.3 07/24/2024     07/24/2024    CO2 29.0 07/24/2024     (H) 07/24/2024    CA 8.9 07/24/2024    ALB 3.6 07/24/2024    ALKPHO 149 (H) 07/24/2024    BILT 0.3 07/24/2024    TP 9.0 (H) 07/24/2024    AST 54 (H) 07/24/2024    ALT 52 (H) 07/24/2024    T4F 1.0 06/07/2024    TSH 4.147 06/07/2024    PHOS 4.8 02/07/2024    B12 435 07/14/2017     Imaging:        CT a/p 5/19/24:  CONCLUSION: 11.1 x 10.2 centimeter enhancing mass replacing the mid to lower right kidney with extensive bibasilar enhancing pleural tumor, trace pleural effusions, intrathoracic adenopathy.  This is likely renal cell carcinoma with extensive bibasilar thoracic involvement     CT chest 5/22/24:  CONCLUSION: Extensive pleural masses with associated small bilateral pleural effusions , as well as mediastinal and hilar lymphadenopathy and bilateral pulmonary nodules.  These are highly concerning for sites of metastatic disease in this setting.    A questioned focal sclerosis of the left lateral first rib adjacent to a tiny pleural mass could represent a site of osseous involvement of disease.     Right kidney, mass; CT-guided core biopsy 6/5/24:   Clear-cell renal cell carcinoma in a background of extensive necrosis, see comment.    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 She began Nivolumab Cabozantinib 6/12/24 and presents for followup.     # Metastatic poor-risk ccRCC.   -large right renal mass, extensive pleural metastases, anemia, neutropenia, thrombocytosis, quite symptomatic on presentation. Given progressive symptoms and poor risk disease, discussed the need for a treatment response asap,  therefore we discussed proceeding with Nivolumab and Cabozantinib per CM 9ER over ipilimumab and nivolumab with concerns for rapid response  -began treatment 6/12/24  -cleared for next dose of nivolumab, continue cabozantinib, thus far some early signs suggestive of response; Anemia near normal, plt normal today.  -follow q2 weeks for now, restaging in early September or sooner if concerns for PD    # Symptom management  -minimal pain, tylenol prn  -zofran ODT for nausea  -poor appetite, weight loss ongoing but was hesitant with certain supplement use. Discussed ok to drink ensure or premier protein and try up to 3-4 times/day if not eating. Can discuss appetite stimulant at next appt prn.    # Comorbidities  -HTN, hold metoprolol given hypotension with weight loss    MDM High: malignancy; review of results with independent interpretation and discussion of management; drug therapy requiring intensive monitoring for toxicity;  ongoing continuity of complex care    Archie Crocker DO    [facility]  [facility]    This note was created using a voice-recognition transcribing system. Incorrect words or phrases may have been missed during proofreading. Please interpret accordingly.

## 2024-07-30 ENCOUNTER — PATIENT MESSAGE (OUTPATIENT)
Dept: INTERNAL MEDICINE CLINIC | Facility: CLINIC | Age: 69
End: 2024-07-30

## 2024-07-30 ENCOUNTER — OFFICE VISIT (OUTPATIENT)
Dept: INTERNAL MEDICINE CLINIC | Facility: CLINIC | Age: 69
End: 2024-07-30

## 2024-07-30 VITALS
DIASTOLIC BLOOD PRESSURE: 75 MMHG | OXYGEN SATURATION: 100 % | SYSTOLIC BLOOD PRESSURE: 114 MMHG | TEMPERATURE: 98 F | HEART RATE: 102 BPM | HEIGHT: 65 IN | WEIGHT: 161.81 LBS | BODY MASS INDEX: 26.96 KG/M2

## 2024-07-30 DIAGNOSIS — E55.9 VITAMIN D DEFICIENCY: ICD-10-CM

## 2024-07-30 DIAGNOSIS — R73.9 HYPERGLYCEMIA: ICD-10-CM

## 2024-07-30 DIAGNOSIS — Z71.85 VACCINE COUNSELING: ICD-10-CM

## 2024-07-30 DIAGNOSIS — D64.9 ANEMIA OF UNKNOWN ETIOLOGY: ICD-10-CM

## 2024-07-30 DIAGNOSIS — M81.0 AGE-RELATED OSTEOPOROSIS WITHOUT CURRENT PATHOLOGICAL FRACTURE: Primary | ICD-10-CM

## 2024-07-30 DIAGNOSIS — I10 ESSENTIAL HYPERTENSION: ICD-10-CM

## 2024-07-30 DIAGNOSIS — C64.1 RENAL CELL CARCINOMA OF RIGHT KIDNEY (HCC): ICD-10-CM

## 2024-07-30 PROCEDURE — 99214 OFFICE O/P EST MOD 30 MIN: CPT | Performed by: INTERNAL MEDICINE

## 2024-07-30 NOTE — PATIENT INSTRUCTIONS
ASSESSMENT/PLAN:     Encounter Diagnoses   Name Primary?    Age-related osteoporosis without current pathological fracture Take calcium 600 every 12 hrs. With vitamin D 400 IU every  12 hrs. Excerise at least 30 minutes 3-4 times a week. May use calcium citrate as opposed to calcium carbonate which may be better absorbed in the setting of PPI use.  The preferred calcium supplement for people at risk of stone formation is calcium citrate because it helps to increase urinary citrate excretion. We recommend a dose of 200-400 mg if dietary calcium cannot be increased.   lifelong physical activity at all ages is strongly endorsed by the National Osteopor[employer] Foundation. Exercise recommendations generally should include weight-bearing, muscle-strengthening, and balance training exercises for 30 minutes 5 days per week or 75 minutes twice weekly, often consistent with other general health recommendations.   Weight Bearing  There are two types of osteoporosis exercises that are important for building and maintaining bone density: weight-bearing and muscle-strengthening exercises.  Weight-bearing Exercises  These exercises include activities that make you move against gravity while staying upright. Weight-bearing exercises can be high-impact or low-impact.  High-impact weight-bearing exercises help build bones and keep them strong. If you have broken a bone due to osteoporosis or are at risk of breaking a bone, you may need to avoid high-impact exercises. If you’re not sure, you should check with your healthcare provider.  Examples of high-impact weight-bearing exercises are:  Dancing   Doing high-impact aerobics   Hiking   Jogging/running   Jumping Rope   Stair climbing   Tennis  Low-impact weight-bearing exercises can also help keep bones strong and are a safe alternative if you cannot do high-impact exercises. Examples of low-impact weight-bearing exercises are:  Using elliptical training machines   Doing  low-impact aerobics   Using stair-step machines   Fast walking on a treadmill or outside        Yes    Anemia of unknown etiology Improving.        Hyperglycemia Check blood 12-3-24.        Renal cell carcinoma of right kidney (HCC) FU oncology. Undergoing treatment.        Vaccine counseling PCV 20 recc. Recommend shingles vaccine.  There is 2 doses of the vaccine  by 2 months 6 months apart.  Check on insurance coverage on shingles vaccine.  May be covered better at the pharmacy.  Separate shingles vaccine from all of the vaccines by at least 1 to 2 weeks.  Discussed about side effects of vaccine.   Shingles (Herpes Zoster)     Talk to your healthcare provider about the shingles vaccine.   Shingles is also called herpes zoster. It's a painful skin rash caused by the herpes zoster virus. This is the same virus that causes chickenpox. After a person has chickenpox, the virus stays inactive in the nerve cells. Years later, the virus can become active again and travel along the nerve to the skin. Most people have shingles only once. But it's possible to have it more than once.  Who is at risk for shingles?  Anyone who has ever had chickenpox can get shingles. But your risk is greater if you:  Are age 50 or older  Have an illness that weakens your immune system, such as HIV/AIDS  Have cancer, especially Hodgkin disease or lymphoma  Take medicines that weaken your immune system  What are the symptoms of shingles?  The first sign of shingles is often pain, burning, tingling, or itching on one part of your face or body. You may also feel as if you have the flu, with fever and chills.  A red rash with small blisters appears in a few days. The rash may look as follows:   The blisters can occur anywhere, but they’re most common on the back, chest, or belly (abdomen).  They usually appear on only one side of the body, spreading along the nerve pathway where the virus is reactivating.   The rash can also form around  an eye, along one side of the face or neck, or in the mouth.  In a few people, often those with a weak immune system, shingles appear on more than one part of the body at once.  After a few days, the blisters become dry and form a crust. The crust falls off in days to weeks. The blisters generally don't leave scars. But they can in severe cases. Or if someone has a weak immune system.    How is shingles treated?  For most people, shingles heals on its own in a few days or weeks. But treatment is advised to help ease pain, speed healing, and reduce the risk of complications. Antiviral medicines are most often only prescribed if you are seen by a healthcare provider within the first 72 hours of having the rash. But antiviral medicines may be prescribed even after 72 hours if someone's immune system is weak. Or if the infection is extensive, severe, or isn't going away. To reduce symptoms:  Apply ice packs or cool compresses, or soak in a cool bath. To make an ice pack, put ice cubes in a plastic bag that seals at the top. Wrap the bag in a clean, thin towel or cloth. Never put ice or an ice pack directly on the skin.  Use calamine lotion to calm itchy skin.  Ask your provider about over-the-counter pain relievers. If your pain is severe, your provider may prescribe stronger pain medicines.  What are possible complications of shingles?  Shingles often goes away with no lasting effects. But some people have complications during or after the infection comes out:  Postherpetic neuralgia. This is the most common complication. It's more likely as people age, especially after age 60. It' is nerve pain at the place where the rash used to be. It can range from mild to severe. It can last for only a few days, or for months or even years after you have had shingles. Antiviral medicines given during the first 72 hours of the rash can reduce the chance of postherpetic neuralgia. Other medicines can be prescribed to help ease the  pain and improve quality of life.  Bacterial infection. Shingles blisters may get infected with bacteria. Depending on the severity of the infection, topical, oral or IV (intravenous) antibiotic medicine is used to treat the infection.  Eye problems. If you have shingles on the face, see your healthcare provider right away. Shingles can cause serious problems with vision, and even blindness.  In very rare cases, shingles can also lead to pneumonia, hearing problems, brain inflammation, or even death.   When to get medical care  Call your healthcare provider if you have any of these:  New symptoms or symptoms that don’t go away with treatment  A rash or blisters near your eye  More drainage, fever, or rash after treatment  Severe pain that doesn’t go away  How can shingles be prevented?  You can only get shingles if you have had chickenpox in the past. Someone who never had chickenpox can get the virus from you. But instead of have shingles, the person may get chickenpox. Until your blisters form scabs, don't have any contact with others, especially the following:  Pregnant women who have never had chickenpox or the vaccine  Babies who were born early (premature) or who had low weight at birth  People with weak immune systems (such as people getting chemotherapy for cancer, people who have had organ transplants, or people with HIV infections), particularly if they have never had chicken pox.  The shingles vaccine  Two shingles vaccines are available to help prevent shingles or make it less painful:  Zoster vaccine live (ZVL)  Recombinant zoster vaccine (RZV). This is a newer vaccine that has been available since 2017.  You should get the shingles vaccine if you are healthy and age 50 or older, even if you've had shingles in the past. Two shots of the RZV vaccine are recommended. You should get the second RZV shot 2 to 6 months after the first. The vaccine makes it less likely that you will develop shingles. If you do  develop shingles, your symptoms will likely be milder than if you hadn’t been vaccinated. RZV is also advised even if you had the older shingles vaccine in the past. That's because the RZV vaccine works better and protects you from shingles longer.  Talk with your healthcare provider about the best time to get vaccinated. Ask which vaccine is best for you based on your age and health conditions.  [employer] last reviewed this educational content on 6/1/2019 © 2000-2020 [employer]. All rights reserved. This information is not intended as a substitute for professional medical care. Always follow your healthcare professional's instructions.        Text SUPPORT1 to 20222 to learn if you may be eligible for financial support with your medication(s).    Msg & Data Rates May Apply. Msg freq varies. Terms apply. Text HELP for help. Text STOP to end.   Zoster Vaccine, Recombinant injection  Brand Name: SHINGRIX  What is this medicine?  ZOSTER VACCINE (ZOS ter vak SEEN) is used to prevent shingles in adults 50 years old and over. This vaccine is not used to treat shingles or nerve pain from shingles.  How should I use this medicine?  This vaccine is for injection in a muscle. It is given by a health care professional.  Talk to your pediatrician regarding the use of this medicine in children. This medicine is not approved for use in children.  What side effects may I notice from receiving this medicine?  Side effects that you should report to your doctor or health care professional as soon as possible:  allergic reactions like skin rash, itching or hives, swelling of the face, lips, or tongue  breathing problems  Side effects that usually do not require medical attention (report these to your doctor or health care professional if they continue or are bothersome):  chills  headache  fever  nausea, vomiting  redness, warmth, pain, swelling or itching at site where injected  tiredness  What may interact with this  medicine?    medicines that suppress your immune system  medicines to treat cancer  steroid medicines like prednisone or cortisone  What if I miss a dose?  Keep appointments for follow-up (booster) doses as directed. It is important not to miss your dose. Call your doctor or health care professional if you are unable to keep an appointment.  Where should I keep my medicine?  This vaccine is only given in a clinic, pharmacy, doctor's office, or other health care setting and will not be stored at home.  What should I tell my health care provider before I take this medicine?  They need to know if you have any of these conditions:  blood disorders or disease  cancer like leukemia or lymphoma  immune system problems or therapy  an unusual or allergic reaction to vaccines, other medications, foods, dyes, or preservatives  pregnant or trying to get pregnant  breast-feeding  What should I watch for while using this medicine?  Visit your doctor for regular check ups.  This vaccine, like all vaccines, may not fully protect everyone.  NOTE:This sheet is a summary. It may not cover all possible information. If you have questions about this medicine, talk to your doctor, pharmacist, or health care provider. Copyright© 2020 [employer]            Vitamin D deficiency Check blood 12-24.       GERD. Careful with diet. Avoid hot spicy foods and caffeine and caffinated beverages. Careful with acidic foods. Elevate head of bed. Wait 2 hrs. after eating before going to bed to allow digestion. Avoid caffeinated or carbonated beverages, fried foods, spicy foods or highly acidic foods (citrus, onions, tomatoes, etc  -don’t lay down 20-30 minutes after eating or drinking  -use wedge pillow under mattress or use cinder blocks to elevate head of bed-this will prevent reflux at night  -take medications as directed  -quit smoking if applicable  -call if symptoms do not improve within 2-3 days or if symptoms worsen  Follow up with  Gastroenterologist      Orders Placed This Encounter   Procedures    Lipid Panel    Comp Metabolic Panel (14)    Vitamin D    Hemoglobin A1C       Meds This Visit:  Requested Prescriptions      No prescriptions requested or ordered in this encounter       Imaging & Referrals:  None      RTC 4 months for FU.   RTC 5-21-25 for physical.

## 2024-07-30 NOTE — PROGRESS NOTES
HPI:    Patient ID: Lary Wallis is a 69 year old female.    Hypertension  Patient is here for follow up of hypertension. BP at home: 110-130/80's Different times/   Has been compliant with medications.  Only take meds. If >130 systolic. Exercise level: somewhat active and has been following low salt diet.  Weight has been stable.  Wt Readings from Last 3 Encounters:   07/30/24 161 lb 12.8 oz (73.4 kg)   07/24/24 161 lb (73 kg)   07/10/24 162 lb (73.5 kg)     BP Readings from Last 3 Encounters:   07/30/24 114/75   07/24/24 129/69   07/10/24 125/72     Labs:   Lab Results   Component Value Date/Time     (H) 07/24/2024 01:24 PM     07/24/2024 01:24 PM    K 4.3 07/24/2024 01:24 PM     07/24/2024 01:24 PM    CO2 29.0 07/24/2024 01:24 PM    CREATSERUM 0.63 07/24/2024 01:24 PM    CA 8.9 07/24/2024 01:24 PM    AST 54 (H) 07/24/2024 01:24 PM    ALT 52 (H) 07/24/2024 01:24 PM    TSH 4.147 06/07/2024 01:39 PM    T4F 1.0 06/07/2024 01:39 PM        Lab Results   Component Value Date/Time    CHOLEST 115 12/06/2023 01:57 PM    HDL 32 (L) 12/06/2023 01:57 PM    TRIG 69 12/06/2023 01:57 PM    LDL 68 12/06/2023 01:57 PM    NONHDLC 83 12/06/2023 01:57 PM            Wt Readings from Last 3 Encounters:   07/30/24 161 lb 12.8 oz (73.4 kg)   07/24/24 161 lb (73 kg)   07/10/24 162 lb (73.5 kg)     BP Readings from Last 3 Encounters:   07/30/24 114/75   07/24/24 129/69   07/10/24 125/72     Labs:   Lab Results   Component Value Date/Time     (H) 07/24/2024 01:24 PM     07/24/2024 01:24 PM    K 4.3 07/24/2024 01:24 PM     07/24/2024 01:24 PM    CO2 29.0 07/24/2024 01:24 PM    CREATSERUM 0.63 07/24/2024 01:24 PM    CA 8.9 07/24/2024 01:24 PM    AST 54 (H) 07/24/2024 01:24 PM    ALT 52 (H) 07/24/2024 01:24 PM    TSH 4.147 06/07/2024 01:39 PM    T4F 1.0 06/07/2024 01:39 PM        Lab Results   Component Value Date/Time    CHOLEST 115 12/06/2023 01:57 PM    HDL 32 (L) 12/06/2023 01:57 PM    TRIG 69  12/06/2023 01:57 PM    LDL 68 12/06/2023 01:57 PM    NONHDLC 83 12/06/2023 01:57 PM          Gastro-esophageal Reflux  She complains of heartburn. She reports no abdominal pain, no belching, no chest pain, no choking, no coughing, no early satiety, no globus sensation, no hoarse voice, no nausea, no sore throat, no stridor, no tooth decay, no water brash or no wheezing. This is a recurrent problem. The current episode started more than 1 month ago (Only if eats certain foods.). The problem occurs rarely. The problem has been waxing and waning. The heartburn duration is less than a minute. The heartburn is located in the substernum. The heartburn wakes her from sleep. The heartburn does not limit her activity. The heartburn doesn't change with position. The symptoms are aggravated by certain foods. Associated symptoms include fatigue. She has tried an antacid for the symptoms. The treatment provided moderate relief.         Review of Systems   Constitutional:  Positive for activity change and fatigue. Negative for appetite change, chills, diaphoresis, fever and unexpected weight change.   HENT:  Positive for mouth sores (Rinse with 50:50 H2O2. No more pain X 3 days.). Negative for drooling, hoarse voice, sore throat, trouble swallowing and voice change.    Respiratory:  Negative for apnea, cough, choking, chest tightness, shortness of breath, wheezing and stridor.    Cardiovascular:  Negative for chest pain, palpitations and leg swelling.   Gastrointestinal:  Positive for diarrhea (First 2 months of chemo.1-2 times loose but nonbloody.) and heartburn. Negative for abdominal distention, abdominal pain, anal bleeding, blood in stool, constipation (2 times loose. Nonbloody.), nausea, rectal pain and vomiting.   Endocrine: Negative for cold intolerance, heat intolerance, polydipsia, polyphagia and polyuria.   Genitourinary:  Negative for decreased urine volume, difficulty urinating, dysuria, flank pain, frequency,  genital sores, hematuria, menstrual problem, pelvic pain and urgency.   Neurological:  Negative for dizziness, tremors, seizures, syncope, facial asymmetry, speech difficulty, weakness, light-headedness, numbness and headaches.   All other systems reviewed and are negative.        Current Outpatient Medications   Medication Sig Dispense Refill    prochlorperazine (COMPAZINE) 10 mg tablet Take 1 tablet (10 mg total) by mouth every 6 (six) hours as needed for Nausea. 60 tablet 3    cabozantinib 40 MG Oral Tab Take 1 tablet (40 mg total) by mouth daily. 28 tablet 11    docusate sodium 50 MG Oral Cap Take 1 capsule (50 mg total) by mouth as needed for constipation.      sertraline 25 MG Oral Tab Take 1 tablet (25 mg total) by mouth daily. 90 tablet 1    metoprolol succinate  MG Oral Tablet 24 Hr Take 0.5 tablets (50 mg total) by mouth daily. 90 tablet 1    celecoxib (CELEBREX) 200 MG Oral Cap Take 1 capsule (200 mg total) by mouth daily as needed for Pain. 90 capsule 1    ondansetron 4 MG Oral Tablet Dispersible Take 1 tablet (4 mg total) by mouth every 8 (eight) hours as needed for Nausea. 30 tablet 2    acetaminophen-codeine 300-30 MG Oral Tab Take 1 tablet by mouth every 6 (six) hours as needed for Pain. 20 tablet 0    ibuprofen 600 MG Oral Tab Take 1 tablet (600 mg total) by mouth every 6 (six) hours as needed for Pain. (Patient not taking: Reported on 7/10/2024) 90 tablet 1     Allergies:  Allergies   Allergen Reactions    Ace Inhibitors Coughing    Azithromycin      Other reaction(s): AZITHROMYCIN    Erythromycin      Other reaction(s): Rash - Mild    Naproxen      Other reaction(s): NAPROXEN    Nortriptyline      Other reaction(s): vivid, scary dreams    Sulfa Antibiotics      Other reaction(s): Rash - Moderate       HISTORY:  Past Medical History:    Essential hypertension    High blood pressure    Mixed hyperlipidemia    Moderate single current episode of major depressive disorder (HCC)    Osteoarthritis     KNEE - RIGHT    Osteoarthrosis, pelvic region and thigh    PONV (postoperative nausea and vomiting)    Pulmonary embolism (HCC)    after hip surgery      Past Surgical History:   Procedure Laterality Date    Cholecystectomy      Colonoscopy N/A 10/1/2018    Procedure: COLONOSCOPY;  Surgeon: Shiva Clemente MD;  Location: [facility] ENDOSCOPY    Hc implant ocular device intraoperative detached retina [id number]      Hip replacement surgery  2012    Hysterectomy      2010    Laparoscopic cholecystectomy  04/01/1990      Family History   Problem Relation Age of Onset    Diabetes Father     Arthritis Mother     Macular degeneration Mother     Other (spinal stenosis) Mother     Breast Cancer Sister 62    Pacemaker Sister       Social History:   Social History     Socioeconomic History    Marital status:     Number of children: 4   Tobacco Use    Smoking status: Never    Smokeless tobacco: Never   Vaping Use    Vaping status: Never Used   Substance and Sexual Activity    Alcohol use: Yes     Comment: occasional    Drug use: No   Other Topics Concern     Service No    Blood Transfusions No    Caffeine Concern Yes    Occupational Exposure No    Hobby Hazards No    Sleep Concern Yes    Stress Concern No    Weight Concern Yes    Special Diet No    Back Care No    Exercise No    Bike Helmet No    Seat Belt Yes    Self-Exams Yes        PHYSICAL EXAM:   /75 (BP Location: Right arm, Patient Position: Sitting, Cuff Size: adult)   Pulse 102   Temp 97.8 °F (36.6 °C) (Oral)   Ht 5' 5\" (1.651 m)   Wt 161 lb 12.8 oz (73.4 kg)   SpO2 100%   BMI 26.92 kg/m²   BP Readings from Last 3 Encounters:   07/30/24 114/75   07/24/24 129/69   07/10/24 125/72     Wt Readings from Last 3 Encounters:   07/30/24 161 lb 12.8 oz (73.4 kg)   07/24/24 161 lb (73 kg)   07/10/24 162 lb (73.5 kg)       Physical Exam  Vitals and nursing note reviewed.   Constitutional:       General: She is not in acute distress.     Appearance: Normal  appearance. She is well-developed and well-groomed. She is not ill-appearing, toxic-appearing or diaphoretic.      Interventions: She is not intubated.  HENT:      Mouth/Throat:      Lips: Pink. No lesions.      Mouth: Mucous membranes are moist. Oral lesions present. No injury, lacerations or angioedema.      Dentition: Gum lesions present. No dental tenderness, gingival swelling or dental abscesses.      Tongue: No lesions. Tongue does not deviate from midline.      Palate: No mass and lesions.      Pharynx: No pharyngeal swelling, oropharyngeal exudate, posterior oropharyngeal erythema or uvula swelling.      Tonsils: No tonsillar exudate or tonsillar abscesses.     Eyes:      General: No scleral icterus.     Conjunctiva/sclera: Conjunctivae normal.   Neck:      Thyroid: No thyroid mass or thyromegaly.      Trachea: Trachea and phonation normal.   Cardiovascular:      Rate and Rhythm: Normal rate and regular rhythm.      Pulses: Normal pulses. No decreased pulses.           Carotid pulses are 2+ on the right side and 2+ on the left side.       Radial pulses are 2+ on the right side and 2+ on the left side.        Dorsalis pedis pulses are 2+ on the right side and 2+ on the left side.        Posterior tibial pulses are 2+ on the right side and 2+ on the left side.      Heart sounds: Normal heart sounds, S1 normal and S2 normal.   Pulmonary:      Effort: Pulmonary effort is normal. No tachypnea, bradypnea, accessory muscle usage, prolonged expiration, respiratory distress or retractions. She is not intubated.      Breath sounds: Normal breath sounds and air entry. No stridor, decreased air movement or transmitted upper airway sounds. No decreased breath sounds, wheezing, rhonchi or rales.   Chest:      Chest wall: No tenderness.   Abdominal:      General: Bowel sounds are normal. There is no distension.      Palpations: Abdomen is soft. Abdomen is not rigid. There is no mass.      Tenderness: There is no abdominal  tenderness. There is no right CVA tenderness, left CVA tenderness, guarding or rebound. Negative signs include Reina's sign.   Musculoskeletal:      Right lower leg: No edema.      Left lower leg: No edema.   Lymphadenopathy:      Cervical: No cervical adenopathy.   Skin:     General: Skin is warm and dry.   Neurological:      Mental Status: She is alert and oriented to person, place, and time.   Psychiatric:         Mood and Affect: Mood normal.         Behavior: Behavior normal. Behavior is cooperative.         Thought Content: Thought content normal.              ASSESSMENT/PLAN:     Encounter Diagnoses   Name Primary?    Age-related osteoporosis without current pathological fracture Take calcium 600 every 12 hrs. With vitamin D 400 IU every  12 hrs. Excerise at least 30 minutes 3-4 times a week. May use calcium citrate as opposed to calcium carbonate which may be better absorbed in the setting of PPI use.  The preferred calcium supplement for people at risk of stone formation is calcium citrate because it helps to increase urinary citrate excretion. We recommend a dose of 200-400 mg if dietary calcium cannot be increased.   lifelong physical activity at all ages is strongly endorsed by the National Osteopor[employer] Foundation. Exercise recommendations generally should include weight-bearing, muscle-strengthening, and balance training exercises for 30 minutes 5 days per week or 75 minutes twice weekly, often consistent with other general health recommendations.   Weight Bearing  There are two types of osteoporosis exercises that are important for building and maintaining bone density: weight-bearing and muscle-strengthening exercises.  Weight-bearing Exercises  These exercises include activities that make you move against gravity while staying upright. Weight-bearing exercises can be high-impact or low-impact.  High-impact weight-bearing exercises help build bones and keep them strong. If you have broken a bone due  to osteoporosis or are at risk of breaking a bone, you may need to avoid high-impact exercises. If you’re not sure, you should check with your healthcare provider.  Examples of high-impact weight-bearing exercises are:  Dancing   Doing high-impact aerobics   Hiking   Jogging/running   Jumping Rope   Stair climbing   Tennis  Low-impact weight-bearing exercises can also help keep bones strong and are a safe alternative if you cannot do high-impact exercises. Examples of low-impact weight-bearing exercises are:  Using elliptical training machines   Doing low-impact aerobics   Using stair-step machines   Fast walking on a treadmill or outside        Yes    Anemia of unknown etiology Improving.        Hyperglycemia Check blood 12-3-24.        Renal cell carcinoma of right kidney (HCC) FU oncology. Undergoing treatment.        Vaccine counseling PCV 20 recc. Recommend shingles vaccine.  There is 2 doses of the vaccine  by 2 months 6 months apart.  Check on insurance coverage on shingles vaccine.  May be covered better at the pharmacy.  Separate shingles vaccine from all of the vaccines by at least 1 to 2 weeks.  Discussed about side effects of vaccine.        Vitamin D deficiency Check blood 12-24.       GERD. Careful with diet. Avoid hot spicy foods and caffeine and caffinated beverages. Careful with acidic foods. Elevate head of bed. Wait 2 hrs. after eating before going to bed to allow digestion. Avoid caffeinated or carbonated beverages, fried foods, spicy foods or highly acidic foods (citrus, onions, tomatoes, etc  -don’t lay down 20-30 minutes after eating or drinking  -use wedge pillow under mattress or use cinder blocks to elevate head of bed-this will prevent reflux at night  -take medications as directed  -quit smoking if applicable  -call if symptoms do not improve within 2-3 days or if symptoms worsen  Follow up with Gastroenterologist      Orders Placed This Encounter   Procedures    Lipid Panel     Comp Metabolic Panel (14)    Vitamin D    Hemoglobin A1C       Meds This Visit:  Requested Prescriptions      No prescriptions requested or ordered in this encounter       Imaging & Referrals:  None      RTC 4 months for FU.   RTC 5-21-25 for physical.

## 2024-07-31 NOTE — TELEPHONE ENCOUNTER
Dr. Stover: please see detailed blood pressure readings ranging systolic 125-133 and diastolic upper 85-90    From: Lary Wallis  To: Fernanda Stover  Sent: 7/30/2024  1:27 PM CDT  Subject: BP Readings    Here are my readings:

## 2024-08-07 ENCOUNTER — OFFICE VISIT (OUTPATIENT)
Dept: HEMATOLOGY/ONCOLOGY | Facility: HOSPITAL | Age: 69
End: 2024-08-07
Attending: INTERNAL MEDICINE
Payer: MEDICARE

## 2024-08-07 VITALS
RESPIRATION RATE: 16 BRPM | TEMPERATURE: 98 F | HEIGHT: 65 IN | WEIGHT: 160 LBS | BODY MASS INDEX: 26.66 KG/M2 | HEART RATE: 117 BPM | DIASTOLIC BLOOD PRESSURE: 72 MMHG | SYSTOLIC BLOOD PRESSURE: 119 MMHG | OXYGEN SATURATION: 96 %

## 2024-08-07 DIAGNOSIS — Z51.12 ENCOUNTER FOR ANTINEOPLASTIC IMMUNOTHERAPY: ICD-10-CM

## 2024-08-07 DIAGNOSIS — C64.1 RENAL CELL CARCINOMA OF RIGHT KIDNEY (HCC): Primary | ICD-10-CM

## 2024-08-07 DIAGNOSIS — C78.02 MALIGNANT NEOPLASM METASTATIC TO BOTH LUNGS (HCC): ICD-10-CM

## 2024-08-07 DIAGNOSIS — C64.1 RENAL CELL CARCINOMA OF RIGHT KIDNEY (HCC): ICD-10-CM

## 2024-08-07 DIAGNOSIS — Z51.11 CHEMOTHERAPY MANAGEMENT, ENCOUNTER FOR: Primary | ICD-10-CM

## 2024-08-07 DIAGNOSIS — Z51.11 CHEMOTHERAPY MANAGEMENT, ENCOUNTER FOR: ICD-10-CM

## 2024-08-07 DIAGNOSIS — C78.01 MALIGNANT NEOPLASM METASTATIC TO BOTH LUNGS (HCC): ICD-10-CM

## 2024-08-07 LAB
ALBUMIN SERPL-MCNC: 3.7 G/DL (ref 3.2–4.8)
ALBUMIN/GLOB SERPL: 0.7 {RATIO} (ref 1–2)
ALP LIVER SERPL-CCNC: 199 U/L
ALT SERPL-CCNC: 103 U/L
ANION GAP SERPL CALC-SCNC: 4 MMOL/L (ref 0–18)
AST SERPL-CCNC: 101 U/L (ref ?–34)
BASOPHILS # BLD AUTO: 0.05 X10(3) UL (ref 0–0.2)
BASOPHILS NFR BLD AUTO: 0.9 %
BILIRUB SERPL-MCNC: 0.3 MG/DL (ref 0.2–1.1)
BILIRUB UR QL: NEGATIVE
BUN BLD-MCNC: 23 MG/DL (ref 9–23)
BUN/CREAT SERPL: 32.4 (ref 10–20)
CALCIUM BLD-MCNC: 9.4 MG/DL (ref 8.7–10.4)
CHLORIDE SERPL-SCNC: 108 MMOL/L (ref 98–112)
CHOLEST SERPL-MCNC: 118 MG/DL (ref ?–200)
CO2 SERPL-SCNC: 27 MMOL/L (ref 21–32)
COLOR UR: YELLOW
CREAT BLD-MCNC: 0.71 MG/DL
DEPRECATED RDW RBC AUTO: 73.2 FL (ref 35.1–46.3)
EGFRCR SERPLBLD CKD-EPI 2021: 93 ML/MIN/1.73M2 (ref 60–?)
EOSINOPHIL # BLD AUTO: 0.2 X10(3) UL (ref 0–0.7)
EOSINOPHIL NFR BLD AUTO: 3.7 %
ERYTHROCYTE [DISTWIDTH] IN BLOOD BY AUTOMATED COUNT: 26.6 % (ref 11–15)
GLOBULIN PLAS-MCNC: 5.5 G/DL (ref 2–3.5)
GLUCOSE BLD-MCNC: 120 MG/DL (ref 70–99)
GLUCOSE UR-MCNC: NORMAL MG/DL
HCT VFR BLD AUTO: 41.2 %
HDLC SERPL-MCNC: 28 MG/DL (ref 40–59)
HGB BLD-MCNC: 12.1 G/DL
HYALINE CASTS #/AREA URNS AUTO: PRESENT /LPF
IMM GRANULOCYTES # BLD AUTO: 0.01 X10(3) UL (ref 0–1)
IMM GRANULOCYTES NFR BLD: 0.2 %
KETONES UR-MCNC: NEGATIVE MG/DL
LDLC SERPL CALC-MCNC: 73 MG/DL (ref ?–100)
LEUKOCYTE ESTERASE UR QL STRIP.AUTO: 75
LYMPHOCYTES # BLD AUTO: 1.69 X10(3) UL (ref 1–4)
LYMPHOCYTES NFR BLD AUTO: 31 %
MAGNESIUM SERPL-MCNC: 1.8 MG/DL (ref 1.6–2.6)
MCH RBC QN AUTO: 22.9 PG (ref 26–34)
MCHC RBC AUTO-ENTMCNC: 29.4 G/DL (ref 31–37)
MCV RBC AUTO: 77.9 FL
MONOCYTES # BLD AUTO: 0.38 X10(3) UL (ref 0.1–1)
MONOCYTES NFR BLD AUTO: 7 %
NEUTROPHILS # BLD AUTO: 3.13 X10 (3) UL (ref 1.5–7.7)
NEUTROPHILS # BLD AUTO: 3.13 X10(3) UL (ref 1.5–7.7)
NEUTROPHILS NFR BLD AUTO: 57.2 %
NITRITE UR QL STRIP.AUTO: NEGATIVE
NONHDLC SERPL-MCNC: 90 MG/DL (ref ?–130)
OSMOLALITY SERPL CALC.SUM OF ELEC: 293 MOSM/KG (ref 275–295)
PH UR: 5.5 [PH] (ref 5–8)
PLATELET # BLD AUTO: 328 10(3)UL (ref 150–450)
PLATELET MORPHOLOGY: NORMAL
POTASSIUM SERPL-SCNC: 4.5 MMOL/L (ref 3.5–5.1)
PROT SERPL-MCNC: 9.2 G/DL (ref 5.7–8.2)
PROT UR-MCNC: 30 MG/DL
RBC # BLD AUTO: 5.29 X10(6)UL
SODIUM SERPL-SCNC: 139 MMOL/L (ref 136–145)
SP GR UR STRIP: 1.03 (ref 1–1.03)
T4 FREE SERPL-MCNC: 1.8 NG/DL (ref 0.8–1.7)
TRIGL SERPL-MCNC: 86 MG/DL (ref 30–149)
TSI SER-ACNC: 0.07 MIU/ML (ref 0.55–4.78)
UROBILINOGEN UR STRIP-ACNC: 2
VLDLC SERPL CALC-MCNC: 13 MG/DL (ref 0–30)
WBC # BLD AUTO: 5.5 X10(3) UL (ref 4–11)

## 2024-08-07 PROCEDURE — 85025 COMPLETE CBC W/AUTO DIFF WBC: CPT

## 2024-08-07 PROCEDURE — 83735 ASSAY OF MAGNESIUM: CPT

## 2024-08-07 PROCEDURE — S0028 INJECTION, FAMOTIDINE, 20 MG: HCPCS

## 2024-08-07 PROCEDURE — 80053 COMPREHEN METABOLIC PANEL: CPT

## 2024-08-07 PROCEDURE — 80061 LIPID PANEL: CPT

## 2024-08-07 PROCEDURE — 84443 ASSAY THYROID STIM HORMONE: CPT

## 2024-08-07 PROCEDURE — 96413 CHEMO IV INFUSION 1 HR: CPT

## 2024-08-07 PROCEDURE — 84439 ASSAY OF FREE THYROXINE: CPT

## 2024-08-07 PROCEDURE — 81001 URINALYSIS AUTO W/SCOPE: CPT

## 2024-08-07 RX ORDER — FAMOTIDINE 10 MG/ML
INJECTION, SOLUTION INTRAVENOUS
Status: COMPLETED
Start: 2024-08-07 | End: 2024-08-07

## 2024-08-07 RX ORDER — FAMOTIDINE 10 MG/ML
20 INJECTION, SOLUTION INTRAVENOUS 2 TIMES DAILY
Status: CANCELLED
Start: 2024-08-07

## 2024-08-07 RX ORDER — FAMOTIDINE 10 MG/ML
20 INJECTION, SOLUTION INTRAVENOUS 2 TIMES DAILY
Status: DISCONTINUED | OUTPATIENT
Start: 2024-08-07 | End: 2024-08-07

## 2024-08-07 RX ADMIN — FAMOTIDINE: 10 INJECTION, SOLUTION INTRAVENOUS at 15:53:00

## 2024-08-07 NOTE — PROGRESS NOTES
Patient here for C3D1 Opdivo.  Arrives Via wheelchair, accompanied by Self. Patient denies new issues or concerns. 20mg IV Pepcid administered prior to Opdivo infusion.    Patient was evaluated today by MD.    Oral medications included in this regimen:  no    Patient confirms comprehension of cancer treatment schedule:  yes    Pregnancy screening:  Denies possibility of pregnancy    Modifications in dose or schedule:  No    Medications appearance and physical integrity checked by RN: yes.    Chemotherapy IV pump settings verified by 2 RNs:  No due to targeted therapy IV administration.  Frequency of blood return and site check throughout administration: Prior to administration and At completion of therapy     Infusion/treatment outcome:  patient tolerated treatment without incident.    Education Record    Learner:  Patient  Barriers / Limitations:  None  Method:  Reinforcement  Education / instructions given: Reinforced plan of care and treatment regimen.  Outcome:  Shows understanding    Discharged Home, Via wheelchair, accompanied by:Family member    Patient/family verbalized understanding of future appointments: by printed AVS

## 2024-08-07 NOTE — PROGRESS NOTES
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 She began Nivolumab Cabozantinib 6/12/24 and presents for followup.     Interval history:  Energy slightly improved, appetite still waxing and waning, often low because her gums are sore when she chews denser foods.  Overall clinically stable, walking more at home as well.     Oncology history:  Briefly, the patient began to feel fatigued in the Fall 2023 and she was found to have a hemoglobin of 8.5 with an MCV of 71 with last lab values in 2017 showing a normal hemoglobin.  In March 2024, iron saturation was 15% and ferritin was 612.  She was given IV iron and referred to GI which prompted a CT abdomen pelvis which unfortunately revealed an 11 cm enhancing mass within the right kidney and extensive bibasilar pleural metastases concerning for metastatic renal cell carcinoma.     5/21/24: Initial medical oncology consultation, will obtain brain imaging and refer to IR for kidney biopsy.  Expressed concern for metastatic kidney cancer.  -Brain MRI showed a meningioma, no intracranial metastatic disease.  -IR guided biopsy 6/5 shows high-grade clear-cell renal cell carcinoma, with extensive necrosis but no sarcomatoid or rhabdoid features    6/6/24: Medical oncology follow-up.  Patient continues to decline.  Patient has poor risk disease, very symptomatic and concerned with need for rapid response upfront.  Therefore, we will proceed with cabozantinib nivolumab per [employer] 9ER    6/12/24: began Cabozantinib Nivolumab.     6/26/24: slight improvement, no dose limiting toxicities,  continue treatment.     7/24/24: Feeling little stronger, tolerating therapy well    8/7/24: Grade 1 transaminitis, continue therapy, repeat LFTs next week, follow-up in 2 weeks, restaging at the end of August    Review of Systems:  Hematology/Oncology ROS performed and negative except as above in HPI    History/Other:   Current Medications:   prochlorperazine (COMPAZINE) 10 mg tablet Take 1 tablet (10 mg total) by mouth every 6 (six) hours as needed for Nausea. 60 tablet 3    cabozantinib 40 MG Oral Tab Take 1 tablet (40 mg total) by mouth daily. 28 tablet 11    docusate sodium 50 MG Oral Cap Take 1 capsule (50 mg total) by mouth as needed for constipation.      sertraline 25 MG Oral Tab Take 1 tablet (25 mg total) by mouth daily. 90 tablet 1    metoprolol succinate  MG Oral Tablet 24 Hr Take 0.5 tablets (50 mg total) by mouth daily. 90 tablet 1    celecoxib (CELEBREX) 200 MG Oral Cap Take 1 capsule (200 mg total) by mouth daily as needed for Pain. 90 capsule 1    ondansetron 4 MG Oral Tablet Dispersible Take 1 tablet (4 mg total) by mouth every 8 (eight) hours as needed for Nausea. 30 tablet 2    acetaminophen-codeine 300-30 MG Oral Tab Take 1 tablet by mouth every 6 (six) hours as needed for Pain. 20 tablet 0    ibuprofen 600 MG Oral Tab Take 1 tablet (600 mg total) by mouth every 6 (six) hours as needed for Pain. 90 tablet 1     Allergies:   Allergies   Allergen Reactions    Ace Inhibitors Coughing    Azithromycin      Other reaction(s): AZITHROMYCIN    Erythromycin      Other reaction(s): Rash - Mild    Naproxen      Other reaction(s): NAPROXEN    Nortriptyline      Other reaction(s): vivid, scary dreams    Sulfa Antibiotics      Other reaction(s): Rash - Moderate     Objective:   Blood pressure 119/72, pulse 117, temperature 98.4 °F (36.9 °C), temperature source Oral, resp. rate 16, height 1.651 m (5' 5\"), weight 72.6 kg (160 lb), SpO2 96%, not currently breastfeeding.  Physical Exam:  ECOG PS:  1  General: A&Ox3, NAD  HEENT: PERRL, OP clear  CV: RRR, no murmurs  Pulm: CTA b/l, nl effort  Abd: soft, ntnd, no HSM or masses  Lymph: no palpable lymphadenopathy  Extremities: no edema  Neurological: grossly intact    Results:   Labs:  Lab Results   Component Value Date    WBC 5.5 08/07/2024    HGB 12.1 08/07/2024    HCT 41.2 08/07/2024    .0 08/07/2024    CREATSERUM 0.71 08/07/2024    BUN 23 08/07/2024     08/07/2024    K 4.5 08/07/2024     08/07/2024    CO2 27.0 08/07/2024     (H) 08/07/2024    CA 9.4 08/07/2024    ALB 3.7 08/07/2024    ALKPHO 199 (H) 08/07/2024    BILT 0.3 08/07/2024    TP 9.2 (H) 08/07/2024     (H) 08/07/2024     (H) 08/07/2024    T4F 1.8 (H) 08/07/2024    TSH 0.067 (L) 08/07/2024    MG 1.8 08/07/2024    PHOS 4.8 02/07/2024    B12 435 07/14/2017     Imaging:        CT a/p 5/19/24:  CONCLUSION: 11.1 x 10.2 centimeter enhancing mass replacing the mid to lower right kidney with extensive bibasilar enhancing pleural tumor, trace pleural effusions, intrathoracic adenopathy.  This is likely renal cell carcinoma with extensive bibasilar thoracic involvement     CT chest 5/22/24:  CONCLUSION: Extensive pleural masses with associated small bilateral pleural effusions , as well as mediastinal and hilar lymphadenopathy and bilateral pulmonary nodules.  These are highly concerning for sites of metastatic disease in this setting.    A questioned focal sclerosis of the left lateral first rib adjacent to a tiny pleural mass could represent a site of osseous involvement of disease.     Right kidney, mass; CT-guided core biopsy 6/5/24:   Clear-cell renal cell carcinoma in a background of extensive necrosis, see comment.    Assessment & Plan:   Lary Wallis is a 69 year old female with a hx of HTN, HLD, depression and OA who presents for medical oncology followup regarding poor- risk metastatic clear cell renal cell carcinoma with extensive lung and pleural  metastases. She began Nivolumab Cabozantinib 6/12/24 and presents for followup.     # Metastatic poor-risk ccRCC.   -large right renal mass, extensive pleural metastases, anemia, neutropenia, thrombocytosis, quite symptomatic on presentation. Given progressive symptoms and poor risk disease, discussed the need for a treatment response asap, therefore we discussed proceeding with Nivolumab and Cabozantinib per CM 9ER over ipilimumab and nivolumab with concerns for rapid response  -began treatment 6/12/24  -cleared for next dose of nivolumab, continue cabozantinib, thus far some early signs suggestive of response  -G1 asymptomatic transaminitis, continue therapy for now, repeat LFTs next week  -follow q2 weeks for now, restaging in late August/early September    # Symptom management  -minimal pain, tylenol prn  -zofran ODT for nausea  -poor appetite, weight loss ongoing but was hesitant with certain supplement use. Discussed ok to drink ensure or premier protein and try up to 3-4 times/day if not eating. Can discuss appetite stimulant at next appt prn.    # Comorbidities  -HTN, hold metoprolol given hypotension with weight loss    MDM High: malignancy; review of results with independent interpretation and discussion of management; drug therapy requiring intensive monitoring for toxicity;  ongoing continuity of complex care    Archie Crocker DO    [facility]  [facility]    This note was created using a voice-recognition transcribing system. Incorrect words or phrases may have been missed during proofreading. Please interpret accordingly.

## 2024-08-14 ENCOUNTER — LAB ENCOUNTER (OUTPATIENT)
Dept: LAB | Age: 69
End: 2024-08-14
Attending: STUDENT IN AN ORGANIZED HEALTH CARE EDUCATION/TRAINING PROGRAM
Payer: MEDICARE

## 2024-08-14 DIAGNOSIS — Z51.12 ENCOUNTER FOR ANTINEOPLASTIC IMMUNOTHERAPY: ICD-10-CM

## 2024-08-14 DIAGNOSIS — Z51.11 CHEMOTHERAPY MANAGEMENT, ENCOUNTER FOR: ICD-10-CM

## 2024-08-14 DIAGNOSIS — C64.1 RENAL CELL CARCINOMA OF RIGHT KIDNEY (HCC): ICD-10-CM

## 2024-08-14 LAB
ALBUMIN SERPL-MCNC: 3.5 G/DL (ref 3.2–4.8)
ALBUMIN/GLOB SERPL: 0.7 {RATIO} (ref 1–2)
ALP LIVER SERPL-CCNC: 185 U/L
ALT SERPL-CCNC: 93 U/L
ANION GAP SERPL CALC-SCNC: 7 MMOL/L (ref 0–18)
AST SERPL-CCNC: 91 U/L (ref ?–34)
BILIRUB SERPL-MCNC: 0.4 MG/DL (ref 0.2–1.1)
BUN BLD-MCNC: 24 MG/DL (ref 9–23)
BUN/CREAT SERPL: 35.3 (ref 10–20)
CALCIUM BLD-MCNC: 9.2 MG/DL (ref 8.7–10.4)
CHLORIDE SERPL-SCNC: 105 MMOL/L (ref 98–112)
CO2 SERPL-SCNC: 28 MMOL/L (ref 21–32)
CREAT BLD-MCNC: 0.68 MG/DL
EGFRCR SERPLBLD CKD-EPI 2021: 95 ML/MIN/1.73M2 (ref 60–?)
FASTING STATUS PATIENT QL REPORTED: NO
GLOBULIN PLAS-MCNC: 5.3 G/DL (ref 2–3.5)
GLUCOSE BLD-MCNC: 99 MG/DL (ref 70–99)
OSMOLALITY SERPL CALC.SUM OF ELEC: 294 MOSM/KG (ref 275–295)
POTASSIUM SERPL-SCNC: 4.4 MMOL/L (ref 3.5–5.1)
PROT SERPL-MCNC: 8.8 G/DL (ref 5.7–8.2)
SODIUM SERPL-SCNC: 140 MMOL/L (ref 136–145)

## 2024-08-14 PROCEDURE — 80053 COMPREHEN METABOLIC PANEL: CPT

## 2024-08-14 PROCEDURE — 36415 COLL VENOUS BLD VENIPUNCTURE: CPT

## 2024-08-21 ENCOUNTER — OFFICE VISIT (OUTPATIENT)
Dept: HEMATOLOGY/ONCOLOGY | Facility: HOSPITAL | Age: 69
End: 2024-08-21
Attending: INTERNAL MEDICINE
Payer: MEDICARE

## 2024-08-21 ENCOUNTER — TELEPHONE (OUTPATIENT)
Dept: HEMATOLOGY/ONCOLOGY | Facility: HOSPITAL | Age: 69
End: 2024-08-21

## 2024-08-21 VITALS
RESPIRATION RATE: 16 BRPM | WEIGHT: 157 LBS | DIASTOLIC BLOOD PRESSURE: 70 MMHG | HEART RATE: 109 BPM | TEMPERATURE: 98 F | HEIGHT: 65 IN | OXYGEN SATURATION: 96 % | BODY MASS INDEX: 26.16 KG/M2 | SYSTOLIC BLOOD PRESSURE: 121 MMHG

## 2024-08-21 DIAGNOSIS — C64.1 RENAL CELL CARCINOMA OF RIGHT KIDNEY (HCC): ICD-10-CM

## 2024-08-21 DIAGNOSIS — Z51.11 CHEMOTHERAPY MANAGEMENT, ENCOUNTER FOR: Primary | ICD-10-CM

## 2024-08-21 DIAGNOSIS — Z51.12 ENCOUNTER FOR ANTINEOPLASTIC IMMUNOTHERAPY: Primary | ICD-10-CM

## 2024-08-21 DIAGNOSIS — Z51.12 ENCOUNTER FOR ANTINEOPLASTIC IMMUNOTHERAPY: ICD-10-CM

## 2024-08-21 DIAGNOSIS — R74.01 TRANSAMINITIS: ICD-10-CM

## 2024-08-21 DIAGNOSIS — K12.31 MUCOSITIS DUE TO ANTINEOPLASTIC THERAPY: ICD-10-CM

## 2024-08-21 LAB
ALBUMIN SERPL-MCNC: 3.5 G/DL (ref 3.2–4.8)
ALBUMIN/GLOB SERPL: 0.7 {RATIO} (ref 1–2)
ALP LIVER SERPL-CCNC: 178 U/L
ALT SERPL-CCNC: 119 U/L
ANION GAP SERPL CALC-SCNC: 5 MMOL/L (ref 0–18)
AST SERPL-CCNC: 101 U/L (ref ?–34)
BASOPHILS # BLD AUTO: 0.04 X10(3) UL (ref 0–0.2)
BASOPHILS NFR BLD AUTO: 0.6 %
BILIRUB SERPL-MCNC: 0.3 MG/DL (ref 0.2–1.1)
BUN BLD-MCNC: 26 MG/DL (ref 9–23)
BUN/CREAT SERPL: 36.6 (ref 10–20)
CALCIUM BLD-MCNC: 9.3 MG/DL (ref 8.7–10.4)
CHLORIDE SERPL-SCNC: 105 MMOL/L (ref 98–112)
CO2 SERPL-SCNC: 28 MMOL/L (ref 21–32)
CREAT BLD-MCNC: 0.71 MG/DL
DEPRECATED RDW RBC AUTO: 76.8 FL (ref 35.1–46.3)
EGFRCR SERPLBLD CKD-EPI 2021: 93 ML/MIN/1.73M2 (ref 60–?)
EOSINOPHIL # BLD AUTO: 0.49 X10(3) UL (ref 0–0.7)
EOSINOPHIL NFR BLD AUTO: 7.9 %
ERYTHROCYTE [DISTWIDTH] IN BLOOD BY AUTOMATED COUNT: 27.4 % (ref 11–15)
GLOBULIN PLAS-MCNC: 5.3 G/DL (ref 2–3.5)
GLUCOSE BLD-MCNC: 145 MG/DL (ref 70–99)
HCT VFR BLD AUTO: 40.3 %
HGB BLD-MCNC: 12.2 G/DL
IMM GRANULOCYTES # BLD AUTO: 0.01 X10(3) UL (ref 0–1)
IMM GRANULOCYTES NFR BLD: 0.2 %
LYMPHOCYTES # BLD AUTO: 1.72 X10(3) UL (ref 1–4)
LYMPHOCYTES NFR BLD AUTO: 27.6 %
MCH RBC QN AUTO: 24.1 PG (ref 26–34)
MCHC RBC AUTO-ENTMCNC: 30.3 G/DL (ref 31–37)
MCV RBC AUTO: 79.5 FL
MONOCYTES # BLD AUTO: 0.42 X10(3) UL (ref 0.1–1)
MONOCYTES NFR BLD AUTO: 6.7 %
NEUTROPHILS # BLD AUTO: 3.56 X10 (3) UL (ref 1.5–7.7)
NEUTROPHILS # BLD AUTO: 3.56 X10(3) UL (ref 1.5–7.7)
NEUTROPHILS NFR BLD AUTO: 57 %
OSMOLALITY SERPL CALC.SUM OF ELEC: 293 MOSM/KG (ref 275–295)
PLATELET # BLD AUTO: 313 10(3)UL (ref 150–450)
PLATELET MORPHOLOGY: NORMAL
POTASSIUM SERPL-SCNC: 4.1 MMOL/L (ref 3.5–5.1)
PROT SERPL-MCNC: 8.8 G/DL (ref 5.7–8.2)
RBC # BLD AUTO: 5.07 X10(6)UL
SODIUM SERPL-SCNC: 138 MMOL/L (ref 136–145)
WBC # BLD AUTO: 6.2 X10(3) UL (ref 4–11)

## 2024-08-21 PROCEDURE — 99215 OFFICE O/P EST HI 40 MIN: CPT | Performed by: PHYSICIAN ASSISTANT

## 2024-08-21 PROCEDURE — 96413 CHEMO IV INFUSION 1 HR: CPT

## 2024-08-21 PROCEDURE — 85025 COMPLETE CBC W/AUTO DIFF WBC: CPT

## 2024-08-21 PROCEDURE — 96375 TX/PRO/DX INJ NEW DRUG ADDON: CPT

## 2024-08-21 PROCEDURE — 80053 COMPREHEN METABOLIC PANEL: CPT

## 2024-08-21 PROCEDURE — S0028 INJECTION, FAMOTIDINE, 20 MG: HCPCS

## 2024-08-21 RX ORDER — FAMOTIDINE 10 MG/ML
20 INJECTION, SOLUTION INTRAVENOUS ONCE
Status: COMPLETED | OUTPATIENT
Start: 2024-08-21 | End: 2024-08-21

## 2024-08-21 RX ORDER — FAMOTIDINE 10 MG/ML
INJECTION, SOLUTION INTRAVENOUS
Status: COMPLETED
Start: 2024-08-21 | End: 2024-08-21

## 2024-08-21 RX ORDER — FAMOTIDINE 10 MG/ML
20 INJECTION, SOLUTION INTRAVENOUS 2 TIMES DAILY
Status: CANCELLED
Start: 2024-08-21

## 2024-08-21 RX ORDER — LIDOCAINE HYDROCHLORIDE 20 MG/ML
10 SOLUTION OROPHARYNGEAL
Qty: 100 ML | Refills: 0 | Status: SHIPPED | OUTPATIENT
Start: 2024-08-21

## 2024-08-21 RX ORDER — CLOTRIMAZOLE AND BETAMETHASONE DIPROPIONATE 10; .64 MG/G; MG/G
CREAM TOPICAL
Qty: 45 G | Refills: 0 | Status: SHIPPED | OUTPATIENT
Start: 2024-08-21

## 2024-08-21 RX ADMIN — FAMOTIDINE 20 MG: 10 INJECTION, SOLUTION INTRAVENOUS at 15:44:00

## 2024-08-21 NOTE — PROGRESS NOTES
Patient here for C3D1 Opdivo.  Arrives Via wheelchair, accompanied by Self. Patient denies new issues or concerns. Patient was evaluated today by MD.    Oral medications included in this regimen:  no    Patient confirms comprehension of cancer treatment schedule:  yes    Pregnancy screening:  Denies possibility of pregnancy    Modifications in dose or schedule:  No    Medications appearance and physical integrity checked by RN: yes.    Chemotherapy IV pump settings verified by 2 RNs:  No due to targeted therapy IV administration.  Frequency of blood return and site check throughout administration: Prior to administration and At completion of therapy     Infusion/treatment outcome:  patient tolerated treatment without incident.    Education Record    Learner:  Patient  Barriers / Limitations:  None  Method:  Reinforcement  Education / instructions given: Reinforced plan of care and treatment regimen.  Outcome:  Shows understanding    Discharged Home, Via wheelchair, accompanied by:Family member    Patient/family verbalized understanding of future appointments: by printed AVS

## 2024-08-21 NOTE — PATIENT INSTRUCTIONS
Proceed with treatment today  Recheck liver function test (non fasting lab) on 8/28/24  Lotrisone cream to buttock - apply twice daily as needed  Viscous lidocaine 2 teaspoons four times daily, swish and spit or swallow as needed for mouth soreness  Call as needed  Follow-up in 2 weeks

## 2024-08-21 NOTE — TELEPHONE ENCOUNTER
Writer called [facility] as requested by Ramakrishna LUONG via inKeldelice message stating, \"Please call [facility] and confirm they will ship Cabozantinib in a timely fashion.  Lary states she was told to request a refill the day she takes her last tablet.\"    [facility] was called. Writer first presented case to CADENCE Avitia, who then transferred call to [facility]. Rep stated that patient should call 7-10 days before end of current fill.     Writer called patient and informed her to continue to order her medication atleast 7 days ahead of time. She is aware to contact the clinic if pharmacy tries to delay her shipment again.

## 2024-08-21 NOTE — PROGRESS NOTES
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 She began Nivolumab Cabozantinib 6/12/24 and presents for followup.     Interval history:  Energy slightly improved, appetite still waxing and waning, often low because her gums are sore when she chews denser foods.  Overall clinically stable, walking more at home as well.     Oncology history:  Briefly, the patient began to feel fatigued in the Fall 2023 and she was found to have a hemoglobin of 8.5 with an MCV of 71 with last lab values in 2017 showing a normal hemoglobin.  In March 2024, iron saturation was 15% and ferritin was 612.  She was given IV iron and referred to GI which prompted a CT abdomen pelvis which unfortunately revealed an 11 cm enhancing mass within the right kidney and extensive bibasilar pleural metastases concerning for metastatic renal cell carcinoma.     5/21/24: Initial medical oncology consultation, will obtain brain imaging and refer to IR for kidney biopsy.  Expressed concern for metastatic kidney cancer.  -Brain MRI showed a meningioma, no intracranial metastatic disease.  -IR guided biopsy 6/5 shows high-grade clear-cell renal cell carcinoma, with extensive necrosis but no sarcomatoid or rhabdoid features    6/6/24: Medical oncology follow-up.  Patient continues to decline.  Patient has poor risk disease, very symptomatic and concerned with need for rapid response upfront.  Therefore, we will proceed with cabozantinib nivolumab per CheckMate 9ER    6/12/24: began Cabozantinib Nivolumab.     6/26/24: slight improvement, no dose limiting toxicities,  continue treatment.     7/24/24: Feeling little stronger, tolerating therapy well    8/7/24: Grade 1 transaminitis, continue therapy, repeat LFTs next week, follow-up in 2 weeks, restaging at the end of August 8/21/24:  Grade 1 transaminitis continues, mucositis, intermittent diarrhea, max 3 episodes per 24 hours, dry cough that is not new    Review of Systems:  Hematology/Oncology ROS performed and negative except as above in HPI    History/Other:   Current Medications:   Lidocaine Viscous HCl 2 % Mouth/Throat Solution Take 10 mL by mouth 4 (four) times daily before meals and nightly. 100 mL 0    clotrimazole-betamethasone 1-0.05 % External Cream Apply twice daily to affected area as needed 45 g 0    prochlorperazine (COMPAZINE) 10 mg tablet Take 1 tablet (10 mg total) by mouth every 6 (six) hours as needed for Nausea. 60 tablet 3    cabozantinib 40 MG Oral Tab Take 1 tablet (40 mg total) by mouth daily. 28 tablet 11    docusate sodium 50 MG Oral Cap Take 1 capsule (50 mg total) by mouth as needed for constipation.      sertraline 25 MG Oral Tab Take 1 tablet (25 mg total) by mouth daily. 90 tablet 1    metoprolol succinate  MG Oral Tablet 24 Hr Take 0.5 tablets (50 mg total) by mouth daily. 90 tablet 1    celecoxib (CELEBREX) 200 MG Oral Cap Take 1 capsule (200 mg total) by mouth daily as needed for Pain. 90 capsule 1    ondansetron 4 MG Oral Tablet Dispersible Take 1 tablet (4 mg total) by mouth every 8 (eight) hours as needed for Nausea. 30 tablet 2    acetaminophen-codeine 300-30 MG Oral Tab Take 1 tablet by mouth every 6 (six) hours as needed for Pain. 20 tablet 0    ibuprofen 600 MG Oral Tab Take 1 tablet (600 mg total) by mouth every 6 (six) hours as needed for Pain. 90 tablet 1     Allergies:   Allergies   Allergen Reactions    Ace Inhibitors Coughing    Azithromycin      Other reaction(s): AZITHROMYCIN    Erythromycin      Other reaction(s): Rash - Mild    Naproxen      Other reaction(s): NAPROXEN     Nortriptyline      Other reaction(s): vivid, scary dreams    Sulfa Antibiotics      Other reaction(s): Rash - Moderate     Objective:   Blood pressure 121/70, pulse 109, temperature 97.9 °F (36.6 °C), temperature source Oral, resp. rate 16, height 1.651 m (5' 5\"), weight 71.2 kg (157 lb), SpO2 96%, not currently breastfeeding.  Physical Exam:  ECOG PS: 1  General: A&Ox3, NAD, w/c  HEENT: PERRL, OP clear, hoarse  CV: RRR, no murmurs  Pulm: CTA b/l, nl effort  Abd: soft, ntnd, no HSM or masses  Lymph: no palpable lymphadenopathy  Extremities: no edema  Neurological: grossly intact    Results:   Labs:  Lab Results   Component Value Date    WBC 6.2 08/21/2024    HGB 12.2 08/21/2024    HCT 40.3 08/21/2024    .0 08/21/2024    CREATSERUM 0.71 08/21/2024    BUN 26 (H) 08/21/2024     08/21/2024    K 4.1 08/21/2024     08/21/2024    CO2 28.0 08/21/2024     (H) 08/21/2024    CA 9.3 08/21/2024    ALB 3.5 08/21/2024    ALKPHO 178 (H) 08/21/2024    BILT 0.3 08/21/2024    TP 8.8 (H) 08/21/2024     (H) 08/21/2024     (H) 08/21/2024    T4F 1.8 (H) 08/07/2024    TSH 0.067 (L) 08/07/2024    MG 1.8 08/07/2024    PHOS 4.8 02/07/2024    B12 435 07/14/2017     Imaging:        CT a/p 5/19/24:  CONCLUSION: 11.1 x 10.2 centimeter enhancing mass replacing the mid to lower right kidney with extensive bibasilar enhancing pleural tumor, trace pleural effusions, intrathoracic adenopathy.  This is likely renal cell carcinoma with extensive bibasilar thoracic involvement     CT chest 5/22/24:  CONCLUSION: Extensive pleural masses with associated small bilateral pleural effusions , as well as mediastinal and hilar lymphadenopathy and bilateral pulmonary nodules.  These are highly concerning for sites of metastatic disease in this setting.    A questioned focal sclerosis of the left lateral first rib adjacent to a tiny pleural mass could represent a site of osseous involvement of disease.     Right kidney,  mass; CT-guided core biopsy 6/5/24:   Clear-cell renal cell carcinoma in a background of extensive necrosis, see comment.    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 She began Nivolumab Cabozantinib 6/12/24 and presents for followup.     # Metastatic poor-risk ccRCC.   -large right renal mass, extensive pleural metastases, anemia, neutropenia, thrombocytosis, quite symptomatic on presentation. Given progressive symptoms and poor risk disease, discussed the need for a treatment response asap, therefore we discussed proceeding with Nivolumab and Cabozantinib per CM 9ER over ipilimumab and nivolumab with concerns for rapid response  -began treatment 6/12/24  -cleared for next dose of nivolumab, continue cabozantinib, thus far some early signs suggestive of response  -G1 asymptomatic transaminitis, continue therapy for now, repeat LFTs next week - standing order placed  -follow q2 weeks for now, restaging scheduled on 8/28/24    # Symptom management  -minimal pain, tylenol prn  -zofran ODT for nausea  -poor appetite, mucositis contributing, weight loss ongoing but was hesitant with certain supplement use. Drinking ensure or premier protein, up to 3-4 times/day if not eating. Eating fruit.  Add viscous lidocaine 10 ml sw/sp QID prn.  Notify clinic if uncontrolled.  Monitor weight.  Consider appetite stimulant at next appt prn.  -dermatitis, buttocks, lotrisone cream apply BID prn.   -diarrhea, monitor, if > 3 episodes per 24 hours, start antidiarrheal.  (States goes back and forth between diarrhea and constipation otherwise)    # Comorbidities  -HTN, hold metoprolol given hypotension with weight loss    MDM High: malignancy; review of results with independent interpretation and discussion of management; drug  therapy requiring intensive monitoring for toxicity;  ongoing continuity of complex care    Ramakrishna Desai PA-C    [facility]  [facility]    This note was created using a voice-recognition transcribing system. Incorrect words or phrases may have been missed during proofreading. Please interpret accordingly.

## 2024-08-28 ENCOUNTER — LAB ENCOUNTER (OUTPATIENT)
Dept: LAB | Facility: HOSPITAL | Age: 69
End: 2024-08-28
Attending: STUDENT IN AN ORGANIZED HEALTH CARE EDUCATION/TRAINING PROGRAM
Payer: MEDICARE

## 2024-08-28 ENCOUNTER — HOSPITAL ENCOUNTER (OUTPATIENT)
Dept: CT IMAGING | Facility: HOSPITAL | Age: 69
Discharge: HOME | End: 2024-08-28
Attending: STUDENT IN AN ORGANIZED HEALTH CARE EDUCATION/TRAINING PROGRAM
Payer: MEDICARE

## 2024-08-28 DIAGNOSIS — C64.1 RENAL CELL CARCINOMA OF RIGHT KIDNEY (HCC): ICD-10-CM

## 2024-08-28 DIAGNOSIS — Z51.12 ENCOUNTER FOR ANTINEOPLASTIC IMMUNOTHERAPY: ICD-10-CM

## 2024-08-28 DIAGNOSIS — Z51.11 CHEMOTHERAPY MANAGEMENT, ENCOUNTER FOR: ICD-10-CM

## 2024-08-28 DIAGNOSIS — R74.01 TRANSAMINITIS: ICD-10-CM

## 2024-08-28 LAB
ALBUMIN SERPL-MCNC: 3.3 G/DL (ref 3.2–4.8)
ALP LIVER SERPL-CCNC: 134 U/L
ALT SERPL-CCNC: 63 U/L
AST SERPL-CCNC: 54 U/L (ref ?–34)
BILIRUB DIRECT SERPL-MCNC: 0.2 MG/DL (ref ?–0.3)
BILIRUB SERPL-MCNC: 0.4 MG/DL (ref 0.2–1.1)
PROT SERPL-MCNC: 8.1 G/DL (ref 5.7–8.2)

## 2024-08-28 PROCEDURE — 71260 CT THORAX DX C+: CPT | Performed by: STUDENT IN AN ORGANIZED HEALTH CARE EDUCATION/TRAINING PROGRAM

## 2024-08-28 PROCEDURE — 74177 CT ABD & PELVIS W/CONTRAST: CPT | Performed by: STUDENT IN AN ORGANIZED HEALTH CARE EDUCATION/TRAINING PROGRAM

## 2024-08-28 PROCEDURE — 36415 COLL VENOUS BLD VENIPUNCTURE: CPT

## 2024-08-28 PROCEDURE — 80076 HEPATIC FUNCTION PANEL: CPT

## 2024-09-04 ENCOUNTER — OFFICE VISIT (OUTPATIENT)
Dept: HEMATOLOGY/ONCOLOGY | Facility: HOSPITAL | Age: 69
End: 2024-09-04
Attending: INTERNAL MEDICINE
Payer: MEDICARE

## 2024-09-04 VITALS
BODY MASS INDEX: 26.26 KG/M2 | HEART RATE: 89 BPM | WEIGHT: 157.63 LBS | RESPIRATION RATE: 16 BRPM | HEIGHT: 65 IN | OXYGEN SATURATION: 96 % | SYSTOLIC BLOOD PRESSURE: 121 MMHG | DIASTOLIC BLOOD PRESSURE: 69 MMHG | TEMPERATURE: 99 F

## 2024-09-04 DIAGNOSIS — C64.1 RENAL CELL CARCINOMA OF RIGHT KIDNEY (HCC): ICD-10-CM

## 2024-09-04 DIAGNOSIS — Z51.11 CHEMOTHERAPY MANAGEMENT, ENCOUNTER FOR: Primary | ICD-10-CM

## 2024-09-04 DIAGNOSIS — K12.31 MUCOSITIS DUE TO ANTINEOPLASTIC THERAPY: ICD-10-CM

## 2024-09-04 DIAGNOSIS — Z51.12 ENCOUNTER FOR ANTINEOPLASTIC IMMUNOTHERAPY: ICD-10-CM

## 2024-09-04 DIAGNOSIS — Z51.11 CHEMOTHERAPY MANAGEMENT, ENCOUNTER FOR: ICD-10-CM

## 2024-09-04 DIAGNOSIS — C64.1 RENAL CELL CARCINOMA OF RIGHT KIDNEY (HCC): Primary | ICD-10-CM

## 2024-09-04 DIAGNOSIS — R74.01 TRANSAMINITIS: ICD-10-CM

## 2024-09-04 DIAGNOSIS — C78.02 MALIGNANT NEOPLASM METASTATIC TO BOTH LUNGS (HCC): ICD-10-CM

## 2024-09-04 DIAGNOSIS — C78.01 MALIGNANT NEOPLASM METASTATIC TO BOTH LUNGS (HCC): ICD-10-CM

## 2024-09-04 LAB
ALBUMIN SERPL-MCNC: 3.6 G/DL (ref 3.2–4.8)
ALBUMIN/GLOB SERPL: 0.7 {RATIO} (ref 1–2)
ALP LIVER SERPL-CCNC: 125 U/L
ALT SERPL-CCNC: 62 U/L
ANION GAP SERPL CALC-SCNC: 5 MMOL/L (ref 0–18)
AST SERPL-CCNC: 68 U/L (ref ?–34)
BASOPHILS # BLD AUTO: 0.05 X10(3) UL (ref 0–0.2)
BASOPHILS NFR BLD AUTO: 0.8 %
BILIRUB SERPL-MCNC: 0.4 MG/DL (ref 0.2–1.1)
BUN BLD-MCNC: 18 MG/DL (ref 9–23)
BUN/CREAT SERPL: 27.7 (ref 10–20)
CALCIUM BLD-MCNC: 9.2 MG/DL (ref 8.7–10.4)
CHLORIDE SERPL-SCNC: 108 MMOL/L (ref 98–112)
CO2 SERPL-SCNC: 28 MMOL/L (ref 21–32)
CREAT BLD-MCNC: 0.65 MG/DL
DEPRECATED RDW RBC AUTO: 80.9 FL (ref 35.1–46.3)
EGFRCR SERPLBLD CKD-EPI 2021: 96 ML/MIN/1.73M2 (ref 60–?)
EOSINOPHIL # BLD AUTO: 0.44 X10(3) UL (ref 0–0.7)
EOSINOPHIL NFR BLD AUTO: 7.4 %
ERYTHROCYTE [DISTWIDTH] IN BLOOD BY AUTOMATED COUNT: 27.9 % (ref 11–15)
GLOBULIN PLAS-MCNC: 5 G/DL (ref 2–3.5)
GLUCOSE BLD-MCNC: 95 MG/DL (ref 70–99)
HCT VFR BLD AUTO: 40.7 %
HGB BLD-MCNC: 12.5 G/DL
IMM GRANULOCYTES # BLD AUTO: 0.01 X10(3) UL (ref 0–1)
IMM GRANULOCYTES NFR BLD: 0.2 %
LYMPHOCYTES # BLD AUTO: 1.94 X10(3) UL (ref 1–4)
LYMPHOCYTES NFR BLD AUTO: 32.8 %
MCH RBC QN AUTO: 25 PG (ref 26–34)
MCHC RBC AUTO-ENTMCNC: 30.7 G/DL (ref 31–37)
MCV RBC AUTO: 81.2 FL
MONOCYTES # BLD AUTO: 0.36 X10(3) UL (ref 0.1–1)
MONOCYTES NFR BLD AUTO: 6.1 %
NEUTROPHILS # BLD AUTO: 3.12 X10 (3) UL (ref 1.5–7.7)
NEUTROPHILS # BLD AUTO: 3.12 X10(3) UL (ref 1.5–7.7)
NEUTROPHILS NFR BLD AUTO: 52.7 %
OSMOLALITY SERPL CALC.SUM OF ELEC: 294 MOSM/KG (ref 275–295)
PLATELET # BLD AUTO: 273 10(3)UL (ref 150–450)
PLATELET MORPHOLOGY: NORMAL
POTASSIUM SERPL-SCNC: 4.4 MMOL/L (ref 3.5–5.1)
PROT SERPL-MCNC: 8.6 G/DL (ref 5.7–8.2)
RBC # BLD AUTO: 5.01 X10(6)UL
SODIUM SERPL-SCNC: 141 MMOL/L (ref 136–145)
WBC # BLD AUTO: 5.9 X10(3) UL (ref 4–11)

## 2024-09-04 PROCEDURE — 96375 TX/PRO/DX INJ NEW DRUG ADDON: CPT

## 2024-09-04 PROCEDURE — G2211 COMPLEX E/M VISIT ADD ON: HCPCS | Performed by: STUDENT IN AN ORGANIZED HEALTH CARE EDUCATION/TRAINING PROGRAM

## 2024-09-04 PROCEDURE — 80053 COMPREHEN METABOLIC PANEL: CPT

## 2024-09-04 PROCEDURE — 85025 COMPLETE CBC W/AUTO DIFF WBC: CPT

## 2024-09-04 PROCEDURE — S0028 INJECTION, FAMOTIDINE, 20 MG: HCPCS

## 2024-09-04 PROCEDURE — 99215 OFFICE O/P EST HI 40 MIN: CPT | Performed by: STUDENT IN AN ORGANIZED HEALTH CARE EDUCATION/TRAINING PROGRAM

## 2024-09-04 PROCEDURE — 96413 CHEMO IV INFUSION 1 HR: CPT

## 2024-09-04 RX ORDER — FAMOTIDINE 10 MG/ML
INJECTION, SOLUTION INTRAVENOUS
Status: COMPLETED
Start: 2024-09-04 | End: 2024-09-04

## 2024-09-04 RX ORDER — FAMOTIDINE 10 MG/ML
20 INJECTION, SOLUTION INTRAVENOUS ONCE
Status: COMPLETED | OUTPATIENT
Start: 2024-09-04 | End: 2024-09-04

## 2024-09-04 RX ORDER — FAMOTIDINE 10 MG/ML
20 INJECTION, SOLUTION INTRAVENOUS 2 TIMES DAILY
Status: CANCELLED
Start: 2024-09-04

## 2024-09-04 RX ADMIN — FAMOTIDINE 20 MG: 10 INJECTION, SOLUTION INTRAVENOUS at 13:29:00

## 2024-09-04 NOTE — PROGRESS NOTES
Patient here for C4D1 Opdivo.  Arrives Ambulating independently, accompanied by Family member. Patient denies new issues or complaints, 20 mg IVP Pepcid administered as pre medication to Opdivo per order.    Patient was evaluated today by MD and Treatment Nurse.    Oral medications included in this regimen:  no    Patient confirms comprehension of cancer treatment schedule:  yes    Pregnancy screening:  Denies possibility of pregnancy    Modifications in dose or schedule:  No    Medications appearance and physical integrity checked by RN: yes.    Chemotherapy IV pump settings verified by 2 RNs:  No due to targeted therapy IV administration.  Frequency of blood return and site check throughout administration: Prior to administration and At completion of therapy     Infusion/treatment outcome:  patient tolerated treatment without incident    Education Record    Learner:  Patient  Barriers / Limitations:  None  Method:  Reinforcement  Education / instructions given: Reinforced infusion process and reviewed upcoming appts.  Outcome:  Shows understanding    Discharged Home, Ambulating independently, accompanied by:Family member    Patient/family verbalized understanding of future appointments: by printed AVS

## 2024-09-09 NOTE — PROGRESS NOTES
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 She began Nivolumab Cabozantinib 6/12/24 and presents for followup.     Interval history:  Continues to slowly improve, walking more around the house. Overall feeling ok.     Oncology history:  Briefly, the patient began to feel fatigued in the Fall 2023 and she was found to have a hemoglobin of 8.5 with an MCV of 71 with last lab values in 2017 showing a normal hemoglobin.  In March 2024, iron saturation was 15% and ferritin was 612.  She was given IV iron and referred to GI which prompted a CT abdomen pelvis which unfortunately revealed an 11 cm enhancing mass within the right kidney and extensive bibasilar pleural metastases concerning for metastatic renal cell carcinoma.     5/21/24: Initial medical oncology consultation, will obtain brain imaging and refer to IR for kidney biopsy.  Expressed concern for metastatic kidney cancer.  -Brain MRI showed a meningioma, no intracranial metastatic disease.  -IR guided biopsy 6/5 shows high-grade clear-cell renal cell carcinoma, with extensive necrosis but no sarcomatoid or rhabdoid features    6/6/24: Medical oncology follow-up.  Patient continues to decline.  Patient has poor risk disease, very symptomatic and concerned with need for rapid response upfront.  Therefore, we will proceed with cabozantinib nivolumab per CheckMate 9ER    6/12/24: began Cabozantinib Nivolumab.     6/26/24: slight improvement, no dose limiting toxicities, continue treatment.     7/24/24: Feeling little stronger, tolerating therapy well    8/7/24: Grade 1  transaminitis, continue therapy, repeat LFTs next week, follow-up in 2 weeks, restaging at the end of August 9/4/24: Restaging CT shows partial response to treatment. Continue current therapy plan.     Review of Systems:  Hematology/Oncology ROS performed and negative except as above in HPI    History/Other:   Current Medications:   Lidocaine Viscous HCl 2 % Mouth/Throat Solution Take 10 mL by mouth 4 (four) times daily before meals and nightly. 100 mL 0    clotrimazole-betamethasone 1-0.05 % External Cream Apply twice daily to affected area as needed 45 g 0    prochlorperazine (COMPAZINE) 10 mg tablet Take 1 tablet (10 mg total) by mouth every 6 (six) hours as needed for Nausea. 60 tablet 3    cabozantinib 40 MG Oral Tab Take 1 tablet (40 mg total) by mouth daily. 28 tablet 11    docusate sodium 50 MG Oral Cap Take 1 capsule (50 mg total) by mouth as needed for constipation.      sertraline 25 MG Oral Tab Take 1 tablet (25 mg total) by mouth daily. 90 tablet 1    metoprolol succinate  MG Oral Tablet 24 Hr Take 0.5 tablets (50 mg total) by mouth daily. 90 tablet 1    celecoxib (CELEBREX) 200 MG Oral Cap Take 1 capsule (200 mg total) by mouth daily as needed for Pain. 90 capsule 1    ondansetron 4 MG Oral Tablet Dispersible Take 1 tablet (4 mg total) by mouth every 8 (eight) hours as needed for Nausea. 30 tablet 2    acetaminophen-codeine 300-30 MG Oral Tab Take 1 tablet by mouth every 6 (six) hours as needed for Pain. 20 tablet 0    ibuprofen 600 MG Oral Tab Take 1 tablet (600 mg total) by mouth every 6 (six) hours as needed for Pain. 90 tablet 1     Allergies:   Allergies   Allergen Reactions    Ace Inhibitors Coughing    Azithromycin      Other reaction(s): AZITHROMYCIN    Erythromycin      Other reaction(s): Rash - Mild    Naproxen      Other reaction(s): NAPROXEN    Nortriptyline      Other reaction(s): vivid, scary dreams    Sulfa Antibiotics      Other reaction(s): Rash - Moderate     Objective:    Blood pressure 121/69, pulse 89, temperature 98.5 °F (36.9 °C), temperature source Oral, resp. rate 16, height 1.651 m (5' 5\"), weight 71.5 kg (157 lb 9.6 oz), SpO2 96%, not currently breastfeeding.  Physical Exam:  ECOG PS: 1  General: A&Ox3, NAD  HEENT: PERRL, OP clear  CV: RRR, no murmurs  Pulm: CTA b/l, nl effort  Abd: soft, ntnd, no HSM or masses  Lymph: no palpable lymphadenopathy  Extremities: no edema  Neurological: grossly intact    Results:   Labs:  Lab Results   Component Value Date    WBC 5.9 09/04/2024    HGB 12.5 09/04/2024    HCT 40.7 09/04/2024    .0 09/04/2024    CREATSERUM 0.65 09/04/2024    BUN 18 09/04/2024     09/04/2024    K 4.4 09/04/2024     09/04/2024    CO2 28.0 09/04/2024    GLU 95 09/04/2024    CA 9.2 09/04/2024    ALB 3.6 09/04/2024    ALKPHO 125 09/04/2024    BILT 0.4 09/04/2024    TP 8.6 (H) 09/04/2024    AST 68 (H) 09/04/2024    ALT 62 (H) 09/04/2024    T4F 1.8 (H) 08/07/2024    TSH 0.067 (L) 08/07/2024    MG 1.8 08/07/2024    PHOS 4.8 02/07/2024    B12 435 07/14/2017     Imaging:  CT CHEST+ABDOMEN+PELVIS(ALL CNTRST ONLY)(CPT=71260/43169)    Result Date: 8/28/2024  CONCLUSION:  9.0 cm, previously 11.1 cm right lower renal mass, with decreased solid components compared to 05/19/2024 examination.  Unchanged mild right hydronephrosis related to extrinsic compression of the mass on the proximal collecting system.  Moderate overall decrease in numerous pleural based masses, as well as numerous pulmonary nodules compared to the 05/22/2024 examination.  Unchanged focal sclerosis involving the left anterior first rib, indeterminate.  Correlation can be made with nuclear medicine bone scan.  Otherwise, attention is recommended on future follow-up cross-sectional imaging.  Decrease in the reticular and tree-in-bud opacity within the lingula, may represent resolving infectious/inflammatory process.  Recommend attention on short interval follow-up CT chest in 3 to 6 months.   Decreased size of mediastinal and hilar lymphadenopathy since 05/22/2024.  1.0 cm hypodense lesion in the spleen, nonspecific and grossly unchanged.  Recommend attention on follow-up.            Dictated by (CST): Kay Good MD on 8/28/2024 at 12:16 PM     Finalized by (CST): Kay Good MD on 8/28/2024 at 12:50 PM           CT a/p 5/19/24:  CONCLUSION: 11.1 x 10.2 centimeter enhancing mass replacing the mid to lower right kidney with extensive bibasilar enhancing pleural tumor, trace pleural effusions, intrathoracic adenopathy.  This is likely renal cell carcinoma with extensive bibasilar thoracic involvement     CT chest 5/22/24:  CONCLUSION: Extensive pleural masses with associated small bilateral pleural effusions , as well as mediastinal and hilar lymphadenopathy and bilateral pulmonary nodules.  These are highly concerning for sites of metastatic disease in this setting.    A questioned focal sclerosis of the left lateral first rib adjacent to a tiny pleural mass could represent a site of osseous involvement of disease.     Right kidney, mass; CT-guided core biopsy 6/5/24:   Clear-cell renal cell carcinoma in a background of extensive necrosis, see comment.    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 She began Nivolumab Cabozantinib 6/12/24 and presents for followup.     # Metastatic poor-risk ccRCC.   -large right renal mass, extensive pleural metastases, anemia, neutropenia, thrombocytosis, quite symptomatic on presentation. Given progressive symptoms and poor risk disease, discussed the need for a treatment response asap, therefore we discussed proceeding with Nivolumab and Cabozantinib per CM 9ER over ipilimumab and nivolumab with concerns for rapid response  -began treatment  6/12/24  -imaging shows partial response to therapy  -cleared for next dose of nivolumab, continue cabozantinib  -G1 asymptomatic transaminitis, continue to monitor closely  -follow q2 weeks for now, consider monthly nivolumab pending trend of liver enzymes    # Symptom management  -minimal pain, tylenol prn  -zofran ODT for nausea  -Appetite improving with treatment response    # Comorbidities  -HTN, hold metoprolol given hypotension with weight loss    MDM High: malignancy; review of results with independent interpretation and discussion of management; drug therapy requiring intensive monitoring for toxicity;  ongoing continuity of complex care    Archie Crocker DO    [facility]  [facility]    This note was created using a voice-recognition transcribing system. Incorrect words or phrases may have been missed during proofreading. Please interpret accordingly.

## 2024-09-13 DIAGNOSIS — L89.149 PRESSURE INJURY OF SKIN OF LEFT LOWER BACK, UNSPECIFIED INJURY STAGE: Primary | ICD-10-CM

## 2024-09-16 ENCOUNTER — TELEPHONE (OUTPATIENT)
Dept: HEMATOLOGY/ONCOLOGY | Facility: HOSPITAL | Age: 69
End: 2024-09-16

## 2024-09-16 NOTE — TELEPHONE ENCOUNTER
LVM for patient with information regarding wound care consult and number to schedule. Encouraged patient to call clinic if she requires assistance with scheduling. [employer] message was sent as well.

## 2024-09-18 ENCOUNTER — NURSE ONLY (OUTPATIENT)
Dept: HEMATOLOGY/ONCOLOGY | Facility: HOSPITAL | Age: 69
End: 2024-09-18
Attending: INTERNAL MEDICINE
Payer: MEDICARE

## 2024-09-18 VITALS
HEART RATE: 88 BPM | SYSTOLIC BLOOD PRESSURE: 122 MMHG | BODY MASS INDEX: 26.33 KG/M2 | DIASTOLIC BLOOD PRESSURE: 71 MMHG | TEMPERATURE: 98 F | RESPIRATION RATE: 16 BRPM | OXYGEN SATURATION: 95 % | WEIGHT: 158 LBS | HEIGHT: 65 IN

## 2024-09-18 DIAGNOSIS — C64.1 RENAL CELL CARCINOMA OF RIGHT KIDNEY (HCC): ICD-10-CM

## 2024-09-18 DIAGNOSIS — Z51.12 ENCOUNTER FOR ANTINEOPLASTIC IMMUNOTHERAPY: ICD-10-CM

## 2024-09-18 DIAGNOSIS — Z51.11 CHEMOTHERAPY MANAGEMENT, ENCOUNTER FOR: Primary | ICD-10-CM

## 2024-09-18 DIAGNOSIS — L27.1 PALMAR PLANTAR ERYTHRODYSAESTHESIA DUE TO CYTOTOXIC THERAPY: ICD-10-CM

## 2024-09-18 DIAGNOSIS — L30.4 INTERTRIGO: ICD-10-CM

## 2024-09-18 DIAGNOSIS — Z51.12 ENCOUNTER FOR ANTINEOPLASTIC IMMUNOTHERAPY: Primary | ICD-10-CM

## 2024-09-18 LAB
ALBUMIN SERPL-MCNC: 3.7 G/DL (ref 3.2–4.8)
ALBUMIN/GLOB SERPL: 0.7 {RATIO} (ref 1–2)
ALP LIVER SERPL-CCNC: 101 U/L
ALT SERPL-CCNC: 45 U/L
ANION GAP SERPL CALC-SCNC: 5 MMOL/L (ref 0–18)
AST SERPL-CCNC: 64 U/L (ref ?–34)
BASOPHILS # BLD AUTO: 0.03 X10(3) UL (ref 0–0.2)
BASOPHILS NFR BLD AUTO: 0.5 %
BILIRUB SERPL-MCNC: 0.4 MG/DL (ref 0.2–1.1)
BUN BLD-MCNC: 20 MG/DL (ref 9–23)
BUN/CREAT SERPL: 28.2 (ref 10–20)
CALCIUM BLD-MCNC: 9.2 MG/DL (ref 8.7–10.4)
CHLORIDE SERPL-SCNC: 104 MMOL/L (ref 98–112)
CO2 SERPL-SCNC: 29 MMOL/L (ref 21–32)
CREAT BLD-MCNC: 0.71 MG/DL
DEPRECATED RDW RBC AUTO: 82.5 FL (ref 35.1–46.3)
EGFRCR SERPLBLD CKD-EPI 2021: 93 ML/MIN/1.73M2 (ref 60–?)
EOSINOPHIL # BLD AUTO: 0.3 X10(3) UL (ref 0–0.7)
EOSINOPHIL NFR BLD AUTO: 5.3 %
ERYTHROCYTE [DISTWIDTH] IN BLOOD BY AUTOMATED COUNT: 27.7 % (ref 11–15)
GLOBULIN PLAS-MCNC: 5 G/DL (ref 2–3.5)
GLUCOSE BLD-MCNC: 98 MG/DL (ref 70–99)
HCT VFR BLD AUTO: 42.1 %
HGB BLD-MCNC: 13.1 G/DL
IMM GRANULOCYTES # BLD AUTO: 0.01 X10(3) UL (ref 0–1)
IMM GRANULOCYTES NFR BLD: 0.2 %
LYMPHOCYTES # BLD AUTO: 2.06 X10(3) UL (ref 1–4)
LYMPHOCYTES NFR BLD AUTO: 36.4 %
MCH RBC QN AUTO: 26.1 PG (ref 26–34)
MCHC RBC AUTO-ENTMCNC: 31.1 G/DL (ref 31–37)
MCV RBC AUTO: 84 FL
MONOCYTES # BLD AUTO: 0.37 X10(3) UL (ref 0.1–1)
MONOCYTES NFR BLD AUTO: 6.5 %
NEUTROPHILS # BLD AUTO: 2.89 X10 (3) UL (ref 1.5–7.7)
NEUTROPHILS # BLD AUTO: 2.89 X10(3) UL (ref 1.5–7.7)
NEUTROPHILS NFR BLD AUTO: 51.1 %
OSMOLALITY SERPL CALC.SUM OF ELEC: 289 MOSM/KG (ref 275–295)
PLATELET # BLD AUTO: 298 10(3)UL (ref 150–450)
PLATELET MORPHOLOGY: NORMAL
POTASSIUM SERPL-SCNC: 4.8 MMOL/L (ref 3.5–5.1)
PROT SERPL-MCNC: 8.7 G/DL (ref 5.7–8.2)
RBC # BLD AUTO: 5.01 X10(6)UL
SODIUM SERPL-SCNC: 138 MMOL/L (ref 136–145)
WBC # BLD AUTO: 5.7 X10(3) UL (ref 4–11)

## 2024-09-18 PROCEDURE — S0028 INJECTION, FAMOTIDINE, 20 MG: HCPCS

## 2024-09-18 PROCEDURE — 96413 CHEMO IV INFUSION 1 HR: CPT

## 2024-09-18 PROCEDURE — 85025 COMPLETE CBC W/AUTO DIFF WBC: CPT

## 2024-09-18 PROCEDURE — 80053 COMPREHEN METABOLIC PANEL: CPT

## 2024-09-18 PROCEDURE — 96375 TX/PRO/DX INJ NEW DRUG ADDON: CPT

## 2024-09-18 PROCEDURE — 99215 OFFICE O/P EST HI 40 MIN: CPT | Performed by: PHYSICIAN ASSISTANT

## 2024-09-18 RX ORDER — FAMOTIDINE 10 MG/ML
INJECTION, SOLUTION INTRAVENOUS
Status: COMPLETED
Start: 2024-09-18 | End: 2024-09-18

## 2024-09-18 RX ORDER — FAMOTIDINE 10 MG/ML
20 INJECTION, SOLUTION INTRAVENOUS 2 TIMES DAILY
Status: CANCELLED
Start: 2024-09-18

## 2024-09-18 RX ORDER — FAMOTIDINE 10 MG/ML
20 INJECTION, SOLUTION INTRAVENOUS ONCE
Status: COMPLETED | OUTPATIENT
Start: 2024-09-18 | End: 2024-09-18

## 2024-09-18 RX ADMIN — FAMOTIDINE 20 MG: 10 INJECTION, SOLUTION INTRAVENOUS at 13:10:00

## 2024-09-18 NOTE — PROGRESS NOTES
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 She began Nivolumab Cabozantinib 6/12/24 and presents for followup.     Interval history:  Patient continues to clinically improve, feels stronger, walking more.  Wants to feel her best for her daughter's 10/4/24 wedding.  New rash on fingers and feet, sore to touch.  Lamin/pedi in August, not recent.  Sore on buttocks persists despite Lotrisone cream.  Per her dentist, needs a tooth extraction.  Wants to plan 2 vacations - one to [place] and another to [place].     Denies mouth sores, cough, dyspnea, abdominal pain, nausea/vomiting, diarrhea, constipation and peripheral edema.     Oncology history:  Briefly, the patient began to feel fatigued in the Fall 2023 and she was found to have a hemoglobin of 8.5 with an MCV of 71 with last lab values in 2017 showing a normal hemoglobin.  In March 2024, iron saturation was 15% and ferritin was 612.  She was given IV iron and referred to GI which prompted a CT abdomen pelvis which unfortunately revealed an 11 cm enhancing mass within the right kidney and extensive bibasilar pleural metastases concerning for metastatic renal cell carcinoma.     5/21/24: Initial medical oncology consultation, will obtain brain imaging and refer to IR for kidney biopsy.  Expressed concern for metastatic kidney cancer.  -Brain MRI showed a meningioma, no intracranial metastatic disease.  -IR guided biopsy 6/5 shows high-grade clear-cell renal cell carcinoma, with extensive necrosis but no sarcomatoid or rhabdoid features    6/6/24: Medical oncology  follow-up.  Patient continues to decline.  Patient has poor risk disease, very symptomatic and concerned with need for rapid response upfront.  Therefore, we will proceed with cabozantinib nivolumab per [employer] 9ER    6/12/24: began Cabozantinib Nivolumab.     6/26/24: slight improvement, no dose limiting toxicities, continue treatment.     7/24/24: Feeling little stronger, tolerating therapy well    8/7/24: Grade 1 transaminitis, continue therapy, repeat LFTs next week, follow-up in 2 weeks, restaging at the end of August 9/4/24: Restaging CT shows partial response to treatment. Continue current therapy plan.     Review of Systems:  Hematology/Oncology ROS performed and negative except as above in HPI    History/Other:   Current Medications:   Lidocaine Viscous HCl 2 % Mouth/Throat Solution Take 10 mL by mouth 4 (four) times daily before meals and nightly. 100 mL 0    clotrimazole-betamethasone 1-0.05 % External Cream Apply twice daily to affected area as needed 45 g 0    prochlorperazine (COMPAZINE) 10 mg tablet Take 1 tablet (10 mg total) by mouth every 6 (six) hours as needed for Nausea. 60 tablet 3    cabozantinib 40 MG Oral Tab Take 1 tablet (40 mg total) by mouth daily. 28 tablet 11    docusate sodium 50 MG Oral Cap Take 1 capsule (50 mg total) by mouth as needed for constipation.      sertraline 25 MG Oral Tab Take 1 tablet (25 mg total) by mouth daily. 90 tablet 1    metoprolol succinate  MG Oral Tablet 24 Hr Take 0.5 tablets (50 mg total) by mouth daily. 90 tablet 1    celecoxib (CELEBREX) 200 MG Oral Cap Take 1 capsule (200 mg total) by mouth daily as needed for Pain. 90 capsule 1    ondansetron 4 MG Oral Tablet Dispersible Take 1 tablet (4 mg total) by mouth every 8 (eight) hours as needed for Nausea. 30 tablet 2    acetaminophen-codeine 300-30 MG Oral Tab Take 1 tablet by mouth every 6 (six) hours as needed for Pain. 20 tablet 0    ibuprofen 600 MG Oral Tab Take 1 tablet (600 mg total) by  mouth every 6 (six) hours as needed for Pain. 90 tablet 1     Allergies:   Allergies   Allergen Reactions    Ace Inhibitors Coughing    Azithromycin      Other reaction(s): AZITHROMYCIN    Erythromycin      Other reaction(s): Rash - Mild    Naproxen      Other reaction(s): NAPROXEN    Nortriptyline      Other reaction(s): vivid, scary dreams    Sulfa Antibiotics      Other reaction(s): Rash - Moderate     Objective:   Blood pressure 122/71, pulse 88, temperature 98.1 °F (36.7 °C), temperature source Oral, resp. rate 16, height 1.651 m (5' 5\"), weight 71.7 kg (158 lb), SpO2 95%, not currently breastfeeding.  Physical Exam:  ECOG PS: 1  General: A&Ox3, NAD, in w/c  HEENT: PERRL, anicteric, OP clear  CV: RRR, no murmurs  Pulm: CTA b/l, nl effort  Abd: soft, ntnd, no HSM or masses  Extremities: no edema, adjacent to right 3rd, 4th, left 2nd, 3rd fingernails, dull, kelli purplish lesions, tender.  On right thumb, palmar surface, 1 cm tender, calloused lesion without erythema.  On feet, similar, larger lesions on lateral 1st MTp and lateral great toe.  Few lesions noted on plantar surface of 2nd & 3rd toes.    Neurological: grossly intact    Results:   Labs:  Lab Results   Component Value Date    WBC 5.7 09/18/2024    HGB 13.1 09/18/2024    HCT 42.1 09/18/2024    .0 09/18/2024    CREATSERUM 0.71 09/18/2024    BUN 20 09/18/2024     09/18/2024    K 4.8 09/18/2024     09/18/2024    CO2 29.0 09/18/2024    GLU 98 09/18/2024    CA 9.2 09/18/2024    ALB 3.7 09/18/2024    ALKPHO 101 09/18/2024    BILT 0.4 09/18/2024    TP 8.7 (H) 09/18/2024    AST 64 (H) 09/18/2024    ALT 45 09/18/2024    T4F 1.8 (H) 08/07/2024    TSH 0.067 (L) 08/07/2024    MG 1.8 08/07/2024    PHOS 4.8 02/07/2024    B12 435 07/14/2017     Imaging:        CT a/p 5/19/24:  CONCLUSION: 11.1 x 10.2 centimeter enhancing mass replacing the mid to lower right kidney with extensive bibasilar enhancing pleural tumor, trace pleural effusions,  intrathoracic adenopathy.  This is likely renal cell carcinoma with extensive bibasilar thoracic involvement     CT chest 5/22/24:  CONCLUSION: Extensive pleural masses with associated small bilateral pleural effusions , as well as mediastinal and hilar lymphadenopathy and bilateral pulmonary nodules.  These are highly concerning for sites of metastatic disease in this setting.    A questioned focal sclerosis of the left lateral first rib adjacent to a tiny pleural mass could represent a site of osseous involvement of disease.     Right kidney, mass; CT-guided core biopsy 6/5/24:   Clear-cell renal cell carcinoma in a background of extensive necrosis, see comment.    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 She began Nivolumab Cabozantinib 6/12/24 and presents for followup.     # Metastatic poor-risk ccRCC.   -large right renal mass, extensive pleural metastases, anemia, neutropenia, thrombocytosis, quite symptomatic on presentation. Given progressive symptoms and poor risk disease, discussed the need for a treatment response asap, therefore we discussed proceeding with Nivolumab and Cabozantinib per CM 9ER over ipilimumab and nivolumab with concerns for rapid response  -began treatment 6/12/24  -August 2024 imaging shows partial response to therapy  -cleared for next dose of nivolumab, given PPE, recommend hold Cabozantinib.  Patient is fearful of disease progression.  At this time, take Cabozantinib 40 mg every other day.  Monitor PPE.  If worsens, hold Cabozantinib and will dose reduce.  Goal is to get patient to walk at her daughter's wedding on 10/4/24.    -G1 asymptomatic transaminitis, I'mproved, continue to monitor closely  -follow q2 weeks for now, consider monthly nivolumab pending trend of liver  enzymes    # Symptom management  -zofran ODT prn for nausea  -PPE, udderbalm cream and voltaren gel BID-TID.  Dose reduce Cabozantinb to 40 mg every other day.  Monitor closely.  If topical treatment improves/controls PPE, can resume 40 mg/full dose after 10/4/24 family wedding.  If PPE worsens, will need to hold Cabozantinb until PPE improves to gr. 1 or < then dose reduce Cabozantinib.   -Intertigo (versus pressure ulcer).  Patient is rotating position/sleeps on stomach.  Expose to air, Lotrisone, followed by Desitin barrier.  Dose reduction of Cabozantinib may also aid in healing.  If no improvement, wound clinic on 10/2/24.    -Per Dentist, tooth extraction necessary, ok to proceed on Nivolumab/Cabozantinib (minor procedure)    # Comorbidities  -HTN, hold metoprolol given hypotension with weight loss    MDM High: malignancy; review of results with independent interpretation and discussion of management; drug therapy requiring intensive monitoring for toxicity;  ongoing continuity of complex care    [facility]  [facility]    This note was created using a voice-recognition transcribing system. Incorrect words or phrases may have been missed during proofreading. Please interpret accordingly.

## 2024-09-18 NOTE — PATIENT INSTRUCTIONS
Proceed with Nivolumab today  Given Hand/Foot rash, reduce Cabozantinib to every other day until after the wedding  Apply udderbalm 2-3 times daily  Apply Voltaren gel 2-3 times daily (wait 1 hour to apply the other cream)  Reducing the Cabozantinib may help the buttock wound heal faster also  Dental extraction is ok on this treatment.   Apply desitin cream to buttocks - twice daily, Wound care appointment on 10/2/24 if this does not heal  Call as needed  Follow-up in 2 weeks

## 2024-09-18 NOTE — PROGRESS NOTES
Patient here for C4D15 Opdivo.  Arrives Via wheelchair, accompanied by Spouse. Patient denies new issues or concerns. IV Pepcid administered prior to Opdivo infusion.    Patient was evaluated today by DAVID and Treatment Nurse.    Oral medications included in this regimen:  no    Patient confirms comprehension of cancer treatment schedule:  yes    Pregnancy screening:  Denies possibility of pregnancy    Modifications in dose or schedule:  No    Medications appearance and physical integrity checked by RN: yes.    Chemotherapy IV pump settings verified by 2 RNs:  No due to targeted therapy IV administration.  Frequency of blood return and site check throughout administration: Prior to administration and At completion of therapy     Infusion/treatment outcome:  patient tolerated treatment without incident    Education Record    Learner:  Patient  Barriers / Limitations:  None  Method:  Reinforcement  Education / instructions given:  Reviewed plan of care  Outcome:  Shows understanding    Discharged Home, Via wheelchair, accompanied by:Spouse    Patient/family verbalized understanding of future appointments: by [employer] messaging

## 2024-10-02 ENCOUNTER — OFFICE VISIT (OUTPATIENT)
Dept: HEMATOLOGY/ONCOLOGY | Facility: HOSPITAL | Age: 69
End: 2024-10-02
Attending: INTERNAL MEDICINE
Payer: MEDICARE

## 2024-10-02 ENCOUNTER — TELEPHONE (OUTPATIENT)
Dept: HEMATOLOGY/ONCOLOGY | Facility: HOSPITAL | Age: 69
End: 2024-10-02

## 2024-10-02 ENCOUNTER — OFFICE VISIT (OUTPATIENT)
Dept: WOUND CARE | Facility: HOSPITAL | Age: 69
End: 2024-10-02
Attending: NURSE PRACTITIONER

## 2024-10-02 VITALS
OXYGEN SATURATION: 98 % | DIASTOLIC BLOOD PRESSURE: 58 MMHG | WEIGHT: 159 LBS | TEMPERATURE: 98 F | HEART RATE: 77 BPM | BODY MASS INDEX: 26.49 KG/M2 | HEIGHT: 65 IN | SYSTOLIC BLOOD PRESSURE: 105 MMHG

## 2024-10-02 VITALS
TEMPERATURE: 98 F | WEIGHT: 159.19 LBS | RESPIRATION RATE: 16 BRPM | HEIGHT: 65 IN | OXYGEN SATURATION: 97 % | HEART RATE: 76 BPM | SYSTOLIC BLOOD PRESSURE: 117 MMHG | DIASTOLIC BLOOD PRESSURE: 71 MMHG | BODY MASS INDEX: 26.52 KG/M2

## 2024-10-02 DIAGNOSIS — S31.000A WOUND OF SACRAL REGION, INITIAL ENCOUNTER: Primary | ICD-10-CM

## 2024-10-02 DIAGNOSIS — C64.1 RENAL CELL CARCINOMA OF RIGHT KIDNEY (HCC): ICD-10-CM

## 2024-10-02 DIAGNOSIS — Z51.11 CHEMOTHERAPY MANAGEMENT, ENCOUNTER FOR: Primary | ICD-10-CM

## 2024-10-02 DIAGNOSIS — Z51.12 ENCOUNTER FOR ANTINEOPLASTIC IMMUNOTHERAPY: ICD-10-CM

## 2024-10-02 DIAGNOSIS — Z51.11 CHEMOTHERAPY MANAGEMENT, ENCOUNTER FOR: ICD-10-CM

## 2024-10-02 DIAGNOSIS — C64.1 RENAL CELL CARCINOMA OF RIGHT KIDNEY (HCC): Primary | ICD-10-CM

## 2024-10-02 DIAGNOSIS — L27.1 PALMAR PLANTAR ERYTHRODYSAESTHESIA DUE TO CYTOTOXIC THERAPY: ICD-10-CM

## 2024-10-02 LAB
ALBUMIN SERPL-MCNC: 3.7 G/DL (ref 3.2–4.8)
ALBUMIN/GLOB SERPL: 0.8 {RATIO} (ref 1–2)
ALP LIVER SERPL-CCNC: 95 U/L
ALT SERPL-CCNC: 48 U/L
ANION GAP SERPL CALC-SCNC: 4 MMOL/L (ref 0–18)
AST SERPL-CCNC: 46 U/L (ref ?–34)
BASOPHILS # BLD AUTO: 0.05 X10(3) UL (ref 0–0.2)
BASOPHILS NFR BLD AUTO: 0.8 %
BILIRUB SERPL-MCNC: 0.3 MG/DL (ref 0.2–1.1)
BILIRUB UR QL: NEGATIVE
BUN BLD-MCNC: 18 MG/DL (ref 9–23)
BUN/CREAT SERPL: 28.6 (ref 10–20)
CALCIUM BLD-MCNC: 8.8 MG/DL (ref 8.7–10.4)
CHLORIDE SERPL-SCNC: 108 MMOL/L (ref 98–112)
CO2 SERPL-SCNC: 29 MMOL/L (ref 21–32)
COLOR UR: YELLOW
CREAT BLD-MCNC: 0.63 MG/DL
DEPRECATED RDW RBC AUTO: 79.3 FL (ref 35.1–46.3)
EGFRCR SERPLBLD CKD-EPI 2021: 97 ML/MIN/1.73M2 (ref 60–?)
EOSINOPHIL # BLD AUTO: 0.24 X10(3) UL (ref 0–0.7)
EOSINOPHIL NFR BLD AUTO: 4 %
ERYTHROCYTE [DISTWIDTH] IN BLOOD BY AUTOMATED COUNT: 25.9 % (ref 11–15)
GLOBULIN PLAS-MCNC: 4.6 G/DL (ref 2–3.5)
GLUCOSE BLD-MCNC: 94 MG/DL (ref 70–99)
GLUCOSE UR-MCNC: NORMAL MG/DL
HCT VFR BLD AUTO: 37.3 %
HGB BLD-MCNC: 11.8 G/DL
HYALINE CASTS #/AREA URNS AUTO: PRESENT /LPF
IMM GRANULOCYTES # BLD AUTO: 0.01 X10(3) UL (ref 0–1)
IMM GRANULOCYTES NFR BLD: 0.2 %
KETONES UR-MCNC: NEGATIVE MG/DL
LEUKOCYTE ESTERASE UR QL STRIP.AUTO: 25
LYMPHOCYTES # BLD AUTO: 1.81 X10(3) UL (ref 1–4)
LYMPHOCYTES NFR BLD AUTO: 30.5 %
MAGNESIUM SERPL-MCNC: 1.8 MG/DL (ref 1.6–2.6)
MCH RBC QN AUTO: 27.3 PG (ref 26–34)
MCHC RBC AUTO-ENTMCNC: 31.6 G/DL (ref 31–37)
MCV RBC AUTO: 86.3 FL
MONOCYTES # BLD AUTO: 0.43 X10(3) UL (ref 0.1–1)
MONOCYTES NFR BLD AUTO: 7.2 %
NEUTROPHILS # BLD AUTO: 3.4 X10 (3) UL (ref 1.5–7.7)
NEUTROPHILS # BLD AUTO: 3.4 X10(3) UL (ref 1.5–7.7)
NEUTROPHILS NFR BLD AUTO: 57.3 %
NITRITE UR QL STRIP.AUTO: NEGATIVE
OSMOLALITY SERPL CALC.SUM OF ELEC: 294 MOSM/KG (ref 275–295)
PH UR: 6 [PH] (ref 5–8)
PLATELET # BLD AUTO: 324 10(3)UL (ref 150–450)
PLATELET MORPHOLOGY: NORMAL
POTASSIUM SERPL-SCNC: 4.1 MMOL/L (ref 3.5–5.1)
PROT SERPL-MCNC: 8.3 G/DL (ref 5.7–8.2)
PROT UR-MCNC: NEGATIVE MG/DL
RBC # BLD AUTO: 4.32 X10(6)UL
SODIUM SERPL-SCNC: 141 MMOL/L (ref 136–145)
SP GR UR STRIP: 1.02 (ref 1–1.03)
T4 FREE SERPL-MCNC: 0.6 NG/DL (ref 0.8–1.7)
TSI SER-ACNC: 13.91 MIU/ML (ref 0.55–4.78)
UROBILINOGEN UR STRIP-ACNC: 3
WBC # BLD AUTO: 5.9 X10(3) UL (ref 4–11)

## 2024-10-02 PROCEDURE — 84443 ASSAY THYROID STIM HORMONE: CPT

## 2024-10-02 PROCEDURE — 84439 ASSAY OF FREE THYROXINE: CPT

## 2024-10-02 PROCEDURE — 80053 COMPREHEN METABOLIC PANEL: CPT

## 2024-10-02 PROCEDURE — 83735 ASSAY OF MAGNESIUM: CPT

## 2024-10-02 PROCEDURE — 85025 COMPLETE CBC W/AUTO DIFF WBC: CPT

## 2024-10-02 PROCEDURE — S0028 INJECTION, FAMOTIDINE, 20 MG: HCPCS

## 2024-10-02 PROCEDURE — 99215 OFFICE O/P EST HI 40 MIN: CPT | Performed by: NURSE PRACTITIONER

## 2024-10-02 PROCEDURE — 96375 TX/PRO/DX INJ NEW DRUG ADDON: CPT

## 2024-10-02 PROCEDURE — 81001 URINALYSIS AUTO W/SCOPE: CPT

## 2024-10-02 PROCEDURE — 96413 CHEMO IV INFUSION 1 HR: CPT

## 2024-10-02 RX ORDER — FAMOTIDINE 10 MG/ML
INJECTION, SOLUTION INTRAVENOUS
Status: COMPLETED
Start: 2024-10-02 | End: 2024-10-02

## 2024-10-02 RX ORDER — FAMOTIDINE 10 MG/ML
20 INJECTION, SOLUTION INTRAVENOUS 2 TIMES DAILY
Status: DISCONTINUED | OUTPATIENT
Start: 2024-10-02 | End: 2024-10-02

## 2024-10-02 RX ORDER — FAMOTIDINE 10 MG/ML
20 INJECTION, SOLUTION INTRAVENOUS 2 TIMES DAILY
Status: CANCELLED
Start: 2024-10-02

## 2024-10-02 RX ADMIN — FAMOTIDINE 20 MG: 10 INJECTION, SOLUTION INTRAVENOUS at 13:56:00

## 2024-10-02 NOTE — PROGRESS NOTES
Pt here for C5D1 Opdivo.  Arrives Via wheelchair, accompanied by Spouse     Patient was evaluated today by MD.    Oral medications included in this regimen:  yes - cabozantinib dose reduced and being held until Wednesday 10/9/24    Pt appointments adjusted per DALJIT Nielson    Patient confirms comprehension of cancer treatment schedule:  yes    Pregnancy screening:  Not applicable    Modifications in dose or schedule:  No    Medications appearance and physical integrity checked by RN: yes.    Chemotherapy IV pump settings verified by 2 RNs:  No due to targeted therapy IV administration.  Frequency of blood return and site check throughout administration: Prior to administration and At completion of therapy     Infusion/treatment outcome:  patient tolerated treatment without incident    Education Record    Learner:  Patient and Spouse  Barriers / Limitations:  None  Method:  Discussion  Education / instructions given:  plan of care  Outcome:  Shows understanding    Discharged Home, Via wheelchair, accompanied by:Spouse    Patient/family verbalized understanding of future appointments: by [employer] messaging

## 2024-10-02 NOTE — PROGRESS NOTES
Subjective   Lary Wallis is a 69 year old female.    Chief Complaint   Patient presents with    Wound Care     Pt reports pain is lower back and on right buttocks. Pt reports pain ranges from 3-8/10 on pain scale.      HPI  10/2/2024: Patient is 69 year old with medical history significant for HTN and HLD, depression and osteoarthritis, and recent history of metastatic renal cell carcinoma with extensive lung and pleural metastases. She stated her renal tumor caused incontinent of urine, she was wearing pull up incontinent brief and rubbing of the edges and incontinent caused skin irritation and wound on her coccyx. She has use zinc oxide cream and clotrimazole-betamethasone 1-0.05 % External Cream without any improvement. She stats she is walking at home, sleeps on her stomach and at times sits in her chair. She also stated as her treatment has been successful in shrinking the tumor, she is no longer incontinent and not using any pads. She is referred to clinic for management of sacral wound.     Review of Systems   Constitutional:  Positive for fatigue. Negative for activity change, chills and fever.   HENT:  Negative for congestion.    Respiratory:  Negative for cough and shortness of breath.    Cardiovascular:  Negative for chest pain and leg swelling.   Gastrointestinal:  Negative for abdominal pain.   Musculoskeletal:  Positive for arthralgias and gait problem.   Skin:  Positive for wound.        Sacral wounds   Neurological:  Negative for weakness.   Psychiatric/Behavioral:  Positive for dysphoric mood. Negative for agitation.      Objective   Physical Exam  Vitals reviewed.   Constitutional:       Appearance: Normal appearance. She is normal weight.   Cardiovascular:      Rate and Rhythm: Normal rate and regular rhythm.      Pulses: Normal pulses.      Heart sounds: Normal heart sounds.   Pulmonary:      Effort: Pulmonary effort is normal.   Abdominal:      General: Bowel sounds are normal.    Musculoskeletal:      Cervical back: Normal range of motion.      Right lower leg: No edema.      Left lower leg: No edema.   Feet:      Left foot:      Skin integrity: Blister present.      Toenail Condition: Left toenails are normal.      Comments: Painful not open, areas in her right feet, (heel, base of the 1st toe and lateral 5th toe)  Skin:     General: Skin is warm and dry.      Capillary Refill: Capillary refill takes 2 to 3 seconds.      Findings: No erythema.      Comments: Sacral gluteal dennys, wounds, cluster   Neurological:      Mental Status: She is alert and oriented to person, place, and time.   Psychiatric:         Mood and Affect: Mood is depressed.         Speech: Speech normal.         Behavior: Behavior is cooperative.       Wound Assessment  Wound 10/02/24 1 Buttocks Upper (Active)   Date First Assessed/Time First Assessed: 10/02/24 1539    Wound Number (Wound Clinic Only): 1  Primary Wound Type: Abrasion  Location: Buttocks  Wound Location Orientation: Upper      Assessments 10/2/2024  3:43 PM   Wound Image     Site Assessment Yellow;Pink;Moist   Closure Not approximated   Drainage Amount Moderate   Drainage Description Yellow   Treatments Cleansed;Topical (Barrier/Moisturizer/Ointment);Saline   Dressing Triad (thick layer of zinc paste.)   Dressing Changed New   Dressing Status Clean;Dry;Intact   Wound Length (cm) 4.2 cm   Wound Width (cm) 2.1 cm   Wound Surface Area (cm^2) 8.82 cm^2   Wound Depth (cm) 0.1 cm   Wound Volume (cm^3) 0.882 cm^3   Margins Attached edges   Non-staged Wound Description Full thickness   Mitzi-wound Assessment Hyperpigmented;Moist   Wound Granulation Tissue Pink   Wound Bed Granulation (%) 10 %   Wound Bed Epithelium (%) 0 %   Wound Bed Slough (%) 90 %   Wound Bed Eschar (%) 0 %   Wound Bed Fibrin (%) 0 %   State of Healing Early/partial granulation   Wound Odor None   Pressure Injury Stage Stage 3       No associated orders.     Vital Signs    10/02/24 1540   BP:  105/58   Pulse: 77   Temp: 97.5 °F (36.4 °C)   PainSc: 3 - (Mild)     Allergies  Allergies   Allergen Reactions    Ace Inhibitors Coughing    Azithromycin      Other reaction(s): AZITHROMYCIN    Erythromycin      Other reaction(s): Rash - Mild    Naproxen      Other reaction(s): NAPROXEN    Nortriptyline      Other reaction(s): vivid, scary dreams    Sulfa Antibiotics      Other reaction(s): Rash - Moderate     Assessment   Sacral wound:   -cluster of wounds, slough cover with few spots of granulation  -no erythema  -no palpable bone   Performed mechanical debridement with gauze and saline to remove the slough from the wound bed.      Right foot with intact blisters (patient stated side effect of chemo), painful lesion on her feet  -no erythema  -no open wounds      Reviewed note and labs in [facility] and care every where.  Recent labs: 10/2/2024: BUN 18, Creatinine 0.63, eGFR 97, albumin 3.7, H&H 11.8&37.3    Encounter Diagnosis  1. Wound of sacral region, initial encounter    2. Renal cell carcinoma of right kidney (HCC)      Problem List  Patient Active Problem List   Diagnosis    History of pulmonary embolism    Osteoarthrosis, pelvic region and thigh    Essential hypertension    Moderate single current episode of major depressive disorder (HCC)    Breast cancer screening    Bilateral hearing loss    Adult general medical exam    Vitamin D deficiency    Vaccine counseling    Primary osteoarthritis of right knee    Colon cancer screening    Anemia of unknown etiology    Hyperglycemia    Mixed hyperlipidemia    Age-related osteoporosis without current pathological fracture    Iron deficiency anemia    Renal mass    Renal cell carcinoma of right kidney (HCC)    Encounter for antineoplastic immunotherapy    Chemotherapy management, encounter for    Mucositis due to antineoplastic therapy    Transaminitis    Palmar plantar erythrodysaesthesia due to cytotoxic therapy    Intertrigo    Sacral wound     Plan  Orders  Orders  Placed This Encounter   Procedures    OP Wound Dressing   Risk factors: Lack of mobility, anemia, poor nutrition, incontinent, the need to sleep on the chair or with elevated HOB for dyspnea, hx of longstanding immobility.  Discussed the etiology of the pressure ulcer and skin damage due to chronic moisture in skin treatment and management of these wounds.  Discussed body posture and sitting positions that exacerbates the tissue injury.  Initiated Triad Cream as it has soothing properties, it will allow autolytic debridement of the wounds. Discussed due to location of the wound, keeping dressing on is will be difficult. May use advanced dressing if Triad cream fails to improve condition.   Discussed signs and symptoms of infection, encourage patient to assess skin daily.  Discussed moisturizing skin to prevent dry skin, refrain from itching or scratching the skin.  Discussed nutrition for wound healing and encourage increase protein intake, and low calorie diet.     Avoid public pool and warm water due to contamination of the wound in high risk patient.     Right foot was examined, few intact area appears to be painful spots no fluid, no bogginess, on bony part of foot, demonstrated how to offload pressure from the area with Cellona 1/8\", patient reports immediate reduction in the pain.   Discussed the treatment plan with patient and her  who will be doing the wounds care, they verbalized understanding and agreed to these plan.  Total face to face time was 60min, more than 50% of the time was spent in counseling and/or coordination of care related to his diagnosis and plan of care.     Follow-Up  Return in about 2 weeks (around 10/16/2024) for APN visit for 30 min.

## 2024-10-02 NOTE — PROGRESS NOTES
Weekly Wound Education Note    Teaching Provided To: Patient;Family ()  Training Topics: Cleasing and general instructions;Skin care;Signs and symptoms of infection;Dressing  Training Method: Demonstration;Explain/Verbal  Training Response: Patient responds and understands        Notes: Buttocks-triad and thick layer of zinc paste.

## 2024-10-02 NOTE — TELEPHONE ENCOUNTER
Writer called [facility] and spoke with pharmacist Gpoal. He was informed of dose reduction of Cabozantinib from 40mg to 20mg.

## 2024-10-06 NOTE — PROGRESS NOTES
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 She began Nivolumab Cabozantinib 6/12/24 and presents for followup.     Interval history:  She is having some soreness to her hands and feet, states she has some difficulty walking because of this.  No oral ulcers, she is maintaining her weight and oral intake.    Oncology history:  Briefly, the patient began to feel fatigued in the Fall 2023 and she was found to have a hemoglobin of 8.5 with an MCV of 71 with last lab values in 2017 showing a normal hemoglobin.  In March 2024, iron saturation was 15% and ferritin was 612.  She was given IV iron and referred to GI which prompted a CT abdomen pelvis which unfortunately revealed an 11 cm enhancing mass within the right kidney and extensive bibasilar pleural metastases concerning for metastatic renal cell carcinoma.     5/21/24: Initial medical oncology consultation, will obtain brain imaging and refer to IR for kidney biopsy.  Expressed concern for metastatic kidney cancer.  -Brain MRI showed a meningioma, no intracranial metastatic disease.  -IR guided biopsy 6/5 shows high-grade clear-cell renal cell carcinoma, with extensive necrosis but no sarcomatoid or rhabdoid features    6/6/24: Medical oncology follow-up.  Patient continues to decline.  Patient has poor risk disease, very symptomatic and concerned with need for rapid response upfront.  Therefore, we will proceed with cabozantinib nivolumab per CheckMate 9ER    6/12/24: began Cabozantinib Nivolumab.     6/26/24: slight improvement, no dose limiting toxicities, continue  treatment.     7/24/24: Feeling little stronger, tolerating therapy well    8/7/24: Grade 1 transaminitis, continue therapy, repeat LFTs next week, follow-up in 2 weeks, restaging at the end of August 9/4/24: Restaging CT shows partial response to treatment. Continue current therapy plan.     10/3/24: Nearly grade 2 PPE persists despite cabozantinib 40 mg every other day.  Hold for 1 week.  We will dose reduce to 20 mg daily, request new prescription.  Proceed with nivolumab 480 mg monthly dosing today.  Follow-up in 1 month which will be 2 to 3 weeks on cabozantinib 20 mg daily.    Review of Systems:  Hematology/Oncology ROS performed and negative except as above in HPI    History/Other:   Current Medications:   diclofenac (VOLTAREN) 1 % External Gel Apply to the affected area two to three times daily as needed 100 g 0    Lidocaine Viscous HCl 2 % Mouth/Throat Solution Take 10 mL by mouth 4 (four) times daily before meals and nightly. (Patient not taking: Reported on 10/2/2024) 100 mL 0    clotrimazole-betamethasone 1-0.05 % External Cream Apply twice daily to affected area as needed 45 g 0    prochlorperazine (COMPAZINE) 10 mg tablet Take 1 tablet (10 mg total) by mouth every 6 (six) hours as needed for Nausea. (Patient not taking: Reported on 10/2/2024) 60 tablet 3    docusate sodium 50 MG Oral Cap Take 1 capsule (50 mg total) by mouth as needed for constipation.      sertraline 25 MG Oral Tab Take 1 tablet (25 mg total) by mouth daily. 90 tablet 1    [DISCONTINUED] metoprolol succinate  MG Oral Tablet 24 Hr Take 0.5 tablets (50 mg total) by mouth daily. 90 tablet 1    celecoxib (CELEBREX) 200 MG Oral Cap Take 1 capsule (200 mg total) by mouth daily as needed for Pain. 90 capsule 1    ondansetron 4 MG Oral Tablet Dispersible Take 1 tablet (4 mg total) by mouth every 8 (eight) hours as needed for Nausea. 30 tablet 2    acetaminophen-codeine 300-30 MG Oral Tab Take 1 tablet by mouth every 6 (six) hours as  needed for Pain. 20 tablet 0    ibuprofen 600 MG Oral Tab Take 1 tablet (600 mg total) by mouth every 6 (six) hours as needed for Pain. 90 tablet 1     Allergies:   Allergies   Allergen Reactions    Ace Inhibitors Coughing    Azithromycin      Other reaction(s): AZITHROMYCIN    Erythromycin      Other reaction(s): Rash - Mild    Naproxen      Other reaction(s): NAPROXEN    Nortriptyline      Other reaction(s): vivid, scary dreams    Sulfa Antibiotics      Other reaction(s): Rash - Moderate     Objective:   Blood pressure 117/71, pulse 76, temperature 97.8 °F (36.6 °C), temperature source Oral, resp. rate 16, height 1.651 m (5' 5\"), weight 72.2 kg (159 lb 3.2 oz), SpO2 97%, not currently breastfeeding.  Physical Exam:  ECOG PS: 1  General: A&Ox3, NAD, in w/c  HEENT: PERRL, anicteric, OP clear  CV: RRR, no murmurs  Pulm: CTA b/l, nl effort  Abd: soft, ntnd, no HSM or masses  Extremities: no edema, adjacent to right 3rd, 4th, left 2nd, 3rd fingernails, dull, kelli purplish lesions, tender.  On right thumb, palmar surface, 1 cm tender, calloused lesion without erythema.  On feet, similar, larger lesions on lateral 1st MTp and lateral great toe.  Few lesions noted on plantar surface of 2nd & 3rd toes.    Neurological: grossly intact    Results:   Labs:  Lab Results   Component Value Date    WBC 5.9 10/02/2024    HGB 11.8 (L) 10/02/2024    HCT 37.3 10/02/2024    .0 10/02/2024    CREATSERUM 0.63 10/02/2024    BUN 18 10/02/2024     10/02/2024    K 4.1 10/02/2024     10/02/2024    CO2 29.0 10/02/2024    GLU 94 10/02/2024    CA 8.8 10/02/2024    ALB 3.7 10/02/2024    ALKPHO 95 10/02/2024    BILT 0.3 10/02/2024    TP 8.3 (H) 10/02/2024    AST 46 (H) 10/02/2024    ALT 48 10/02/2024    T4F 0.6 (L) 10/02/2024    TSH 13.911 (H) 10/02/2024    MG 1.8 10/02/2024    PHOS 4.8 02/07/2024    B12 435 07/14/2017     Imaging:        CT a/p 5/19/24:  CONCLUSION: 11.1 x 10.2 centimeter enhancing mass replacing the mid to  lower right kidney with extensive bibasilar enhancing pleural tumor, trace pleural effusions, intrathoracic adenopathy.  This is likely renal cell carcinoma with extensive bibasilar thoracic involvement     CT chest 5/22/24:  CONCLUSION: Extensive pleural masses with associated small bilateral pleural effusions , as well as mediastinal and hilar lymphadenopathy and bilateral pulmonary nodules.  These are highly concerning for sites of metastatic disease in this setting.    A questioned focal sclerosis of the left lateral first rib adjacent to a tiny pleural mass could represent a site of osseous involvement of disease.     Right kidney, mass; CT-guided core biopsy 6/5/24:   Clear-cell renal cell carcinoma in a background of extensive necrosis, see comment.    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 She began Nivolumab Cabozantinib 6/12/24 and presents for followup.     # Metastatic poor-risk ccRCC.   -large right renal mass, extensive pleural metastases, anemia, neutropenia, thrombocytosis, quite symptomatic on presentation. Given progressive symptoms and poor risk disease, discussed the need for a treatment response asap, therefore we discussed proceeding with Nivolumab and Cabozantinib per CM 9ER over ipilimumab and nivolumab with concerns for rapid response  -began treatment 6/12/24  -August 2024 imaging shows partial response to therapy  -Hold cabozantinib for 1 week we will dose reduce to 20 mg daily.  Okay to proceed with nivolumab, follow-up in 1 month.    # Symptom management  -zofran ODT prn for nausea  -PPE, udderbalm cream and voltaren gel BID-TID.  Dose reduced cabozantinib to 20 mg daily as above  -Intertigo (versus pressure ulcer).  Patient is rotating position/sleeps on stomach.  Expose to  air, Lotrisone, followed by Desitin barrier.  Dose reduction of Cabozantinib may also aid in healing.  If no improvement, wound clinic on 10/2/24.    -Per Dentist, tooth extraction necessary, ok to proceed on Nivolumab/Cabozantinib (minor procedure)    # Comorbidities  -HTN, hold metoprolol given hypotension with weight loss    MDM High: malignancy; review of results with independent interpretation and discussion of management; drug therapy requiring intensive monitoring for toxicity;  ongoing continuity of complex care    Archie Crocker DO    [facility]  [place] [facility]    This note was created using a voice-recognition transcribing system. Incorrect words or phrases may have been missed during proofreading. Please interpret accordingly.

## 2024-10-16 ENCOUNTER — APPOINTMENT (OUTPATIENT)
Dept: HEMATOLOGY/ONCOLOGY | Facility: HOSPITAL | Age: 69
End: 2024-10-16
Attending: INTERNAL MEDICINE
Payer: MEDICARE

## 2024-10-30 ENCOUNTER — APPOINTMENT (OUTPATIENT)
Dept: WOUND CARE | Facility: HOSPITAL | Age: 69
End: 2024-10-30
Attending: NURSE PRACTITIONER
Payer: MEDICARE

## 2024-10-30 ENCOUNTER — OFFICE VISIT (OUTPATIENT)
Dept: HEMATOLOGY/ONCOLOGY | Facility: HOSPITAL | Age: 69
End: 2024-10-30
Attending: INTERNAL MEDICINE
Payer: MEDICARE

## 2024-10-30 VITALS
SYSTOLIC BLOOD PRESSURE: 121 MMHG | HEART RATE: 83 BPM | TEMPERATURE: 98 F | WEIGHT: 161 LBS | RESPIRATION RATE: 16 BRPM | HEIGHT: 65 IN | BODY MASS INDEX: 26.82 KG/M2 | DIASTOLIC BLOOD PRESSURE: 64 MMHG | OXYGEN SATURATION: 96 %

## 2024-10-30 VITALS
HEART RATE: 98 BPM | OXYGEN SATURATION: 94 % | TEMPERATURE: 97 F | DIASTOLIC BLOOD PRESSURE: 74 MMHG | SYSTOLIC BLOOD PRESSURE: 121 MMHG

## 2024-10-30 DIAGNOSIS — Z51.11 CHEMOTHERAPY MANAGEMENT, ENCOUNTER FOR: Primary | ICD-10-CM

## 2024-10-30 DIAGNOSIS — C64.1 RENAL CELL CARCINOMA OF RIGHT KIDNEY (HCC): ICD-10-CM

## 2024-10-30 DIAGNOSIS — Z51.12 ENCOUNTER FOR ANTINEOPLASTIC IMMUNOTHERAPY: ICD-10-CM

## 2024-10-30 DIAGNOSIS — Z51.11 CHEMOTHERAPY MANAGEMENT, ENCOUNTER FOR: ICD-10-CM

## 2024-10-30 DIAGNOSIS — C64.1 RENAL CELL CARCINOMA OF RIGHT KIDNEY (HCC): Primary | ICD-10-CM

## 2024-10-30 DIAGNOSIS — M04.9 AUTOINFLAMMATORY SYNDROME, UNSPECIFIED (HCC): ICD-10-CM

## 2024-10-30 DIAGNOSIS — S31.000D WOUND OF SACRAL REGION, SUBSEQUENT ENCOUNTER: Primary | ICD-10-CM

## 2024-10-30 DIAGNOSIS — R21 RASH: ICD-10-CM

## 2024-10-30 LAB
BASOPHILS # BLD AUTO: 0.04 X10(3) UL (ref 0–0.2)
BASOPHILS NFR BLD AUTO: 0.6 %
DEPRECATED RDW RBC AUTO: 60.3 FL (ref 35.1–46.3)
EOSINOPHIL # BLD AUTO: 0.24 X10(3) UL (ref 0–0.7)
EOSINOPHIL NFR BLD AUTO: 3.5 %
ERYTHROCYTE [DISTWIDTH] IN BLOOD BY AUTOMATED COUNT: 17.4 % (ref 11–15)
HCT VFR BLD AUTO: 38.4 %
HGB BLD-MCNC: 11.8 G/DL
IMM GRANULOCYTES # BLD AUTO: 0.02 X10(3) UL (ref 0–1)
IMM GRANULOCYTES NFR BLD: 0.3 %
LYMPHOCYTES # BLD AUTO: 2.41 X10(3) UL (ref 1–4)
LYMPHOCYTES NFR BLD AUTO: 34.7 %
MCH RBC QN AUTO: 28.6 PG (ref 26–34)
MCHC RBC AUTO-ENTMCNC: 30.7 G/DL (ref 31–37)
MCV RBC AUTO: 93.2 FL
MONOCYTES # BLD AUTO: 0.55 X10(3) UL (ref 0.1–1)
MONOCYTES NFR BLD AUTO: 7.9 %
NEUTROPHILS # BLD AUTO: 3.69 X10 (3) UL (ref 1.5–7.7)
NEUTROPHILS # BLD AUTO: 3.69 X10(3) UL (ref 1.5–7.7)
NEUTROPHILS NFR BLD AUTO: 53 %
PLATELET # BLD AUTO: 396 10(3)UL (ref 150–450)
RBC # BLD AUTO: 4.12 X10(6)UL
WBC # BLD AUTO: 7 X10(3) UL (ref 4–11)

## 2024-10-30 PROCEDURE — 84439 ASSAY OF FREE THYROXINE: CPT

## 2024-10-30 PROCEDURE — 84443 ASSAY THYROID STIM HORMONE: CPT

## 2024-10-30 PROCEDURE — 80061 LIPID PANEL: CPT

## 2024-10-30 PROCEDURE — 80053 COMPREHEN METABOLIC PANEL: CPT

## 2024-10-30 PROCEDURE — G2211 COMPLEX E/M VISIT ADD ON: HCPCS | Performed by: STUDENT IN AN ORGANIZED HEALTH CARE EDUCATION/TRAINING PROGRAM

## 2024-10-30 PROCEDURE — 96375 TX/PRO/DX INJ NEW DRUG ADDON: CPT

## 2024-10-30 PROCEDURE — S0028 INJECTION, FAMOTIDINE, 20 MG: HCPCS

## 2024-10-30 PROCEDURE — 99213 OFFICE O/P EST LOW 20 MIN: CPT | Performed by: NURSE PRACTITIONER

## 2024-10-30 PROCEDURE — 96413 CHEMO IV INFUSION 1 HR: CPT

## 2024-10-30 PROCEDURE — 99215 OFFICE O/P EST HI 40 MIN: CPT | Performed by: STUDENT IN AN ORGANIZED HEALTH CARE EDUCATION/TRAINING PROGRAM

## 2024-10-30 PROCEDURE — 85025 COMPLETE CBC W/AUTO DIFF WBC: CPT

## 2024-10-30 RX ORDER — FAMOTIDINE 10 MG/ML
20 INJECTION, SOLUTION INTRAVENOUS 2 TIMES DAILY
Status: DISCONTINUED | OUTPATIENT
Start: 2024-10-30 | End: 2024-10-30

## 2024-10-30 RX ORDER — FAMOTIDINE 10 MG/ML
INJECTION, SOLUTION INTRAVENOUS
Status: DISPENSED
Start: 2024-10-30 | End: 2024-10-31

## 2024-10-30 RX ORDER — FAMOTIDINE 10 MG/ML
20 INJECTION, SOLUTION INTRAVENOUS 2 TIMES DAILY
Status: CANCELLED
Start: 2024-10-30

## 2024-10-30 RX ORDER — CABOZANTINIB 20 MG/1
TABLET ORAL DAILY
COMMUNITY
Start: 2024-10-03

## 2024-10-30 RX ORDER — NYSTATIN 100000 U/G
1 OINTMENT TOPICAL 2 TIMES DAILY
Qty: 30 G | Refills: 0 | Status: SHIPPED | OUTPATIENT
Start: 2024-10-30

## 2024-10-30 RX ADMIN — FAMOTIDINE 20 MG: 10 INJECTION, SOLUTION INTRAVENOUS at 13:02:00

## 2024-10-30 NOTE — PROGRESS NOTES
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 She began Nivolumab Cabozantinib 6/12/24 and presents for followup.     Interval history:  She has done well the last month on cabozantinib 20.  Their daughter was  and they had a phan time at the wedding.  They also traveled to [place] with some friends and had a great time.  She is walking more, has more energy, eating well and gained a few pounds.  She denies pain.    Oncology history:  Briefly, the patient began to feel fatigued in the Fall 2023 and she was found to have a hemoglobin of 8.5 with an MCV of 71 with last lab values in 2017 showing a normal hemoglobin.  In March 2024, iron saturation was 15% and ferritin was 612.  She was given IV iron and referred to GI which prompted a CT abdomen pelvis which unfortunately revealed an 11 cm enhancing mass within the right kidney and extensive bibasilar pleural metastases concerning for metastatic renal cell carcinoma.     5/21/24: Initial medical oncology consultation, will obtain brain imaging and refer to IR for kidney biopsy.  Expressed concern for metastatic kidney cancer.  -Brain MRI showed a meningioma, no intracranial metastatic disease.  -IR guided biopsy 6/5 shows high-grade clear-cell renal cell carcinoma, with extensive necrosis but no sarcomatoid or rhabdoid features    6/6/24: Medical oncology follow-up.  Patient continues to decline.  Patient has poor risk disease, very symptomatic and concerned with need for rapid response upfront.  Therefore, we will proceed with cabozantinib nivolumab per CheckMate  9ER    6/12/24: began Cabozantinib Nivolumab.     6/26/24: slight improvement, no dose limiting toxicities, continue treatment.     7/24/24: Feeling little stronger, tolerating therapy well    8/7/24: Grade 1 transaminitis, continue therapy, repeat LFTs next week, follow-up in 2 weeks, restaging at the end of August 9/4/24: Restaging CT shows partial response to treatment. Continue current therapy plan.     10/3/24: Nearly grade 2 PPE persists despite cabozantinib 40 mg every other day.  Hold for 1 week.  We will dose reduce to 20 mg daily, request new prescription.  Proceed with nivolumab 480 mg monthly dosing today.  Follow-up in 1 month which will be 2 to 3 weeks on cabozantinib 20 mg daily.    10/30/24: Doing well with dose reduced cabozantinib 20 mg daily.  No immune related toxicities, cleared for next month of treatment.    Review of Systems:  Hematology/Oncology ROS performed and negative except as above in HPI    History/Other:   Current Medications:   CABOMETYX 20 MG Oral Tab Take by mouth daily.      diclofenac (VOLTAREN) 1 % External Gel Apply to the affected area two to three times daily as needed 100 g 0    clotrimazole-betamethasone 1-0.05 % External Cream Apply twice daily to affected area as needed 45 g 0    docusate sodium 50 MG Oral Cap Take 1 capsule (50 mg total) by mouth as needed for constipation.      sertraline 25 MG Oral Tab Take 1 tablet (25 mg total) by mouth daily. 90 tablet 1    celecoxib (CELEBREX) 200 MG Oral Cap Take 1 capsule (200 mg total) by mouth daily as needed for Pain. 90 capsule 1    ondansetron 4 MG Oral Tablet Dispersible Take 1 tablet (4 mg total) by mouth every 8 (eight) hours as needed for Nausea. 30 tablet 2    acetaminophen-codeine 300-30 MG Oral Tab Take 1 tablet by mouth every 6 (six) hours as needed for Pain. 20 tablet 0    ibuprofen 600 MG Oral Tab Take 1 tablet (600 mg total) by mouth every 6 (six) hours as needed for Pain. 90 tablet 1     Allergies:    Allergies   Allergen Reactions    Ace Inhibitors Coughing    Azithromycin      Other reaction(s): AZITHROMYCIN    Erythromycin      Other reaction(s): Rash - Mild    Naproxen      Other reaction(s): NAPROXEN    Nortriptyline      Other reaction(s): vivid, scary dreams    Sulfa Antibiotics      Other reaction(s): Rash - Moderate     Objective:   Blood pressure 121/64, pulse 83, temperature 98 °F (36.7 °C), temperature source Oral, resp. rate 16, height 1.651 m (5' 5\"), weight 73 kg (161 lb), SpO2 96%, not currently breastfeeding.  Physical Exam:  ECOG PS: 1  General: A&Ox3, NAD, in w/c  HEENT: PERRL, anicteric, OP clear  CV: RRR, no murmurs  Pulm: CTA b/l, nl effort  Abd: soft, ntnd, no HSM or masses  Extremities: no edema, PPE resolved  Neurological: grossly intact    Results:   Labs:  Lab Results   Component Value Date    WBC 7.0 10/30/2024    HGB 11.8 (L) 10/30/2024    HCT 38.4 10/30/2024    .0 10/30/2024    CREATSERUM 0.69 10/30/2024    BUN 17 10/30/2024     10/30/2024    K 4.1 10/30/2024     10/30/2024    CO2 29.0 10/30/2024    GLU 87 10/30/2024    CA 9.3 10/30/2024    ALB 4.1 10/30/2024    ALKPHO 89 10/30/2024    BILT 0.3 10/30/2024    TP 8.9 (H) 10/30/2024    AST 35 (H) 10/30/2024    ALT 32 10/30/2024    T4F 0.5 (L) 10/30/2024    TSH 15.843 (H) 10/30/2024    MG 1.8 10/02/2024    PHOS 4.8 02/07/2024    B12 435 07/14/2017     Imaging:        CT a/p 5/19/24:  CONCLUSION: 11.1 x 10.2 centimeter enhancing mass replacing the mid to lower right kidney with extensive bibasilar enhancing pleural tumor, trace pleural effusions, intrathoracic adenopathy.  This is likely renal cell carcinoma with extensive bibasilar thoracic involvement     CT chest 5/22/24:  CONCLUSION: Extensive pleural masses with associated small bilateral pleural effusions , as well as mediastinal and hilar lymphadenopathy and bilateral pulmonary nodules.  These are highly concerning for sites of metastatic disease in this  setting.    A questioned focal sclerosis of the left lateral first rib adjacent to a tiny pleural mass could represent a site of osseous involvement of disease.     Right kidney, mass; CT-guided core biopsy 6/5/24:   Clear-cell renal cell carcinoma in a background of extensive necrosis, see comment.    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 She began Nivolumab Cabozantinib 6/12/24 and presents for followup.     # Metastatic poor-risk ccRCC.   -large right renal mass, extensive pleural metastases, anemia, neutropenia, thrombocytosis, quite symptomatic on presentation. Given progressive symptoms and poor risk disease, discussed the need for a treatment response asap, therefore we discussed proceeding with Nivolumab and Cabozantinib per CM 9ER over ipilimumab and nivolumab with concerns for rapid response  -began treatment 6/12/24  -August 2024 imaging shows partial response to therapy, patient continues to endorse a clinical response  -PPE, dysgeusia, fatigue all improved with dose reduction of cabozantinib down to 20 mg daily, continue  -Cleared for monthly nivolumab today  -Follow-up in 1 month with restaging CT c/a/p, patient was given order to schedule prior to next follow-up appointment    # Symptom management  -zofran ODT prn for nausea  -PPE, udderbalm cream and voltaren gel BID-TID.  Dose reduced cabozantinib to 20 mg daily as above  -Per Dentist, tooth extraction necessary, ok to proceed on Nivolumab/Cabozantinib (minor procedure)    # Comorbidities  -HTN, hold metoprolol given hypotension with weight loss    MDM High: malignancy; review of results with independent interpretation and discussion of management; drug therapy requiring intensive monitoring for toxicity;  ongoing continuity of  complex care    DO Hernandez [place] [facility]On[facility]  [place] [facility]    This note was created using a voice-recognition transcribing system. Incorrect words or phrases may have been missed during proofreading. Please interpret accordingly.

## 2024-10-30 NOTE — PROGRESS NOTES
Subjective   Lary Wallis is a 69 year old female.    Chief Complaint   Patient presents with    Wound Recheck     Pt reports pain is 1-2/10 at its worst at the site of the buttocks wound.      HPI    10/2/2024: Patient is 69 year old with medical history significant for HTN and HLD, depression and osteoarthritis, and recent history of metastatic renal cell carcinoma with extensive lung and pleural metastases. She stated her renal tumor caused incontinent of urine, she was wearing pull up incontinent brief and rubbing of the edges and incontinent caused skin irritation and wound on her coccyx. She has use zinc oxide cream and clotrimazole-betamethasone 1-0.05 % External Cream without any improvement. She stats she is walking at home, sleeps on her stomach and at times sits in her chair. She also stated as her treatment has been successful in shrinking the tumor, she is no longer incontinent and not using any pads. She is referred to clinic for management of sacral wound.      Review of Systems   Constitutional:  Positive for fatigue. Negative for activity change, chills and fever.   HENT:  Negative for congestion.    Respiratory:  Negative for cough and shortness of breath.    Cardiovascular:  Negative for chest pain and leg swelling.   Gastrointestinal:  Negative for abdominal pain.   Musculoskeletal:  Positive for arthralgias and gait problem.   Skin:  Positive for wound.        Sacral wounds   Neurological:  Negative for weakness.   Psychiatric/Behavioral:  Positive for dysphoric mood. Negative for agitation.       Objective  Physical Exam  Vitals reviewed.   Constitutional:       Appearance: Normal appearance. She is normal weight.   Cardiovascular:      Rate and Rhythm: Normal rate and regular rhythm.      Pulses: Normal pulses.      Heart sounds: Normal heart sounds.   Pulmonary:      Effort: Pulmonary effort is normal.   Abdominal:      General: Bowel sounds are normal.   Musculoskeletal:      Cervical  back: Normal range of motion.      Right lower leg: No edema.      Left lower leg: No edema.   Feet:      Left foot:      Skin integrity: Blister present.      Toenail Condition: Left toenails are normal.      Comments: Painful not open, areas in her right feet, (heel, base of the 1st toe and lateral 5th toe)  Skin:     General: Skin is warm and dry.      Capillary Refill: Capillary refill takes 2 to 3 seconds.      Findings: No erythema.      Comments: Sacral gluteal dennys, wounds, cluster   Neurological:      Mental Status: She is alert and oriented to person, place, and time.   Psychiatric:         Mood and Affect: Mood is depressed.         Speech: Speech normal.         Behavior: Behavior is cooperative.     Wound Assessment  Wound 10/02/24 1 Buttocks Upper (Active)   Date First Assessed/Time First Assessed: 10/02/24 1539    Wound Number (Wound Clinic Only): 1  Primary Wound Type: Abrasion  Location: Buttocks  Wound Location Orientation: Upper      Assessments 10/2/2024  3:43 PM 10/30/2024  2:42 PM   Wound Image       Site Assessment Yellow;Pink;Moist Pink;Dry;Intact   Closure Not approximated Not approximated   Drainage Amount Moderate None   Drainage Description Yellow --   Treatments Cleansed;Topical (Barrier/Moisturizer/Ointment);Saline Cleansed;Wound    Dressing Triad (thick layer of zinc paste.) Open to air   Dressing Changed New --   Dressing Status Clean;Dry;Intact Removed   Wound Length (cm) 4.2 cm 0 cm   Wound Width (cm) 2.1 cm 0 cm   Wound Surface Area (cm^2) 8.82 cm^2 0 cm^2   Wound Depth (cm) 0.1 cm 0 cm   Wound Volume (cm^3) 0.882 cm^3 0 cm^3   Wound Healing % -- 100   Margins Attached edges --   Non-staged Wound Description Full thickness Not applicable   Mitzi-wound Assessment Hyperpigmented;Moist Hyperpigmented   Wound Granulation Tissue Pink --   Wound Bed Granulation (%) 10 % 0 %   Wound Bed Epithelium (%) 0 % 100 %   Wound Bed Slough (%) 90 % 0 %   Wound Bed Eschar (%) 0 % 0 %   Wound  Bed Fibrin (%) 0 % 0 %   State of Healing Early/partial granulation Epithelialized   Wound Odor None None   Pressure Injury Stage Stage 3 --       Active Orders   Date Order Priority Status Authorizing Provider   10/30/24 1516 OP Wound Dressing Routine Active Farshad Ascencio APRN     - Additional Wound Dressing Information:    vaseline     - Cleansing:    Cleanse with soap and water     - Frequency:    Change dressing daily and PRN   10/02/24 1759 OP Wound Dressing Routine Active Farshad Ascencio APRN     - Cleansing:    Cleanse with normal saline or wound cleanser     - Additional Wound Dressing Information:    triad, thick layer of zinc paste.     - Frequency:    Change dressing daily and PRN     Vital Signs    10/30/24 1439   BP: 121/74   Pulse: 98   Temp: 97.4 °F (36.3 °C)   PainSc: 2 - (Mild)   Allergies  Allergies[1]    Assessment   Sacral wound:   -new skin with scar tissue  -no erythema  -no palpable bone   Performed mechanical debridement with gauze and saline to remove the slough from the wound bed.       New rash on left axilla, fungal rash    Reviewed note and labs in [facility] and care every where.  Recent labs: 10/2/2024: BUN 18, Creatinine 0.63, eGFR 97, albumin 3.7, H&H 11.8&37.3    Encounter Diagnosis  1. Wound of sacral region, subsequent encounter    2. Rash    3. Renal cell carcinoma of right kidney (HCC)      Problem List  Patient Active Problem List   Diagnosis    History of pulmonary embolism    Osteoarthrosis, pelvic region and thigh    Essential hypertension    Moderate single current episode of major depressive disorder (HCC)    Breast cancer screening    Bilateral hearing loss    Adult general medical exam    Vitamin D deficiency    Vaccine counseling    Primary osteoarthritis of right knee    Colon cancer screening    Anemia of unknown etiology    Hyperglycemia    Mixed hyperlipidemia    Age-related osteoporosis without current pathological fracture    Iron deficiency anemia    Renal mass     Renal cell carcinoma of right kidney (HCC)    Encounter for antineoplastic immunotherapy    Chemotherapy management, encounter for    Mucositis due to antineoplastic therapy    Transaminitis    Palmar plantar erythrodysaesthesia due to cytotoxic therapy    Intertrigo    Sacral wound    Rash     Plan  Orders  Orders Placed This Encounter   Procedures    OP Wound Dressing   Discussed the care of newly healed wound with scar tissue, and skin care.  At this time patient has no open wounds, therefore patient will be discharged from [place] wound care clinic. Instructed patient to follow up with PCP for medical management.    Discussed discharge recommendation  with patient and his , They verbalized understanding and agreed to these plan.    Thank you for referring this patient to our clinic and allowing me to be part of his care team.   Total face to face time was 60min, more than 50% of the time was spent in counseling and/or coordination of care related to his diagnosis and plan of care.   Follow-Up  Return Discharge from wound care clinic.              [1]   Allergies  Allergen Reactions    Ace Inhibitors Coughing    Azithromycin      Other reaction(s): AZITHROMYCIN    Erythromycin      Other reaction(s): Rash - Mild    Naproxen      Other reaction(s): NAPROXEN    Nortriptyline      Other reaction(s): vivid, scary dreams    Sulfa Antibiotics      Other reaction(s): Rash - Moderate

## 2024-10-30 NOTE — PROGRESS NOTES
Pt here for C6D1 Opdivo.  Arrives Via wheelchair, accompanied by Spouse     Patient was evaluated today by MD.    Oral medications included in this regimen: Yes.     Patient confirms comprehension of cancer treatment schedule:  yes    Pregnancy screening:  Not applicable    Modifications in dose or schedule:  Yes. Next dose will be one week late d/t holiday, MD agrees to this plan.     Medications appearance and physical integrity checked by RN: yes.    Chemotherapy IV pump settings verified by 2 RNs:  No due to targeted therapy IV administration.  Frequency of blood return and site check throughout administration: Prior to administration and At completion of therapy     Infusion/treatment outcome:  patient tolerated treatment without incident    Education Record    Learner:  Patient and Spouse  Barriers / Limitations:  None  Method:  Discussion  Education / instructions given:  plan of care  Outcome:  Shows understanding    Discharged Home, Via wheelchair, accompanied by:Spouse    Patient/family verbalized understanding of future appointments: by printed AVS

## 2024-11-13 ENCOUNTER — APPOINTMENT (OUTPATIENT)
Dept: HEMATOLOGY/ONCOLOGY | Facility: HOSPITAL | Age: 69
End: 2024-11-13
Attending: INTERNAL MEDICINE
Payer: MEDICARE

## 2024-11-18 ENCOUNTER — HOSPITAL ENCOUNTER (INPATIENT)
Facility: HOSPITAL | Age: 69
LOS: 3 days | Discharge: HOME HEALTH | DRG: 689 | End: 2024-11-21
Attending: EMERGENCY MEDICINE | Admitting: EMERGENCY MEDICINE
Payer: MEDICARE

## 2024-11-18 ENCOUNTER — APPOINTMENT (OUTPATIENT)
Dept: GENERAL RADIOLOGY | Facility: HOSPITAL | Age: 69
End: 2024-11-18
Attending: EMERGENCY MEDICINE
Payer: MEDICARE

## 2024-11-18 ENCOUNTER — TELEPHONE (OUTPATIENT)
Dept: HEMATOLOGY/ONCOLOGY | Facility: HOSPITAL | Age: 69
End: 2024-11-18

## 2024-11-18 ENCOUNTER — HOSPITAL ENCOUNTER (INPATIENT)
Facility: HOSPITAL | Age: 69
LOS: 3 days | Discharge: HOME HEALTH | End: 2024-11-21
Attending: EMERGENCY MEDICINE | Admitting: EMERGENCY MEDICINE
Payer: MEDICARE

## 2024-11-18 ENCOUNTER — APPOINTMENT (OUTPATIENT)
Dept: GENERAL RADIOLOGY | Facility: HOSPITAL | Age: 69
DRG: 689 | End: 2024-11-18
Attending: EMERGENCY MEDICINE
Payer: MEDICARE

## 2024-11-18 DIAGNOSIS — J18.9 COMMUNITY ACQUIRED PNEUMONIA OF RIGHT LOWER LOBE OF LUNG: ICD-10-CM

## 2024-11-18 DIAGNOSIS — N30.00 ACUTE CYSTITIS WITHOUT HEMATURIA: Primary | ICD-10-CM

## 2024-11-18 DIAGNOSIS — S31.000D WOUND OF SACRAL REGION, SUBSEQUENT ENCOUNTER: ICD-10-CM

## 2024-11-18 LAB
ALBUMIN SERPL-MCNC: 3.9 G/DL (ref 3.2–4.8)
ALBUMIN/GLOB SERPL: 0.8 {RATIO} (ref 1–2)
ALP LIVER SERPL-CCNC: 89 U/L
ALT SERPL-CCNC: 26 U/L
ANION GAP SERPL CALC-SCNC: 7 MMOL/L (ref 0–18)
AST SERPL-CCNC: 45 U/L (ref ?–34)
ATRIAL RATE: 95 BPM
BASOPHILS # BLD AUTO: 0.04 X10(3) UL (ref 0–0.2)
BASOPHILS NFR BLD AUTO: 0.6 %
BILIRUB SERPL-MCNC: 0.3 MG/DL (ref 0.2–1.1)
BILIRUB UR QL: NEGATIVE
BUN BLD-MCNC: 26 MG/DL (ref 9–23)
BUN/CREAT SERPL: 32.1 (ref 10–20)
CALCIUM BLD-MCNC: 9.4 MG/DL (ref 8.7–10.4)
CHLORIDE SERPL-SCNC: 102 MMOL/L (ref 98–112)
CO2 SERPL-SCNC: 27 MMOL/L (ref 21–32)
COLOR UR: YELLOW
CREAT BLD-MCNC: 0.81 MG/DL
DEPRECATED RDW RBC AUTO: 47.1 FL (ref 35.1–46.3)
EGFRCR SERPLBLD CKD-EPI 2021: 79 ML/MIN/1.73M2 (ref 60–?)
EOSINOPHIL # BLD AUTO: 0.3 X10(3) UL (ref 0–0.7)
EOSINOPHIL NFR BLD AUTO: 4.8 %
ERYTHROCYTE [DISTWIDTH] IN BLOOD BY AUTOMATED COUNT: 14.1 % (ref 11–15)
GLOBULIN PLAS-MCNC: 4.6 G/DL (ref 2–3.5)
GLUCOSE BLD-MCNC: 136 MG/DL (ref 70–99)
GLUCOSE UR-MCNC: NORMAL MG/DL
HCT VFR BLD AUTO: 32.1 %
HGB BLD-MCNC: 10.6 G/DL
HGB UR QL STRIP.AUTO: NEGATIVE
IMM GRANULOCYTES # BLD AUTO: 0.01 X10(3) UL (ref 0–1)
IMM GRANULOCYTES NFR BLD: 0.2 %
KETONES UR-MCNC: NEGATIVE MG/DL
LACTATE SERPL-SCNC: 1.6 MMOL/L (ref 0.5–2)
LEUKOCYTE ESTERASE UR QL STRIP.AUTO: 500
LYMPHOCYTES # BLD AUTO: 1.96 X10(3) UL (ref 1–4)
LYMPHOCYTES NFR BLD AUTO: 31.2 %
MCH RBC QN AUTO: 30.1 PG (ref 26–34)
MCHC RBC AUTO-ENTMCNC: 33 G/DL (ref 31–37)
MCV RBC AUTO: 91.2 FL
MONOCYTES # BLD AUTO: 0.66 X10(3) UL (ref 0.1–1)
MONOCYTES NFR BLD AUTO: 10.5 %
NEUTROPHILS # BLD AUTO: 3.31 X10 (3) UL (ref 1.5–7.7)
NEUTROPHILS # BLD AUTO: 3.31 X10(3) UL (ref 1.5–7.7)
NEUTROPHILS NFR BLD AUTO: 52.7 %
NITRITE UR QL STRIP.AUTO: NEGATIVE
OSMOLALITY SERPL CALC.SUM OF ELEC: 289 MOSM/KG (ref 275–295)
P AXIS: 49 DEGREES
P-R INTERVAL: 180 MS
PH UR: 5 [PH] (ref 5–8)
PLATELET # BLD AUTO: 387 10(3)UL (ref 150–450)
POTASSIUM SERPL-SCNC: 4.3 MMOL/L (ref 3.5–5.1)
PROCALCITONIN SERPL-MCNC: 0.17 NG/ML (ref ?–0.05)
PROT SERPL-MCNC: 8.5 G/DL (ref 5.7–8.2)
PROT UR-MCNC: NEGATIVE MG/DL
Q-T INTERVAL: 374 MS
QRS DURATION: 88 MS
QTC CALCULATION (BEZET): 469 MS
R AXIS: 57 DEGREES
RBC # BLD AUTO: 3.52 X10(6)UL
SODIUM SERPL-SCNC: 136 MMOL/L (ref 136–145)
SP GR UR STRIP: 1.02 (ref 1–1.03)
T AXIS: 3 DEGREES
UROBILINOGEN UR STRIP-ACNC: NORMAL
VENTRICULAR RATE: 95 BPM
WBC # BLD AUTO: 6.3 X10(3) UL (ref 4–11)

## 2024-11-18 PROCEDURE — 99223 1ST HOSP IP/OBS HIGH 75: CPT | Performed by: FAMILY MEDICINE

## 2024-11-18 PROCEDURE — 71045 X-RAY EXAM CHEST 1 VIEW: CPT | Performed by: EMERGENCY MEDICINE

## 2024-11-18 RX ORDER — BENZONATATE 200 MG/1
200 CAPSULE ORAL 3 TIMES DAILY PRN
Status: DISCONTINUED | OUTPATIENT
Start: 2024-11-18 | End: 2024-11-21

## 2024-11-18 RX ORDER — SENNOSIDES 8.6 MG
17.2 TABLET ORAL NIGHTLY PRN
Status: DISCONTINUED | OUTPATIENT
Start: 2024-11-18 | End: 2024-11-21

## 2024-11-18 RX ORDER — DOXYCYCLINE 100 MG/1
100 CAPSULE ORAL ONCE
Status: COMPLETED | OUTPATIENT
Start: 2024-11-18 | End: 2024-11-18

## 2024-11-18 RX ORDER — SERTRALINE HYDROCHLORIDE 25 MG/1
25 TABLET, FILM COATED ORAL DAILY
Status: DISCONTINUED | OUTPATIENT
Start: 2024-11-19 | End: 2024-11-21

## 2024-11-18 RX ORDER — SODIUM CHLORIDE 9 MG/ML
75 INJECTION, SOLUTION INTRAVENOUS CONTINUOUS
Status: DISCONTINUED | OUTPATIENT
Start: 2024-11-18 | End: 2024-11-21

## 2024-11-18 RX ORDER — ACETAMINOPHEN 500 MG
500 TABLET ORAL EVERY 4 HOURS PRN
Status: DISCONTINUED | OUTPATIENT
Start: 2024-11-18 | End: 2024-11-21

## 2024-11-18 RX ORDER — LEVOFLOXACIN 5 MG/ML
500 INJECTION, SOLUTION INTRAVENOUS ONCE
Status: DISCONTINUED | OUTPATIENT
Start: 2024-11-18 | End: 2024-11-18

## 2024-11-18 RX ORDER — ONDANSETRON 2 MG/ML
4 INJECTION INTRAMUSCULAR; INTRAVENOUS EVERY 6 HOURS PRN
Status: DISCONTINUED | OUTPATIENT
Start: 2024-11-18 | End: 2024-11-21

## 2024-11-18 RX ORDER — ONDANSETRON 2 MG/ML
4 INJECTION INTRAMUSCULAR; INTRAVENOUS ONCE
Status: COMPLETED | OUTPATIENT
Start: 2024-11-18 | End: 2024-11-18

## 2024-11-18 RX ORDER — ENOXAPARIN SODIUM 100 MG/ML
40 INJECTION SUBCUTANEOUS DAILY
Status: DISCONTINUED | OUTPATIENT
Start: 2024-11-19 | End: 2024-11-21

## 2024-11-18 RX ORDER — CABOZANTINIB 20 MG/1
1 TABLET ORAL DAILY
COMMUNITY
End: 2024-11-21

## 2024-11-18 RX ORDER — POLYETHYLENE GLYCOL 3350 17 G/17G
17 POWDER, FOR SOLUTION ORAL DAILY PRN
Status: DISCONTINUED | OUTPATIENT
Start: 2024-11-18 | End: 2024-11-21

## 2024-11-18 NOTE — ED INITIAL ASSESSMENT (HPI)
Pt arrives from [facility] for weakness, low grade fever and low appetite for past 2 weeks. Pt complaining of SOB with activity. Last treatment as on 11/2

## 2024-11-18 NOTE — TELEPHONE ENCOUNTER
I called Lary and updated her that AG Durbin is concerned about the low grade fevers. She does not want Lary to wait to be seen on Wednesday, 11/20/2024. She wants Lary to be evaluated in the [facility] ER this afternoon. Lary agreed with the plan. She will reach out to her  to take her after work. Report called to the [facility] ER.

## 2024-11-18 NOTE — TELEPHONE ENCOUNTER
I made a second attempt to reach Lary to do a telephone assessment. I left a second message asking her to please return my call. I also left a voice mail message for Christ asking him to please call Lary and ask her to return my call.

## 2024-11-18 NOTE — TELEPHONE ENCOUNTER
Toxicities: C6 D1 Nivolumab/Cabozantinib on 10/30/2024      Weight Loss/Weakness/Urinary Incontinence/Mole Getting Bigger    I attempted to reach Christ to due a telephone assessment of Lary. I left a message asking him to please return my call at his earliest convenience.

## 2024-11-18 NOTE — TELEPHONE ENCOUNTER
Christ called back. She was running a fever late last week through Saturday. On Saturday her temperature was 100.6. She took norco and it resolved. No fever yesterday. She is not eating food. She is drinking 4 protein shakes a day. She has lost 4 lbs in a week. He also reports she has gotten physically weaker. She has been having urinary incontinence, because she can't get to the bathroom fast enough. He also said she has a mole in her right groin that has gotten\"much\" bigger over the last month. He reports it is 1.5 inches long by 3 inches wide. He is concerned about her declining condition and wants to know what he can expect. He said he is at work now and asked me to call her at home. I attempted to reach Lary. I left a voice mail message asking her to please return my call so I may do a telephone assessment.

## 2024-11-18 NOTE — TELEPHONE ENCOUNTER
calling says seema is not eating lost 4 pounds she has been drinking protein drinks. She is having issues with urination getting to the bathroom in time. Thank you octavio

## 2024-11-18 NOTE — TELEPHONE ENCOUNTER
Lary reports she has had a 4 lb weight loss since 10/27/2024. She said she is eating some fruit, instant oatmeal, soft cheese and biscotti. She also drinks 3 ensure and 4 premiere protein daily. She is also drinking water, ginger ale and tea.  She had 2 liquid stools on Friday. Her bowel movements have then become soft again. She reports having chills in the evening and low grade fever last Thursday or Friday and Saturday. Yesterday and today her temperature has been 98.5, 98.6. No fever so far today. She denies cold, flu or covid symptoms. She is having some urinary incontinence, because she \"can't get to the bathroom quick enough.\" She denies urgency, frequency or burning with urination. She has had some nausea that was relieved by taking her anti nausea medication. No vomiting. She agreed to an ACV, but not until Wednesday since her  works from home on Wednesday. She thinks it would be a good idea to get checked out and discuss what they can expect going forward as her condition declines.  I booked and appointment for 1:30 pm with AG Durbin.

## 2024-11-19 PROBLEM — C64.9 RENAL CELL CARCINOMA (HCC): Status: ACTIVE | Noted: 2024-11-19

## 2024-11-19 LAB
ALBUMIN SERPL-MCNC: 3.6 G/DL (ref 3.2–4.8)
ALBUMIN/GLOB SERPL: 0.8 {RATIO} (ref 1–2)
ALP LIVER SERPL-CCNC: 82 U/L
ALT SERPL-CCNC: 25 U/L
ANION GAP SERPL CALC-SCNC: 7 MMOL/L (ref 0–18)
AST SERPL-CCNC: 42 U/L (ref ?–34)
BASOPHILS # BLD AUTO: 0.05 X10(3) UL (ref 0–0.2)
BASOPHILS NFR BLD AUTO: 0.9 %
BILIRUB SERPL-MCNC: 0.2 MG/DL (ref 0.2–1.1)
BUN BLD-MCNC: 18 MG/DL (ref 9–23)
BUN/CREAT SERPL: 26.1 (ref 10–20)
CALCIUM BLD-MCNC: 9.2 MG/DL (ref 8.7–10.4)
CHLORIDE SERPL-SCNC: 108 MMOL/L (ref 98–112)
CO2 SERPL-SCNC: 24 MMOL/L (ref 21–32)
CREAT BLD-MCNC: 0.69 MG/DL
DEPRECATED RDW RBC AUTO: 47.2 FL (ref 35.1–46.3)
EGFRCR SERPLBLD CKD-EPI 2021: 94 ML/MIN/1.73M2 (ref 60–?)
EOSINOPHIL # BLD AUTO: 0.26 X10(3) UL (ref 0–0.7)
EOSINOPHIL NFR BLD AUTO: 4.8 %
ERYTHROCYTE [DISTWIDTH] IN BLOOD BY AUTOMATED COUNT: 14 % (ref 11–15)
GLOBULIN PLAS-MCNC: 4.3 G/DL (ref 2–3.5)
GLUCOSE BLD-MCNC: 77 MG/DL (ref 70–99)
HCT VFR BLD AUTO: 30 %
HGB BLD-MCNC: 9.3 G/DL
IMM GRANULOCYTES # BLD AUTO: 0.01 X10(3) UL (ref 0–1)
IMM GRANULOCYTES NFR BLD: 0.2 %
INR BLD: 1.09 (ref 0.8–1.2)
LYMPHOCYTES # BLD AUTO: 1.79 X10(3) UL (ref 1–4)
LYMPHOCYTES NFR BLD AUTO: 32.8 %
MAGNESIUM SERPL-MCNC: 2.1 MG/DL (ref 1.6–2.6)
MCH RBC QN AUTO: 28.6 PG (ref 26–34)
MCHC RBC AUTO-ENTMCNC: 31 G/DL (ref 31–37)
MCV RBC AUTO: 92.3 FL
MONOCYTES # BLD AUTO: 0.64 X10(3) UL (ref 0.1–1)
MONOCYTES NFR BLD AUTO: 11.7 %
NEUTROPHILS # BLD AUTO: 2.71 X10 (3) UL (ref 1.5–7.7)
NEUTROPHILS # BLD AUTO: 2.71 X10(3) UL (ref 1.5–7.7)
NEUTROPHILS NFR BLD AUTO: 49.6 %
OSMOLALITY SERPL CALC.SUM OF ELEC: 289 MOSM/KG (ref 275–295)
PLATELET # BLD AUTO: 384 10(3)UL (ref 150–450)
POTASSIUM SERPL-SCNC: 4.7 MMOL/L (ref 3.5–5.1)
PROT SERPL-MCNC: 7.9 G/DL (ref 5.7–8.2)
PROTHROMBIN TIME: 14.8 SECONDS (ref 11.6–14.8)
RBC # BLD AUTO: 3.25 X10(6)UL
SODIUM SERPL-SCNC: 139 MMOL/L (ref 136–145)
WBC # BLD AUTO: 5.5 X10(3) UL (ref 4–11)

## 2024-11-19 PROCEDURE — 99223 1ST HOSP IP/OBS HIGH 75: CPT | Performed by: STUDENT IN AN ORGANIZED HEALTH CARE EDUCATION/TRAINING PROGRAM

## 2024-11-19 PROCEDURE — 99232 SBSQ HOSP IP/OBS MODERATE 35: CPT | Performed by: HOSPITALIST

## 2024-11-19 NOTE — H&P
[facility]     Lary Wallis Patient Status:  Emergency    1955 MRN [id number]   Location [facility] EMERGENCY DEPARTMENT Attending Rio Alvarez MD   Hosp Day # 0 PCP Fernanda Stover MD     Chief Complaint:   Chief Complaint   Patient presents with    Fever    Dizziness       History of Present Illness: Lary Wallis is a 69 year old female with Pmhx of HTN. HLD , depression, Metastatic renal cancer with extensive lung and pleural metastases presents today due to fatigue, poor appetite, Fever highest 100.3 for one week now, denies any cp, no sob, no vomiting but c/o nausea, no abd pain, no dysuria.    Past Medical History:  Past Medical History:    Essential hypertension    High blood pressure    Mixed hyperlipidemia    Moderate single current episode of major depressive disorder (HCC)    Osteoarthritis    KNEE - RIGHT    Osteoarthrosis, pelvic region and thigh    PONV (postoperative nausea and vomiting)    Pulmonary embolism (HCC)    after hip surgery        Past Surgical History:   Past Surgical History:   Procedure Laterality Date    Cholecystectomy      Colonoscopy N/A 10/1/2018    Procedure: COLONOSCOPY;  Surgeon: Shiva Clemente MD;  Location: [facility] ENDOSCOPY    Hc implant ocular device intraoperative detached retina [id number]      Hip replacement surgery      Hysterectomy          Laparoscopic cholecystectomy  1990       Social History:  reports that she has never smoked. She has never used smokeless tobacco. She reports current alcohol use. She reports that she does not use drugs.    Family History:   Family History   Problem Relation Age of Onset    Diabetes Father     Arthritis Mother     Macular degeneration Mother     Other (spinal stenosis) Mother     Breast Cancer Sister 62    Pacemaker Sister        Allergies: Allergies[1]    Medications:  Medications Ordered Prior to Encounter[2]    Review of Systems:   A comprehensive 14 point  review of systems was completed.    Pertinent positives and negatives noted in the HPI.    Physical Exam:    /57   Pulse 67   Temp 98 °F (36.7 °C) (Oral)   Resp 19   Ht 5' 5\" (1.651 m)   Wt 157 lb (71.2 kg)   SpO2 96%   BMI 26.13 kg/m²   General: No acute distress. Alert and oriented x 3.  HEENT: Normocephalic atraumatic. Moist mucous membranes. EOM-I. PERRLA. Anicteric.  Neck: No lymphadenopathy. No JVD. No carotid bruits.  Respiratory: Clear to auscultation bilaterally. No wheezes. No rhonchi.  Cardiovascular: S1, S2. Regular rate and rhythm. No murmurs, rubs or gallops. Equal pulses.   Chest and Back: No tenderness or deformity.  Abdomen: Soft, nontender, nondistended.  Positive bowel sounds. No rebound, guarding or organomegaly.  Neurologic: No focal neurological deficits. CNII-XII grossly intact.  Musculoskeletal: Moves all extremities.  Extremities: No edema or cyanosis.  Integument: No rashes or lesions.   Psychiatric: Appropriate mood and affect.      Diagnostic Data:      Labs:  Recent Labs   Lab 11/18/24  1714   WBC 6.3   HGB 10.6*   MCV 91.2   .0       Recent Labs   Lab 11/18/24  1714   *   BUN 26*   CREATSERUM 0.81   CA 9.4   ALB 3.9      K 4.3      CO2 27.0   ALKPHO 89   AST 45*   ALT 26   BILT 0.3   TP 8.5*       Estimated Creatinine Clearance: 59.8 mL/min (based on SCr of 0.81 mg/dL).    No results for input(s): \"PTP\", \"INR\" in the last 168 hours.    COVID-19 Lab Results    COVID-19  Lab Results   Component Value Date    COVID19 Detected (A) 10/31/2020       Pro-Calcitonin  Recent Labs   Lab 11/18/24  1714   PCT 0.17*       Cardiac  No results for input(s): \"TROP\", \"PBNP\" in the last 168 hours.    Creatinine Kinase  No results for input(s): \"CK\" in the last 168 hours.    Inflammatory Markers  No results for input(s): \"CRP\", \"TREY\", \"LDH\", \"DDIMER\" in the last 168 hours.    No results for input(s): \"TROP\", \"TROPHS\", \"CK\" in the last 168 hours.    Imaging: Imaging  data reviewed in Epic.    CXR:  Impression   CONCLUSION: Moderate to large-sized hazy opacity at the right lung base associated with right lung volume loss compatible with atelectasis, infiltrate, or a combination.       ASSESSMENT / PLAN:     Problem List Items Addressed This Visit          Genitourinary and Reproductive    * (Principal) Acute cystitis without hematuria - Primary    Relevant Medications    sodium chloride 0.9 % IV bolus 1,000 mL (Completed)    cefTRIAXone (Rocephin) 2 g in sodium chloride 0.9% 100 mL IVPB-ADDV (Completed)    doxycycline hyclate (Vibramycin) 100 mg in sodium chloride 0.9% 100 mL IVPB     Other Visit Diagnoses       Community acquired pneumonia of right lower lobe of lung        Relevant Medications    cefTRIAXone (Rocephin) 2 g in sodium chloride 0.9% 100 mL IVPB-ADDV (Completed)    doxycycline hyclate (Vibramycin) 100 mg in sodium chloride 0.9% 100 mL IVPB            69 year old female with Pmhx of HTN. HLD , depression, Metastatic renal cancer with extensive lung and pleural metastases presents today due to fatigue, poor appetite, Fever    UTI  Poor appetite/fever  SIRs r/o sepsis   -Ucx and Bcx pending   -Started on IV Rocephin   -IVF  -Symptomatic mx     H/o Renal cancer with mets  -Oncology consulted  -await recs for Chemo     H/o HTN  -BP is normotensive         Quality:  DVT Prophylaxis: lovenox   CODE status: Full   Willett: None needed     Plan of care discussed with patient at the bedside, patient voiced his/her understanding of current treatment and plan.  All questions answered best able.    Nati Espino MD    Supplementary Documentation:           **Certification      PHYSICIAN Certification of Need for Inpatient Hospitalization - Initial Certification    Patient will require inpatient services that will reasonably be expected to span two midnight's based on the clinical documentation in H+P.   Based on patients current state of illness, I anticipate that, after  discharge, patient will require TBD.      I personally reviewed the available laboratories, imaging including operative report. I discussed/will discuss the case with patient and her nurse. I ordered laboratories studies. I adjusted medications including not applicable today. Medical decision making high, risk is high.     >75 min spent, >50% spent counseling and coordinating care in the form of educating pt/family and d/w consultants and staff. Most of the time spent discussing the above plan.          [1]   Allergies  Allergen Reactions    Ace Inhibitors Coughing    Azithromycin      Other reaction(s): AZITHROMYCIN    Erythromycin      Other reaction(s): Rash - Mild    Naproxen      Other reaction(s): NAPROXEN    Nortriptyline      Other reaction(s): vivid, scary dreams    Sulfa Antibiotics      Other reaction(s): Rash - Moderate   [2]   Current Facility-Administered Medications on File Prior to Encounter   Medication Dose Route Frequency Provider Last Rate Last Admin    [COMPLETED] nivolumab (Opdivo) 480 mg in sodium chloride 0.9% 148 mL IVPB  480 mg Intravenous Once Archie Crocker DO   Stopped at 10/30/24 1413    [] famotidine (Pepcid) 20 mg/2mL injection             [COMPLETED] nivolumab (Opdivo) 480 mg in sodium chloride 0.9% 148 mL IVPB  480 mg Intravenous Once Archie Crocker DO   Stopped at 10/02/24 1455    [COMPLETED] famotidine (Pepcid) 20 mg/2mL injection 20 mg  20 mg Intravenous Once Ramakrishna Desai PA   20 mg at 24 1310    [COMPLETED] nivolumab (Opdivo) 240 mg in sodium chloride 0.9% 124 mL IVPB  240 mg Intravenous Once Ramakrishna Desai PA   Stopped at 24 1426    [COMPLETED] famotidine (Pepcid) 20 mg/2mL injection 20 mg  20 mg Intravenous Once Archie Crocker DO   20 mg at 24 1329    [COMPLETED] nivolumab (Opdivo) 240 mg in sodium chloride 0.9% 124 mL IVPB  240 mg Intravenous Once Archie Crocker DO   Stopped at 24 1428    [COMPLETED] iopamidol 76%  (ISOVUE-370) injection for power injector  80 mL Intravenous ONCE PRN Archie Crocker,    80 mL at 08/28/24 0832    [COMPLETED] famotidine (Pepcid) 20 mg/2mL injection 20 mg  20 mg Intravenous Once Ramakrishna Desai PA   20 mg at 08/21/24 1544    [COMPLETED] nivolumab (Opdivo) 240 mg in sodium chloride 0.9% 124 mL IVPB  240 mg Intravenous Once Ramakrishna Desai PA   Stopped at 08/21/24 1253     Current Outpatient Medications on File Prior to Encounter   Medication Sig Dispense Refill    CABOMETYX 20 MG Oral Tab Take by mouth daily.      nystatin 100,000 Units/g External Ointment Apply 1 Application topically 2 (two) times daily. 30 g 0    diclofenac (VOLTAREN) 1 % External Gel Apply to the affected area two to three times daily as needed 100 g 0    Lidocaine Viscous HCl 2 % Mouth/Throat Solution Take 10 mL by mouth 4 (four) times daily before meals and nightly. 100 mL 0    clotrimazole-betamethasone 1-0.05 % External Cream Apply twice daily to affected area as needed 45 g 0    prochlorperazine (COMPAZINE) 10 mg tablet Take 1 tablet (10 mg total) by mouth every 6 (six) hours as needed for Nausea. 60 tablet 3    docusate sodium 50 MG Oral Cap Take 1 capsule (50 mg total) by mouth as needed for constipation.      sertraline 25 MG Oral Tab Take 1 tablet (25 mg total) by mouth daily. 90 tablet 1    celecoxib (CELEBREX) 200 MG Oral Cap Take 1 capsule (200 mg total) by mouth daily as needed for Pain. 90 capsule 1    ondansetron 4 MG Oral Tablet Dispersible Take 1 tablet (4 mg total) by mouth every 8 (eight) hours as needed for Nausea. 30 tablet 2    acetaminophen-codeine 300-30 MG Oral Tab Take 1 tablet by mouth every 6 (six) hours as needed for Pain. 20 tablet 0    ibuprofen 600 MG Oral Tab Take 1 tablet (600 mg total) by mouth every 6 (six) hours as needed for Pain. 90 tablet 1

## 2024-11-19 NOTE — ED QUICK NOTES
Patient reports IV abx burning arm. No redness noted. ED MD notified and ED Pharmacist notified. Oral Abx ordered.

## 2024-11-19 NOTE — ED PROVIDER NOTES
Patient Seen in: [facility] Emergency Department    History     Chief Complaint   Patient presents with    Fever    Dizziness       HPI    69-year-old female currently undergoing chemotherapy for renal cell carcinoma presents to the ED for weakness, fatigue, loss of appetite, weight loss, low-grade temperatures.  Patient states that she has been feeling worse over 2 weeks.  Patient's  states that patient has had temperatures as high as 100.3 at night over the last 3 weeks.  Patient has not had any vomiting but has had nausea.  No dysuria.  Patient states that she has a small cough over the last week usually at nighttime.  She has some pain in her neck when she swallows on the right side.    History from Independent Source:  gave additional history about fevers as stated above    External Records Reviewed: On chart review, patient was last seen for chemo at the end of October.  She follows with wound care nurse and was last seen on 30 October.  Patient has history of metastatic renal cell carcinoma with extensive lung and pleural metastasis.  She has had sacral decubitus ulcers.    History reviewed.   Past Medical History:    Essential hypertension    High blood pressure    Mixed hyperlipidemia    Moderate single current episode of major depressive disorder (HCC)    Osteoarthritis    KNEE - RIGHT    Osteoarthrosis, pelvic region and thigh    PONV (postoperative nausea and vomiting)    Pulmonary embolism (HCC)    after hip surgery       History reviewed.   Past Surgical History:   Procedure Laterality Date    Cholecystectomy      Colonoscopy N/A 10/1/2018    Procedure: COLONOSCOPY;  Surgeon: Shiva Clemente MD;  Location: [facility] ENDOSCOPY    Hc implant ocular device intraoperative detached retina c1784      Hip replacement surgery  2012    Hysterectomy      2010    Laparoscopic cholecystectomy  04/01/1990         Medications :  Prescriptions Prior to Admission[1]     Family History   Problem  Relation Age of Onset    Diabetes Father     Arthritis Mother     Macular degeneration Mother     Other (spinal stenosis) Mother     Breast Cancer Sister 62    Pacemaker Sister        Smoking Status:   Social History     Socioeconomic History    Marital status:     Number of children: 4   Tobacco Use    Smoking status: Never    Smokeless tobacco: Never   Vaping Use    Vaping status: Never Used   Substance and Sexual Activity    Alcohol use: Yes     Comment: occasional    Drug use: No   Other Topics Concern     Service No    Blood Transfusions No    Caffeine Concern Yes    Occupational Exposure No    Hobby Hazards No    Sleep Concern Yes    Stress Concern No    Weight Concern Yes    Special Diet No    Back Care No    Exercise No    Bike Helmet No    Seat Belt Yes    Self-Exams Yes       Constitutional and vital signs reviewed.      Social History and Family History elements reviewed from today, pertinent positives to the presenting problem noted.    Physical Exam     ED Triage Vitals [11/18/24 1635]   BP 91/64   Pulse 95   Resp 18   Temp 98 °F (36.7 °C)   Temp src Oral   SpO2 95 %   O2 Device None (Room air)       Physical Exam   Constitutional: AAOx3, well nourished, NAD  HEENT: Normocephalic, PERRLA, MMM  CV: s1s2+, RRR, no m/r/g, normal distal pulses  Pulmonary/Chest: CTA b/l with no rales, wheezes.  No chest wall tenderness  Abdominal: Nontender.  Nondistended. Soft. Bowel sounds are normal.   Neck/Back:   :   Musculoskeletal: Normal range of motion. No deformity.   Neurological: Awake, alert. Normal reflexes. No cranial nerve deficit.    Skin: Skin is warm and dry. No rash noted. No erythema.   Psychiatric:      All measures to prevent infection transmission during my interaction with the patient were taken. The patient was already wearing a droplet mask on my arrival to the room. Personal protective equipment was worn throughout the duration of the exam.      ED Course        Labs Reviewed    URINALYSIS WITH CULTURE REFLEX - Abnormal; Notable for the following components:       Result Value    Clarity Urine Turbid (*)     Leukocyte Esterase Urine 500 (*)     WBC Urine 21-50 (*)     Bacteria Urine Rare (*)     Squamous Epi. Cells Few (*)     All other components within normal limits   COMP METABOLIC PANEL (14) - Abnormal; Notable for the following components:    Glucose 136 (*)     BUN 26 (*)     BUN/CREA Ratio 32.1 (*)     AST 45 (*)     Total Protein 8.5 (*)     Globulin  4.6 (*)     A/G Ratio 0.8 (*)     All other components within normal limits   CBC WITH DIFFERENTIAL WITH PLATELET - Abnormal; Notable for the following components:    RBC 3.52 (*)     HGB 10.6 (*)     HCT 32.1 (*)     RDW-SD 47.1 (*)     All other components within normal limits   PROCALCITONIN - Abnormal; Notable for the following components:    Procalcitonin 0.17 (*)     All other components within normal limits   LACTIC ACID, PLASMA - Normal   SCAN SLIDE   RAINBOW DRAW BLUE   URINE CULTURE, ROUTINE   BLOOD CULTURE   BLOOD CULTURE     My Independent Interpretation of EKG (if performed): EKG    Rate, intervals and axes as noted on EKG Report.  Rate: 95 bpm  Rhythm: Sinus Rhythm  Reading: Sinus rhythm, nonspecific ST changes, normal axis and intervals, no ectopy             Monitor Interpretation:   normal sinus rhythm as interpreted by me.      Imaging Results Available and Reviewed while in ED: XR CHEST AP PORTABLE  (CPT=71045)    Result Date: 11/18/2024  CONCLUSION: Moderate to large-sized hazy opacity at the right lung base associated with right lung volume loss compatible with atelectasis, infiltrate, or a combination.    Dictated by (CST): Rodri Lee MD on 11/18/2024 at 6:53 PM     Finalized by (CST): Rodri Lee MD on 11/18/2024 at 6:54 PM         ED Medications Administered:   Medications   doxycycline hyclate (Vibramycin) 100 mg in sodium chloride 0.9% 100 mL IVPB (has no administration in time range)   sodium  chloride 0.9 % IV bolus 1,000 mL (0 mL Intravenous Stopped 11/18/24 1841)   ondansetron (Zofran) 4 MG/2ML injection 4 mg (4 mg Intravenous Given 11/18/24 1744)   cefTRIAXone (Rocephin) 2 g in sodium chloride 0.9% 100 mL IVPB-ADDV (0 g Intravenous Stopped 11/18/24 2047)             MDM     Vitals:    11/18/24 1635 11/18/24 1730 11/18/24 1830 11/18/24 2015   BP: 91/64 100/63 103/62 101/57   Pulse: 95 91 87 67   Resp: 18 23 25 19   Temp: 98 °F (36.7 °C)      TempSrc: Oral      SpO2: 95% 97% 94% 96%   Weight: 71.2 kg      Height: 165.1 cm (5' 5\")        *I personally reviewed and interpreted all ED vitals.    Independent Interpretation of Studies: Patient's chest x-ray and she may have some right base infiltrate    Social Determinants of Health:     Procedures:      Differential/MDM/Shared Decision Making: Differential Diagnosis includes UTI, dehydration, electrolyte abnormality, pneumonia, MONICA, others.      The patient already  has a past medical history of Essential hypertension, High blood pressure, Mixed hyperlipidemia (12/22/2023), Moderate single current episode of major depressive disorder (HCC) (10/06/2017), Osteoarthritis, Osteoarthrosis, pelvic region and thigh (03/21/2012), PONV (postoperative nausea and vomiting), and Pulmonary embolism (HCC).  to contribute to the complexity of this ED evaluation.           Medications, Diagnostics, or Disposition considered but not done:     Patient has a urinary tract infection on labs as well as a possible right lower lobe infiltrate.  Workup otherwise does not show any other significant abnormalities.  Patient is not septic.  Management of case was discussed with bolused and started on ceftriaxone and doxycycline.  Will place oncology on consult.  Family updated and are agreeable with plan for admission.    Critical care time exclusive of procedure time spent on this patient was 31 min for taking history from patient examining patient, medical decision-making, reviewing  lab work and radiology studies, explaining results to patient and family, discussing with consultants/admitting physician, and documenting in patient's chart.      Condition upon leaving the department: Stable    Disposition and Plan     Clinical Impression:  1. Acute cystitis without hematuria    2. Community acquired pneumonia of right lower lobe of lung        Disposition:  Admit    Follow-up:  No follow-up provider specified.    Medications Prescribed:  Current Discharge Medication List          Hospital Problems       Present on Admission  Date Reviewed: 10/30/2024            ICD-10-CM Noted POA    * (Principal) Acute cystitis without hematuria N30.00 11/18/2024 Unknown               [1] (Not in a hospital admission)

## 2024-11-19 NOTE — ED QUICK NOTES
Orders for admission, patient is aware of plan and ready to go upstairs. Any questions, please call ED RN Marsha at extension [phone].     Patient Covid vaccination status: Fully vaccinated     COVID Test Ordered in ED: None    COVID Suspicion at Admission: N/A    Running Infusions:  None    Mental Status/LOC at time of transport: Aox3    Other pertinent information:   CIWA score: N/A   NIH score:  N/A

## 2024-11-19 NOTE — PROGRESS NOTES
Patient admitted to unit overnight, aoX4 with no complaints of pain.  VSS on room air, IVF and antibiotic infusing, blood and urine cultures pending, tele monitoring (no calls), voids freely (urgency at times), ambulates with SBA and a walker. Per evening RN Ana, patient's spuose will bring in patients chemo pill (Cabometyx) in the morning; will endorse to day shift RN to send to pharmacy upon arrival.  Safety precautions maintained; call light within reach.

## 2024-11-19 NOTE — PROGRESS NOTES
[facility]  part of [facility]    Progress Note    Lary Wallis Patient Status:  Inpatient    1955 MRN [id number]   Location [facility] 4W/SW/SE Attending Kristi Sadler, DO   Hosp Day # 1 PCP Fernanda Stover MD     Chief complaint weakness     Subjective:   Lary Wallis is a(n) 69 year old female Pt about the same today. Denies dysuria     ROS:   No cp, sob   No c/d   No n/v     Objective:   Blood pressure 97/55, pulse 86, temperature 98.5 °F (36.9 °C), temperature source Oral, resp. rate 18, height 5' 5\" (1.651 m), weight 155 lb 9.6 oz (70.6 kg), SpO2 94%, not currently breastfeeding.      Intake/Output Summary (Last 24 hours) at 2024 1558  Last data filed at 2024 1249  Gross per 24 hour   Intake 1703.75 ml   Output 250 ml   Net 1453.75 ml       Patient Weight(s) for the past 336 hrs:   Weight   24 2255 155 lb 9.6 oz (70.6 kg)   24 1635 157 lb (71.2 kg)           General appearance: alert, appears stated age and cooperative  Pulmonary:  clear to auscultation bilaterally  Cardiovascular: S1, S2 normal, no murmur, click, rub or gallop, regular rate and rhythm  Abdominal: soft, non-tender; bowel sounds normal; no masses,  no organomegaly  Extremities: extremities normal, atraumatic, no cyanosis or edema        Medicines:     Current Facility-Administered Medications   Medication Dose Route Frequency    cefTRIAXone (Rocephin) 1 g in sodium chloride 0.9% 100 mL IVPB-ADDV  1 g Intravenous Q24H    sodium chloride 0.9% infusion  75 mL/hr Intravenous Continuous    acetaminophen (Tylenol Extra Strength) tab 500 mg  500 mg Oral Q4H PRN    polyethylene glycol (PEG 3350) (Miralax) 17 g oral packet 17 g  17 g Oral Daily PRN    sennosides (Senokot) tab 17.2 mg  17.2 mg Oral Nightly PRN    benzonatate (Tessalon) cap 200 mg  200 mg Oral TID PRN    enoxaparin (Lovenox) 40 MG/0.4ML SUBQ injection 40 mg  40 mg Subcutaneous Daily    sertraline (Zoloft) tab 25 mg   25 mg Oral Daily    ondansetron (Zofran) 4 MG/2ML injection 4 mg  4 mg Intravenous Q6H PRN       Narcotic Medications            acetaminophen-codeine 300-30 MG Oral Tab                         sodium chloride 75 mL/hr (11/19/24 1249)           Lab Results   Component Value Date    WBC 5.5 11/19/2024    HGB 9.3 (L) 11/19/2024    HCT 30.0 (L) 11/19/2024    .0 11/19/2024    CREATSERUM 0.69 11/19/2024    BUN 18 11/19/2024     11/19/2024    K 4.7 11/19/2024     11/19/2024    CO2 24.0 11/19/2024    GLU 77 11/19/2024    CA 9.2 11/19/2024    ALB 3.6 11/19/2024    ALKPHO 82 11/19/2024    BILT 0.2 11/19/2024    TP 7.9 11/19/2024    AST 42 (H) 11/19/2024    ALT 25 11/19/2024    INR 1.09 11/19/2024    T4F 0.5 (L) 10/30/2024    TSH 15.843 (H) 10/30/2024    MG 2.1 11/19/2024    PHOS 4.8 02/07/2024    B12 435 07/14/2017       XR CHEST AP PORTABLE  (CPT=71045)    Result Date: 11/18/2024  CONCLUSION: Moderate to large-sized hazy opacity at the right lung base associated with right lung volume loss compatible with atelectasis, infiltrate, or a combination.    Dictated by (CST): Rodri Lee MD on 11/18/2024 at 6:53 PM     Finalized by (CST): Rodri Lee MD on 11/18/2024 at 6:54 PM         EKG 12 Lead    Result Date: 11/18/2024  Normal sinus rhythm Cannot rule out Anterior infarct , age undetermined ST & T wave abnormality, consider inferior ischemia Abnormal ECG No previous ECGs found in [facility] Confirmed by CHARLOTTE MULLINS PRATIK ([id number]) on 11/18/2024 5:22:03 PM     Results:     CBC:    Lab Results   Component Value Date    WBC 5.5 11/19/2024    WBC 6.3 11/18/2024    WBC 7.0 10/30/2024     Lab Results   Component Value Date    HEMOGLOBIN 13.5 05/21/2007    HGB 9.3 (L) 11/19/2024    HGB 10.6 (L) 11/18/2024    HGB 11.8 (L) 10/30/2024      Lab Results   Component Value Date    .0 11/19/2024    .0 11/18/2024    .0 10/30/2024       Recent Labs   Lab 11/18/24  1714 11/19/24  0653   * 77    BUN 26* 18   CREATSERUM 0.81 0.69   CA 9.4 9.2    139   K 4.3 4.7    108   CO2 27.0 24.0           Assessment and Plan:         69 year old female with Pmhx of HTN. HLD , depression, Metastatic renal cancer with extensive lung and pleural metastases presents today due to fatigue, poor appetite, Fever     UTI  Poor appetite/fever  SIRs r/o sepsis   -Ucx and Bcx pending   -cont on IV Rocephin   -IVF  -Symptomatic mx      H/o Renal cancer with mets  -Oncology consult appreciated      H/o HTN  -BP is normotensive            Quality:  DVT Prophylaxis: lovenox   CODE status: Full   Willett: None needed          Kristi Sadler DO         Chart reviewed, including current vitals, notes, labs and imaging  Labs ordered and medications adjusted as outlined above  Coordinate care with care team/consultants  Discussed with patient results of tests, management plan as outlined above, and the need for ongoing hospitalization  D/w RN     [facility]        11/19/2024       Home soon       Supplementary Documentation:   DVT Mechanical Prophylaxis:        DVT Pharmacologic Prophylaxis   Medication    enoxaparin (Lovenox) 40 MG/0.4ML SUBQ injection 40 mg                Code Status: Full Code  Willett: No urinary catheter in place  Willett Duration (in days):   Central line:    HARVEY: 11/20/2024

## 2024-11-19 NOTE — PLAN OF CARE
Pt is A&Ox 4, pt refused lovenox this morning - educated on purpose of medication, room air, remote tele in place, tolerating regular diet, voiding freely, passing BMs, ambulating standby assist with a walker. Call light within reach, calls appropriately, I-bed awareness/alarm on.       Problem: Patient Centered Care  Goal: Patient preferences are identified and integrated in the patient's plan of care  Description: Interventions:  - What would you like us to know as we care for you?   - Provide timely, complete, and accurate information to patient/family  - Incorporate patient and family knowledge, values, beliefs, and cultural backgrounds into the planning and delivery of care  - Encourage patient/family to participate in care and decision-making at the level they choose  - Honor patient and family perspectives and choices  Outcome: Progressing     Problem: Patient/Family Goals  Goal: Patient/Family Long Term Goal  Description: Patient's Long Term Goal: return home     Interventions:  - improve strength, improve diet, tolerate pain   - See additional Care Plan goals for specific interventions  Outcome: Progressing  Goal: Patient/Family Short Term Goal  Description: Patient's Short Term Goal: improve strength     Interventions:   - ambulate with assistive devices, ambulate with standby assist, frequent movement   - See additional Care Plan goals for specific interventions  Outcome: Progressing

## 2024-11-19 NOTE — CONSULTS
[facility]ematology[facility]Oncology [facility]  Initial Inpatient Consult Note    Lary Wallis Patient Status:  Inpatient    1955 MRN [id number]   Location [facility] 4W/SW/SE Attending Kristi Sadler,    Hosp Day # 1 PCP Fernanda Stover MD       Oncology History   Renal cell carcinoma of right kidney (HCC)   2024 Initial Diagnosis    Renal cell carcinoma of right kidney (HCC)     2024 - 2024 Chemotherapy    OP MELANOMA Ipilimumab / Nivolumab  Plan Provider: Archie Crocker DO  Treatment goal: Palliative  Line of treatment: [No plan line of treatment]     2024 -  Chemotherapy    OP Renal Nivolumab/Cabozantinib  Plan Provider: Archie Crocker DO  Treatment goal: Palliative  Line of treatment: [No plan line of treatment]       Subjective:   Lary Wallis is a 69 year old female well-known to me with a history of metastatic clear-cell renal cell carcinoma on frontline cabozantinib nivolumab who was admitted with low-grade fevers, fatigue, failure to thrive.  Workup suggestive of pneumonia in the right lower lobe and possible UTI cultures pending.    She is feeling a little better today.  No low-grade fevers since admission.    Review of Systems:  Hematology/Oncology ROS performed and negative except as above in HPI    History/Other:   Past Medical History:  Past Medical History:    Essential hypertension    High blood pressure    Mixed hyperlipidemia    Moderate single current episode of major depressive disorder (HCC)    Osteoarthritis    KNEE - RIGHT    Osteoarthrosis, pelvic region and thigh    PONV (postoperative nausea and vomiting)    Pulmonary embolism (HCC)    after hip surgery       Past Surgical History:  Past Surgical History:   Procedure Laterality Date    Cholecystectomy      Colonoscopy N/A 10/1/2018    Procedure: COLONOSCOPY;  Surgeon: Shiva Clemente MD;  Location: [facility] ENDOSCOPY    Hc implant ocular device intraoperative detached retina [id number]       Hip replacement surgery  2012    Hysterectomy      2010    Laparoscopic cholecystectomy  04/01/1990       Current Medications:   [COMPLETED] sodium chloride 0.9 % IV bolus 1,000 mL  1,000 mL Intravenous Once    [COMPLETED] ondansetron (Zofran) 4 MG/2ML injection 4 mg  4 mg Intravenous Once    [COMPLETED] cefTRIAXone (Rocephin) 2 g in sodium chloride 0.9% 100 mL IVPB-ADDV  2 g Intravenous Once    [COMPLETED] doxycycline hyclate (Vibramycin) 100 mg in sodium chloride 0.9% 100 mL IVPB  100 mg Intravenous Once    [COMPLETED] doxycycline (Vibramycin) cap 100 mg  100 mg Oral Once    cefTRIAXone (Rocephin) 1 g in sodium chloride 0.9% 100 mL IVPB-ADDV  1 g Intravenous Q24H    sodium chloride 0.9% infusion  75 mL/hr Intravenous Continuous    acetaminophen (Tylenol Extra Strength) tab 500 mg  500 mg Oral Q4H PRN    polyethylene glycol (PEG 3350) (Miralax) 17 g oral packet 17 g  17 g Oral Daily PRN    sennosides (Senokot) tab 17.2 mg  17.2 mg Oral Nightly PRN    benzonatate (Tessalon) cap 200 mg  200 mg Oral TID PRN    enoxaparin (Lovenox) 40 MG/0.4ML SUBQ injection 40 mg  40 mg Subcutaneous Daily    sertraline (Zoloft) tab 25 mg  25 mg Oral Daily    ondansetron (Zofran) 4 MG/2ML injection 4 mg  4 mg Intravenous Q6H PRN       Allergies:   Allergies[1]    Family Medical History:  Family History   Problem Relation Age of Onset    Diabetes Father     Arthritis Mother     Macular degeneration Mother     Other (spinal stenosis) Mother     Breast Cancer Sister 62    Pacemaker Sister        Social History:  Social History     Socioeconomic History    Marital status:      Spouse name: Not on file    Number of children: 4    Years of education: Not on file    Highest education level: Not on file   Occupational History    Not on file   Tobacco Use    Smoking status: Never    Smokeless tobacco: Never   Vaping Use    Vaping status: Never Used   Substance and Sexual Activity    Alcohol use: Yes     Comment: occasional    Drug  use: No    Sexual activity: Not on file   Other Topics Concern     Service No    Blood Transfusions No    Caffeine Concern Yes    Occupational Exposure No    Hobby Hazards No    Sleep Concern Yes    Stress Concern No    Weight Concern Yes    Special Diet No    Back Care No    Exercise No    Bike Helmet No    Seat Belt Yes    Self-Exams Yes   Social History Narrative    Not on file     Social Drivers of Health     Financial Resource Strain: Not on file   Food Insecurity: No Food Insecurity (11/18/2024)    Food Insecurity     Food Insecurity: Never true   Transportation Needs: No Transportation Needs (11/18/2024)    Transportation Needs     Lack of Transportation: No     Car Seat: Not on file   Physical Activity: Not on file   Stress: Not on file   Social Connections: Not on file   Housing Stability: Low Risk  (11/18/2024)    Housing Stability     Housing Instability: No     Housing Instability Emergency: Not on file     Crib or [place]in[place]: Not on file       Objective:    BP 97/55 (BP Location: Right arm)   Pulse 86   Temp 98.5 °F (36.9 °C) (Oral)   Resp 18   Ht 1.651 m (5' 5\")   Wt 70.6 kg (155 lb 9.6 oz)   SpO2 94%   BMI 25.89 kg/m²   Physical Exam:  General: A&Ox3, NAD  HEENT: PERRL  CV: RRR, no murmurs  Pulm: CTA b/l,  normal effort  Abd: soft, ntnd  Extremities: no edema or calf tenderness  Neurological: Grossly intact    Labs:  Lab Results   Component Value Date/Time    WBC 5.5 11/19/2024 06:52 AM    RBC 3.25 (L) 11/19/2024 06:52 AM    HGB 9.3 (L) 11/19/2024 06:52 AM    HCT 30.0 (L) 11/19/2024 06:52 AM    MCV 92.3 11/19/2024 06:52 AM    MCH 28.6 11/19/2024 06:52 AM    MCHC 31.0 11/19/2024 06:52 AM    RDW 14.0 11/19/2024 06:52 AM    NEPRELIM 2.71 11/19/2024 06:52 AM    .0 11/19/2024 06:52 AM       Lab Results   Component Value Date/Time    GLU 77 11/19/2024 06:53 AM    BUN 18 11/19/2024 06:53 AM    CREATSERUM 0.69 11/19/2024 06:53 AM    GFRNAA 98 12/06/2023 12:00 AM    CA 9.2 11/19/2024  06:53 AM    ALB 3.6 11/19/2024 06:53 AM     11/19/2024 06:53 AM    K 4.7 11/19/2024 06:53 AM     11/19/2024 06:53 AM    CO2 24.0 11/19/2024 06:53 AM    ALKPHO 82 11/19/2024 06:53 AM    AST 42 (H) 11/19/2024 06:53 AM    ALT 25 11/19/2024 06:53 AM     Imaging:  XR CHEST AP PORTABLE  (CPT=71045)    Result Date: 11/18/2024  CONCLUSION: Moderate to large-sized hazy opacity at the right lung base associated with right lung volume loss compatible with atelectasis, infiltrate, or a combination.    Dictated by (CST): Rodri Lee MD on 11/18/2024 at 6:53 PM     Finalized by (CST): Rodri Lee MD on 11/18/2024 at 6:54 PM              Assessment & Plan:    Lary Wallis is a 69 year old female well-known to me with a history of metastatic clear-cell renal cell carcinoma on frontline cabozantinib nivolumab who was admitted with low-grade fevers, fatigue, failure to thrive.  Workup suggestive of pneumonia in the right lower lobe and possible UTI cultures pending.    Clinically improving.  On ceftriaxone.  Cultures pending.  Low suspicion for treatment related or immune mediated adverse events at this time.  We can hold cabozantinib for the time being.  Discussed and I recommended she does proceed with prophylactic Lovenox.  I encouraged her to get up out of bed.  Hopefully she can discharge over the next 24 to 48 hours.    Archie Crocker DO    [facility]  [facility]    This note was created using a voice-recognition transcribing system. Incorrect words or phrases may have been missed during proofreading. Please interpret accordingly.         [1]   Allergies  Allergen Reactions    Ace Inhibitors Coughing    Azithromycin      Other reaction(s): AZITHROMYCIN    Erythromycin      Other reaction(s): Rash - Mild    Naproxen      Other reaction(s): NAPROXEN    Nortriptyline      Other reaction(s): vivid, scary dreams    Sulfa Antibiotics      Other reaction(s):  Rash - Moderate

## 2024-11-20 ENCOUNTER — APPOINTMENT (OUTPATIENT)
Dept: HEMATOLOGY/ONCOLOGY | Facility: HOSPITAL | Age: 69
End: 2024-11-20
Attending: INTERNAL MEDICINE
Payer: MEDICARE

## 2024-11-20 LAB
ANION GAP SERPL CALC-SCNC: 8 MMOL/L (ref 0–18)
BASOPHILS # BLD AUTO: 0.05 X10(3) UL (ref 0–0.2)
BASOPHILS NFR BLD AUTO: 0.9 %
BUN BLD-MCNC: 14 MG/DL (ref 9–23)
BUN/CREAT SERPL: 21.5 (ref 10–20)
CALCIUM BLD-MCNC: 8.6 MG/DL (ref 8.7–10.4)
CHLORIDE SERPL-SCNC: 109 MMOL/L (ref 98–112)
CO2 SERPL-SCNC: 24 MMOL/L (ref 21–32)
CREAT BLD-MCNC: 0.65 MG/DL
DEPRECATED HBV CORE AB SER IA-ACNC: 1436 NG/ML
DEPRECATED RDW RBC AUTO: 47.8 FL (ref 35.1–46.3)
EGFRCR SERPLBLD CKD-EPI 2021: 96 ML/MIN/1.73M2 (ref 60–?)
EOSINOPHIL # BLD AUTO: 0.3 X10(3) UL (ref 0–0.7)
EOSINOPHIL NFR BLD AUTO: 5.2 %
ERYTHROCYTE [DISTWIDTH] IN BLOOD BY AUTOMATED COUNT: 14.1 % (ref 11–15)
GLUCOSE BLD-MCNC: 82 MG/DL (ref 70–99)
HCT VFR BLD AUTO: 30.6 %
HGB BLD-MCNC: 9.9 G/DL
IMM GRANULOCYTES # BLD AUTO: 0.01 X10(3) UL (ref 0–1)
IMM GRANULOCYTES NFR BLD: 0.2 %
IRON SATN MFR SERPL: 10 %
IRON SERPL-MCNC: 22 UG/DL
LYMPHOCYTES # BLD AUTO: 1.89 X10(3) UL (ref 1–4)
LYMPHOCYTES NFR BLD AUTO: 33 %
MCH RBC QN AUTO: 30 PG (ref 26–34)
MCHC RBC AUTO-ENTMCNC: 32.4 G/DL (ref 31–37)
MCV RBC AUTO: 92.7 FL
MONOCYTES # BLD AUTO: 0.58 X10(3) UL (ref 0.1–1)
MONOCYTES NFR BLD AUTO: 10.1 %
NEUTROPHILS # BLD AUTO: 2.89 X10 (3) UL (ref 1.5–7.7)
NEUTROPHILS # BLD AUTO: 2.89 X10(3) UL (ref 1.5–7.7)
NEUTROPHILS NFR BLD AUTO: 50.6 %
OSMOLALITY SERPL CALC.SUM OF ELEC: 292 MOSM/KG (ref 275–295)
PLATELET # BLD AUTO: 357 10(3)UL (ref 150–450)
POTASSIUM SERPL-SCNC: 4 MMOL/L (ref 3.5–5.1)
RBC # BLD AUTO: 3.3 X10(6)UL
SODIUM SERPL-SCNC: 141 MMOL/L (ref 136–145)
TIBC SERPL-MCNC: 219 UG/DL (ref 250–425)
TRANSFERRIN SERPL-MCNC: 147 MG/DL (ref 250–380)
WBC # BLD AUTO: 5.7 X10(3) UL (ref 4–11)

## 2024-11-20 PROCEDURE — 99233 SBSQ HOSP IP/OBS HIGH 50: CPT | Performed by: HOSPITALIST

## 2024-11-20 RX ORDER — DIPHENHYDRAMINE HYDROCHLORIDE 50 MG/ML
25 INJECTION INTRAMUSCULAR; INTRAVENOUS EVERY 4 HOURS PRN
Status: DISCONTINUED | OUTPATIENT
Start: 2024-11-20 | End: 2024-11-21

## 2024-11-20 RX ORDER — LEVOFLOXACIN 250 MG/1
750 TABLET, FILM COATED ORAL NIGHTLY
Status: DISCONTINUED | OUTPATIENT
Start: 2024-11-20 | End: 2024-11-21

## 2024-11-20 RX ORDER — TRIAMCINOLONE ACETONIDE 1 MG/ML
LOTION TOPICAL 2 TIMES DAILY
Status: DISCONTINUED | OUTPATIENT
Start: 2024-11-20 | End: 2024-11-21

## 2024-11-20 RX ORDER — ECHINACEA PURPUREA EXTRACT 125 MG
2 TABLET ORAL EVERY 4 HOURS PRN
Status: DISCONTINUED | OUTPATIENT
Start: 2024-11-20 | End: 2024-11-21

## 2024-11-20 RX ORDER — FAMOTIDINE 10 MG
10 TABLET ORAL 2 TIMES DAILY
Status: DISCONTINUED | OUTPATIENT
Start: 2024-11-20 | End: 2024-11-21

## 2024-11-20 NOTE — PHYSICAL THERAPY NOTE
PHYSICAL THERAPY EVALUATION - INPATIENT     Room Number: 457/457-A  Evaluation Date: 11/20/2024  Type of Evaluation: Initial   Physician Order: PT Eval and Treat    Presenting Problem: fevers and weakness, difficulty ambulating,acute cystitis w/o hematuria  and possibly coommunity acquired PNA  Co-Morbidities : sacral decub ulcers, HTN, OA, PE, chemo for renal cell carcinoma  Reason for Therapy: Mobility Dysfunction and Discharge Planning    PHYSICAL THERAPY ASSESSMENT   Patient is a 69 year old female admitted 11/18/2024 for overall weakness and inability to move around at home, fevers and chills. Being treated for likely cystitis and possible community acquired PNA and feeling better.  Prior to admission, patient's baseline is independent at home without any device except at night uses a rollator to get to the bathroom. She is home with her  who can assist her if needed but he does still work several days a week. She is able to drive but hasn't in about 6 months. She does still go to the store with family and friends as she is able.  Patient is currently functioning below baseline with bed mobility, transfers, gait, stair negotiation, maintaining seated position, standing prolonged periods, and performing household tasks.  Patient is requiring minimal assist as a result of the following impairments: decreased functional strength, decreased endurance/aerobic capacity, decreased muscular endurance, and medical status.  Physical Therapy will continue to follow for duration of hospitalization.    Patient will benefit from continued skilled PT Services at discharge to promote prior level of function and safety with additional support and return home with home health PT.    PLAN DURING HOSPITALIZATION  Nursing Mobility Recommendation : 1 Assist  PT Device Recommendation: Rolling walker  PT Treatment Plan: Bed mobility;Endurance;Energy conservation;Patient education;Family education;Gait  training;Strengthening;Stair training;Transfer training  Rehab Potential : Good  Frequency (Obs): 5x/week     PHYSICAL THERAPY MEDICAL/SOCIAL HISTORY   History related to current admission: 69-year-old female currently undergoing chemotherapy for renal cell carcinoma presents to the ED for weakness, fatigue, loss of appetite, weight loss, low-grade temperatures.  Patient states that she has been feeling worse over 2 weeks.  Patient's  states that patient has had temperatures as high as 100.3 at night over the last 3 weeks.  Patient has not had any vomiting but has had nausea.  No dysuria.  Patient states that she has a small cough over the last week usually at nighttime.  She has some pain in her neck when she swallows on the right side.      Problem List  Principal Problem:    Acute cystitis without hematuria  Active Problems:    Community acquired pneumonia of right lower lobe of lung    Renal cell carcinoma (HCC)      HOME SITUATION  Type of Home: House  Home Layout: Two level;Bed/bath upstairs  Stairs to Enter : 0 (ramped entrance)   Railing: No    Stairs to Bedroom: 7    Railing: Yes    Lives With: Spouse (who works, home W, )    Drives: Yes (has not driven by choice x 6 months)   Patient Regularly Uses: Glasses;Rollator (uses rollator at [facility])     Prior Level of [place]: Patient reports she lives with her  and is normally independent with all stairs and mobility, does what she wants. She does use a rollator at night to go to the bathroom and a cart at the store but otherwise no device. She has been feeling weak and not driving since about 6 months ago of her own accord. She reports her cousin will be visiting from [place] for about 2 weeks    SUBJECTIVE  \"I am feeling better today\"    PHYSICAL THERAPY EXAMINATION   OBJECTIVE  Precautions: Low vision (IV)  Fall Risk: Standard fall risk    PAIN ASSESSMENT  Ratin  Location: overall soreness but not painful  Management Techniques: Activity  promotion    COGNITION  Overall Cognitive Status:  WFL - within functional limits    RANGE OF MOTION AND STRENGTH ASSESSMENT  Upper extremity ROM and strength are within functional limits   Lower extremity ROM is within functional limits   Lower extremity strength is within functional limits     BALANCE  Static Sitting: Normal  Dynamic Sitting: Good  Static Standing: Fair -  Dynamic Standing: Poor +    ACTIVITY TOLERANCE  Pulse: 98  Heart Rate Source: Monitor     BP: 110/68  BP Location: Right arm  BP Method: Automatic  Patient Position: Sitting    O2 WALK  Oxygen Therapy  SPO2% on Room Air at Rest: 98  SPO2% Ambulation on Room Air: 94    AM-PAC '6-Clicks' INPATIENT SHORT FORM - BASIC MOBILITY  How much difficulty does the patient currently have...  Patient Difficulty: Turning over in bed (including adjusting bedclothes, sheets and blankets)?: A Little   Patient Difficulty: Sitting down on and standing up from a chair with arms (e.g., wheelchair, bedside commode, etc.): A Little   Patient Difficulty: Moving from lying on back to sitting on the side of the bed?: A Little   How much help from another person does the patient currently need...   Help from Another: Moving to and from a bed to a chair (including a wheelchair)?: A Little   Help from Another: Need to walk in hospital room?: A Little   Help from Another: Climbing 3-5 steps with a railing?: A Little     AM-PAC Score:  Raw Score: 18   Approx Degree of Impairment: 46.58%   Standardized Score (AM-PAC Scale): 43.63   CMS Modifier (G-Code): CK    FUNCTIONAL ABILITY STATUS  Functional Mobility/Gait Assessment  Gait Assistance: Contact guard assist;Supervision  Distance (ft): 200  Assistive Device: Rolling walker  Pattern: Within Functional Limits (slow eliezer,, flexed posture)  Rolling: modified independent  Supine to Sit: supervision  Sit to Supine: contact guard assist  Sit to Stand: contact guard assist    Exercise/Education Provided:  Bed mobility  Energy  conservation  Functional activity tolerated  Gait training  Posture  Strengthening  Transfer training    Skilled Therapy Provided: RN approves participation in therapy session. Patient presents sitting up in chair with her cousin at bedside. She has just eaten and feels a bit better but does not really have an appetite. She is agreeable to therapy, reports she is just feeling stronger from the hydration and medication. She is able to move with supervision to min A, does not feel she can do the stairs today. She is able to walk in halls some but then reports fatigue. Education and training regarding short frequent bouts of activity and importance of up to chair for all meals as well as walking in halls (distance as tolerated) 3-4 times a day with RW. Pt reports she would like a RW at [place] so requested order from RN. Pt is up in chair with all needs in reach and cousin in room, RN aware of session.    The patient's Approx Degree of Impairment: 46.58% has been calculated based on documentation in the [facility] '6 clicks' Inpatient Basic Mobility Short Form.  Research supports that patients with this level of impairment may benefit from home with HHPT and 24 hr care and this is the recommendation.  Final disposition will be made by interdisciplinary medical team.    Patient End of Session: Up in chair;Needs met;Call light within reach;RN aware of session/findings;All patient questions and concerns addressed;Hospital anti-slip socks;Discussed recommendations with /    CURRENT GOALS  Goals to be met by: 11/30/25  Patient Goal Patient's self-stated goal is: to go home, be independent   Goal #1 Patient is able to demonstrate supine - sit EOB @ level: modified independent     Goal #1   Current Status    Goal #2 Patient is able to demonstrate transfers Sit to/from Stand at assistance level: modified independent with walker - rolling     Goal #2  Current Status    Goal #3 Patient is able to ambulate 450 feet  with assist device: walker - rolling at assistance level: supervision   Goal #3   Current Status    Goal #4 Patient will negotiate 4 stairs/one curb w/ assistive device and supervision   Goal #4   Current Status    Goal #5 Patient to demonstrate independence with home activity/exercise instructions provided to patient in preparation for discharge.   Goal #5   Current Status    Goal #6    Goal #6  Current Status      Patient Evaluation Complexity Level:  History High - 3 or more personal factors and/or co-morbidities   Examination of body systems Moderate - addressing a total of 3 or more elements   Clinical Presentation Low- Stable   Clinical Decision Making  Low Complexity     Therapeutic Activity:  32 minutes

## 2024-11-20 NOTE — PLAN OF CARE
Patient is alert and oriented. Denies pain. Endorses nausea. Zofran givem. Up to chair and bathroom with walker and one assist. IV fluids infusing. Endorses SOB with exertion. Lovenox given for DVT prophylaxis. Patient endorsed irritated rash on left forearm. MD aware and orders placed and IV antibiotic changed. Safety precautions in place.     Problem: Patient Centered Care  Goal: Patient preferences are identified and integrated in the patient's plan of care  Description: Interventions:  - What would you like us to know as we care for you?   - Provide timely, complete, and accurate information to patient/family  - Incorporate patient and family knowledge, values, beliefs, and cultural backgrounds into the planning and delivery of care  - Encourage patient/family to participate in care and decision-making at the level they choose  - Honor patient and family perspectives and choices  Outcome: Progressing     Problem: Patient/Family Goals  Goal: Patient/Family Long Term Goal  Description: Patient's Long Term Goal:     Interventions:  -   - See additional Care Plan goals for specific interventions  Outcome: Progressing  Goal: Patient/Family Short Term Goal  Description: Patient's Short Term Goal:     Interventions:   -   - See additional Care Plan goals for specific interventions  Outcome: Progressing     Problem: RESPIRATORY - ADULT  Goal: Achieves optimal ventilation and oxygenation  Description: INTERVENTIONS:  - Assess for changes in respiratory status  - Assess for changes in mentation and behavior  - Position to facilitate oxygenation and minimize respiratory effort  - Oxygen supplementation based on oxygen saturation or ABGs  - Provide Smoking Cessation handout, if applicable  - Encourage broncho-pulmonary hygiene including cough, deep breathe, Incentive Spirometry  - Assess the need for suctioning and perform as needed  - Assess and instruct to report SOB or any respiratory difficulty  - Respiratory Therapy  support as indicated  - Manage/alleviate anxiety  - Monitor for signs/symptoms of CO2 retention  Outcome: Progressing     Problem: GASTROINTESTINAL - ADULT  Goal: Minimal or absence of nausea and vomiting  Description: INTERVENTIONS:  - Maintain adequate hydration with IV or PO as ordered and tolerated  - Nasogastric tube to low intermittent suction as ordered  - Evaluate effectiveness of ordered antiemetic medications  - Provide nonpharmacologic comfort measures as appropriate  - Advance diet as tolerated, if ordered  - Obtain nutritional consult as needed  - Evaluate fluid balance  Outcome: Not Progressing

## 2024-11-20 NOTE — PROGRESS NOTES
[facility]  part of [facility]    Progress Note    Lary Wallis Patient Status:  Inpatient    1955 MRN [id number]   Location [facility] 4W/SW/SE Attending Atul Mueller,*   Hosp Day # 2 PCP Fernanda Stover MD     Chief Complaint: weak    Subjective:     Constitutional:  Positive for appetite change and fatigue.   HENT:  Positive for congestion.    Respiratory:  Positive for shortness of breath.    Cardiovascular:  Negative for leg swelling.   Genitourinary:  Negative for dysuria.       Objective:   Blood pressure 110/68, pulse 98, temperature 98.2 °F (36.8 °C), temperature source Oral, resp. rate 18, height 5' 5\" (1.651 m), weight 155 lb 9.6 oz (70.6 kg), SpO2 96%, not currently breastfeeding.  Physical Exam  Vitals and nursing note reviewed.   Constitutional:       Appearance: She is obese.   HENT:      Head: Normocephalic and atraumatic.   Cardiovascular:      Rate and Rhythm: Normal rate and regular rhythm.      Pulses: Normal pulses.   Pulmonary:      Breath sounds: Rhonchi present.   Abdominal:      General: Bowel sounds are normal.      Palpations: Abdomen is soft.   Skin:     General: Skin is warm and dry.      Capillary Refill: Capillary refill takes less than 2 seconds.   Neurological:      Mental Status: She is alert and oriented to person, place, and time.   Psychiatric:         Behavior: Behavior normal.         Judgment: Judgment normal.         Results:   Lab Results   Component Value Date    WBC 5.7 2024    HGB 9.9 (L) 2024    HCT 30.6 (L) 2024    .0 2024    CREATSERUM 0.65 2024    BUN 14 2024     2024    K 4.0 2024     2024    CO2 24.0 2024    GLU 82 2024    CA 8.6 (L) 2024    ALB 3.6 2024    ALKPHO 82 2024    BILT 0.2 2024    TP 7.9 2024    AST 42 (H) 2024    ALT 25 2024    INR 1.09 2024    T4F 0.5 (L) 10/30/2024     TSH 15.843 (H) 10/30/2024    MG 2.1 11/19/2024    PHOS 4.8 02/07/2024    B12 435 07/14/2017       XR CHEST AP PORTABLE  (CPT=71045)    Result Date: 11/18/2024  CONCLUSION: Moderate to large-sized hazy opacity at the right lung base associated with right lung volume loss compatible with atelectasis, infiltrate, or a combination.    Dictated by (CST): Rodri Lee MD on 11/18/2024 at 6:53 PM     Finalized by (CST): Rodri Lee MD on 11/18/2024 at 6:54 PM         EKG 12 Lead    Result Date: 11/18/2024  Normal sinus rhythm Cannot rule out Anterior infarct , age undetermined ST & T wave abnormality, consider inferior ischemia Abnormal ECG No previous ECGs found in [facility] Confirmed by CHARLOTTE MULLINS, COLLINS ([id number]) on 11/18/2024 5:22:03 PM     Assessment & Plan:       UTI  Poor appetite/fever  SIRs r/o sepsis   -Ucx and Bcx pending   -cont on IV Rocephin   -IVF  -Symptomatic mx     Poss pneumonia check labs     H/o Renal cancer with mets  -Oncology consult appreciated      H/o HTN  -BP is normotensive            Quality:  DVT Prophylaxis: lovenox no Need for home lovenox per dr gamez  CODE status: Full   Willett: None needed      Complex mdm; coordinating care with nurse/dr gamez and counseling pt and with her permission her family in room about poss dc        ORALIA ARDON MD

## 2024-11-20 NOTE — OCCUPATIONAL THERAPY NOTE
OCCUPATIONAL THERAPY EVALUATION - INPATIENT     Room Number: 457/457-A  Evaluation Date: 11/20/2024  Type of Evaluation: Initial       Physician Order: IP Consult to Occupational Therapy  Reason for Therapy: ADL/IADL Dysfunction and Discharge Planning    OCCUPATIONAL THERAPY ASSESSMENT   RN cleared pt for participation in OT session, which was completed in collaboration with PT. Upon arrival, pt was seated in bedside chair  and agreeable to activity. Cousin present during session. Pt was left in chair and alarm was activated. Call light and all needs left in reach. Handoff given to RN.    Patient is a 69 year old female admitted 11/18/2024 for UTI; h/o renal cell carcinoma of R kidney; pneumonia.  Prior to admission, pt was independent;  assists with IADLs.  Patient is currently requiring up to mod A for ADLs overall along with SBA for STS, and SBA for functional transfer at RW level. Pt tolerated about 1 minutes of supported standing.  Pt has the following impairments: weakness.    ACTIVITY TOLERANCE  Pulse: 98        BP: 110/68             Education provided  Educated pt about role of OT and hospital therapy process as well as proper safety techniques including proper hand placement, body mechanics, safety techniques. Pt verbalized/demonstrated fair carryover.    Patient will benefit from continued skilled OT Services with no additional skilled services but increased support at home from cousin and     PLAN  OT Treatment Plan: Balance activities;Energy conservation/work simplification techniques;Continued evaluation;Compensatory technique education;Fine motor coordination activities;Neuromuscluar reeducation;Equipment eval/education;Patient/Family training;Patient/Family education;Cognitive reorientation;Endurance training;UE strengthening/ROM;Functional transfer training;Visual perceptual training;IADL training;ADL training      OCCUPATIONAL THERAPY MEDICAL/SOCIAL HISTORY     Problem List  Principal  Problem:    Acute cystitis without hematuria  Active Problems:    Community acquired pneumonia of right lower lobe of lung    Renal cell carcinoma (HCC)      Past Medical History  Past Medical History:    Essential hypertension    High blood pressure    Mixed hyperlipidemia    Moderate single current episode of major depressive disorder (HCC)    Osteoarthritis    KNEE - RIGHT    Osteoarthrosis, pelvic region and thigh    PONV (postoperative nausea and vomiting)    Pulmonary embolism (HCC)    after hip surgery       Past Surgical History  Past Surgical History:   Procedure Laterality Date    Cholecystectomy      Colonoscopy N/A 10/1/2018    Procedure: COLONOSCOPY;  Surgeon: Shiva Clemente MD;  Location: [facility] ENDOSCOPY    Hc implant ocular device intraoperative detached retina [id number]      Hip replacement surgery  2012    Hysterectomy          Laparoscopic cholecystectomy  1990       HOME SITUATION  Type of Home: House  Home Layout: Two level;Bed/bath upstairs  Lives With: Spouse                      Drives: Yes (has not driven by choice x 6 months)  Patient Regularly Uses: Glasses;Rollator (uses rollator at [facility])    SUBJECTIVE  \"I walked the hallway yesterday.\"    OCCUPATIONAL THERAPY EXAMINATION      OBJECTIVE  Precautions: Low vision  Fall Risk: Standard fall risk    PAIN ASSESSMENT  Ratin             RANGE OF MOTION   Upper extremity ROM is within functional limits     STRENGTH ASSESSMENT  Upper extremity strength is within functional limits     COORDINATION  Gross Motor: WFL   Fine Motor: WFL     ACTIVITIES OF DAILY LIVING ASSESSMENT  AM-PAC ‘6-Clicks’ Inpatient Daily Activity Short Form  How much help from another person does the patient currently need…  -   Putting on and taking off regular lower body clothing?: A Little  -   Bathing (including washing, rinsing, drying)?: A Little  -   Toileting, which includes using toilet, bedpan or urinal? : A Little  -   Putting on and taking off regular  upper body clothing?: A Little  -   Taking care of personal grooming such as brushing teeth?: A Little  -   Eating meals?: None    AM-PAC Score:  Score: 19  Approx Degree of Impairment: 42.8%  Standardized Score (AM-PAC Scale): 40.22  CMS Modifier (G-Code): CK      FUNCTIONAL TRANSFER ASSESSMENT     Sit to stand: SBA  Toilet Transfer: SBA  Chair Transfer: SBA    FUNCTIONAL ADL ASSESSMENT  Overall mod A    The patient's Approx Degree of Impairment: 42.8% has been calculated based on documentation in the [facility] '6 clicks' Inpatient Daily Activity Short Form.  Research supports that patients with this level of impairment may benefit from home health. Final disposition will be made by interdisciplinary medical team.    OT Goals  Patients self stated goal is: to go home      Patient will complete functional transfer with mod I  Comment:     Patient will complete toileting with mod I  Comment:     Patient will tolerate standing for 3 minutes in prep for adls with mod I   Comment:    Patient will complete item retrieval with mod I  Comment:          Goals  on:   Frequency: 3-5x/wk    Patient Evaluation Complexity Level:   Occupational Profile/Medical History MODERATE - Expanded review of history including review of medical or therapy record   Specific performance deficits impacting engagement in ADL/IADL MODERATE  3 - 5 performance deficits   Client Assessment/Performance Deficits MODERATE - Comorbidities and min to mod modifications of tasks    Clinical Decision Making MODERATE - Analysis of occupational profile, detailed assessments, several treatment options    Overall Complexity MODERATE     OT Session Time: 20 minutes  Self-Care Home Management: 10 minutes            Teresa Hsu OT  [facility]  Inpatient Rehabilitation  Occupational Therapy  [phone]

## 2024-11-20 NOTE — PLAN OF CARE
Problem: Patient Centered Care  Goal: Patient preferences are identified and integrated in the patient's plan of care  Description: Interventions:  - What would you like us to know as we care for you? From home with   - Provide timely, complete, and accurate information to patient/family  - Incorporate patient and family knowledge, values, beliefs, and cultural backgrounds into the planning and delivery of care  - Encourage patient/family to participate in care and decision-making at the level they choose  - Honor patient and family perspectives and choices  Outcome: Progressing    No acute changes overnight. Patient is  aoX4 with no complaints of pain. VSS on room air, IVF and antibiotic infusing, blood and urine cultures negative, tele monitoring (no calls), voids freely (urgency at times), zofran given for nausea this morning. Pt ambulates with SBA and a walker. Safety precautions maintained; call light within reach

## 2024-11-21 ENCOUNTER — TELEPHONE (OUTPATIENT)
Dept: INTERNAL MEDICINE CLINIC | Facility: CLINIC | Age: 69
End: 2024-11-21

## 2024-11-21 VITALS
WEIGHT: 155.63 LBS | HEART RATE: 89 BPM | BODY MASS INDEX: 25.93 KG/M2 | RESPIRATION RATE: 16 BRPM | OXYGEN SATURATION: 93 % | DIASTOLIC BLOOD PRESSURE: 56 MMHG | SYSTOLIC BLOOD PRESSURE: 98 MMHG | HEIGHT: 65 IN | TEMPERATURE: 99 F

## 2024-11-21 LAB
ANION GAP SERPL CALC-SCNC: 7 MMOL/L (ref 0–18)
BASOPHILS # BLD AUTO: 0.04 X10(3) UL (ref 0–0.2)
BASOPHILS NFR BLD AUTO: 0.7 %
BUN BLD-MCNC: 13 MG/DL (ref 9–23)
BUN/CREAT SERPL: 20.6 (ref 10–20)
C DIFF TOX B STL QL: NEGATIVE
CALCIUM BLD-MCNC: 8.6 MG/DL (ref 8.7–10.4)
CHLORIDE SERPL-SCNC: 109 MMOL/L (ref 98–112)
CO2 SERPL-SCNC: 24 MMOL/L (ref 21–32)
CREAT BLD-MCNC: 0.63 MG/DL
DEPRECATED RDW RBC AUTO: 47 FL (ref 35.1–46.3)
EGFRCR SERPLBLD CKD-EPI 2021: 97 ML/MIN/1.73M2 (ref 60–?)
EOSINOPHIL # BLD AUTO: 0.32 X10(3) UL (ref 0–0.7)
EOSINOPHIL NFR BLD AUTO: 5.4 %
ERYTHROCYTE [DISTWIDTH] IN BLOOD BY AUTOMATED COUNT: 14.1 % (ref 11–15)
GLUCOSE BLD-MCNC: 79 MG/DL (ref 70–99)
HCT VFR BLD AUTO: 29.1 %
HGB BLD-MCNC: 9.5 G/DL
IMM GRANULOCYTES # BLD AUTO: 0.01 X10(3) UL (ref 0–1)
IMM GRANULOCYTES NFR BLD: 0.2 %
LYMPHOCYTES # BLD AUTO: 1.85 X10(3) UL (ref 1–4)
LYMPHOCYTES NFR BLD AUTO: 30.9 %
MAGNESIUM SERPL-MCNC: 1.9 MG/DL (ref 1.6–2.6)
MCH RBC QN AUTO: 30.1 PG (ref 26–34)
MCHC RBC AUTO-ENTMCNC: 32.6 G/DL (ref 31–37)
MCV RBC AUTO: 92.1 FL
MONOCYTES # BLD AUTO: 0.69 X10(3) UL (ref 0.1–1)
MONOCYTES NFR BLD AUTO: 11.5 %
NEUTROPHILS # BLD AUTO: 3.07 X10 (3) UL (ref 1.5–7.7)
NEUTROPHILS # BLD AUTO: 3.07 X10(3) UL (ref 1.5–7.7)
NEUTROPHILS NFR BLD AUTO: 51.3 %
OSMOLALITY SERPL CALC.SUM OF ELEC: 289 MOSM/KG (ref 275–295)
PHOSPHATE SERPL-MCNC: 3.2 MG/DL (ref 2.4–5.1)
PLATELET # BLD AUTO: 331 10(3)UL (ref 150–450)
POTASSIUM SERPL-SCNC: 3.9 MMOL/L (ref 3.5–5.1)
RBC # BLD AUTO: 3.16 X10(6)UL
SODIUM SERPL-SCNC: 140 MMOL/L (ref 136–145)
WBC # BLD AUTO: 6 X10(3) UL (ref 4–11)

## 2024-11-21 PROCEDURE — 99239 HOSP IP/OBS DSCHRG MGMT >30: CPT | Performed by: HOSPITALIST

## 2024-11-21 RX ORDER — FAMOTIDINE 10 MG
10 TABLET ORAL 2 TIMES DAILY
Qty: 10 TABLET | Refills: 0 | Status: SHIPPED | OUTPATIENT
Start: 2024-11-21

## 2024-11-21 RX ORDER — TRIAMCINOLONE ACETONIDE 1 MG/ML
1 LOTION TOPICAL 2 TIMES DAILY
Qty: 1 EACH | Refills: 0 | Status: SHIPPED | OUTPATIENT
Start: 2024-11-21

## 2024-11-21 RX ORDER — ACETAMINOPHEN AND CODEINE PHOSPHATE 300; 30 MG/1; MG/1
1 TABLET ORAL EVERY 6 HOURS PRN
Qty: 20 TABLET | Refills: 0 | Status: SHIPPED | OUTPATIENT
Start: 2024-11-21

## 2024-11-21 RX ORDER — ACETAMINOPHEN 500 MG
500 TABLET ORAL EVERY 4 HOURS PRN
Status: SHIPPED | COMMUNITY
Start: 2024-11-21

## 2024-11-21 RX ORDER — LEVOFLOXACIN 750 MG/1
750 TABLET, FILM COATED ORAL NIGHTLY
Qty: 5 TABLET | Refills: 0 | Status: SHIPPED | OUTPATIENT
Start: 2024-11-21 | End: 2024-11-26

## 2024-11-21 RX ORDER — ECHINACEA PURPUREA EXTRACT 125 MG
2 TABLET ORAL EVERY 4 HOURS PRN
Qty: 1 EACH | Refills: 0 | Status: SHIPPED | OUTPATIENT
Start: 2024-11-21

## 2024-11-21 RX ORDER — BENZONATATE 200 MG/1
200 CAPSULE ORAL 3 TIMES DAILY PRN
Qty: 30 CAPSULE | Refills: 0 | Status: SHIPPED | OUTPATIENT
Start: 2024-11-21

## 2024-11-21 NOTE — DISCHARGE SUMMARY
Dc summary#[id number]  > 30 min spent on dc    Hospital Discharge Diagnoses: uti/pneumonia    Lace+ Score: 73  59-90 High Risk  29-58 Medium Risk  0-28   Low Risk.    TCM Follow-Up Recommendation:  LACE > 58: High Risk of readmission after discharge from the hospital.  Tcm fu recommended

## 2024-11-21 NOTE — CM/SW NOTE
11/21/24 1100   CM/SW Referral Data   Referral Source Social Work (self-referral)   Reason for Referral Discharge planning   Informant Patient;EMR;Clinical Staff Member   Medical Hx   Does patient have an established PCP? Yes  (Dr. Fernanda Stover)   Significant Past Medical/Mental Health Hx Renal cell carcinoma   Patient Info   Advanced directives? No   Patient's Current Mental Status at Time of Assessment Alert;Oriented   Patient's Home Environment House   Number of Levels in Home 2   Number of Stair in Home 7 to 2nd floor   Patient lives with Spouse/Significant other   Patient Status Prior to Admission   Independent with ADLs and Mobility No   Pt. requires assistance with Driving   Services in place prior to admission DME/Supplies at home   Type of DME/Supplies Wheelchair;Ramp;Stair Lift;Rollator Walker;Grab Bars   Discharge Needs   Anticipated D/C needs Home health care   Choice of Post-Acute Provider   Patient declines recommended services Yes     SW self-referred to this case for discharge planning.    Pt admitted for worsening weakness/nausea/SOB.    SW met with pt and cousin Kristi bedside to complete assessment.    Pt confirmed address and PCP on file.    Pt is from home w/her spouse in a 2-level home.  Pt has 4 supportive children as well.    At baseline, pt is independent with mobility and ADLs.  Pt does have access to a rollator and WC.    Pt does not drive by her choice.    Pt confirmed she has follow-up with PCP 12/5, CT scan on 12/2, labs 12/4 and then her infusion at cancer center.    Anticipated therapy need: Home with Home Healthcare.    Pt declining at this time- feels comfortable with her home support and level of functioning.    Pt hopeful for discharge today.    PLAN: DC home declining home health care    / to remain available for support and/or discharge planning.     Fernanda Metz MSW, LSW [id number]

## 2024-11-21 NOTE — TELEPHONE ENCOUNTER
Valentina, states that the patient is being discharged from [facility] today and they will see the patient for nursing and physical therapy. Valentina, states that they will fax over the plan of care for the patient.

## 2024-11-21 NOTE — PLAN OF CARE
Patient is alert and oriented x4. Tolerating diet, PRN Zofran given for mild nausea. Stool sample sent for C. Diff testing. IVF infusing. Up with a walker. Call light within reach. Fall precautions maintained.    Problem: Patient Centered Care  Goal: Patient preferences are identified and integrated in the patient's plan of care  Description: Interventions:  - What would you like us to know as we care for you?   - Provide timely, complete, and accurate information to patient/family  - Incorporate patient and family knowledge, values, beliefs, and cultural backgrounds into the planning and delivery of care  - Encourage patient/family to participate in care and decision-making at the level they choose  - Honor patient and family perspectives and choices  Outcome: Progressing     Problem: Patient/Family Goals  Goal: Patient/Family Long Term Goal  Description: Patient's Long Term Goal:     Interventions:  -   - See additional Care Plan goals for specific interventions  Outcome: Progressing  Goal: Patient/Family Short Term Goal  Description: Patient's Short Term Goal:     Interventions:   -   - See additional Care Plan goals for specific interventions  Outcome: Progressing     Problem: RESPIRATORY - ADULT  Goal: Achieves optimal ventilation and oxygenation  Description: INTERVENTIONS:  - Assess for changes in respiratory status  - Assess for changes in mentation and behavior  - Position to facilitate oxygenation and minimize respiratory effort  - Oxygen supplementation based on oxygen saturation or ABGs  - Provide Smoking Cessation handout, if applicable  - Encourage broncho-pulmonary hygiene including cough, deep breathe, Incentive Spirometry  - Assess the need for suctioning and perform as needed  - Assess and instruct to report SOB or any respiratory difficulty  - Respiratory Therapy support as indicated  - Manage/alleviate anxiety  - Monitor for signs/symptoms of CO2 retention  Outcome: Progressing     Problem:  GASTROINTESTINAL - ADULT  Goal: Minimal or absence of nausea and vomiting  Description: INTERVENTIONS:  - Maintain adequate hydration with IV or PO as ordered and tolerated  - Nasogastric tube to low intermittent suction as ordered  - Evaluate effectiveness of ordered antiemetic medications  - Provide nonpharmacologic comfort measures as appropriate  - Advance diet as tolerated, if ordered  - Obtain nutritional consult as needed  - Evaluate fluid balance  Outcome: Progressing

## 2024-11-21 NOTE — PLAN OF CARE
Lary is A&Ox4 and on room air. Pt ambulates standby assist with a walker-walk test was completed and O2 saturations remained at 94%. Patient is saline locked and voids independently. 1 emesis episode today-Zofran given. Tolerating diet and has had no complaints of pain throughout the day. Frequent rounding by nursing staff. Safety precautions maintained/call light within reach. Patient discharged home with spouse. IV removed, discharge education provided, patient sent with all personal belongings, and discharge instructions. Addressed additional questions.   Problem: Patient Centered Care  Goal: Patient preferences are identified and integrated in the patient's plan of care  Description: Interventions:  - What would you like us to know as we care for you?   - Provide timely, complete, and accurate information to patient/family  - Incorporate patient and family knowledge, values, beliefs, and cultural backgrounds into the planning and delivery of care  - Encourage patient/family to participate in care and decision-making at the level they choose  - Honor patient and family perspectives and choices  Outcome: Adequate for Discharge    Problem: RESPIRATORY - ADULT  Goal: Achieves optimal ventilation and oxygenation  Description: INTERVENTIONS:  - Assess for changes in respiratory status  - Assess for changes in mentation and behavior  - Position to facilitate oxygenation and minimize respiratory effort  - Oxygen supplementation based on oxygen saturation or ABGs  - Provide Smoking Cessation handout, if applicable  - Encourage broncho-pulmonary hygiene including cough, deep breathe, Incentive Spirometry  - Assess the need for suctioning and perform as needed  - Assess and instruct to report SOB or any respiratory difficulty  - Respiratory Therapy support as indicated  - Manage/alleviate anxiety  - Monitor for signs/symptoms of CO2 retention  Outcome: Adequate for Discharge     Problem: GASTROINTESTINAL - ADULT  Goal:  Minimal or absence of nausea and vomiting  Description: INTERVENTIONS:  - Maintain adequate hydration with IV or PO as ordered and tolerated  - Nasogastric tube to low intermittent suction as ordered  - Evaluate effectiveness of ordered antiemetic medications  - Provide nonpharmacologic comfort measures as appropriate  - Advance diet as tolerated, if ordered  - Obtain nutritional consult as needed  - Evaluate fluid balance  Outcome: Adequate for Discharge

## 2024-11-21 NOTE — HOME CARE LIAISON
Received referral via [facility] for Home Health services. Spoke w/ patient and her  who is agreeable with [facility]. Contact information placed on AVS.

## 2024-11-21 NOTE — CM/SW NOTE
11/21/24 1500   Discharge disposition   Expected discharge disposition Home-Health   Post Acute Care Provider [facility]   Discharge transportation Private car     RYANN confirmed with RN who stated pt is medically ready for discharge today.  DC order placed.    HH Orders were received and attached them via Aidin to [facility] .  RYANN informed by [facility]  liaison Jarrett that pt is now agreeable to [facility].  Valentina made aware of discharge through secure chat/ [facility] Messages.    RYANN confirmed that [facility]  contact information added to AVS.    PLAN: DC home with [facility]     Fernanda Metz MSW, LSW [phone]

## 2024-11-21 NOTE — PHYSICAL THERAPY NOTE
PHYSICAL THERAPY TREATMENT NOTE - INPATIENT     Room Number: 457/457-A       Presenting Problem: fevers and weakness, difficulty ambulating,acute cystitis w/o hematuria  and possibly coommunity acquired PNA  Co-Morbidities : sacral decub ulcers, HTN, OA, PE, chemo for renal cell carcinoma    Problem List  Principal Problem:    Acute cystitis without hematuria  Active Problems:    Community acquired pneumonia of right lower lobe of lung    Renal cell carcinoma (HCC)      PHYSICAL THERAPY ASSESSMENT   Patient demonstrates good  progress this session, goals  updated to reflect patient performance.      Patient is requiring supervision as a result of the following impairments: decreased functional strength and medical status.     Patient continues to function near baseline with bed mobility, transfers, gait, and stair negotiation.  Next session anticipate patient to progress bed mobility, transfers, gait, and stair negotiation.  Physical Therapy will continue to follow patient for duration of hospitalization.    Patient continues to benefit from continued skilled PT services: at discharge to promote prior level of function and safety with additional support and return home with home health PT.    PLAN DURING HOSPITALIZATION  Nursing Mobility Recommendation : 1 Assist  PT Device Recommendation: Rolling walker  PT Treatment Plan: Bed mobility;Body mechanics;Patient education;Gait training;Transfer training;Balance training;Stair training  Frequency (Obs): 3x/week     SUBJECTIVE  \"I have stairs at home\"    OBJECTIVE  Precautions: Low vision    PAIN ASSESSMENT   Rating: Unable to rate  Location: overall soreness but not painful  Management Techniques: Activity promotion    BALANCE  Static Sitting: Good  Dynamic Sitting: Fair +  Static Standing: Fair  Dynamic Standing: Fair -    AM-PAC '6-Clicks' INPATIENT SHORT FORM - BASIC MOBILITY  How much difficulty does the patient currently have...  Patient Difficulty: Turning over in  bed (including adjusting bedclothes, sheets and blankets)?: None   Patient Difficulty: Sitting down on and standing up from a chair with arms (e.g., wheelchair, bedside commode, etc.): A Little   Patient Difficulty: Moving from lying on back to sitting on the side of the bed?: A Little   How much help from another person does the patient currently need...   Help from Another: Moving to and from a bed to a chair (including a wheelchair)?: A Little   Help from Another: Need to walk in hospital room?: A Little   Help from Another: Climbing 3-5 steps with a railing?: A Little     AM-PAC Score:  Raw Score: 19   Approx Degree of Impairment: 41.77%   Standardized Score (AM-PAC Scale): 45.44   CMS Modifier (G-Code): CK    FUNCTIONAL ABILITY STATUS  Functional Mobility/Gait Assessment  Gait Assistance: Supervision  Distance (ft): 100ft  Assistive Device: Rolling walker  Pattern: Within Functional Limits (slow eliezer,, flexed posture)  Rolling:  not tested   Supine to Sit: supervision  Sit to Supine: supervision  Sit to Stand: supervision    Skilled Therapy Provided: Patient on room air. Patient oxygen sat 91% and heart rate 86, at end of session oxygen sat 94% and heart rate 102, remained above 90% throughout the session. RN aware. Patient also needs rolling walker for home, RN aware. Patient currently with SBA for mobility during the session, able to ambulate about 100ft with rolling walker, and able to navigate 4 stairs with two handrails with single step pattern when ascending and descending.   The patient's Approx Degree of Impairment: 41.77% has been calculated based on documentation in the [facility] '6 clicks' Inpatient Daily Activity Short Form.  Research supports that patients with this level of impairment may benefit from home with home health. Patient received semi-fowlers in bed, agreeable to physical therapy.     PATIENT EDUCATION  Education Provided To: Patient  Patient Education: Role of Physical Therapy;Plan of  Care  Patient's Response to Education: Verbalized Understanding             Final disposition will be made by interdisciplinary medical team.    Patient End of Session: Needs met;Call light within reach;RN aware of session/findings;All patient questions and concerns addressed;In bed;Family present    CURRENT GOALS   Goals to be met by: 11/30/25  Patient Goal Patient's self-stated goal is: to go home, be independent   Goal #1 Patient is able to demonstrate supine - sit EOB @ level: modified independent      Goal #1   Current Status Not met   Goal #2 Patient is able to demonstrate transfers Sit to/from Stand at assistance level: modified independent with walker - rolling      Goal #2  Current Status Not met   Goal #3 Patient is able to ambulate 450 feet with assist device: walker - rolling at assistance level: supervision   Goal #3   Current Status Not met   Goal #4 Patient will negotiate 4 stairs/one curb w/ assistive device and supervision   Goal #4   Current Status Not met   Goal #5 Patient to demonstrate independence with home activity/exercise instructions provided to patient in preparation for discharge.   Goal #5   Current Status In progress   Goal #6     Goal #6  Current Status       Gait Training: 10 minutes  Therapeutic Activity: 13 minutes

## 2024-11-21 NOTE — DISCHARGE INSTRUCTIONS
Ok to go home  Follow up as scheduled.  Stay away from perfume     Medication List        START taking these medications      acetaminophen 500 MG Tabs  Commonly known as: Tylenol Extra Strength     benzonatate 200 MG Caps  Commonly known as: Tessalon  Take 1 capsule (200 mg total) by mouth 3 (three) times daily as needed for cough.     famotidine 10 MG Tabs  Commonly known as: Pepcid  Take 1 tablet (10 mg total) by mouth 2 (two) times daily. For itching     levoFLOXacin 750 MG Tabs  Commonly known as: Levaquin  Take 1 tablet (750 mg total) by mouth at bedtime for 5 doses. Watch achilles tendon pain, visual changes, palpitations     Sennosides 17.2 MG Tabs  Take 1 tablet (17.2 mg total) by mouth nightly as needed (constipation, as needed if no bowel movement that day).     sodium chloride 0.65 % Soln  Commonly known as: Saline Mist  2 sprays by Nasal route every 4 (four) hours as needed for congestion.     triamcinolone 0.1 % Lotn  Commonly known as: Kenalog  Apply 1 Application topically 2 (two) times daily.            CHANGE how you take these medications      acetaminophen-codeine 300-30 MG Tabs  Commonly known as: Tylenol #3  Take 1 tablet by mouth every 6 (six) hours as needed for Pain. Watch drowsiness no alcohol  What changed: additional instructions            CONTINUE taking these medications      docusate sodium 50 MG Caps  Commonly known as: COLACE     ondansetron 4 MG Tbdp  Commonly known as: Zofran-ODT  Take 1 tablet (4 mg total) by mouth every 8 (eight) hours as needed for Nausea.     prochlorperazine 10 mg tablet  Commonly known as: Compazine  Take 1 tablet (10 mg total) by mouth every 6 (six) hours as needed for Nausea.     sertraline 25 MG Tabs  Commonly known as: Zoloft  Take 1 tablet (25 mg total) by mouth daily.            STOP taking these medications      Cabometyx 20 MG Tabs  Generic drug: Cabozantinib S-Malate     celecoxib 200 MG Caps  Commonly known as: CeleBREX     diclofenac 1 %  Gel  Commonly known as: Voltaren               Where to Get Your Medications        [id number] - [place], [place] - [address] AT [facility], [phone], [phone]  [address], [facility] [address]      Phone: [phone]   acetaminophen-codeine 300-30 MG Tabs  benzonatate 200 MG Caps  famotidine 10 MG Tabs  levoFLOXacin 750 MG Tabs  Sennosides 17.2 MG Tabs  sodium chloride 0.65 % Soln  triamcinolone 0.1 % Lotn        Sometimes managing your health at home requires assistance.  The [facility] team has recognized your preference to use [facility].  They can be reached by phone at [phone].  The fax number for your reference is [phone].  A representative from the home health agency will contact you or your family to schedule your first visit.

## 2024-11-21 NOTE — PROGRESS NOTES
Patient's O2 saturation on room air is 94% at rest. Patient's O2 saturation on room air is 94% while ambulating.

## 2024-11-22 ENCOUNTER — PATIENT OUTREACH (OUTPATIENT)
Dept: CASE MANAGEMENT | Age: 69
End: 2024-11-22

## 2024-11-22 NOTE — TELEPHONE ENCOUNTER
I called and spoke with Valentina about the below  States understanding and she knows patient is aware of visit scheduled 12/5/24    Advised orders will be signed at that time.     Valentina says technical rule is 3 months prior to within one month of discharge.     Future Appointments   Date Time Provider Department Center   12/2/2024 11:00 AM LMB CT RM1 [facility] MOB. CT EM [place]   12/4/2024  9:45 AM EM CC LAB1 [facility] CHEMO EMO   12/4/2024 11:00 AM Archie Crocker, DO [facility] HEM ONC EMO   12/4/2024 11:30 AM EM CC INFRN 5 [facility] CHEMO EMO   12/5/2024  1:00 PM Fernanda Stover MD [id number] [facility] 429   5/13/2025 12:30 PM Fernanda Stover MD [id number] [facility] 429     No other questions or concerns at this time.

## 2024-11-22 NOTE — PROGRESS NOTES
NCM attempted to reach the patient to complete a [facility] follow up call. Left message to call back. [facility] provided direct contact info at [phone].

## 2024-11-22 NOTE — TELEPHONE ENCOUNTER
Per insurance guidelines needs OV to sign those with in 3 months from home health services to begin.  I have not seen her since July so that would be too late beyond 3 months.  Also would like to do hospital follow-up visits just to see where were going.  She has an appointment with me December 5.

## 2024-11-25 NOTE — DISCHARGE SUMMARY
[facility]    PATIENT'S NAME: DENNISE LARKIN   ATTENDING PHYSICIAN: Atul Mueller MD   PATIENT ACCOUNT#:   [id number]    LOCATION:  [address] [place] [address]  MEDICAL RECORD #:   [id number]       YOB: 1955  ADMISSION DATE:       11/18/2024      DISCHARGE DATE:  11/21/2024    DISCHARGE SUMMARY    About 45 minutes were spent coordinating care with Dr. Crocker, Nursing in preparing this discharge.    DISCHARGE DIAGNOSIS:  Urinary tract infection and pneumonia.    HISTORY AND HOSPITAL COURSE:  This is a 69-year-old white female who presents with a history of fever and dizziness.  She has a history of metastatic renal cancer with pleural metastasis and has not been eating well.  She has a great deal of anxiety and also does like to manage her own care.  Dr. Crocker talked to her into taking DVT prophylaxis while she was in the hospital and she did agree to take it there but not at home.  She also reported being occasionally short of breath and we walked her around and she did not seem short of breath, and her cultures showed no growth of significance just possible contamination.  She had no fevers.  Her white blood count improved.  We were able to discharge her home.    PHYSICAL EXAMINATION ON DISCHARGE:  VITAL SIGNS:  Temperature 98.9, pulse 89, respiratory rate 16, blood pressure 98/56, 93%.  LUNGS:  Occasional rhonchi.  HEART:  Normal S1, S2.  No S3.  ABDOMEN:  Soft, nontender.  EXTREMITIES:  Without edema.  NEUROLOGIC:  She is alert and oriented, pleasant and cooperative.    LABORATORY STUDIES:  Please see chart.    ASSESSMENT AND PLAN:  1.   Urinary tract infection.  Continue medication orally and see how she does.  2.   Possible pneumonia.  Procalcitonin is very mildly elevated at 0.17.  X-rays abnormal.  We will treat with Levaquin.  3.   Nausea and vomiting today after her sister visited wearing heavily scented lotion and perfume.  Patient is better now after her sister left.  4.   Renal  cancer with metastasis as per Dr. Crocker.  5.   Hypertension.  Patient is doing well.  6.   DVT prophylaxis.  Patient did agree to Lovenox here.  No Lovenox needed for home per Dr. Crocker, as patient would not take it anyway.    CONDITION ON DISCHARGE:  Stable.    CODE STATUS:  Full Code per records and not discussed during this hospitalization.    ACTIVITY:  As tolerated.    DIET:  Low salt as tolerated.    FOLLOWUP:  Follow up with Dr. Stover in a few days.  Follow up with Dr. Crocker in a week.  Follow up with Dr. Crocker for laboratory studies and treatment as he suggests.    DISCHARGE MEDICATIONS:  1.   Colace 100 mg p.o. b.i.d. p.r.n. constipation.  2.   Per patient's preference either Zofran or Compazine every 6 hours as needed for nausea.  3.   Sertraline 25 mg daily.  4.   Tylenol 500 mg every 4 hours p.r.n. pain.  Watch total daily Tylenol, limit to 3 g.  5.   Tylenol with codeine 1 tablet every 6 hours as needed for pain.  6.   Tessalon Perles 200 mg 3 times a day as needed.  7.    Guaifenesin 10 mg twice a day.  8.   Levaquin 750 mg at bedtime for 5 more doses. Watch for Achilles tendon pain, palpitations and visual changes.  9.   Senna 17.2 mg nightly as needed.  10.   Triamcinolone lotion apply twice a day as needed.    RISK OF READMISSION:  High.  TCM followup recommended.    Dictated By Atul Mueller MD  d: 11/21/2024 17:43:06  t: 11/23/2024 14:33:43  Job [id number]  [facility]/

## 2024-11-27 ENCOUNTER — MED REC SCAN ONLY (OUTPATIENT)
Dept: INTERNAL MEDICINE CLINIC | Facility: CLINIC | Age: 69
End: 2024-11-27

## 2024-12-02 ENCOUNTER — HOSPITAL ENCOUNTER (OUTPATIENT)
Dept: CT IMAGING | Age: 69
End: 2024-12-02
Attending: STUDENT IN AN ORGANIZED HEALTH CARE EDUCATION/TRAINING PROGRAM
Payer: MEDICARE

## 2024-12-02 ENCOUNTER — HOSPITAL ENCOUNTER (OUTPATIENT)
Dept: CT IMAGING | Facility: HOSPITAL | Age: 69
Discharge: HOME | End: 2024-12-02
Attending: STUDENT IN AN ORGANIZED HEALTH CARE EDUCATION/TRAINING PROGRAM
Payer: MEDICARE

## 2024-12-02 DIAGNOSIS — Z51.11 CHEMOTHERAPY MANAGEMENT, ENCOUNTER FOR: ICD-10-CM

## 2024-12-02 DIAGNOSIS — Z51.12 ENCOUNTER FOR ANTINEOPLASTIC IMMUNOTHERAPY: ICD-10-CM

## 2024-12-02 DIAGNOSIS — C64.1 RENAL CELL CARCINOMA OF RIGHT KIDNEY (HCC): ICD-10-CM

## 2024-12-02 PROCEDURE — 74177 CT ABD & PELVIS W/CONTRAST: CPT | Performed by: STUDENT IN AN ORGANIZED HEALTH CARE EDUCATION/TRAINING PROGRAM

## 2024-12-02 PROCEDURE — 71260 CT THORAX DX C+: CPT | Performed by: STUDENT IN AN ORGANIZED HEALTH CARE EDUCATION/TRAINING PROGRAM

## 2024-12-04 ENCOUNTER — OFFICE VISIT (OUTPATIENT)
Dept: HEMATOLOGY/ONCOLOGY | Facility: HOSPITAL | Age: 69
End: 2024-12-04
Attending: INTERNAL MEDICINE
Payer: MEDICARE

## 2024-12-04 ENCOUNTER — TELEPHONE (OUTPATIENT)
Dept: HEMATOLOGY/ONCOLOGY | Facility: HOSPITAL | Age: 69
End: 2024-12-04

## 2024-12-04 VITALS
SYSTOLIC BLOOD PRESSURE: 94 MMHG | HEIGHT: 65 IN | RESPIRATION RATE: 18 BRPM | WEIGHT: 156.38 LBS | BODY MASS INDEX: 26.05 KG/M2 | DIASTOLIC BLOOD PRESSURE: 55 MMHG | TEMPERATURE: 98 F | OXYGEN SATURATION: 96 % | HEART RATE: 93 BPM

## 2024-12-04 DIAGNOSIS — C64.1 RENAL CELL CARCINOMA OF RIGHT KIDNEY (HCC): Primary | ICD-10-CM

## 2024-12-04 DIAGNOSIS — C64.1 RENAL CELL CARCINOMA OF RIGHT KIDNEY (HCC): ICD-10-CM

## 2024-12-04 DIAGNOSIS — Z51.12 ENCOUNTER FOR ANTINEOPLASTIC IMMUNOTHERAPY: ICD-10-CM

## 2024-12-04 DIAGNOSIS — Z51.11 CHEMOTHERAPY MANAGEMENT, ENCOUNTER FOR: Primary | ICD-10-CM

## 2024-12-04 DIAGNOSIS — D64.9 ANEMIA OF UNKNOWN ETIOLOGY: ICD-10-CM

## 2024-12-04 DIAGNOSIS — Z51.11 CHEMOTHERAPY MANAGEMENT, ENCOUNTER FOR: ICD-10-CM

## 2024-12-04 PROBLEM — E03.9 ACQUIRED HYPOTHYROIDISM: Status: ACTIVE | Noted: 2024-12-04

## 2024-12-04 LAB
ALBUMIN SERPL-MCNC: 3.8 G/DL (ref 3.2–4.8)
ALBUMIN/GLOB SERPL: 0.8 {RATIO} (ref 1–2)
ALP LIVER SERPL-CCNC: 75 U/L
ALT SERPL-CCNC: 26 U/L
ANION GAP SERPL CALC-SCNC: 10 MMOL/L (ref 0–18)
AST SERPL-CCNC: 51 U/L (ref ?–34)
BASOPHILS # BLD AUTO: 0.04 X10(3) UL (ref 0–0.2)
BASOPHILS NFR BLD AUTO: 0.7 %
BILIRUB SERPL-MCNC: 0.3 MG/DL (ref 0.2–1.1)
BILIRUB UR QL: NEGATIVE
BUN BLD-MCNC: 12 MG/DL (ref 9–23)
BUN/CREAT SERPL: 15.8 (ref 10–20)
CALCIUM BLD-MCNC: 9.3 MG/DL (ref 8.7–10.4)
CHLORIDE SERPL-SCNC: 103 MMOL/L (ref 98–112)
CO2 SERPL-SCNC: 25 MMOL/L (ref 21–32)
COLOR UR: YELLOW
CREAT BLD-MCNC: 0.76 MG/DL
DEPRECATED RDW RBC AUTO: 48.4 FL (ref 35.1–46.3)
DIRECT COOMBS POLY: NEGATIVE
EGFRCR SERPLBLD CKD-EPI 2021: 85 ML/MIN/1.73M2 (ref 60–?)
EOSINOPHIL # BLD AUTO: 0.38 X10(3) UL (ref 0–0.7)
EOSINOPHIL NFR BLD AUTO: 6.9 %
ERYTHROCYTE [DISTWIDTH] IN BLOOD BY AUTOMATED COUNT: 14.3 % (ref 11–15)
GLOBULIN PLAS-MCNC: 4.5 G/DL (ref 2–3.5)
GLUCOSE BLD-MCNC: 86 MG/DL (ref 70–99)
GLUCOSE UR-MCNC: NORMAL MG/DL
HCT VFR BLD AUTO: 34.4 %
HGB BLD-MCNC: 10.7 G/DL
HGB UR QL STRIP.AUTO: NEGATIVE
IMM GRANULOCYTES # BLD AUTO: 0.01 X10(3) UL (ref 0–1)
IMM GRANULOCYTES NFR BLD: 0.2 %
KETONES UR-MCNC: NEGATIVE MG/DL
LEUKOCYTE ESTERASE UR QL STRIP.AUTO: 500
LYMPHOCYTES # BLD AUTO: 1.84 X10(3) UL (ref 1–4)
LYMPHOCYTES NFR BLD AUTO: 33.5 %
MAGNESIUM SERPL-MCNC: 2.1 MG/DL (ref 1.6–2.6)
MCH RBC QN AUTO: 28.5 PG (ref 26–34)
MCHC RBC AUTO-ENTMCNC: 31.1 G/DL (ref 31–37)
MCV RBC AUTO: 91.7 FL
MONOCYTES # BLD AUTO: 0.48 X10(3) UL (ref 0.1–1)
MONOCYTES NFR BLD AUTO: 8.7 %
NEUTROPHILS # BLD AUTO: 2.74 X10 (3) UL (ref 1.5–7.7)
NEUTROPHILS # BLD AUTO: 2.74 X10(3) UL (ref 1.5–7.7)
NEUTROPHILS NFR BLD AUTO: 50 %
NITRITE UR QL STRIP.AUTO: NEGATIVE
OSMOLALITY SERPL CALC.SUM OF ELEC: 285 MOSM/KG (ref 275–295)
PH UR: 5.5 [PH] (ref 5–8)
PLATELET # BLD AUTO: 386 10(3)UL (ref 150–450)
POTASSIUM SERPL-SCNC: 3.8 MMOL/L (ref 3.5–5.1)
PROT SERPL-MCNC: 8.3 G/DL (ref 5.7–8.2)
RBC # BLD AUTO: 3.75 X10(6)UL
SODIUM SERPL-SCNC: 138 MMOL/L (ref 136–145)
SP GR UR STRIP: 1.02 (ref 1–1.03)
T4 FREE SERPL-MCNC: 0.7 NG/DL (ref 0.8–1.7)
TSI SER-ACNC: 26.42 UIU/ML (ref 0.55–4.78)
UROBILINOGEN UR STRIP-ACNC: 2
WBC # BLD AUTO: 5.5 X10(3) UL (ref 4–11)

## 2024-12-04 PROCEDURE — 81001 URINALYSIS AUTO W/SCOPE: CPT

## 2024-12-04 PROCEDURE — 84443 ASSAY THYROID STIM HORMONE: CPT

## 2024-12-04 PROCEDURE — 80053 COMPREHEN METABOLIC PANEL: CPT

## 2024-12-04 PROCEDURE — 86880 COOMBS TEST DIRECT: CPT

## 2024-12-04 PROCEDURE — S0028 INJECTION, FAMOTIDINE, 20 MG: HCPCS

## 2024-12-04 PROCEDURE — 83036 HEMOGLOBIN GLYCOSYLATED A1C: CPT | Performed by: INTERNAL MEDICINE

## 2024-12-04 PROCEDURE — G2212 PROLONG OUTPT/OFFICE VIS: HCPCS | Performed by: STUDENT IN AN ORGANIZED HEALTH CARE EDUCATION/TRAINING PROGRAM

## 2024-12-04 PROCEDURE — G2211 COMPLEX E/M VISIT ADD ON: HCPCS | Performed by: STUDENT IN AN ORGANIZED HEALTH CARE EDUCATION/TRAINING PROGRAM

## 2024-12-04 PROCEDURE — 96413 CHEMO IV INFUSION 1 HR: CPT

## 2024-12-04 PROCEDURE — 83735 ASSAY OF MAGNESIUM: CPT

## 2024-12-04 PROCEDURE — 96375 TX/PRO/DX INJ NEW DRUG ADDON: CPT

## 2024-12-04 PROCEDURE — 85025 COMPLETE CBC W/AUTO DIFF WBC: CPT

## 2024-12-04 PROCEDURE — 84439 ASSAY OF FREE THYROXINE: CPT

## 2024-12-04 PROCEDURE — 99215 OFFICE O/P EST HI 40 MIN: CPT | Performed by: STUDENT IN AN ORGANIZED HEALTH CARE EDUCATION/TRAINING PROGRAM

## 2024-12-04 RX ORDER — PANTOPRAZOLE SODIUM 40 MG/1
40 TABLET, DELAYED RELEASE ORAL NIGHTLY
Qty: 90 TABLET | Refills: 0 | OUTPATIENT
Start: 2024-12-04

## 2024-12-04 RX ORDER — FAMOTIDINE 10 MG/ML
20 INJECTION, SOLUTION INTRAVENOUS 2 TIMES DAILY
Status: DISCONTINUED | OUTPATIENT
Start: 2024-12-04 | End: 2024-12-04

## 2024-12-04 RX ORDER — FAMOTIDINE 10 MG/ML
INJECTION, SOLUTION INTRAVENOUS
Status: COMPLETED
Start: 2024-12-04 | End: 2024-12-04

## 2024-12-04 RX ORDER — FAMOTIDINE 10 MG/ML
20 INJECTION, SOLUTION INTRAVENOUS 2 TIMES DAILY
Status: CANCELLED
Start: 2024-12-04

## 2024-12-04 RX ORDER — PANTOPRAZOLE SODIUM 40 MG/1
40 TABLET, DELAYED RELEASE ORAL NIGHTLY
Qty: 30 TABLET | Refills: 1 | Status: SHIPPED | OUTPATIENT
Start: 2024-12-04

## 2024-12-04 RX ORDER — LEVOTHYROXINE SODIUM 50 UG/1
50 TABLET ORAL
Qty: 30 TABLET | Refills: 1 | Status: SHIPPED | OUTPATIENT
Start: 2024-12-04

## 2024-12-04 RX ORDER — LEVOTHYROXINE SODIUM 50 UG/1
50 TABLET ORAL
Qty: 90 TABLET | Refills: 0 | OUTPATIENT
Start: 2024-12-04

## 2024-12-04 RX ADMIN — FAMOTIDINE 20 MG: 10 INJECTION, SOLUTION INTRAVENOUS at 12:46:00

## 2024-12-04 NOTE — PROGRESS NOTES
Lary to infusion today for C8 D1 OPDIVO.  Arrives Via wheelchair, accompanied by Spouse     Patient was evaluated today by MD.    Oral medications included in this regimen:  no    Patient confirms comprehension of cancer treatment schedule:  yes    Pregnancy screening:  Denies possibility of pregnancy    Modifications in dose or schedule:  No    Pt to start Synthroid for hypothyroidism  Pt to follow-up with labs to recheck thyroid function and has visit with Dr. Crocker in 2 weeks    Medications appearance and physical integrity checked by RN: yes.    Chemotherapy IV pump settings verified by 2 RNs:  No due to targeted therapy IV administration.  Frequency of blood return and site check throughout administration: Prior to administration, Prior to each drug, and At completion of therapy     Infusion/treatment outcome:  patient tolerated treatment without incident    Education Record    Learner:  Patient  Barriers / Limitations:  None  Method:  Reinforcement  Education / instructions given:  Plan of care  Outcome:  Shows understanding    Discharged Home, Via wheelchair, accompanied by:Spouse    Patient/family verbalized understanding of future appointments: by [employer] messaging

## 2024-12-04 NOTE — TELEPHONE ENCOUNTER
Call attempted to schedule patient for 2 week follow up on 12/19. Appointments scheduled. Requested patient call back or send [employer] message if date/time does not work.

## 2024-12-05 ENCOUNTER — OFFICE VISIT (OUTPATIENT)
Dept: INTERNAL MEDICINE CLINIC | Facility: CLINIC | Age: 69
End: 2024-12-05

## 2024-12-05 ENCOUNTER — TELEPHONE (OUTPATIENT)
Dept: INTERNAL MEDICINE CLINIC | Facility: CLINIC | Age: 69
End: 2024-12-05

## 2024-12-05 ENCOUNTER — MED REC SCAN ONLY (OUTPATIENT)
Dept: INTERNAL MEDICINE CLINIC | Facility: CLINIC | Age: 69
End: 2024-12-05

## 2024-12-05 ENCOUNTER — LAB ENCOUNTER (OUTPATIENT)
Dept: LAB | Facility: HOSPITAL | Age: 69
End: 2024-12-05
Attending: STUDENT IN AN ORGANIZED HEALTH CARE EDUCATION/TRAINING PROGRAM
Payer: MEDICARE

## 2024-12-05 VITALS
HEIGHT: 65 IN | SYSTOLIC BLOOD PRESSURE: 91 MMHG | WEIGHT: 156 LBS | OXYGEN SATURATION: 100 % | BODY MASS INDEX: 25.99 KG/M2 | DIASTOLIC BLOOD PRESSURE: 58 MMHG | HEART RATE: 97 BPM | TEMPERATURE: 98 F

## 2024-12-05 DIAGNOSIS — R73.9 HYPERGLYCEMIA: ICD-10-CM

## 2024-12-05 DIAGNOSIS — D50.8 OTHER IRON DEFICIENCY ANEMIA: ICD-10-CM

## 2024-12-05 DIAGNOSIS — Z51.11 CHEMOTHERAPY MANAGEMENT, ENCOUNTER FOR: ICD-10-CM

## 2024-12-05 DIAGNOSIS — C64.1 RENAL CELL CARCINOMA OF RIGHT KIDNEY (HCC): ICD-10-CM

## 2024-12-05 DIAGNOSIS — C64.1 RENAL CELL CARCINOMA OF RIGHT KIDNEY (HCC): Primary | ICD-10-CM

## 2024-12-05 DIAGNOSIS — E55.9 VITAMIN D DEFICIENCY: ICD-10-CM

## 2024-12-05 DIAGNOSIS — E03.9 ACQUIRED HYPOTHYROIDISM: ICD-10-CM

## 2024-12-05 DIAGNOSIS — Z51.12 ENCOUNTER FOR ANTINEOPLASTIC IMMUNOTHERAPY: ICD-10-CM

## 2024-12-05 DIAGNOSIS — M81.0 AGE-RELATED OSTEOPOROSIS WITHOUT CURRENT PATHOLOGICAL FRACTURE: Primary | ICD-10-CM

## 2024-12-05 DIAGNOSIS — K59.09 OTHER CONSTIPATION: ICD-10-CM

## 2024-12-05 DIAGNOSIS — D64.9 ANEMIA OF UNKNOWN ETIOLOGY: ICD-10-CM

## 2024-12-05 DIAGNOSIS — R74.01 TRANSAMINITIS: ICD-10-CM

## 2024-12-05 DIAGNOSIS — E78.2 MIXED HYPERLIPIDEMIA: ICD-10-CM

## 2024-12-05 DIAGNOSIS — Z71.85 VACCINE COUNSELING: ICD-10-CM

## 2024-12-05 DIAGNOSIS — I10 ESSENTIAL HYPERTENSION: ICD-10-CM

## 2024-12-05 LAB
CORTIS SERPL-MCNC: 1.8 UG/DL
DEPRECATED HBV CORE AB SER IA-ACNC: 2059 NG/ML
EST. AVERAGE GLUCOSE BLD GHB EST-MCNC: 111 MG/DL (ref 68–126)
HBA1C MFR BLD: 5.5 % (ref ?–5.7)
IRON SATN MFR SERPL: 12 %
IRON SERPL-MCNC: 28 UG/DL
TIBC SERPL-MCNC: 238 UG/DL (ref 250–425)
TRANSFERRIN SERPL-MCNC: 160 MG/DL (ref 250–380)
VIT D+METAB SERPL-MCNC: 58.9 NG/ML (ref 30–100)

## 2024-12-05 PROCEDURE — 84466 ASSAY OF TRANSFERRIN: CPT | Performed by: INTERNAL MEDICINE

## 2024-12-05 PROCEDURE — 82024 ASSAY OF ACTH: CPT

## 2024-12-05 PROCEDURE — 82533 TOTAL CORTISOL: CPT

## 2024-12-05 PROCEDURE — 82728 ASSAY OF FERRITIN: CPT | Performed by: INTERNAL MEDICINE

## 2024-12-05 PROCEDURE — 83540 ASSAY OF IRON: CPT | Performed by: INTERNAL MEDICINE

## 2024-12-05 PROCEDURE — 82306 VITAMIN D 25 HYDROXY: CPT | Performed by: INTERNAL MEDICINE

## 2024-12-05 PROCEDURE — 36415 COLL VENOUS BLD VENIPUNCTURE: CPT

## 2024-12-05 RX ORDER — HYDROCORTISONE 10 MG/1
TABLET ORAL
Qty: 90 TABLET | Refills: 1 | Status: SHIPPED | OUTPATIENT
Start: 2024-12-05

## 2024-12-05 NOTE — TELEPHONE ENCOUNTER
Patient came to see me in office.  Very weak.  Has not been doing much but also has not been eating.  Discussed started on levothyroxine 50 mcg and just took a first dose today.  I know her TSH was elevated.  But her cortisol level was slightly low iron levels are slightly low.  She had received iron infusions in the past.  Not sure if she needs to receive more?  Anything to do with the cortisol?

## 2024-12-05 NOTE — PATIENT INSTRUCTIONS
Encounter Diagnoses   Name Primary?    Age-related osteoporosis without current pathological fracture Take calcium 600 every 12 hrs. With vitamin D 400 IU every  12 hrs. Excerise at least 30 minutes 3-4 times a week. May use calcium citrate as opposed to calcium carbonate which may be better absorbed in the setting of PPI use.  The preferred calcium supplement for people at risk of stone formation is calcium citrate because it helps to increase urinary citrate excretion. We recommend a dose of 200-400 mg if dietary calcium cannot be increased.   lifelong physical activity at all ages is strongly endorsed by the National Osteoporosis Foundation. Exercise recommendations generally should include weight-bearing, muscle-strengthening, and balance training exercises for 30 minutes 5 days per week or 75 minutes twice weekly, often consistent with other general health recommendations.   Weight Bearing  There are two types of osteoporosis exercises that are important for building and maintaining bone density: weight-bearing and muscle-strengthening exercises.  Weight-bearing Exercises  These exercises include activities that make you move against gravity while staying upright. Weight-bearing exercises can be high-impact or low-impact.  High-impact weight-bearing exercises help build bones and keep them strong. If you have broken a bone due to osteoporosis or are at risk of breaking a bone, you may need to avoid high-impact exercises. If you’re not sure, you should check with your healthcare provider.  Examples of high-impact weight-bearing exercises are:  Dancing   Doing high-impact aerobics   Hiking   Jogging/running   Jumping Rope   Stair climbing   Tennis  Low-impact weight-bearing exercises can also help keep bones strong and are a safe alternative if you cannot do high-impact exercises. Examples of low-impact weight-bearing exercises are:  Using elliptical training machines   Doing low-impact aerobics   Using  stair-step machines   Fast walking on a treadmill or outside        Yes    Essential hypertension Stable. Careful with diet and excercise at least 30 minutes 3-4 times a week. Check blood pressures at different times on different days. Can purchase own blood pressure monitor. If not, check at local pharmacy. Bake foods more and grill occasionally. Avoid fried foods. No salt. Use other seasonings.         Other iron deficiency anemia FU oncology.        Mixed hyperlipidemia Stable.        Transaminitis Stable.        Vitamin D deficiency Check blood.        Hyperglycemia Check blood.        Acquired hypothyroidism On Rx. Check blood inn 4 weeks.        Vaccine counseling Holding flu vaccine today. Holding covid booster and PCV 20 and shingrix.        Other constipation try Kellog's all bran buds 1-2 tbsp. a day. Increase protein.       Weakness. Awaiting cortisol level. Will discuss with oncology about treatment but also weakness may be from thyroid just started medications yesterday.  Discussed about taking thyroid medications on empty stomach first thing in the morning an hour before any other medications and food.  Takes oral chemo after that and then has to wait another 2 hours with food.  Discussed about not taking thyroid medicines with iron or grapefruit juice etc.  Also may be combination of anemia.  Will check with oncology.  Fall precautions discussed.  Discussed about toning exercises even while sitting with weights lifting the legs etc.  Awaiting PT and home health services. HOME SAFETY CHECKLIST     General Home Safety Tips     Make sure carbon monoxide and smoke detectors and fire extinguishers are available and inspected regularly. Replace batteries twice a year during daylight saving time.   Remove tripping hazards such as throw rugs, extension cords and excessive clutter.   Keep walkways and rooms well lit.   Secure large furniture, such as book shelves, cabinets or large TVs, to prevent tipping.    Ensure chairs have armrests to provide support when going from a sitting to standing position.   Apply stickers to glass doors at eye-level to ensure doors are visible.   Install a latch or deadbolt either above or below eye-level on all doors.       Kitchen      Use appliances that have an automatic shut-off feature.   Prevent unsafe stove usage by applying stove knob covers, removing knobs or turning off the gas when the stove is not in use.      Bathroom     Install grab bars for the shower, tub and toilet to provide additional support.   Set the water temperature at 120 degrees Fahrenheit or below to prevent scalding.   Apply textured stickers to slippery surfaces to prevent falls.      Bedroom      Closely monitor the use of an electric blanket, heater or heating pad to prevent burns or other injuries.   Provide seating near the bed to help with dressing.   Ensure closet shelves are at an accessible height so that items are easy to reach, which may prevent the person from climbing shelves or objects falling from overhead.     Garage and Basement   Install a motion sensor on the garage door.    Jose Eduardo stairs with bright tape and ensure railings are sturdy and secure to prevent tripping or falls.       Orders Placed This Encounter   Procedures    Vitamin D    TSH W Reflex To Free T4    Free T4, (Free Thyroxine)    Ferritin    CBC With Differential With Platelet    Iron And Tibc    Hemoglobin A1C    Vitamin D, 25-Hydroxy       Meds This Visit:  Requested Prescriptions      No prescriptions requested or ordered in this encounter       Imaging & Referrals:  None      RTC 6 weeks extended.   RTC after 5-21-25 for physical.

## 2024-12-05 NOTE — PROGRESS NOTES
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 She began Nivolumab Cabozantinib 6/12/24 and presents for followup.     Interval history:  The patient was admitted to [facility] briefly 11/21/2024 through 11/25/2024 with weakness failure to thrive and pneumonia.  Improved with antibiotics but remains very weak.  Low appetite.  No energy, resting all the time.  Thyroid is now hypothyroid.    Oncology history:  Briefly, the patient began to feel fatigued in the Fall 2023 and she was found to have a hemoglobin of 8.5 with an MCV of 71 with last lab values in 2017 showing a normal hemoglobin.  In March 2024, iron saturation was 15% and ferritin was 612.  She was given IV iron and referred to GI which prompted a CT abdomen pelvis which unfortunately revealed an 11 cm enhancing mass within the right kidney and extensive bibasilar pleural metastases concerning for metastatic renal cell carcinoma.     5/21/24: Initial medical oncology consultation, will obtain brain imaging and refer to IR for kidney biopsy.  Expressed concern for metastatic kidney cancer.  -Brain MRI showed a meningioma, no intracranial metastatic disease.  -IR guided biopsy 6/5 shows high-grade clear-cell renal cell carcinoma, with extensive necrosis but no sarcomatoid or rhabdoid features    6/6/24: Medical oncology follow-up.  Patient continues to decline.  Patient has poor risk disease, very symptomatic and concerned with need for rapid response upfront.  Therefore, we will proceed with cabozantinib nivolumab per [employer] 9ER    6/12/24: began Cabozantinib Nivolumab.      6/26/24: slight improvement, no dose limiting toxicities, continue treatment.     7/24/24: Feeling little stronger, tolerating therapy well    8/7/24: Grade 1 transaminitis, continue therapy, repeat LFTs next week, follow-up in 2 weeks, restaging at the end of August 9/4/24: Restaging CT shows partial response to treatment. Continue current therapy plan.     10/3/24: Nearly grade 2 PPE persists despite cabozantinib 40 mg every other day.  Hold for 1 week.  We will dose reduce to 20 mg daily, request new prescription.  Proceed with nivolumab 480 mg monthly dosing today.  Follow-up in 1 month which will be 2 to 3 weeks on cabozantinib 20 mg daily.    10/30/24: Doing well with dose reduced cabozantinib 20 mg daily.  No immune related toxicities, cleared for next month of treatment.    11/21-11/24/24: Admitted to [facility] with pneumonia failure to thrive.    12/4/24: Remains very weak, now hypothyroid.  We will prescribe levothyroxine.  Rule out adrenal insufficiency.  Restaging at this visit also shows stable disease.  Okay to continue treatment for now.    Review of Systems:  Hematology/Oncology ROS performed and negative except as above in HPI    History/Other:   Current Medications:   levothyroxine 50 MCG Oral Tab Take 1 tablet (50 mcg total) by mouth before breakfast. 30 tablet 1    pantoprazole 40 MG Oral Tab EC Take 1 tablet (40 mg total) by mouth at bedtime. 30 tablet 1    acetaminophen 500 MG Oral Tab Take 1 tablet (500 mg total) by mouth every 4 (four) hours as needed for Fever (equal to or greater than 100.4).      sennosides 17.2 MG Oral Tab Take 1 tablet (17.2 mg total) by mouth nightly as needed (constipation, as needed if no bowel movement that day). 30 tablet 0    sodium chloride 0.65 % Nasal Solution 2 sprays by Nasal route every 4 (four) hours as needed for congestion. 1 each 0    triamcinolone 0.1 % External Lotion Apply 1 Application topically 2 (two) times daily. 1 each 0    prochlorperazine  (COMPAZINE) 10 mg tablet Take 1 tablet (10 mg total) by mouth every 6 (six) hours as needed for Nausea. 60 tablet 3    docusate sodium 50 MG Oral Cap Take 1 capsule (50 mg total) by mouth as needed for constipation.      sertraline 25 MG Oral Tab Take 1 tablet (25 mg total) by mouth daily. 90 tablet 1    ondansetron 4 MG Oral Tablet Dispersible Take 1 tablet (4 mg total) by mouth every 8 (eight) hours as needed for Nausea. 30 tablet 2     Allergies:   Allergies   Allergen Reactions    Ace Inhibitors Coughing    Azithromycin      Other reaction(s): AZITHROMYCIN    Erythromycin      Other reaction(s): Rash - Mild    Naproxen      Other reaction(s): NAPROXEN    Nortriptyline      Other reaction(s): vivid, scary dreams    Sulfa Antibiotics      Other reaction(s): Rash - Moderate    Ceftriaxone RASH     Objective:   Blood pressure 94/55, pulse 93, temperature 97.9 °F (36.6 °C), temperature source Oral, resp. rate 18, height 1.651 m (5' 5\"), weight 70.9 kg (156 lb 5.8 oz), SpO2 96%, not currently breastfeeding.  Physical Exam:  ECOG PS: 1  General: A&Ox3, NAD, in w/c  HEENT: PERRL, anicteric, OP clear  CV: RRR, no murmurs  Pulm: CTA b/l, nl effort  Abd: soft, ntnd, no HSM or masses  Extremities: no edema, PPE resolved  Neurological: grossly intact    Results:   Labs:  Lab Results   Component Value Date    WBC 5.5 12/04/2024    HGB 10.7 (L) 12/04/2024    HCT 34.4 (L) 12/04/2024    .0 12/04/2024    CREATSERUM 0.76 12/04/2024    BUN 12 12/04/2024     12/04/2024    K 3.8 12/04/2024     12/04/2024    CO2 25.0 12/04/2024    GLU 86 12/04/2024    CA 9.3 12/04/2024    ALB 3.8 12/04/2024    ALKPHO 75 12/04/2024    BILT 0.3 12/04/2024    TP 8.3 (H) 12/04/2024    AST 51 (H) 12/04/2024    ALT 26 12/04/2024    INR 1.09 11/19/2024    T4F 0.7 (L) 12/04/2024    TSH 26.418 (H) 12/04/2024    MG 2.1 12/04/2024    PHOS 3.2 11/21/2024    B12 435 07/14/2017     Imaging:  CT CHEST+ABDOMEN+PELVIS(ALL CNTRST  ONLY)(CPT=71260/46813)    Addendum Date: 12/3/2024    ADDENDUM:  A presumed right-sided peritoneal or low pleural lesion scalloping the liver is also stable measuring 4.2 x 2.2 cm.  The conclusions are unchanged.    Dictated by (CST): Luis Eduardo Neves MD on 12/03/2024 at 3:59 PM     Finalized by (CST): Luis Eduardo Neves MD on 12/03/2024 at 4:01 PM             Result Date: 12/3/2024  CONCLUSION:   1. There is a history of metastatic renal cancer.  The primary right lower pole renal mass is stable with stable mass effect on the right renal pelvis and resulting mild hydronephrosis.  2. The pleural and pulmonary metastatic nodules demonstrate mixed changes compared to the prior exam as detailed in the body of this report.  There is a new small right pleural effusion.  Overall findings are favored stable to slightly progressed compared to the prior exam of 08/28/2024.  3. Reticular tree-in-bud opacities in the lingula have resolved suggesting resolved focus of inflammation/infection.  4. Mediastinal lymph nodes are stable from the prior exam, prominent but not frankly enlarged by short axis criteria.  5. Additional chronic or incidental findings are described in the body of this report.     elm-remote   Dictated by (CST): Luis Eduardo Neves MD on 12/03/2024 at 12:28 PM     Finalized by (CST): Luis Eduardo Neves MD on 12/03/2024 at 1:06 PM           CT a/p 5/19/24:  CONCLUSION: 11.1 x 10.2 centimeter enhancing mass replacing the mid to lower right kidney with extensive bibasilar enhancing pleural tumor, trace pleural effusions, intrathoracic adenopathy.  This is likely renal cell carcinoma with extensive bibasilar thoracic involvement     CT chest 5/22/24:  CONCLUSION: Extensive pleural masses with associated small bilateral pleural effusions , as well as mediastinal and hilar lymphadenopathy and bilateral pulmonary nodules.  These are highly concerning for sites of metastatic disease in this setting.    A questioned focal sclerosis of  the left lateral first rib adjacent to a tiny pleural mass could represent a site of osseous involvement of disease.     Right kidney, mass; CT-guided core biopsy 6/5/24:   Clear-cell renal cell carcinoma in a background of extensive necrosis, see comment.    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 She began Nivolumab Cabozantinib 6/12/24 and presents for followup.     # Metastatic poor-risk ccRCC.   -large right renal mass, extensive pleural metastases, anemia, neutropenia, thrombocytosis, quite symptomatic on presentation. Given progressive symptoms and poor risk disease, discussed the need for a treatment response asap, therefore we discussed proceeding with Nivolumab and Cabozantinib per CM 9ER over ipilimumab and nivolumab with concerns for rapid response  -began treatment 6/12/24  -August 2024 imaging shows partial response to therapy, patient continues to endorse a clinical response  -PPE, dysgeusia, fatigue all improved with dose reduction of cabozantinib down to 20 mg daily, continue  -She is now hypothyroid, could be causing progressive fatigue over the last 4 weeks.  We will start levothyroxine 50 mcg daily.  Okay to continue cabozantinib for now, we will proceed with nivolumab infusion today   -Rule out adrenal insufficiency tomorrow morning  -Follow-up in 2 weeks to monitor closely    # Symptom management  -zofran ODT prn for nausea  -PPE, udderbalm cream and voltaren gel BID-TID.  Dose reduced cabozantinib to 20 mg daily as above  -Per Dentist, tooth extraction necessary, ok to proceed on Nivolumab/Cabozantinib (minor procedure)    # Comorbidities  -HTN, hold metoprolol given hypotension with weight loss  -Hypothyroidism, immune-mediated, start Synthroid 50 mcg daily    80 minutes were spent  in patient discussion, coordination of care, record review, lab review, imaging review. The diagnosis, prognosis, and general treatment was explained and all questions answered.  ongoing continuity of complex care    Archie Crocker DO    [facility]  [facility]    This note was created using a voice-recognition transcribing system. Incorrect words or phrases may have been missed during proofreading. Please interpret accordingly.

## 2024-12-05 NOTE — PROGRESS NOTES
HPI:    Patient ID: Lary Wallis is a 69 year old female.    ADMISSION DATE:       11/18/2024      DISCHARGE DATE:  11/21/2024     DISCHARGE SUMMARY     About 45 minutes were spent coordinating care with Dr. Corcker, Nursing in preparing this discharge.     DISCHARGE DIAGNOSIS:  Urinary tract infection and pneumonia.     HISTORY AND HOSPITAL COURSE:  This is a 69-year-old white female who presents with a history of fever and dizziness.  She has a history of metastatic renal cancer with pleural metastasis and has not been eating well.  She has a great deal of anxiety and also does like to manage her own care.  Dr. Crocker talked to her into taking DVT prophylaxis while she was in the hospital and she did agree to take it there but not at home.  She also reported being occasionally short of breath and we walked her around and she did not seem short of breath, and her cultures showed no growth of significance just possible contamination.  She had no fevers.  Her white blood count improved.  We were able to discharge her home.     PHYSICAL EXAMINATION ON DISCHARGE:  VITAL SIGNS:  Temperature 98.9, pulse 89, respiratory rate 16, blood pressure 98/56, 93%.  LUNGS:  Occasional rhonchi.  HEART:  Normal S1, S2.  No S3.  ABDOMEN:  Soft, nontender.  EXTREMITIES:  Without edema.  NEUROLOGIC:  She is alert and oriented, pleasant and cooperative.     LABORATORY STUDIES:  Please see chart.     ASSESSMENT AND PLAN:  1.       Urinary tract infection.  Continue medication orally and see how she does.  2.       Possible pneumonia.  Procalcitonin is very mildly elevated at 0.17.  X-rays abnormal.  We will treat with Levaquin.  3.       Nausea and vomiting today after her sister visited wearing heavily scented lotion and perfume.  Patient is better now after her sister left.  4.       Renal cancer with metastasis as per Dr. Crocker.  5.       Hypertension.  Patient is doing well.  6.       DVT prophylaxis.  Patient did agree to Lovenox  here.  No Lovenox needed for home per Dr. Crocker, as patient would not take it anyway.     CONDITION ON DISCHARGE:  Stable.     CODE STATUS:  Full Code per records and not discussed during this hospitalization.     ACTIVITY:  As tolerated.     DIET:  Low salt as tolerated.     FOLLOWUP:  Follow up with Dr. Stover in a few days.  Follow up with Dr. Crocker in a week.  Follow up with Dr. Crocker for laboratory studies and treatment as he suggests.     DISCHARGE MEDICATIONS:  1.       Colace 100 mg p.o. b.i.d. p.r.n. constipation.  2.       Per patient's preference either Zofran or Compazine every 6 hours as needed for nausea.  3.       Sertraline 25 mg daily.  4.       Tylenol 500 mg every 4 hours p.r.n. pain.  Watch total daily Tylenol, limit to 3 g.  5.       Tylenol with codeine 1 tablet every 6 hours as needed for pain.  6.       Tessalon Perles 200 mg 3 times a day as needed.  7.        Guaifenesin 10 mg twice a day.  8.       Levaquin 750 mg at bedtime for 5 more doses. Watch for Achilles tendon pain, palpitations and visual changes.  9.       Senna 17.2 mg nightly as needed.  10.     Triamcinolone lotion apply twice a day as needed.             Discharge Summary       Hospital Discharge Diagnoses: uti/pneumonia           Exercise level: not active and has been following low salt diet.  Weight has been stable.    Wt Readings from Last 3 Encounters:   12/05/24 156 lb (70.8 kg)   12/04/24 156 lb 5.8 oz (70.9 kg)   11/18/24 155 lb 9.6 oz (70.6 kg)     BP Readings from Last 3 Encounters:   12/05/24 91/58   12/04/24 94/55   11/21/24 98/56     Labs:   Lab Results   Component Value Date/Time    GLU 86 12/04/2024 09:55 AM     12/04/2024 09:55 AM    K 3.8 12/04/2024 09:55 AM     12/04/2024 09:55 AM    CO2 25.0 12/04/2024 09:55 AM    CREATSERUM 0.76 12/04/2024 09:55 AM    CA 9.3 12/04/2024 09:55 AM    AST 51 (H) 12/04/2024 09:55 AM    ALT 26 12/04/2024 09:55 AM    TSH 26.418 (H) 12/04/2024 09:55 AM    T4F 0.7 (L)  12/04/2024 09:55 AM        Lab Results   Component Value Date/Time    CHOLEST 152 10/30/2024 11:43 AM    HDL 36 (L) 10/30/2024 11:43 AM    TRIG 86 10/30/2024 11:43 AM     (H) 10/30/2024 11:43 AM    NONHDLC 116 10/30/2024 11:43 AM          Drained in hospital.     Cough improved but only took 2 days but stopped due to tendon pain which is now better.           Review of Systems   Constitutional:  Positive for activity change, appetite change and fatigue. Negative for chills, diaphoresis, fever and unexpected weight change.   HENT:  Negative for drooling, sore throat, trouble swallowing and voice change.    Respiratory:  Negative for apnea, cough, choking, chest tightness, shortness of breath, wheezing and stridor.    Cardiovascular:  Negative for chest pain, palpitations and leg swelling.   Gastrointestinal:  Positive for constipation (Last BM yesterday. NL is qod.). Negative for abdominal distention, abdominal pain, anal bleeding, blood in stool, diarrhea (2 times a day soft nonbloody.), nausea, rectal pain and vomiting.   Endocrine: Negative for cold intolerance, heat intolerance, polydipsia, polyphagia and polyuria.   Genitourinary:  Negative for decreased urine volume, difficulty urinating, dysuria, flank pain, frequency, genital sores, hematuria, menstrual problem, pelvic pain and urgency.   Neurological:  Positive for weakness. Negative for dizziness, tremors, seizures, syncope, facial asymmetry, speech difficulty, light-headedness, numbness and headaches.   All other systems reviewed and are negative.        Current Outpatient Medications   Medication Sig Dispense Refill    levothyroxine 50 MCG Oral Tab Take 1 tablet (50 mcg total) by mouth before breakfast. 30 tablet 1    pantoprazole 40 MG Oral Tab EC Take 1 tablet (40 mg total) by mouth at bedtime. 30 tablet 1    acetaminophen 500 MG Oral Tab Take 1 tablet (500 mg total) by mouth every 4 (four) hours as needed for Fever (equal to or greater than  100.4).      sennosides 17.2 MG Oral Tab Take 1 tablet (17.2 mg total) by mouth nightly as needed (constipation, as needed if no bowel movement that day). 30 tablet 0    sodium chloride 0.65 % Nasal Solution 2 sprays by Nasal route every 4 (four) hours as needed for congestion. 1 each 0    triamcinolone 0.1 % External Lotion Apply 1 Application topically 2 (two) times daily. 1 each 0    prochlorperazine (COMPAZINE) 10 mg tablet Take 1 tablet (10 mg total) by mouth every 6 (six) hours as needed for Nausea. 60 tablet 3    docusate sodium 50 MG Oral Cap Take 1 capsule (50 mg total) by mouth as needed for constipation.      sertraline 25 MG Oral Tab Take 1 tablet (25 mg total) by mouth daily. 90 tablet 1    ondansetron 4 MG Oral Tablet Dispersible Take 1 tablet (4 mg total) by mouth every 8 (eight) hours as needed for Nausea. 30 tablet 2    hydrocortisone 10 MG Oral Tab Take 2 tablets (20 mg total) by mouth daily with breakfast AND 1 tablet (10 mg total) Every afternoon at 2:00 pm. 90 tablet 1     Allergies:Allergies[1]    HISTORY:  Past Medical History:    Acquired hypothyroidism    Essential hypertension    High blood pressure    Mixed hyperlipidemia    Moderate single current episode of major depressive disorder (HCC)    Osteoarthritis    KNEE - RIGHT    Osteoarthrosis, pelvic region and thigh    PONV (postoperative nausea and vomiting)    Pulmonary embolism (HCC)    after hip surgery      Past Surgical History:   Procedure Laterality Date    Cholecystectomy      Colonoscopy N/A 10/1/2018    Procedure: COLONOSCOPY;  Surgeon: Shiva Clemente MD;  Location: [facility] ENDOSCOPY    Hc implant ocular device intraoperative detached retina [id number]      Hip replacement surgery  2012    Hysterectomy      2010    Laparoscopic cholecystectomy  04/01/1990      Family History   Problem Relation Age of Onset    Diabetes Father     Arthritis Mother     Macular degeneration Mother     Other (spinal stenosis) Mother     Breast Cancer  Sister 62    Pacemaker Sister       Social History:   Social History     Socioeconomic History    Marital status:     Number of children: 4   Tobacco Use    Smoking status: Never    Smokeless tobacco: Never   Vaping Use    Vaping status: Never Used   Substance and Sexual Activity    Alcohol use: Yes     Comment: occasional    Drug use: No   Other Topics Concern     Service No    Blood Transfusions No    Caffeine Concern Yes    Occupational Exposure No    Hobby Hazards No    Sleep Concern Yes    Stress Concern No    Weight Concern Yes    Special Diet No    Back Care No    Exercise No    Bike Helmet No    Seat Belt Yes    Self-Exams Yes     Social Drivers of Health     Food Insecurity: No Food Insecurity (11/18/2024)    Food Insecurity     Food Insecurity: Never true   Transportation Needs: No Transportation Needs (11/18/2024)    Transportation Needs     Lack of Transportation: No   Housing Stability: Low Risk  (11/18/2024)    Housing Stability     Housing Instability: No        PHYSICAL EXAM:   BP 91/58 (BP Location: Left arm, Patient Position: Sitting, Cuff Size: adult)   Pulse 97   Temp 97.9 °F (36.6 °C) (Temporal)   Ht 5' 5\" (1.651 m)   Wt 156 lb (70.8 kg)   SpO2 100%   BMI 25.96 kg/m²   BP Readings from Last 3 Encounters:   12/05/24 91/58   12/04/24 94/55   11/21/24 98/56     Wt Readings from Last 3 Encounters:   12/05/24 156 lb (70.8 kg)   12/04/24 156 lb 5.8 oz (70.9 kg)   11/18/24 155 lb 9.6 oz (70.6 kg)       Physical Exam  Vitals and nursing note reviewed.   Constitutional:       General: She is not in acute distress.     Appearance: Normal appearance. She is well-developed and well-groomed. She is not ill-appearing, toxic-appearing or diaphoretic.      Interventions: She is not intubated.  HENT:      Mouth/Throat:      Comments: Patient wearing mask.   Did not have patient remove mask due to current Covid virus situation.  Eyes:      General: No scleral icterus.     Conjunctiva/sclera:  Conjunctivae normal.   Neck:      Thyroid: No thyroid mass or thyromegaly.      Trachea: Trachea and phonation normal.   Cardiovascular:      Rate and Rhythm: Normal rate and regular rhythm.      Pulses: Normal pulses. No decreased pulses.           Carotid pulses are 2+ on the right side and 2+ on the left side.       Radial pulses are 2+ on the right side and 2+ on the left side.        Dorsalis pedis pulses are 2+ on the right side and 2+ on the left side.        Posterior tibial pulses are 2+ on the right side and 2+ on the left side.      Heart sounds: Normal heart sounds, S1 normal and S2 normal.   Pulmonary:      Effort: Pulmonary effort is normal. No tachypnea, bradypnea, accessory muscle usage, prolonged expiration, respiratory distress or retractions. She is not intubated.      Breath sounds: Normal breath sounds and air entry. No stridor, decreased air movement or transmitted upper airway sounds. No decreased breath sounds, wheezing, rhonchi or rales.   Chest:      Chest wall: No tenderness.   Abdominal:      General: Bowel sounds are normal. There is no distension.      Palpations: Abdomen is soft. Abdomen is not rigid. There is no mass.      Tenderness: There is no abdominal tenderness. There is no right CVA tenderness, left CVA tenderness, guarding or rebound.   Musculoskeletal:      Right lower leg: No edema.      Left lower leg: No edema.   Lymphadenopathy:      Cervical: No cervical adenopathy.   Skin:     General: Skin is warm and dry.   Neurological:      Mental Status: She is alert and oriented to person, place, and time.   Psychiatric:         Mood and Affect: Mood normal.         Behavior: Behavior normal. Behavior is cooperative.         Thought Content: Thought content normal.              ASSESSMENT/PLAN:     Encounter Diagnoses   Name Primary?    Age-related osteoporosis without current pathological fracture Take calcium 600 every 12 hrs. With vitamin D 400 IU every  12 hrs. Excerise at  least 30 minutes 3-4 times a week. May use calcium citrate as opposed to calcium carbonate which may be better absorbed in the setting of PPI use.  The preferred calcium supplement for people at risk of stone formation is calcium citrate because it helps to increase urinary citrate excretion. We recommend a dose of 200-400 mg if dietary calcium cannot be increased.   lifelong physical activity at all ages is strongly endorsed by the National Osteopor[employer] Foundation. Exercise recommendations generally should include weight-bearing, muscle-strengthening, and balance training exercises for 30 minutes 5 days per week or 75 minutes twice weekly, often consistent with other general health recommendations.   Weight Bearing  There are two types of osteoporosis exercises that are important for building and maintaining bone density: weight-bearing and muscle-strengthening exercises.  Weight-bearing Exercises  These exercises include activities that make you move against gravity while staying upright. Weight-bearing exercises can be high-impact or low-impact.  High-impact weight-bearing exercises help build bones and keep them strong. If you have broken a bone due to osteoporosis or are at risk of breaking a bone, you may need to avoid high-impact exercises. If you’re not sure, you should check with your healthcare provider.  Examples of high-impact weight-bearing exercises are:  Dancing   Doing high-impact aerobics   Hiking   Jogging/running   Jumping Rope   Stair climbing   Tennis  Low-impact weight-bearing exercises can also help keep bones strong and are a safe alternative if you cannot do high-impact exercises. Examples of low-impact weight-bearing exercises are:  Using elliptical training machines   Doing low-impact aerobics   Using stair-step machines   Fast walking on a treadmill or outside        Yes    Essential hypertension Stable. Careful with diet and excercise at least 30 minutes 3-4 times a week. Check blood  pressures at different times on different days. Can purchase own blood pressure monitor. If not, check at local pharmacy. Bake foods more and grill occasionally. Avoid fried foods. No salt. Use other seasonings.         Other iron deficiency anemia FU oncology.        Mixed hyperlipidemia Stable.        Transaminitis Stable.        Vitamin D deficiency Check blood.        Hyperglycemia Check blood.        Acquired hypothyroidism On Rx. Check blood inn 4 weeks.        Vaccine counseling Holding flu vaccine today. Holding covid booster and PCV 20 and shingrix.        Other constipation try [employer] all bran buds 1-2 tbsp. a day. Increase protein.       Weakness. Awaiting cortisol level.  Will discuss with oncology about treatment but also weakness may be from thyroid just started medications yesterday.  Discussed about taking thyroid medications on empty stomach first thing in the morning an hour before any other medications and food.  Takes oral chemo after that and then has to wait another 2 hours with food.  Discussed about not taking thyroid medicines with iron or grapefruit juice etc.  Also may be combination of anemia.  Will check with oncology.  Fall precautions discussed.  Discussed about toning exercises even while sitting with weights lifting the legs etc.  Awaiting PT and home health services. HOME SAFETY CHECKLIST     General Home Safety Tips     Make sure carbon monoxide and smoke detectors and fire extinguishers are available and inspected regularly. Replace batteries twice a year during daylight saving time.   Remove tripping hazards such as throw rugs, extension cords and excessive clutter.   Keep walkways and rooms well lit.   Secure large furniture, such as book shelves, cabinets or large TVs, to prevent tipping.   Ensure chairs have armrests to provide support when going from a sitting to standing position.   Apply stickers to glass doors at eye-level to ensure doors are visible.   Install a latch  or deadbolt either above or below eye-level on all doors.       Kitchen      Use appliances that have an automatic shut-off feature.   Prevent unsafe stove usage by applying stove knob covers, removing knobs or turning off the gas when the stove is not in use.      Bathroom     Install grab bars for the shower, tub and toilet to provide additional support.   Set the water temperature at 120 degrees Fahrenheit or below to prevent scalding.   Apply textured stickers to slippery surfaces to prevent falls.      Bedroom      Closely monitor the use of an electric blanket, heater or heating pad to prevent burns or other injuries.   Provide seating near the bed to help with dressing.   Ensure closet shelves are at an accessible height so that items are easy to reach, which may prevent the person from climbing shelves or objects falling from overhead.     Garage and Basement   Install a motion sensor on the garage door.    Jose Eduardo stairs with bright tape and ensure railings are sturdy and secure to prevent tripping or falls.       Orders Placed This Encounter   Procedures    Vitamin D    TSH W Reflex To Free T4    Free T4, (Free Thyroxine)    Ferritin    CBC With Differential With Platelet    Iron And Tibc    Hemoglobin A1C    Vitamin D, 25-Hydroxy       Meds This Visit:  Requested Prescriptions      No prescriptions requested or ordered in this encounter       Imaging & Referrals:  None      RTC 6 weeks extended.   RTC after 5-21-25 for physical.   Full H&P done with  present with patient permission.       [1]   Allergies  Allergen Reactions    Levaquin [Levofloxacin] NAUSEA AND VOMITING    Ace Inhibitors Coughing    Azithromycin      Other reaction(s): AZITHROMYCIN    Erythromycin      Other reaction(s): Rash - Mild    Naproxen      Other reaction(s): NAPROXEN    Nortriptyline      Other reaction(s): vivid, scary dreams    Sulfa Antibiotics      Other reaction(s): Rash - Moderate    Ceftriaxone RASH

## 2024-12-06 LAB — ACTH: 1.7 PG/ML

## 2024-12-06 NOTE — TELEPHONE ENCOUNTER
Please see mychart from Dr. Crocker sent to patient yesterday  I did call and leave a message for the patient that we were calling from Dr Stover's office and message was sent to her from Dr. Crocker to further explain and discuss in follow up from questions concerns yesterday with Dr. Stover.     Asked if something further we can do or anything we can assist with, patient to call us back.       Archie Crocker, DO to Lary Wallis         12/5/24  3:50 PM  I just tried to call you.  The cortisol level is low consistent with adrenal insufficiency.  We should start hydrocortisone supplementation 20 mg in the morning and 10 mg in the afternoon around 2 to 3 PM.  I have sent a prescription to your pharmacy.  We will work to get you set up with endocrinology, a doctor that manages hormones, in the near future but this is the dose we should start for now.  Please also start levothyroxine as we discussed yesterday.    Last read by Lary Wallis at 10:26 AM on 12/6/2024.

## 2024-12-06 NOTE — PROGRESS NOTES
Iron storage is elevated which may be an acute phase reactant.  Iron levels are low.  Follow-up with hematology.  Vitamin D level is good.  Hemoglobin A1c which is a 3-month average of sugars which is good.  Goal is 6.5 or less.

## 2024-12-06 NOTE — TELEPHONE ENCOUNTER
This message was from oncology doctor.  Please inform patient that they will call her.Adrenal insufficiency, including [place]'s disease, is a disorder that occurs when the adrenal glands don't make enough of certain hormones.

## 2024-12-09 ENCOUNTER — TELEPHONE (OUTPATIENT)
Dept: ENDOCRINOLOGY CLINIC | Facility: CLINIC | Age: 69
End: 2024-12-09

## 2024-12-09 NOTE — TELEPHONE ENCOUNTER
Cassi Morgan, RN  [facility] Clinical Staff  Good afternoon,    Dr. Crocker has placed a consult order for Dr. Vanessa. Patient with renal cancer on immunotherapy. She has immunotherapy induced adrenal insufficiency. Dr. Crocker has started her on hydrocortisone. Please assist in arranging a consult appointment.    Thanks,    Cassi    Called pt to schedule first available, LVM. Patient scheduled with Dr. Majano 12/12 at 12 pm to hold spot, left message for pt to confirm if can make that appt. MC sent.

## 2024-12-12 ENCOUNTER — OFFICE VISIT (OUTPATIENT)
Dept: ENDOCRINOLOGY CLINIC | Facility: CLINIC | Age: 69
End: 2024-12-12

## 2024-12-12 VITALS
BODY MASS INDEX: 26.33 KG/M2 | HEART RATE: 78 BPM | DIASTOLIC BLOOD PRESSURE: 64 MMHG | WEIGHT: 158 LBS | SYSTOLIC BLOOD PRESSURE: 123 MMHG | HEIGHT: 65 IN

## 2024-12-12 DIAGNOSIS — E03.2 HYPOTHYROIDISM DUE TO MEDICATION: ICD-10-CM

## 2024-12-12 DIAGNOSIS — E27.3 ADRENAL INSUFFICIENCY DUE TO CANCER THERAPY (HCC): Primary | ICD-10-CM

## 2024-12-12 PROCEDURE — 99204 OFFICE O/P NEW MOD 45 MIN: CPT | Performed by: INTERNAL MEDICINE

## 2024-12-12 NOTE — H&P
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  Requesting Physician: Dr. Crocker.    CHIEF COMPLAINT:    Chief Complaint   Patient presents with    Consult     Immunotherapy induced adrenal insufficiency and elevated thyroid level , last labs 12/2024         HISTORY OF PRESENT ILLNESS:   Lary Wallis is a 69 year old female who presents with Immunotherapy-induced AI as well as hypothyroidism.     June 12- started cabozantinib  June 12- started nivolimumab    She started to have elevated TSH in June 2024. She had also started to feel very tired.     The TSH levels have been increasing steadily over the past few months as well and she has been started on levothyroxine 50mcg daily.     She has also been started on hydrocortisone 20mg PO qAM and 10mg PO q 5PM and she is feeling much better on this.       PAST MEDICAL HISTORY:   Past Medical History:    Acquired hypothyroidism    Essential hypertension    High blood pressure    Mixed hyperlipidemia    Moderate single current episode of major depressive disorder (HCC)    Osteoarthritis    KNEE - RIGHT    Osteoarthrosis, pelvic region and thigh    PONV (postoperative nausea and vomiting)    Pulmonary embolism (HCC)    after hip surgery       PAST SURGICAL HISTORY:   Past Surgical History:   Procedure Laterality Date    Cholecystectomy      Colonoscopy N/A 10/1/2018    Procedure: COLONOSCOPY;  Surgeon: Shiva Clemente MD;  Location: [facility] ENDOSCOPY    Hc implant ocular device intraoperative detached retina [id number]      Hip replacement surgery  2012    Hysterectomy      2010    Laparoscopic cholecystectomy  04/01/1990       SOCIAL HISTORY:    Social History     Socioeconomic History    Marital status:     Number of children: 4   Tobacco Use    Smoking status: Never    Smokeless tobacco: Never   Vaping Use    Vaping status: Never Used   Substance and Sexual Activity     Alcohol use: Yes     Comment: occasional    Drug use: No   Other Topics Concern     Service No    Blood Transfusions No    Caffeine Concern Yes    Occupational Exposure No    Hobby Hazards No    Sleep Concern Yes    Stress Concern No    Weight Concern Yes    Special Diet No    Back Care No    Exercise No    Bike Helmet No    Seat Belt Yes    Self-Exams Yes     Social Drivers of Health     Food Insecurity: No Food Insecurity (11/18/2024)    Food Insecurity     Food Insecurity: Never true   Transportation Needs: No Transportation Needs (11/18/2024)    Transportation Needs     Lack of Transportation: No   Housing Stability: Low Risk  (11/18/2024)    Housing Stability     Housing Instability: No       FAMILY HISTORY:   Family History   Problem Relation Age of Onset    Diabetes Father     Arthritis Mother     Macular degeneration Mother     Other (spinal stenosis) Mother     Breast Cancer Sister 62    Pacemaker Sister        CURRENT MEDICATIONS:    Current Outpatient Medications   Medication Sig Dispense Refill    levothyroxine 50 MCG Oral Tab Take 1 tablet (50 mcg total) by mouth before breakfast. 30 tablet 1    hydrocortisone 10 MG Oral Tab Take 2 tablets (20 mg total) by mouth daily with breakfast AND 1 tablet (10 mg total) Every afternoon at 2:00 pm. 90 tablet 1    pantoprazole 40 MG Oral Tab EC Take 1 tablet (40 mg total) by mouth at bedtime. 30 tablet 1    acetaminophen 500 MG Oral Tab Take 1 tablet (500 mg total) by mouth every 4 (four) hours as needed for Fever (equal to or greater than 100.4).      sennosides 17.2 MG Oral Tab Take 1 tablet (17.2 mg total) by mouth nightly as needed (constipation, as needed if no bowel movement that day). 30 tablet 0    sodium chloride 0.65 % Nasal Solution 2 sprays by Nasal route every 4 (four) hours as needed for congestion. 1 each 0    triamcinolone 0.1 % External Lotion Apply 1 Application topically 2 (two) times daily. 1 each 0    prochlorperazine (COMPAZINE) 10 mg  tablet Take 1 tablet (10 mg total) by mouth every 6 (six) hours as needed for Nausea. 60 tablet 3    docusate sodium 50 MG Oral Cap Take 1 capsule (50 mg total) by mouth as needed for constipation.      sertraline 25 MG Oral Tab Take 1 tablet (25 mg total) by mouth daily. 90 tablet 1    ondansetron 4 MG Oral Tablet Dispersible Take 1 tablet (4 mg total) by mouth every 8 (eight) hours as needed for Nausea. 30 tablet 2       ALLERGIES:  Allergies[1]      ASSESSMENTS:     REVIEW OF SYSTEMS:  Constitutional: Negative for: weight change, fever, fatigue, cold/heat intolerance  Eyes: Negative for:  Visual changes, proptosis, blurring  ENT: Negative for:  dysphagia, neck swelling, dysphonia  Respiratory: Negative for:  dyspnea, cough  Cardiovascular: Negative for:  chest pain, palpitations, orthopnea  GI: Negative for:  abdominal pain, nausea, vomiting, diarrhea, constipation, bleeding  Neurology: Negative for: headache, numbness, weakness  Genito-Urinary: Negative for: dysuria, frequency  Psychiatric: Negative for:  depression, anxiety  Hematology/Lymphatics: Negative for: bruising, lower extremity edema  Endocrine: Negative for: polyuria, polydypsia  Skin: Negative for: rash, blister, cellulitis,      PHYSICAL EXAM:   Vitals:    12/12/24 1203   BP: 123/64   Pulse: 78   Weight: 158 lb (71.7 kg)   Height: 5' 5\" (1.651 m)     BMI: Body mass index is 26.29 kg/m².     CONSTITUTIONAL:  awake, alert, cooperative, no apparent distress, and appears stated age  PSYCH: normal affect  SKIN:  no bruising or bleeding, no rashes and no lesions  EXTREMITIES: no edema      DATA:     ASSESSMENT AND PLAN: This is a 69 year-old woman here to establish care for immunotherapy induced hypothyroidism as well as adrenal insufficiency. I would like to check her TSH and FT4 often, every 6 weeks. I will continue her on levothyroxine 50mcg as well Hydrocortisone 20mg in the morning and 10mg PO qPM (5PM).     I will follow up with her on an as needed  basis.     Prior to this encounter, I spent over 20 minutes with preparing for the visit, reviewing documents from other specialties as well as from PCP, and going over test results and imaging studies. During the face to face encounter, I spent an additional 30 minutes which were determined for history-taking, physical examination, and orientation regarding our services. Greater than 50% of the time was spent in counseling, anticipatory guidance, and coordination of care. Patient concerns were answered to the best of my knowledge.       12/12/2024  Audra Majano MD           [1]   Allergies  Allergen Reactions    Levaquin [Levofloxacin] NAUSEA AND VOMITING    Ace Inhibitors Coughing    Azithromycin      Other reaction(s): AZITHROMYCIN    Erythromycin      Other reaction(s): Rash - Mild    Naproxen      Other reaction(s): NAPROXEN    Nortriptyline      Other reaction(s): vivid, scary dreams    Sulfa Antibiotics      Other reaction(s): Rash - Moderate    Ceftriaxone RASH

## 2024-12-13 ENCOUNTER — TELEPHONE (OUTPATIENT)
Dept: INTERNAL MEDICINE CLINIC | Facility: CLINIC | Age: 69
End: 2024-12-13

## 2024-12-13 NOTE — TELEPHONE ENCOUNTER
Left message to patient to call back H#.    Future Appointments   Date Time Provider Department Center   12/19/2024  1:15 PM CMA RESOURCE [facility]   12/19/2024  2:15 PM Archie Crocker, DO [facility]   1/2/2025 12:45 PM EL[facility] CC LAB1 [facility]   1/2/2025  1:15 PM Archie Crocker DO [facility]   1/2/2025  2:00 PM EL[facility] CC INFRN 7 [facility]   5/13/2025 12:30 PM Fernanda Stover MD [id number]  [place] [address]

## 2024-12-17 NOTE — TELEPHONE ENCOUNTER
Third attempt to contact: Left Voicemail to call back our office. Office phone number provided with office telephone hours. No phone response letter generated but unable to print, will postpone..

## 2024-12-17 NOTE — TELEPHONE ENCOUNTER
HOLLAND Stover we have not been able to reach patient.    Patient has read our [employer] message to call us back as you will like to know how she is doing.   Last read by Lary Wallis at 10:53 AM on 12/16/2024.    no

## 2024-12-18 DIAGNOSIS — C64.1 RENAL CELL CARCINOMA OF RIGHT KIDNEY (HCC): Primary | ICD-10-CM

## 2024-12-18 NOTE — TELEPHONE ENCOUNTER
This is her , Wade, she is doing much better than before, but only about 80%. Just really sad these tests were not taken while she was in the hospital, there was enough blood taken. She could have  as a result of the very low adrenal glands. Everyone telling her to eat and she kept stating she was not hungry and would vomit and lots of nausea which is all a result of low adrenal glands. Thanks for your concern.

## 2024-12-19 ENCOUNTER — OFFICE VISIT (OUTPATIENT)
Age: 69
End: 2024-12-19
Attending: INTERNAL MEDICINE
Payer: MEDICARE

## 2024-12-19 ENCOUNTER — NURSE ONLY (OUTPATIENT)
Age: 69
End: 2024-12-19
Attending: INTERNAL MEDICINE
Payer: MEDICARE

## 2024-12-19 VITALS
DIASTOLIC BLOOD PRESSURE: 75 MMHG | SYSTOLIC BLOOD PRESSURE: 128 MMHG | HEIGHT: 65 IN | OXYGEN SATURATION: 98 % | TEMPERATURE: 98 F | RESPIRATION RATE: 18 BRPM | BODY MASS INDEX: 26.46 KG/M2 | WEIGHT: 158.81 LBS

## 2024-12-19 DIAGNOSIS — Z51.12 ENCOUNTER FOR ANTINEOPLASTIC IMMUNOTHERAPY: ICD-10-CM

## 2024-12-19 DIAGNOSIS — C64.1 RENAL CELL CARCINOMA OF RIGHT KIDNEY (HCC): Primary | ICD-10-CM

## 2024-12-19 DIAGNOSIS — Z51.11 CHEMOTHERAPY MANAGEMENT, ENCOUNTER FOR: ICD-10-CM

## 2024-12-19 DIAGNOSIS — C64.1 RENAL CELL CARCINOMA OF RIGHT KIDNEY (HCC): ICD-10-CM

## 2024-12-19 DIAGNOSIS — E27.3 ADRENAL INSUFFICIENCY DUE TO CANCER THERAPY (HCC): ICD-10-CM

## 2024-12-19 LAB
ALBUMIN SERPL-MCNC: 3.5 G/DL (ref 3.2–4.8)
ALBUMIN/GLOB SERPL: 0.9 {RATIO} (ref 1–2)
ALP LIVER SERPL-CCNC: 80 U/L
ALT SERPL-CCNC: 46 U/L
ANION GAP SERPL CALC-SCNC: 6 MMOL/L (ref 0–18)
AST SERPL-CCNC: 40 U/L (ref ?–34)
BASOPHILS # BLD AUTO: 0.05 X10(3) UL (ref 0–0.2)
BASOPHILS NFR BLD AUTO: 0.6 %
BILIRUB SERPL-MCNC: 0.3 MG/DL (ref 0.2–1.1)
BUN BLD-MCNC: 15 MG/DL (ref 9–23)
BUN/CREAT SERPL: 22.7 (ref 10–20)
CALCIUM BLD-MCNC: 8.9 MG/DL (ref 8.7–10.4)
CHLORIDE SERPL-SCNC: 106 MMOL/L (ref 98–112)
CO2 SERPL-SCNC: 32 MMOL/L (ref 21–32)
CREAT BLD-MCNC: 0.66 MG/DL
DEPRECATED RDW RBC AUTO: 53.4 FL (ref 35.1–46.3)
EGFRCR SERPLBLD CKD-EPI 2021: 95 ML/MIN/1.73M2 (ref 60–?)
EOSINOPHIL # BLD AUTO: 0.29 X10(3) UL (ref 0–0.7)
EOSINOPHIL NFR BLD AUTO: 3.5 %
ERYTHROCYTE [DISTWIDTH] IN BLOOD BY AUTOMATED COUNT: 15.9 % (ref 11–15)
GLOBULIN PLAS-MCNC: 4 G/DL (ref 2–3.5)
GLUCOSE BLD-MCNC: 96 MG/DL (ref 70–99)
HCT VFR BLD AUTO: 35 %
HGB BLD-MCNC: 10.8 G/DL
IMM GRANULOCYTES # BLD AUTO: 0.03 X10(3) UL (ref 0–1)
IMM GRANULOCYTES NFR BLD: 0.4 %
LYMPHOCYTES # BLD AUTO: 1.9 X10(3) UL (ref 1–4)
LYMPHOCYTES NFR BLD AUTO: 22.6 %
MCH RBC QN AUTO: 28.4 PG (ref 26–34)
MCHC RBC AUTO-ENTMCNC: 30.9 G/DL (ref 31–37)
MCV RBC AUTO: 92.1 FL
MONOCYTES # BLD AUTO: 0.57 X10(3) UL (ref 0.1–1)
MONOCYTES NFR BLD AUTO: 6.8 %
NEUTROPHILS # BLD AUTO: 5.56 X10 (3) UL (ref 1.5–7.7)
NEUTROPHILS # BLD AUTO: 5.56 X10(3) UL (ref 1.5–7.7)
NEUTROPHILS NFR BLD AUTO: 66.1 %
OSMOLALITY SERPL CALC.SUM OF ELEC: 299 MOSM/KG (ref 275–295)
PLATELET # BLD AUTO: 309 10(3)UL (ref 150–450)
POTASSIUM SERPL-SCNC: 3.9 MMOL/L (ref 3.5–5.1)
PROT SERPL-MCNC: 7.5 G/DL (ref 5.7–8.2)
RBC # BLD AUTO: 3.8 X10(6)UL
SODIUM SERPL-SCNC: 144 MMOL/L (ref 136–145)
WBC # BLD AUTO: 8.4 X10(3) UL (ref 4–11)

## 2024-12-20 NOTE — PROGRESS NOTES
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 She began Nivolumab Cabozantinib 6/12/24 and presents for followup.     Interval history:  Found to have adrenal insufficiency and hypothyroidism, started on levothyroxine and hydrocortisone 20/10.  2 days after starting hydrocortisone she started to feel a lot better.  Her appetite has returned, her strength is returned she is able to walk on her own.  She is still taking cabozantinib.    Oncology history:  Briefly, the patient began to feel fatigued in the Fall 2023 and she was found to have a hemoglobin of 8.5 with an MCV of 71 with last lab values in 2017 showing a normal hemoglobin.  In March 2024, iron saturation was 15% and ferritin was 612.  She was given IV iron and referred to GI which prompted a CT abdomen pelvis which unfortunately revealed an 11 cm enhancing mass within the right kidney and extensive bibasilar pleural metastases concerning for metastatic renal cell carcinoma.     5/21/24: Initial medical oncology consultation, will obtain brain imaging and refer to IR for kidney biopsy.  Expressed concern for metastatic kidney cancer.  -Brain MRI showed a meningioma, no intracranial metastatic disease.  -IR guided biopsy 6/5 shows high-grade clear-cell renal cell carcinoma, with extensive necrosis but no sarcomatoid or rhabdoid features    6/6/24: Medical oncology follow-up.  Patient continues to decline.  Patient has poor risk disease, very symptomatic and concerned with need for rapid response upfront.  Therefore, we will  proceed with cabozantinib nivolumab per [employer] 9ER    6/12/24: began Cabozantinib Nivolumab.     6/26/24: slight improvement, no dose limiting toxicities, continue treatment.     7/24/24: Feeling little stronger, tolerating therapy well    8/7/24: Grade 1 transaminitis, continue therapy, repeat LFTs next week, follow-up in 2 weeks, restaging at the end of August 9/4/24: Restaging CT shows partial response to treatment. Continue current therapy plan.     10/3/24: Nearly grade 2 PPE persists despite cabozantinib 40 mg every other day.  Hold for 1 week.  We will dose reduce to 20 mg daily, request new prescription.  Proceed with nivolumab 480 mg monthly dosing today.  Follow-up in 1 month which will be 2 to 3 weeks on cabozantinib 20 mg daily.    10/30/24: Doing well with dose reduced cabozantinib 20 mg daily.  No immune related toxicities, cleared for next month of treatment.    11/21-11/24/24: Admitted to [facility] with pneumonia and failure to thrive.    12/4/24: Remains very weak, now hypothyroid.  We will prescribe levothyroxine.  Rule out adrenal insufficiency.  Restaging at this visit also shows stable disease.  Okay to continue treatment for now.    12/19/24: Found to have adrenal insufficiency, ACTH and cortisol low.  Started on hydrocortisone 20/10 mg and referred to endocrinology.  Also started on levothyroxine.  Energy is improving, appetite is better she is slowly improving.    Review of Systems:  Hematology/Oncology ROS performed and negative except as above in HPI    History/Other:   Current Medications:   hydrocortisone 10 MG Oral Tab Take 2 tablets (20 mg total) by mouth daily with breakfast AND 1 tablet (10 mg total) Every afternoon at 2:00 pm. 90 tablet 1    levothyroxine 50 MCG Oral Tab Take 1 tablet (50 mcg total) by mouth before breakfast. 30 tablet 1    pantoprazole 40 MG Oral Tab EC Take 1 tablet (40 mg total) by mouth at bedtime. 30 tablet 1    acetaminophen 500 MG Oral Tab Take 1 tablet (500  mg total) by mouth every 4 (four) hours as needed for Fever (equal to or greater than 100.4).      sennosides 17.2 MG Oral Tab Take 1 tablet (17.2 mg total) by mouth nightly as needed (constipation, as needed if no bowel movement that day). 30 tablet 0    sodium chloride 0.65 % Nasal Solution 2 sprays by Nasal route every 4 (four) hours as needed for congestion. 1 each 0    triamcinolone 0.1 % External Lotion Apply 1 Application topically 2 (two) times daily. 1 each 0    prochlorperazine (COMPAZINE) 10 mg tablet Take 1 tablet (10 mg total) by mouth every 6 (six) hours as needed for Nausea. 60 tablet 3    docusate sodium 50 MG Oral Cap Take 1 capsule (50 mg total) by mouth as needed for constipation.      sertraline 25 MG Oral Tab Take 1 tablet (25 mg total) by mouth daily. 90 tablet 1    ondansetron 4 MG Oral Tablet Dispersible Take 1 tablet (4 mg total) by mouth every 8 (eight) hours as needed for Nausea. 30 tablet 2     Allergies:   Allergies   Allergen Reactions    Levaquin [Levofloxacin] NAUSEA AND VOMITING    Ace Inhibitors Coughing    Azithromycin      Other reaction(s): AZITHROMYCIN    Erythromycin      Other reaction(s): Rash - Mild    Naproxen      Other reaction(s): NAPROXEN    Nortriptyline      Other reaction(s): vivid, scary dreams    Sulfa Antibiotics      Other reaction(s): Rash - Moderate    Ceftriaxone RASH     Objective:   Blood pressure 128/75, temperature 98.1 °F (36.7 °C), temperature source Oral, resp. rate 18, height 1.651 m (5' 5\"), weight 72 kg (158 lb 12.8 oz), SpO2 98%, not currently breastfeeding.  Physical Exam:  ECOG PS: 1  General: A&Ox3, NAD, in w/c  HEENT: PERRL, anicteric, OP clear  CV: RRR, no murmurs  Pulm: CTA b/l, nl effort  Abd: soft, ntnd, no HSM or masses  Extremities: no edema, PPE resolved  Neurological: grossly intact    Results:   Labs:  Lab Results   Component Value Date    WBC 8.4 12/19/2024    HGB 10.8 (L) 12/19/2024    HCT 35.0 12/19/2024    .0 12/19/2024     CREATSERUM 0.66 12/19/2024    BUN 15 12/19/2024     12/19/2024    K 3.9 12/19/2024     12/19/2024    CO2 32.0 12/19/2024    GLU 96 12/19/2024    CA 8.9 12/19/2024    ALB 3.5 12/19/2024    ALKPHO 80 12/19/2024    BILT 0.3 12/19/2024    TP 7.5 12/19/2024    AST 40 (H) 12/19/2024    ALT 46 12/19/2024    INR 1.09 11/19/2024    T4F 0.7 (L) 12/04/2024    TSH 26.418 (H) 12/04/2024    MG 2.1 12/04/2024    PHOS 3.2 11/21/2024    B12 435 07/14/2017     Imaging:        CT a/p 5/19/24:  CONCLUSION: 11.1 x 10.2 centimeter enhancing mass replacing the mid to lower right kidney with extensive bibasilar enhancing pleural tumor, trace pleural effusions, intrathoracic adenopathy.  This is likely renal cell carcinoma with extensive bibasilar thoracic involvement     CT chest 5/22/24:  CONCLUSION: Extensive pleural masses with associated small bilateral pleural effusions , as well as mediastinal and hilar lymphadenopathy and bilateral pulmonary nodules.  These are highly concerning for sites of metastatic disease in this setting.    A questioned focal sclerosis of the left lateral first rib adjacent to a tiny pleural mass could represent a site of osseous involvement of disease.     Right kidney, mass; CT-guided core biopsy 6/5/24:   Clear-cell renal cell carcinoma in a background of extensive necrosis, see comment.    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 She began Nivolumab Cabozantinib 6/12/24 and presents for followup.     # Metastatic poor-risk ccRCC.   -large right renal mass, extensive pleural metastases, anemia, neutropenia, thrombocytosis, quite symptomatic on presentation. Given progressive symptoms and poor risk disease, discussed the need for a treatment response asap, therefore we discussed  proceeding with Nivolumab and Cabozantinib per CM 9ER over ipilimumab and nivolumab with concerns for rapid response, and began treatment 6/12/24  -August 2024 imaging shows partial response to therapy, restaging in early December shows continued partial response  -PPE, dysgeusia, fatigue all improved with dose reduction of cabozantinib down to 20 mg daily, continue  -Recently developed adrenal insufficiency and hypothyroidism, treating as below  -Follow-up in 2 weeks for next dose of nivolumab    # Immune mediated adrenal insufficiency  # Immune mediated hypothyroidism  -Patient developed severe weakness and fatigue in November 2024, found to have a high in early December 2024.  -On hydrocortisone 20/10 mg daily, symptoms are much improved.  She is following with endocrinology  -On levothyroxine 50 mcg daily  -Repeat TSH in 2 weeks with next dose of nivolumab    # Symptom management  -zofran ODT prn for nausea  -PPE, udderbalm cream and voltaren gel BID-TID.  Dose reduced cabozantinib to 20 mg daily as above  -Per Dentist, tooth extraction necessary, ok to proceed on Nivolumab/Cabozantinib (minor procedure)    # Comorbidities  -HTN, hold metoprolol given hypotension with weight loss  -Hypothyroidism, immune-mediated, start Synthroid 50 mcg daily    MDM High: malignancy; review of results with independent interpretation and discussion of management; drug therapy requiring intensive monitoring for toxicity;  ongoing continuity of complex care    Archie Crocker, [facility] Hematology/Oncology  [facility]     This note was created using a voice-recognition transcribing system. Incorrect words or phrases may have been missed during proofreading. Please interpret accordingly.

## 2025-01-02 ENCOUNTER — OFFICE VISIT (OUTPATIENT)
Age: 70
End: 2025-01-02
Attending: INTERNAL MEDICINE
Payer: MEDICARE

## 2025-01-02 ENCOUNTER — NURSE ONLY (OUTPATIENT)
Age: 70
End: 2025-01-02
Attending: INTERNAL MEDICINE
Payer: MEDICARE

## 2025-01-02 VITALS
HEIGHT: 65 IN | HEART RATE: 80 BPM | TEMPERATURE: 98 F | DIASTOLIC BLOOD PRESSURE: 80 MMHG | OXYGEN SATURATION: 97 % | BODY MASS INDEX: 27.99 KG/M2 | SYSTOLIC BLOOD PRESSURE: 134 MMHG | WEIGHT: 168 LBS | RESPIRATION RATE: 16 BRPM

## 2025-01-02 DIAGNOSIS — C64.1 RENAL CELL CARCINOMA OF RIGHT KIDNEY (HCC): Primary | ICD-10-CM

## 2025-01-02 DIAGNOSIS — Z51.11 CHEMOTHERAPY MANAGEMENT, ENCOUNTER FOR: ICD-10-CM

## 2025-01-02 DIAGNOSIS — C64.1 RENAL CELL CARCINOMA OF RIGHT KIDNEY (HCC): ICD-10-CM

## 2025-01-02 DIAGNOSIS — L27.1 PALMAR PLANTAR ERYTHRODYSAESTHESIA DUE TO CYTOTOXIC THERAPY: ICD-10-CM

## 2025-01-02 DIAGNOSIS — Z51.12 ENCOUNTER FOR ANTINEOPLASTIC IMMUNOTHERAPY: ICD-10-CM

## 2025-01-02 DIAGNOSIS — E27.3 ADRENAL INSUFFICIENCY DUE TO CANCER THERAPY (HCC): ICD-10-CM

## 2025-01-02 DIAGNOSIS — Z51.11 CHEMOTHERAPY MANAGEMENT, ENCOUNTER FOR: Primary | ICD-10-CM

## 2025-01-02 DIAGNOSIS — E03.9 HYPOTHYROIDISM, UNSPECIFIED TYPE: ICD-10-CM

## 2025-01-02 LAB
ALBUMIN SERPL-MCNC: 3.5 G/DL (ref 3.2–4.8)
ALBUMIN/GLOB SERPL: 1 {RATIO} (ref 1–2)
ALP LIVER SERPL-CCNC: 76 U/L
ALT SERPL-CCNC: 19 U/L
ANION GAP SERPL CALC-SCNC: 7 MMOL/L (ref 0–18)
AST SERPL-CCNC: 20 U/L (ref ?–34)
BASOPHILS # BLD AUTO: 0.02 X10(3) UL (ref 0–0.2)
BASOPHILS NFR BLD AUTO: 0.3 %
BILIRUB SERPL-MCNC: 0.2 MG/DL (ref 0.2–1.1)
BUN BLD-MCNC: 13 MG/DL (ref 9–23)
BUN/CREAT SERPL: 18.1 (ref 10–20)
CALCIUM BLD-MCNC: 8.8 MG/DL (ref 8.7–10.4)
CHLORIDE SERPL-SCNC: 108 MMOL/L (ref 98–112)
CO2 SERPL-SCNC: 28 MMOL/L (ref 21–32)
CREAT BLD-MCNC: 0.72 MG/DL
DEPRECATED RDW RBC AUTO: 57.6 FL (ref 35.1–46.3)
EGFRCR SERPLBLD CKD-EPI 2021: 91 ML/MIN/1.73M2 (ref 60–?)
EOSINOPHIL # BLD AUTO: 0.09 X10(3) UL (ref 0–0.7)
EOSINOPHIL NFR BLD AUTO: 1.4 %
ERYTHROCYTE [DISTWIDTH] IN BLOOD BY AUTOMATED COUNT: 16.8 % (ref 11–15)
GLOBULIN PLAS-MCNC: 3.6 G/DL (ref 2–3.5)
GLUCOSE BLD-MCNC: 152 MG/DL (ref 70–99)
HCT VFR BLD AUTO: 34.2 %
HGB BLD-MCNC: 10.7 G/DL
IMM GRANULOCYTES # BLD AUTO: 0.02 X10(3) UL (ref 0–1)
IMM GRANULOCYTES NFR BLD: 0.3 %
LYMPHOCYTES # BLD AUTO: 1.79 X10(3) UL (ref 1–4)
LYMPHOCYTES NFR BLD AUTO: 27 %
MCH RBC QN AUTO: 29.3 PG (ref 26–34)
MCHC RBC AUTO-ENTMCNC: 31.3 G/DL (ref 31–37)
MCV RBC AUTO: 93.7 FL
MONOCYTES # BLD AUTO: 0.3 X10(3) UL (ref 0.1–1)
MONOCYTES NFR BLD AUTO: 4.5 %
NEUTROPHILS # BLD AUTO: 4.41 X10 (3) UL (ref 1.5–7.7)
NEUTROPHILS # BLD AUTO: 4.41 X10(3) UL (ref 1.5–7.7)
NEUTROPHILS NFR BLD AUTO: 66.5 %
OSMOLALITY SERPL CALC.SUM OF ELEC: 299 MOSM/KG (ref 275–295)
PLATELET # BLD AUTO: 305 10(3)UL (ref 150–450)
POTASSIUM SERPL-SCNC: 4.1 MMOL/L (ref 3.5–5.1)
PROT SERPL-MCNC: 7.1 G/DL (ref 5.7–8.2)
RBC # BLD AUTO: 3.65 X10(6)UL
SODIUM SERPL-SCNC: 143 MMOL/L (ref 136–145)
T4 FREE SERPL-MCNC: 1 NG/DL (ref 0.8–1.7)
TSI SER-ACNC: 3.8 UIU/ML (ref 0.55–4.78)
WBC # BLD AUTO: 6.6 X10(3) UL (ref 4–11)

## 2025-01-02 RX ORDER — FAMOTIDINE 10 MG/ML
20 INJECTION, SOLUTION INTRAVENOUS 2 TIMES DAILY
Status: CANCELLED
Start: 2025-01-02

## 2025-01-02 RX ORDER — FAMOTIDINE 10 MG/ML
20 INJECTION, SOLUTION INTRAVENOUS ONCE
Status: COMPLETED | OUTPATIENT
Start: 2025-01-02 | End: 2025-01-02

## 2025-01-02 RX ORDER — FAMOTIDINE 10 MG/ML
INJECTION, SOLUTION INTRAVENOUS
Status: COMPLETED
Start: 2025-01-02 | End: 2025-01-02

## 2025-01-02 RX ADMIN — FAMOTIDINE 20 MG: 10 INJECTION, SOLUTION INTRAVENOUS at 14:09:00

## 2025-01-02 NOTE — PROGRESS NOTES
Pt here for C9D1 Drug(s)OPdivo.  Arrives Ambulating independently, accompanied by Self and Spouse     Patient was evaluated today by MD.    Oral medications included in this regimen:  no    Patient confirms comprehension of cancer treatment schedule:  yes    Pregnancy screening:  Not applicable    Modifications in dose or schedule:  No    Medications appearance and physical integrity checked by RN: yes.    Chemotherapy IV pump settings verified by 2 RNs:  No due to targeted therapy IV administration.  Frequency of blood return and site check throughout administration: Prior to administration and At completion of therapy     Infusion/treatment outcome:  patient tolerated treatment without incident    Education Record    Learner:  Patient and Spouse  Barriers / Limitations:  None  Method:  Brief focused and Discussion  Education / instructions given:  Plan of care for treatment today  Outcome:  Shows understanding    Discharged Home, Ambulating independently, accompanied by:Self and Spouse    Patient/family verbalized understanding of future appointments: by MyChart messaging

## 2025-01-02 NOTE — PROGRESS NOTES
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 She began Nivolumab Cabozantinib 6/12/24 and presents for followup.     Interval history:  The patient is feeling much better today after starting hydrocortisone and levothyroxine.  She is eating a lot more.  She is walking independently and even went shopping and did some cooking for [place] which she has not done in nearly the last year.  Some sensitivity to her tongue when eating denies diarrhea or other major issues at this time.    Oncology history:  Briefly, the patient began to feel fatigued in the Fall 2023 and she was found to have a hemoglobin of 8.5 with an MCV of 71 with last lab values in 2017 showing a normal hemoglobin.  In March 2024, iron saturation was 15% and ferritin was 612.  She was given IV iron and referred to GI which prompted a CT abdomen pelvis which unfortunately revealed an 11 cm enhancing mass within the right kidney and extensive bibasilar pleural metastases concerning for metastatic renal cell carcinoma.     5/21/24: Initial medical oncology consultation, will obtain brain imaging and refer to IR for kidney biopsy.  Expressed concern for metastatic kidney cancer.  -Brain MRI showed a meningioma, no intracranial metastatic disease.  -IR guided biopsy 6/5 shows high-grade clear-cell renal cell carcinoma, with extensive necrosis but no sarcomatoid or rhabdoid features    6/6/24: Medical oncology follow-up.  Patient continues to decline.  Patient has poor risk disease, very symptomatic and concerned with need for  rapid response upfront.  Therefore, we will proceed with cabozantinib nivolumab per CheckMate 9ER    6/12/24: began Cabozantinib Nivolumab.     6/26/24: slight improvement, no dose limiting toxicities, continue treatment.     7/24/24: Feeling little stronger, tolerating therapy well    8/7/24: Grade 1 transaminitis, continue therapy, repeat LFTs next week, follow-up in 2 weeks, restaging at the end of August 9/4/24: Restaging CT shows partial response to treatment. Continue current therapy plan.     10/3/24: Nearly grade 2 PPE persists despite cabozantinib 40 mg every other day.  Hold for 1 week.  We will dose reduce to 20 mg daily, request new prescription.  Proceed with nivolumab 480 mg monthly dosing today.  Follow-up in 1 month which will be 2 to 3 weeks on cabozantinib 20 mg daily.    10/30/24: Doing well with dose reduced cabozantinib 20 mg daily.  No immune related toxicities, cleared for next month of treatment.    11/21-11/24/24: Admitted to [facility] with pneumonia and failure to thrive.    12/4/24: Remains very weak, now hypothyroid.  We will prescribe levothyroxine.  Rule out adrenal insufficiency.  Restaging at this visit also shows stable disease.  Okay to continue treatment for now.    12/19/24: Found to have adrenal insufficiency, ACTH and cortisol low.  Started on hydrocortisone 20/10 mg and referred to endocrinology.  Also started on levothyroxine.  Energy is improving, appetite is better she is slowly improving.    1/2/24: feeling well, cleared for nivolumab, continue cabozantinib.     Review of Systems:  Hematology/Oncology ROS performed and negative except as above in HPI    History/Other:   Current Medications:   hydrocortisone 10 MG Oral Tab Take 2 tablets (20 mg total) by mouth daily with breakfast AND 1 tablet (10 mg total) Every afternoon at 2:00 pm. 90 tablet 1    levothyroxine 50 MCG Oral Tab Take 1 tablet (50 mcg total) by mouth before breakfast. 30 tablet 1    pantoprazole 40 MG Oral Tab  EC Take 1 tablet (40 mg total) by mouth at bedtime. 30 tablet 1    acetaminophen 500 MG Oral Tab Take 1 tablet (500 mg total) by mouth every 4 (four) hours as needed for Fever (equal to or greater than 100.4).      sennosides 17.2 MG Oral Tab Take 1 tablet (17.2 mg total) by mouth nightly as needed (constipation, as needed if no bowel movement that day). 30 tablet 0    sodium chloride 0.65 % Nasal Solution 2 sprays by Nasal route every 4 (four) hours as needed for congestion. 1 each 0    triamcinolone 0.1 % External Lotion Apply 1 Application topically 2 (two) times daily. 1 each 0    prochlorperazine (COMPAZINE) 10 mg tablet Take 1 tablet (10 mg total) by mouth every 6 (six) hours as needed for Nausea. 60 tablet 3    docusate sodium 50 MG Oral Cap Take 1 capsule (50 mg total) by mouth as needed for constipation.      sertraline 25 MG Oral Tab Take 1 tablet (25 mg total) by mouth daily. 90 tablet 1    ondansetron 4 MG Oral Tablet Dispersible Take 1 tablet (4 mg total) by mouth every 8 (eight) hours as needed for Nausea. 30 tablet 2     Allergies:   Allergies   Allergen Reactions    Levaquin [Levofloxacin] NAUSEA AND VOMITING    Ace Inhibitors Coughing    Azithromycin      Other reaction(s): AZITHROMYCIN    Erythromycin      Other reaction(s): Rash - Mild    Naproxen      Other reaction(s): NAPROXEN    Nortriptyline      Other reaction(s): vivid, scary dreams    Sulfa Antibiotics      Other reaction(s): Rash - Moderate    Ceftriaxone RASH     Objective:   Blood pressure 134/80, pulse 80, temperature 98.2 °F (36.8 °C), temperature source Oral, resp. rate 16, height 1.651 m (5' 5\"), weight 76.2 kg (168 lb), SpO2 97%, not currently breastfeeding.  Physical Exam:  ECOG PS: 1  General: A&Ox3, NAD  HEENT: PERRL, anicteric, OP clear  CV: RRR, no murmurs  Pulm: CTA b/l, nl effort  Abd: soft, ntnd, no HSM or masses  Extremities: no edema, PPE resolved  Neurological: grossly intact    Results:   Labs:  Lab Results   Component  Value Date    WBC 6.6 01/02/2025    HGB 10.7 (L) 01/02/2025    HCT 34.2 (L) 01/02/2025    .0 01/02/2025    CREATSERUM 0.72 01/02/2025    BUN 13 01/02/2025     01/02/2025    K 4.1 01/02/2025     01/02/2025    CO2 28.0 01/02/2025     (H) 01/02/2025    CA 8.8 01/02/2025    ALB 3.5 01/02/2025    ALKPHO 76 01/02/2025    BILT 0.2 01/02/2025    TP 7.1 01/02/2025    AST 20 01/02/2025    ALT 19 01/02/2025    INR 1.09 11/19/2024    T4F 0.7 (L) 12/04/2024    TSH 26.418 (H) 12/04/2024    MG 2.1 12/04/2024    PHOS 3.2 11/21/2024    B12 435 07/14/2017     Imaging:        CT a/p 5/19/24:  CONCLUSION: 11.1 x 10.2 centimeter enhancing mass replacing the mid to lower right kidney with extensive bibasilar enhancing pleural tumor, trace pleural effusions, intrathoracic adenopathy.  This is likely renal cell carcinoma with extensive bibasilar thoracic involvement     CT chest 5/22/24:  CONCLUSION: Extensive pleural masses with associated small bilateral pleural effusions , as well as mediastinal and hilar lymphadenopathy and bilateral pulmonary nodules.  These are highly concerning for sites of metastatic disease in this setting.    A questioned focal sclerosis of the left lateral first rib adjacent to a tiny pleural mass could represent a site of osseous involvement of disease.     Right kidney, mass; CT-guided core biopsy 6/5/24:   Clear-cell renal cell carcinoma in a background of extensive necrosis, see comment.    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 She began Nivolumab Cabozantinib 6/12/24 and presents for followup.     # Metastatic poor-risk ccRCC.   -large right renal mass, extensive pleural metastases, anemia, neutropenia, thrombocytosis, quite symptomatic on presentation. Given  progressive symptoms and poor risk disease, discussed the need for a treatment response asap, therefore we discussed proceeding with Nivolumab and Cabozantinib per CM 9ER over ipilimumab and nivolumab with concerns for rapid response, and began treatment 6/12/24  -August 2024 imaging shows partial response to therapy, restaging in early December 2024 shows continued partial response  -PPE, dysgeusia, fatigue all improved with dose reduction of cabozantinib down to 20 mg daily, continue  -Recently developed adrenal insufficiency and hypothyroidism, treating as below and now much improved  -Cleared for monthly nivolumab today, continue cabozantinib 20 mg daily, plan for restaging in late February which we will order at her next appointment in late January     # Immune mediated adrenal insufficiency  # Immune mediated hypothyroidism  -Patient developed severe weakness and fatigue in November 2024, found to have a high in early December 2024.  -On hydrocortisone 20/10 mg daily, symptoms are much improved.  She is following with endocrinology  -On levothyroxine 50 mcg daily  -TSH pending today, pt to followup with Endocrinology     # Symptom management  -zofran ODT prn for nausea  -PPE, udderbalm cream and voltaren gel BID-TID.  Dose reduced cabozantinib to 20 mg daily as above  -Per Dentist, tooth extraction necessary, ok to proceed on Nivolumab/Cabozantinib (minor procedure)    # Comorbidities  -HTN, hold metoprolol given hypotension with weight loss  -Hypothyroidism, immune-mediated, start Synthroid 50 mcg daily    MDM High: malignancy; review of results with independent interpretation and discussion of management; drug therapy requiring intensive monitoring for toxicity;  ongoing continuity of complex care    Archie Crocker DO    [facility] Hematology/Oncology  [facility]     This note was created using a voice-recognition transcribing system. Incorrect words or phrases may have been missed  during proofreading. Please interpret accordingly.

## 2025-01-06 ENCOUNTER — TELEPHONE (OUTPATIENT)
Dept: ENDOCRINOLOGY CLINIC | Facility: CLINIC | Age: 70
End: 2025-01-06

## 2025-01-06 DIAGNOSIS — E03.2 HYPOTHYROIDISM DUE TO MEDICATION: Primary | ICD-10-CM

## 2025-01-06 PROBLEM — E27.3 ADRENAL INSUFFICIENCY DUE TO CANCER THERAPY (HCC): Status: ACTIVE | Noted: 2025-01-06

## 2025-01-06 NOTE — TELEPHONE ENCOUNTER
Pt notified of test results and upcoming blood work needed. Pt understood and had no further questions or concerns at this time.

## 2025-01-06 NOTE — TELEPHONE ENCOUNTER
Hi!    Please let patient know that I have reviewed her most recent thyroid function tests and they are within normal limits. I would like to keep her on this current dose. I will place orders for her to have them checked again in 6 weeks. Thank you!

## 2025-01-22 RX ORDER — ACETAMINOPHEN 500 MG
500 TABLET ORAL EVERY 4 HOURS PRN
Qty: 200 TABLET | Refills: 3 | Status: SHIPPED | OUTPATIENT
Start: 2025-01-22

## 2025-01-22 RX ORDER — ACETAMINOPHEN 500 MG
500 TABLET ORAL EVERY 4 HOURS PRN
Refills: 0 | OUTPATIENT
Start: 2025-01-22

## 2025-01-22 NOTE — TELEPHONE ENCOUNTER
Please review. This medication has no protocol attached.    Medication is listed as patient reported, last documented as historical prior to hospital discharge 11/21/24.    Please advise if okay to send refills.    Recent Visits  Date Type Provider Dept   12/05/24 Office Visit Fernanda Stover MD [id number]   07/30/24 Office Visit Fernanda Stover MD [id number]     Future Appointments  Date Type Provider Dept   05/13/25 Appointment Fernanda Stover MD [id number]     Requested Prescriptions   Pending Prescriptions Disp Refills    acetaminophen 500 MG Oral Tab  0     Sig: Take 1 tablet (500 mg total) by mouth every 4 (four) hours as needed for Fever (equal to or greater than 100.4).       There is no refill protocol information for this order      Refused Prescriptions Disp Refills    acetaminophen 500 MG Oral Tab  0     Sig: Take 1 tablet (500 mg total) by mouth every 4 (four) hours as needed for Fever (equal to or greater than 100.4).       There is no refill protocol information for this order

## 2025-01-22 NOTE — TELEPHONE ENCOUNTER
Per discharge summary by Dr. Atul Mueller 11/21/24:  - Tylenol 500 mg every 4 hours p.r.n. pain.  Watch total daily  Tylenol, limit to 3 g.  - Tylenol with codeine 1 tablet every 6 hours as needed for pain.

## 2025-01-27 RX ORDER — PANTOPRAZOLE SODIUM 40 MG/1
40 TABLET, DELAYED RELEASE ORAL NIGHTLY
Qty: 30 TABLET | Refills: 1 | OUTPATIENT
Start: 2025-01-27

## 2025-01-27 RX ORDER — PANTOPRAZOLE SODIUM 40 MG/1
40 TABLET, DELAYED RELEASE ORAL NIGHTLY
Qty: 30 TABLET | Refills: 1 | Status: CANCELLED | OUTPATIENT
Start: 2025-01-27

## 2025-01-27 RX ORDER — LEVOTHYROXINE SODIUM 50 UG/1
50 TABLET ORAL
Qty: 30 TABLET | Refills: 1 | OUTPATIENT
Start: 2025-01-27

## 2025-01-27 RX ORDER — LEVOTHYROXINE SODIUM 50 UG/1
50 TABLET ORAL
Qty: 30 TABLET | Refills: 1 | Status: CANCELLED | OUTPATIENT
Start: 2025-01-27

## 2025-01-27 RX ORDER — HYDROCORTISONE 10 MG/1
TABLET ORAL
Qty: 90 TABLET | Refills: 1 | OUTPATIENT
Start: 2025-01-27

## 2025-01-27 RX ORDER — LEVOTHYROXINE SODIUM 50 UG/1
50 TABLET ORAL
Qty: 90 TABLET | Refills: 1 | Status: SHIPPED | OUTPATIENT
Start: 2025-01-27

## 2025-01-27 RX ORDER — HYDROCORTISONE 10 MG/1
TABLET ORAL
Qty: 90 TABLET | Refills: 1 | Status: CANCELLED | OUTPATIENT
Start: 2025-01-27

## 2025-01-27 RX ORDER — PANTOPRAZOLE SODIUM 40 MG/1
40 TABLET, DELAYED RELEASE ORAL NIGHTLY
Qty: 90 TABLET | Refills: 1 | Status: SHIPPED | OUTPATIENT
Start: 2025-01-27

## 2025-01-28 RX ORDER — PANTOPRAZOLE SODIUM 40 MG/1
40 TABLET, DELAYED RELEASE ORAL NIGHTLY
Qty: 90 TABLET | Refills: 1 | OUTPATIENT
Start: 2025-01-28

## 2025-01-29 RX ORDER — SERTRALINE HYDROCHLORIDE 25 MG/1
25 TABLET, FILM COATED ORAL DAILY
Qty: 90 TABLET | Refills: 1 | OUTPATIENT
Start: 2025-01-29

## 2025-01-29 RX ORDER — SERTRALINE HYDROCHLORIDE 25 MG/1
25 TABLET, FILM COATED ORAL DAILY
Qty: 90 TABLET | Refills: 1 | Status: SHIPPED | OUTPATIENT
Start: 2025-01-29

## 2025-01-29 RX ORDER — HYDROCORTISONE 10 MG/1
TABLET ORAL
Qty: 90 TABLET | Refills: 2 | Status: SHIPPED | OUTPATIENT
Start: 2025-01-29

## 2025-01-30 ENCOUNTER — OFFICE VISIT (OUTPATIENT)
Age: 70
End: 2025-01-30
Attending: INTERNAL MEDICINE
Payer: MEDICARE

## 2025-01-30 ENCOUNTER — NURSE ONLY (OUTPATIENT)
Age: 70
End: 2025-01-30
Attending: INTERNAL MEDICINE
Payer: MEDICARE

## 2025-01-30 VITALS
TEMPERATURE: 98 F | WEIGHT: 173 LBS | RESPIRATION RATE: 16 BRPM | OXYGEN SATURATION: 96 % | BODY MASS INDEX: 28.82 KG/M2 | HEIGHT: 65 IN | DIASTOLIC BLOOD PRESSURE: 80 MMHG | SYSTOLIC BLOOD PRESSURE: 136 MMHG | HEART RATE: 74 BPM

## 2025-01-30 DIAGNOSIS — Z51.12 ENCOUNTER FOR ANTINEOPLASTIC IMMUNOTHERAPY: ICD-10-CM

## 2025-01-30 DIAGNOSIS — C64.1 RENAL CELL CARCINOMA OF RIGHT KIDNEY (HCC): Primary | ICD-10-CM

## 2025-01-30 DIAGNOSIS — Z51.11 CHEMOTHERAPY MANAGEMENT, ENCOUNTER FOR: ICD-10-CM

## 2025-01-30 DIAGNOSIS — L27.1 PALMAR PLANTAR ERYTHRODYSAESTHESIA DUE TO CYTOTOXIC THERAPY: ICD-10-CM

## 2025-01-30 DIAGNOSIS — E27.3 ADRENAL INSUFFICIENCY DUE TO CANCER THERAPY (HCC): ICD-10-CM

## 2025-01-30 DIAGNOSIS — Z51.11 CHEMOTHERAPY MANAGEMENT, ENCOUNTER FOR: Primary | ICD-10-CM

## 2025-01-30 DIAGNOSIS — C64.1 RENAL CELL CARCINOMA OF RIGHT KIDNEY (HCC): ICD-10-CM

## 2025-01-30 LAB
ALBUMIN SERPL-MCNC: 3.9 G/DL (ref 3.2–4.8)
ALBUMIN/GLOB SERPL: 1 {RATIO} (ref 1–2)
ALP LIVER SERPL-CCNC: 84 U/L
ALT SERPL-CCNC: 16 U/L
ANION GAP SERPL CALC-SCNC: 7 MMOL/L (ref 0–18)
AST SERPL-CCNC: 23 U/L (ref ?–34)
BASOPHILS # BLD AUTO: 0.04 X10(3) UL (ref 0–0.2)
BASOPHILS NFR BLD AUTO: 0.5 %
BILIRUB SERPL-MCNC: 0.2 MG/DL (ref 0.2–1.1)
BILIRUB UR QL: NEGATIVE
BUN BLD-MCNC: 15 MG/DL (ref 9–23)
BUN/CREAT SERPL: 19.5 (ref 10–20)
CALCIUM BLD-MCNC: 9.2 MG/DL (ref 8.7–10.4)
CHLORIDE SERPL-SCNC: 107 MMOL/L (ref 98–112)
CHOLEST SERPL-MCNC: 177 MG/DL (ref ?–200)
CLARITY UR: CLEAR
CO2 SERPL-SCNC: 28 MMOL/L (ref 21–32)
COLOR UR: YELLOW
CREAT BLD-MCNC: 0.77 MG/DL
DEPRECATED RDW RBC AUTO: 57.5 FL (ref 35.1–46.3)
EGFRCR SERPLBLD CKD-EPI 2021: 84 ML/MIN/1.73M2 (ref 60–?)
EOSINOPHIL # BLD AUTO: 0.05 X10(3) UL (ref 0–0.7)
EOSINOPHIL NFR BLD AUTO: 0.6 %
ERYTHROCYTE [DISTWIDTH] IN BLOOD BY AUTOMATED COUNT: 16.8 % (ref 11–15)
GLOBULIN PLAS-MCNC: 3.9 G/DL (ref 2–3.5)
GLUCOSE BLD-MCNC: 141 MG/DL (ref 70–99)
GLUCOSE UR-MCNC: NORMAL MG/DL
HCT VFR BLD AUTO: 38.5 %
HDLC SERPL-MCNC: 59 MG/DL (ref 40–59)
HGB BLD-MCNC: 11.4 G/DL
IMM GRANULOCYTES # BLD AUTO: 0.03 X10(3) UL (ref 0–1)
IMM GRANULOCYTES NFR BLD: 0.4 %
KETONES UR-MCNC: NEGATIVE MG/DL
LDLC SERPL CALC-MCNC: 103 MG/DL (ref ?–100)
LEUKOCYTE ESTERASE UR QL STRIP.AUTO: NEGATIVE
LYMPHOCYTES # BLD AUTO: 1.77 X10(3) UL (ref 1–4)
LYMPHOCYTES NFR BLD AUTO: 21.4 %
MAGNESIUM SERPL-MCNC: 1.8 MG/DL (ref 1.6–2.6)
MCH RBC QN AUTO: 27.5 PG (ref 26–34)
MCHC RBC AUTO-ENTMCNC: 29.6 G/DL (ref 31–37)
MCV RBC AUTO: 93 FL
MONOCYTES # BLD AUTO: 0.31 X10(3) UL (ref 0.1–1)
MONOCYTES NFR BLD AUTO: 3.8 %
NEUTROPHILS # BLD AUTO: 6.06 X10 (3) UL (ref 1.5–7.7)
NEUTROPHILS # BLD AUTO: 6.06 X10(3) UL (ref 1.5–7.7)
NEUTROPHILS NFR BLD AUTO: 73.3 %
NITRITE UR QL STRIP.AUTO: NEGATIVE
NONHDLC SERPL-MCNC: 118 MG/DL (ref ?–130)
OSMOLALITY SERPL CALC.SUM OF ELEC: 297 MOSM/KG (ref 275–295)
PH UR: 5 [PH] (ref 5–8)
PLATELET # BLD AUTO: 382 10(3)UL (ref 150–450)
POTASSIUM SERPL-SCNC: 4.1 MMOL/L (ref 3.5–5.1)
PROT SERPL-MCNC: 7.8 G/DL (ref 5.7–8.2)
PROT UR-MCNC: NEGATIVE MG/DL
RBC # BLD AUTO: 4.14 X10(6)UL
SODIUM SERPL-SCNC: 142 MMOL/L (ref 136–145)
SP GR UR STRIP: 1.02 (ref 1–1.03)
T4 FREE SERPL-MCNC: 1.1 NG/DL (ref 0.8–1.7)
TRIGL SERPL-MCNC: 80 MG/DL (ref 30–149)
TSI SER-ACNC: 2.56 UIU/ML (ref 0.55–4.78)
UROBILINOGEN UR STRIP-ACNC: NORMAL
VLDLC SERPL CALC-MCNC: 13 MG/DL (ref 0–30)
WBC # BLD AUTO: 8.3 X10(3) UL (ref 4–11)

## 2025-01-30 RX ORDER — FAMOTIDINE 10 MG/ML
INJECTION, SOLUTION INTRAVENOUS
Status: DISPENSED
Start: 2025-01-30 | End: 2025-01-31

## 2025-01-30 RX ORDER — FAMOTIDINE 10 MG/ML
20 INJECTION, SOLUTION INTRAVENOUS ONCE
Status: COMPLETED | OUTPATIENT
Start: 2025-01-30 | End: 2025-01-30

## 2025-01-30 RX ORDER — METOPROLOL SUCCINATE 100 MG/1
100 TABLET, EXTENDED RELEASE ORAL DAILY
COMMUNITY
Start: 2025-01-22

## 2025-01-30 RX ORDER — FAMOTIDINE 10 MG/ML
20 INJECTION, SOLUTION INTRAVENOUS 2 TIMES DAILY
Status: CANCELLED
Start: 2025-01-30

## 2025-01-30 RX ADMIN — FAMOTIDINE 20 MG: 10 INJECTION, SOLUTION INTRAVENOUS at 14:52:00

## 2025-01-30 NOTE — PROGRESS NOTES
Pt here for C10D1 Drug(s)Opdivo.  Arrives Ambulating independently, accompanied by Spouse     Patient was evaluated today by MD.    Oral medications included in this regimen:  no    Patient confirms comprehension of cancer treatment schedule:  yes    Pregnancy screening:  Denies possibility of pregnancy    Modifications in dose or schedule:  no    Medications appearance and physical integrity checked by RN: yes.    Chemotherapy IV pump settings verified by 2 RNs:  No due to targeted therapy IV administration.  Frequency of blood return and site check throughout administration: Prior to administration and At completion of therapy     Infusion/treatment outcome:  patient tolerated treatment without incident    Education Record    Learner:  Patient and Spouse  Barriers / Limitations:  None  Method:  Discussion  Education / instructions given:  plan of care  Outcome:  Shows understanding    Discharged Home, Ambulating independently, accompanied by:Spouse    Patient/family verbalized understanding of future appointments: by [employer] messaging

## 2025-01-31 NOTE — PROGRESS NOTES
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 She began Nivolumab Cabozantinib 6/12/24 and presents for followup.     Interval history:  Patient continues to do well on hydrocortisone and levothyroxine.  No major concerns or complaints this visit doing great.    Oncology history:  Briefly, the patient began to feel fatigued in the Fall 2023 and she was found to have a hemoglobin of 8.5 with an MCV of 71 with last lab values in 2017 showing a normal hemoglobin.  In March 2024, iron saturation was 15% and ferritin was 612.  She was given IV iron and referred to GI which prompted a CT abdomen pelvis which unfortunately revealed an 11 cm enhancing mass within the right kidney and extensive bibasilar pleural metastases concerning for metastatic renal cell carcinoma.     5/21/24: Initial medical oncology consultation, will obtain brain imaging and refer to IR for kidney biopsy.  Expressed concern for metastatic kidney cancer.  -Brain MRI showed a meningioma, no intracranial metastatic disease.  -IR guided biopsy 6/5 shows high-grade clear-cell renal cell carcinoma, with extensive necrosis but no sarcomatoid or rhabdoid features    6/6/24: Medical oncology follow-up.  Patient continues to decline.  Patient has poor risk disease, very symptomatic and concerned with need for rapid response upfront.  Therefore, we will proceed with cabozantinib nivolumab per CheckMate 9ER    6/12/24: began Cabozantinib Nivolumab.     6/26/24: slight improvement, no dose limiting toxicities, continue treatment.      7/24/24: Feeling little stronger, tolerating therapy well    8/7/24: Grade 1 transaminitis, continue therapy, repeat LFTs next week, follow-up in 2 weeks, restaging at the end of August 9/4/24: Restaging CT shows partial response to treatment. Continue current therapy plan.     10/3/24: Nearly grade 2 PPE persists despite cabozantinib 40 mg every other day.  Hold for 1 week.  We will dose reduce to 20 mg daily, request new prescription.  Proceed with nivolumab 480 mg monthly dosing today.  Follow-up in 1 month which will be 2 to 3 weeks on cabozantinib 20 mg daily.    10/30/24: Doing well with dose reduced cabozantinib 20 mg daily.  No immune related toxicities, cleared for next month of treatment.    11/21-11/24/24: Admitted to [facility] with pneumonia and failure to thrive.    12/4/24: Remains very weak, now hypothyroid.  We will prescribe levothyroxine.  Rule out adrenal insufficiency.  Restaging at this visit also shows stable disease.  Okay to continue treatment for now.    12/19/24: Found to have adrenal insufficiency, ACTH and cortisol low.  Started on hydrocortisone 20/10 mg and referred to endocrinology.  Also started on levothyroxine.  Energy is improving, appetite is better she is slowly improving.    1/2/24: feeling well, cleared for nivolumab, continue cabozantinib.     1/30/25: Cleared for nivolumab, continue cabozantinib 20 mg daily.  Decrease hydrocortisone to 10 mg a.m. 5 mg p.m.    Review of Systems:  Hematology/Oncology ROS performed and negative except as above in HPI    History/Other:   Current Medications:   metoprolol succinate  MG Oral Tablet 24 Hr Take 1 tablet (100 mg total) by mouth daily.      sertraline 25 MG Oral Tab Take 1 tablet (25 mg total) by mouth daily. 90 tablet 1    hydrocortisone 10 MG Oral Tab Take 2 tablets (20 mg total) by mouth daily with breakfast AND 1 tablet (10 mg total) Every afternoon at 2:00 pm. 90 tablet 2    levothyroxine 50 MCG Oral Tab Take 1 tablet (50  mcg total) by mouth before breakfast. 90 tablet 1    pantoprazole 40 MG Oral Tab EC Take 1 tablet (40 mg total) by mouth at bedtime. 90 tablet 1    acetaminophen 500 MG Oral Tab Take 1 tablet (500 mg total) by mouth every 4 (four) hours as needed for Fever (equal to or greater than 100.4). 200 tablet 3    sennosides 17.2 MG Oral Tab Take 1 tablet (17.2 mg total) by mouth nightly as needed (constipation, as needed if no bowel movement that day). 30 tablet 0    sodium chloride 0.65 % Nasal Solution 2 sprays by Nasal route every 4 (four) hours as needed for congestion. 1 each 0    triamcinolone 0.1 % External Lotion Apply 1 Application topically 2 (two) times daily. 1 each 0    prochlorperazine (COMPAZINE) 10 mg tablet Take 1 tablet (10 mg total) by mouth every 6 (six) hours as needed for Nausea. 60 tablet 3    docusate sodium 50 MG Oral Cap Take 1 capsule (50 mg total) by mouth as needed for constipation.      ondansetron 4 MG Oral Tablet Dispersible Take 1 tablet (4 mg total) by mouth every 8 (eight) hours as needed for Nausea. 30 tablet 2     Allergies:   Allergies   Allergen Reactions    Levaquin [Levofloxacin] OTHER (SEE COMMENTS)     Tendon pain    Ace Inhibitors Coughing    Azithromycin      Other reaction(s): AZITHROMYCIN    Erythromycin      Other reaction(s): Rash - Mild    Naproxen      Other reaction(s): NAPROXEN    Nortriptyline      Other reaction(s): vivid, scary dreams    Sulfa Antibiotics      Other reaction(s): Rash - Moderate    Ceftriaxone RASH     Objective:   Blood pressure 136/80, pulse 74, temperature 97.9 °F (36.6 °C), temperature source Oral, resp. rate 16, height 1.651 m (5' 5\"), weight 78.5 kg (173 lb), SpO2 96%, not currently breastfeeding.  Physical Exam:  ECOG PS: 1  General: A&Ox3, NAD  HEENT: PERRL, anicteric, OP clear  CV: RRR, no murmurs  Pulm: CTA b/l, nl effort  Abd: soft, ntnd, no HSM or masses  Extremities: no edema, PPE resolved  Neurological: grossly intact    Results:    Labs:  Lab Results   Component Value Date    WBC 8.3 01/30/2025    HGB 11.4 (L) 01/30/2025    HCT 38.5 01/30/2025    .0 01/30/2025    CREATSERUM 0.77 01/30/2025    BUN 15 01/30/2025     01/30/2025    K 4.1 01/30/2025     01/30/2025    CO2 28.0 01/30/2025     (H) 01/30/2025    CA 9.2 01/30/2025    ALB 3.9 01/30/2025    ALKPHO 84 01/30/2025    BILT 0.2 01/30/2025    TP 7.8 01/30/2025    AST 23 01/30/2025    ALT 16 01/30/2025    INR 1.09 11/19/2024    T4F 1.1 01/30/2025    TSH 2.558 01/30/2025    MG 1.8 01/30/2025    PHOS 3.2 11/21/2024    B12 435 07/14/2017     Imaging:        CT a/p 5/19/24:  CONCLUSION: 11.1 x 10.2 centimeter enhancing mass replacing the mid to lower right kidney with extensive bibasilar enhancing pleural tumor, trace pleural effusions, intrathoracic adenopathy.  This is likely renal cell carcinoma with extensive bibasilar thoracic involvement     CT chest 5/22/24:  CONCLUSION: Extensive pleural masses with associated small bilateral pleural effusions , as well as mediastinal and hilar lymphadenopathy and bilateral pulmonary nodules.  These are highly concerning for sites of metastatic disease in this setting.    A questioned focal sclerosis of the left lateral first rib adjacent to a tiny pleural mass could represent a site of osseous involvement of disease.     Right kidney, mass; CT-guided core biopsy 6/5/24:   Clear-cell renal cell carcinoma in a background of extensive necrosis, see comment.    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 She began Nivolumab Cabozantinib 6/12/24 and presents for followup.     # Metastatic poor-risk ccRCC.   -large right renal mass, extensive pleural metastases, anemia, neutropenia, thrombocytosis, quite symptomatic on  presentation. Given progressive symptoms and poor risk disease, discussed the need for a treatment response asap, therefore we discussed proceeding with Nivolumab and Cabozantinib per CM 9ER over ipilimumab and nivolumab with concerns for rapid response, and began treatment 6/12/24  -August 2024 imaging shows partial response to therapy, restaging in early December 2024 shows continued partial response  -PPE, dysgeusia, fatigue all improved with dose reduction of cabozantinib down to 20 mg daily, continue  -Recently developed adrenal insufficiency and hypothyroidism, treating as below and now much improved  -Cleared for monthly nivolumab today, continue cabozantinib 20 mg daily, follow-up with restaging CT at her next appointment, patient will call to schedule scan    # Immune mediated adrenal insufficiency  # Immune mediated hypothyroidism  -Patient developed severe weakness and fatigue in November 2024, found to have a high in early December 2024.  -On hydrocortisone 20/10 mg daily, symptoms are much improved.  She is following with endocrinology -> we discussed tapering hydrocortisone to 10/5 daily and she will start this later this week.  -On levothyroxine 50 mcg daily, TSH normalized    # Symptom management  -zofran ODT prn for nausea  -PPE, udderbalm cream and voltaren gel BID-TID.  Dose reduced cabozantinib to 20 mg daily as above  -Per Dentist, tooth extraction necessary, ok to proceed on Nivolumab/Cabozantinib (minor procedure)    # Comorbidities  -HTN, hold metoprolol given hypotension with weight loss  -Hypothyroidism, immune-mediated, start Synthroid 50 mcg daily    MDM High: malignancy; review of results with independent interpretation and discussion of management; drug therapy requiring intensive monitoring for toxicity;  ongoing continuity of complex care    Archie Crocker DO    [facility] Hematology/Oncology  [facility]     This note was created using a voice-recognition  transcribing system. Incorrect words or phrases may have been missed during proofreading. Please interpret accordingly.

## 2025-02-26 ENCOUNTER — HOSPITAL ENCOUNTER (OUTPATIENT)
Dept: CT IMAGING | Facility: HOSPITAL | Age: 70
Discharge: HOME | End: 2025-02-26
Attending: STUDENT IN AN ORGANIZED HEALTH CARE EDUCATION/TRAINING PROGRAM
Payer: MEDICARE

## 2025-02-26 DIAGNOSIS — C64.1 RENAL CELL CARCINOMA OF RIGHT KIDNEY (HCC): ICD-10-CM

## 2025-02-26 DIAGNOSIS — Z51.12 ENCOUNTER FOR ANTINEOPLASTIC IMMUNOTHERAPY: ICD-10-CM

## 2025-02-26 DIAGNOSIS — Z51.11 CHEMOTHERAPY MANAGEMENT, ENCOUNTER FOR: ICD-10-CM

## 2025-02-26 DIAGNOSIS — E27.3 ADRENAL INSUFFICIENCY DUE TO CANCER THERAPY (HCC): ICD-10-CM

## 2025-02-26 PROCEDURE — 71260 CT THORAX DX C+: CPT | Performed by: STUDENT IN AN ORGANIZED HEALTH CARE EDUCATION/TRAINING PROGRAM

## 2025-02-26 PROCEDURE — 74177 CT ABD & PELVIS W/CONTRAST: CPT | Performed by: STUDENT IN AN ORGANIZED HEALTH CARE EDUCATION/TRAINING PROGRAM

## 2025-02-27 ENCOUNTER — APPOINTMENT (OUTPATIENT)
Age: 70
End: 2025-02-27
Attending: INTERNAL MEDICINE
Payer: MEDICARE

## 2025-03-05 ENCOUNTER — NURSE ONLY (OUTPATIENT)
Age: 70
End: 2025-03-05
Attending: INTERNAL MEDICINE
Payer: MEDICARE

## 2025-03-05 ENCOUNTER — OFFICE VISIT (OUTPATIENT)
Age: 70
End: 2025-03-05
Attending: INTERNAL MEDICINE
Payer: MEDICARE

## 2025-03-05 VITALS
TEMPERATURE: 98 F | DIASTOLIC BLOOD PRESSURE: 85 MMHG | SYSTOLIC BLOOD PRESSURE: 138 MMHG | RESPIRATION RATE: 16 BRPM | HEART RATE: 89 BPM | HEIGHT: 65 IN | OXYGEN SATURATION: 97 % | WEIGHT: 175 LBS | BODY MASS INDEX: 29.16 KG/M2

## 2025-03-05 DIAGNOSIS — E27.3 ADRENAL INSUFFICIENCY DUE TO CANCER THERAPY (HCC): ICD-10-CM

## 2025-03-05 DIAGNOSIS — C64.1 RENAL CELL CARCINOMA OF RIGHT KIDNEY (HCC): ICD-10-CM

## 2025-03-05 DIAGNOSIS — C64.1 RENAL CELL CARCINOMA OF RIGHT KIDNEY (HCC): Primary | ICD-10-CM

## 2025-03-05 DIAGNOSIS — Z51.11 CHEMOTHERAPY MANAGEMENT, ENCOUNTER FOR: Primary | ICD-10-CM

## 2025-03-05 DIAGNOSIS — Z51.12 ENCOUNTER FOR ANTINEOPLASTIC IMMUNOTHERAPY: ICD-10-CM

## 2025-03-05 DIAGNOSIS — E03.2 HYPOTHYROIDISM DUE TO MEDICATION: ICD-10-CM

## 2025-03-05 DIAGNOSIS — Z51.11 CHEMOTHERAPY MANAGEMENT, ENCOUNTER FOR: ICD-10-CM

## 2025-03-05 LAB
ALBUMIN SERPL-MCNC: 3.8 G/DL (ref 3.2–4.8)
ALBUMIN/GLOB SERPL: 1 {RATIO} (ref 1–2)
ALP LIVER SERPL-CCNC: 80 U/L
ALT SERPL-CCNC: 14 U/L
ANION GAP SERPL CALC-SCNC: 6 MMOL/L (ref 0–18)
AST SERPL-CCNC: 21 U/L (ref ?–34)
BASOPHILS # BLD AUTO: 0.04 X10(3) UL (ref 0–0.2)
BASOPHILS NFR BLD AUTO: 0.6 %
BILIRUB SERPL-MCNC: 0.2 MG/DL (ref 0.2–1.1)
BUN BLD-MCNC: 15 MG/DL (ref 9–23)
BUN/CREAT SERPL: 20 (ref 10–20)
CALCIUM BLD-MCNC: 8.3 MG/DL (ref 8.7–10.4)
CHLORIDE SERPL-SCNC: 104 MMOL/L (ref 98–112)
CO2 SERPL-SCNC: 28 MMOL/L (ref 21–32)
CREAT BLD-MCNC: 0.75 MG/DL
DEPRECATED RDW RBC AUTO: 52.7 FL (ref 35.1–46.3)
EGFRCR SERPLBLD CKD-EPI 2021: 87 ML/MIN/1.73M2 (ref 60–?)
EOSINOPHIL # BLD AUTO: 0.12 X10(3) UL (ref 0–0.7)
EOSINOPHIL NFR BLD AUTO: 1.7 %
ERYTHROCYTE [DISTWIDTH] IN BLOOD BY AUTOMATED COUNT: 16.1 % (ref 11–15)
FASTING STATUS PATIENT QL REPORTED: NO
GLOBULIN PLAS-MCNC: 4 G/DL (ref 2–3.5)
GLUCOSE BLD-MCNC: 90 MG/DL (ref 70–99)
HCT VFR BLD AUTO: 36.8 %
HGB BLD-MCNC: 11.1 G/DL
IMM GRANULOCYTES # BLD AUTO: 0.02 X10(3) UL (ref 0–1)
IMM GRANULOCYTES NFR BLD: 0.3 %
LYMPHOCYTES # BLD AUTO: 1.63 X10(3) UL (ref 1–4)
LYMPHOCYTES NFR BLD AUTO: 23.4 %
MCH RBC QN AUTO: 26.8 PG (ref 26–34)
MCHC RBC AUTO-ENTMCNC: 30.2 G/DL (ref 31–37)
MCV RBC AUTO: 88.9 FL
MONOCYTES # BLD AUTO: 0.47 X10(3) UL (ref 0.1–1)
MONOCYTES NFR BLD AUTO: 6.7 %
NEUTROPHILS # BLD AUTO: 4.69 X10 (3) UL (ref 1.5–7.7)
NEUTROPHILS # BLD AUTO: 4.69 X10(3) UL (ref 1.5–7.7)
NEUTROPHILS NFR BLD AUTO: 67.3 %
OSMOLALITY SERPL CALC.SUM OF ELEC: 286 MOSM/KG (ref 275–295)
PLATELET # BLD AUTO: 354 10(3)UL (ref 150–450)
POTASSIUM SERPL-SCNC: 4.3 MMOL/L (ref 3.5–5.1)
PROT SERPL-MCNC: 7.8 G/DL (ref 5.7–8.2)
RBC # BLD AUTO: 4.14 X10(6)UL
SODIUM SERPL-SCNC: 138 MMOL/L (ref 136–145)
T4 FREE SERPL-MCNC: 1.1 NG/DL (ref 0.8–1.7)
TSI SER-ACNC: 5.85 UIU/ML (ref 0.55–4.78)
WBC # BLD AUTO: 7 X10(3) UL (ref 4–11)

## 2025-03-05 RX ORDER — FAMOTIDINE 10 MG/ML
INJECTION, SOLUTION INTRAVENOUS
Status: COMPLETED
Start: 2025-03-05 | End: 2025-03-05

## 2025-03-05 RX ORDER — FAMOTIDINE 10 MG/ML
20 INJECTION, SOLUTION INTRAVENOUS 2 TIMES DAILY
Status: DISCONTINUED | OUTPATIENT
Start: 2025-03-05 | End: 2025-03-05

## 2025-03-05 RX ORDER — FAMOTIDINE 10 MG/ML
20 INJECTION, SOLUTION INTRAVENOUS 2 TIMES DAILY
Status: CANCELLED
Start: 2025-03-05

## 2025-03-05 RX ADMIN — FAMOTIDINE 20 MG: 10 INJECTION, SOLUTION INTRAVENOUS at 13:05:00

## 2025-03-05 NOTE — PROGRESS NOTES
Pt here for C10D1 Drug(s)Opdivo.  Arrives Ambulating independently, accompanied by Family member     Patient was evaluated today by JANAK Crocker MD.    Oral medications included in this regimen:  no    Patient confirms comprehension of cancer treatment schedule:  yes    Pregnancy screening:  Not applicable    Modifications in dose or schedule:  Yes, Patient canceled last session r/t personal reasons so this is C10.     Medications appearance and physical integrity checked by RN: yes.    Chemotherapy IV pump settings verified by 2 RNs:  No due to targeted therapy IV administration.  Frequency of blood return and site check throughout administration: Prior to administration and At completion of therapy     Infusion/treatment outcome:  patient tolerated treatment without incident    Education Record    Learner:  Patient  Barriers / Limitations:  None  Method:  Reinforcement  Education / instructions given:  Plan of care  Outcome:  Shows understanding    Discharged Other stable from infusion , Ambulating independently, accompanied by:Family member    Patient/family verbalized understanding of future appointments: by printed AVS

## 2025-03-06 NOTE — PROGRESS NOTES
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 She began Nivolumab Cabozantinib 6/12/24 and presents for followup.     Interval history:  Overall patient continues to do well, she has lowered hydrocortisone to 10/5, a slight decrease in her energy but able to carry out her daily activities.  Her appetite has also decreased some but she states she is not just eating as much which she is actually happy about because she is not gaining as much weight.  Tolerating cabozantinib 20 mg daily very well.  Denies mouth sores or sensitivity, hand-foot sensitivity, diarrhea.    Oncology history:  Briefly, the patient began to feel fatigued in the Fall 2023 and she was found to have a hemoglobin of 8.5 with an MCV of 71 with last lab values in 2017 showing a normal hemoglobin.  In March 2024, iron saturation was 15% and ferritin was 612.  She was given IV iron and referred to GI which prompted a CT abdomen pelvis which unfortunately revealed an 11 cm enhancing mass within the right kidney and extensive bibasilar pleural metastases concerning for metastatic renal cell carcinoma.     5/21/24: Initial medical oncology consultation, will obtain brain imaging and refer to IR for kidney biopsy.  Expressed concern for metastatic kidney cancer.  -Brain MRI showed a meningioma, no intracranial metastatic disease.  -IR guided biopsy 6/5 shows high-grade clear-cell renal cell carcinoma, with extensive necrosis but no sarcomatoid or rhabdoid features    6/6/24: Medical oncology follow-up.  Patient continues to  decline.  Patient has poor risk disease, very symptomatic and concerned with need for rapid response upfront.  Therefore, we will proceed with cabozantinib nivolumab per CheckMate 9ER    6/12/24: began Cabozantinib Nivolumab.     6/26/24: slight improvement, no dose limiting toxicities, continue treatment.     7/24/24: Feeling little stronger, tolerating therapy well    8/7/24: Grade 1 transaminitis, continue therapy, repeat LFTs next week, follow-up in 2 weeks, restaging at the end of August 9/4/24: Restaging CT shows partial response to treatment. Continue current therapy plan.     10/3/24: Nearly grade 2 PPE persists despite cabozantinib 40 mg every other day.  Hold for 1 week.  We will dose reduce to 20 mg daily, request new prescription.  Proceed with nivolumab 480 mg monthly dosing today.  Follow-up in 1 month which will be 2 to 3 weeks on cabozantinib 20 mg daily.    10/30/24: Doing well with dose reduced cabozantinib 20 mg daily.  No immune related toxicities, cleared for next month of treatment.    11/21-11/24/24: Admitted to [facility] with pneumonia and failure to thrive.    12/4/24: Remains very weak, now hypothyroid.  We will prescribe levothyroxine.  Rule out adrenal insufficiency.  Restaging at this visit also shows stable disease.  Okay to continue treatment for now.    12/19/24: Found to have adrenal insufficiency, ACTH and cortisol low.  Started on hydrocortisone 20/10 mg and referred to endocrinology.  Also started on levothyroxine.  Energy is improving, appetite is better she is slowly improving.    1/2/24: feeling well, cleared for nivolumab, continue cabozantinib.     1/30/25: Cleared for nivolumab, continue cabozantinib 20 mg daily.  Decrease hydrocortisone to 10 mg a.m. 5 mg p.m.    3/5/25: restaging this visit shows SD. One small left lower lobe pleural nodule slightly increased in size but overall other areas continue to improve or are stable. Continue cabozantinb 20mg daily, continue nivolumab  monthly.     Review of Systems:  Hematology/Oncology ROS performed and negative except as above in HPI    History/Other:   Current Medications:   metoprolol succinate  MG Oral Tablet 24 Hr Take 1 tablet (100 mg total) by mouth daily.      sertraline 25 MG Oral Tab Take 1 tablet (25 mg total) by mouth daily. 90 tablet 1    hydrocortisone 10 MG Oral Tab Take 2 tablets (20 mg total) by mouth daily with breakfast AND 1 tablet (10 mg total) Every afternoon at 2:00 pm. 90 tablet 2    levothyroxine 50 MCG Oral Tab Take 1 tablet (50 mcg total) by mouth before breakfast. 90 tablet 1    pantoprazole 40 MG Oral Tab EC Take 1 tablet (40 mg total) by mouth at bedtime. 90 tablet 1    acetaminophen 500 MG Oral Tab Take 1 tablet (500 mg total) by mouth every 4 (four) hours as needed for Fever (equal to or greater than 100.4). 200 tablet 3    sennosides 17.2 MG Oral Tab Take 1 tablet (17.2 mg total) by mouth nightly as needed (constipation, as needed if no bowel movement that day). 30 tablet 0    sodium chloride 0.65 % Nasal Solution 2 sprays by Nasal route every 4 (four) hours as needed for congestion. 1 each 0    triamcinolone 0.1 % External Lotion Apply 1 Application topically 2 (two) times daily. 1 each 0    prochlorperazine (COMPAZINE) 10 mg tablet Take 1 tablet (10 mg total) by mouth every 6 (six) hours as needed for Nausea. 60 tablet 3    docusate sodium 50 MG Oral Cap Take 1 capsule (50 mg total) by mouth as needed for constipation.      ondansetron 4 MG Oral Tablet Dispersible Take 1 tablet (4 mg total) by mouth every 8 (eight) hours as needed for Nausea. 30 tablet 2     Allergies:   Allergies   Allergen Reactions    Levaquin [Levofloxacin] OTHER (SEE COMMENTS)     Tendon pain    Ace Inhibitors Coughing    Azithromycin      Other reaction(s): AZITHROMYCIN    Erythromycin      Other reaction(s): Rash - Mild    Naproxen      Other reaction(s): NAPROXEN    Nortriptyline      Other reaction(s): vivid, scary dreams     Sulfa Antibiotics      Other reaction(s): Rash - Moderate    Ceftriaxone RASH     Objective:   Blood pressure 138/85, pulse 89, temperature 98.2 °F (36.8 °C), temperature source Tympanic, resp. rate 16, height 1.651 m (5' 5\"), weight 79.4 kg (175 lb), SpO2 97%, not currently breastfeeding.  Physical Exam:  ECOG PS: 1  General: A&Ox3, NAD  HEENT: PERRL, anicteric, OP clear  CV: RRR, no murmurs  Pulm: CTA b/l, nl effort  Abd: soft, ntnd, no HSM or masses  Extremities: no edema, PPE resolved  Neurological: grossly intact    Results:   Labs:  Lab Results   Component Value Date    WBC 7.0 03/05/2025    HGB 11.1 (L) 03/05/2025    HCT 36.8 03/05/2025    .0 03/05/2025    CREATSERUM 0.75 03/05/2025    BUN 15 03/05/2025     03/05/2025    K 4.3 03/05/2025     03/05/2025    CO2 28.0 03/05/2025    GLU 90 03/05/2025    CA 8.3 (L) 03/05/2025    ALB 3.8 03/05/2025    ALKPHO 80 03/05/2025    BILT 0.2 03/05/2025    TP 7.8 03/05/2025    AST 21 03/05/2025    ALT 14 03/05/2025    INR 1.09 11/19/2024    T4F 1.1 03/05/2025    TSH 5.852 (H) 03/05/2025    MG 1.8 01/30/2025    PHOS 3.2 11/21/2024    B12 435 07/14/2017     Imaging:  CT CHEST+ABDOMEN+PELVIS(ALL CNTRST ONLY)(CPT=71260/81564)    Result Date: 3/4/2025  CONCLUSION:   9.5 cm right lower pole renal cell carcinoma is unchanged in size and appearance since 12/2/2024.  The enhancing soft tissue has significantly decreased since 5/19/2024 but is otherwise unchanged since 12/2/2024.  1.9 cm posterior left lower lobe subpleural metastasis has INCREASED in size.  However remainder of the pleural metastases within the bilateral upper and lower lobes have decreased since the prior exams.  5 mm left lower lobe pulmonary nodule has decreased but not entirely resolved.  Borderline enlarged mediastinal lymph nodes are unchanged and may be reactive or metastatic.  Multiple other incidental findings as described in the body of the report which are unchanged.    Dictated by  (CST): Toni Billingsley MD on 3/04/2025 at 1:15 PM     Finalized by (CST): Toni Billingsley MD on 3/04/2025 at 1:27 PM           CT a/p 5/19/24:  CONCLUSION: 11.1 x 10.2 centimeter enhancing mass replacing the mid to lower right kidney with extensive bibasilar enhancing pleural tumor, trace pleural effusions, intrathoracic adenopathy.  This is likely renal cell carcinoma with extensive bibasilar thoracic involvement     CT chest 5/22/24:  CONCLUSION: Extensive pleural masses with associated small bilateral pleural effusions , as well as mediastinal and hilar lymphadenopathy and bilateral pulmonary nodules.  These are highly concerning for sites of metastatic disease in this setting.    A questioned focal sclerosis of the left lateral first rib adjacent to a tiny pleural mass could represent a site of osseous involvement of disease.     Right kidney, mass; CT-guided core biopsy 6/5/24:   Clear-cell renal cell carcinoma in a background of extensive necrosis, see comment.    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 She began Nivolumab Cabozantinib 6/12/24 and presents for followup.     # Metastatic poor-risk ccRCC.   -large right renal mass, extensive pleural metastases, anemia, neutropenia, thrombocytosis, quite symptomatic on presentation. Given progressive symptoms and poor risk disease, discussed the need for a treatment response asap, therefore we discussed proceeding with Nivolumab and Cabozantinib per CM 9ER over ipilimumab and nivolumab with concerns for rapid response, and began treatment 6/12/24  -August 2024 imaging shows partial response to therapy, restaging in early December 2024 shows continued partial response  -PPE, dysgeusia, fatigue all improved with dose reduction of cabozantinib down to 20 mg  daily, continue  -Recently developed adrenal insufficiency and hypothyroidism, treating as below and now much improved  -Restaging this visit shows stable disease with only 1 small left lower lobe pleural nodule increased in size.  Other areas continue to improve or are decrease in size.  We discussed this at length today.  We will continue with her current plan of care, cleared for monthly nivolumab today, continue cabozantinib 20 mg daily.  -Follow-up in 1 month or sooner if needed, restaging tentatively late May early June.    # Immune mediated adrenal insufficiency  # Immune mediated hypothyroidism  -Patient developed severe weakness and fatigue in November 2024, found to have a high in early December 2024.  -She is tolerating hydrocortisone 10/5mg daily, continue for now, does follow with Endocrinology   -On levothyroxine 50 mcg daily, TSH normalized    # Symptom management  -zofran ODT prn for nausea  -PPE, udderbalm cream and voltaren gel BID-TID.  Dose reduced cabozantinib to 20 mg daily as above  -Per Dentist, tooth extraction necessary, ok to proceed on Nivolumab/Cabozantinib (minor procedure)    # Comorbidities  -HTN, hold metoprolol given hypotension with weight loss  -Hypothyroidism, immune-mediated, start Synthroid 50 mcg daily    MDM High: malignancy; review of results with independent interpretation and discussion of management; drug therapy requiring intensive monitoring for toxicity;  ongoing continuity of complex care    Archie Crocker DO    [facility] Hematology/Oncology  [facility]     This note was created using a voice-recognition transcribing system. Incorrect words or phrases may have been missed during proofreading. Please interpret accordingly.

## 2025-03-13 DIAGNOSIS — E03.2 HYPOTHYROIDISM DUE TO MEDICATION: Primary | ICD-10-CM

## 2025-03-13 RX ORDER — LEVOTHYROXINE SODIUM 75 UG/1
75 TABLET ORAL
Qty: 90 TABLET | Refills: 2 | Status: SHIPPED | OUTPATIENT
Start: 2025-03-13

## 2025-03-13 NOTE — TELEPHONE ENCOUNTER
Hi!    Please let patient know that I have reviewed her blood tests and I would like to increase her levothyroxine from 50 to 75mcg and recheck her blood tests in 3 months. I would like to see her in the clinic at that time. Thank you!

## 2025-03-18 NOTE — TELEPHONE ENCOUNTER
Spoke to patient and relayed message as shown below. Dr. Majano, patient is requesting for you to refill hydrocortisone as well. Please review and sign off if appropriate. Thank you.

## 2025-03-20 ENCOUNTER — PATIENT MESSAGE (OUTPATIENT)
Age: 70
End: 2025-03-20

## 2025-03-20 RX ORDER — HYDROCORTISONE 10 MG/1
TABLET ORAL
Qty: 90 TABLET | Refills: 2 | Status: SHIPPED | OUTPATIENT
Start: 2025-03-20

## 2025-04-02 ENCOUNTER — OFFICE VISIT (OUTPATIENT)
Age: 70
End: 2025-04-02
Attending: INTERNAL MEDICINE
Payer: MEDICARE

## 2025-04-02 ENCOUNTER — NURSE ONLY (OUTPATIENT)
Age: 70
End: 2025-04-02
Attending: INTERNAL MEDICINE
Payer: MEDICARE

## 2025-04-02 VITALS
HEART RATE: 95 BPM | OXYGEN SATURATION: 97 % | SYSTOLIC BLOOD PRESSURE: 133 MMHG | WEIGHT: 176 LBS | RESPIRATION RATE: 16 BRPM | HEIGHT: 65 IN | DIASTOLIC BLOOD PRESSURE: 83 MMHG | TEMPERATURE: 99 F | BODY MASS INDEX: 29.32 KG/M2

## 2025-04-02 DIAGNOSIS — R82.90 URINE FINDINGS ABNORMAL: ICD-10-CM

## 2025-04-02 DIAGNOSIS — R82.998 URINE WBC INCREASED: ICD-10-CM

## 2025-04-02 DIAGNOSIS — C64.1 RENAL CELL CARCINOMA OF RIGHT KIDNEY (HCC): ICD-10-CM

## 2025-04-02 DIAGNOSIS — Z51.12 ENCOUNTER FOR ANTINEOPLASTIC IMMUNOTHERAPY: ICD-10-CM

## 2025-04-02 DIAGNOSIS — E27.3 ADRENAL INSUFFICIENCY DUE TO CANCER THERAPY (HCC): ICD-10-CM

## 2025-04-02 DIAGNOSIS — Z51.11 CHEMOTHERAPY MANAGEMENT, ENCOUNTER FOR: Primary | ICD-10-CM

## 2025-04-02 DIAGNOSIS — E03.2 HYPOTHYROIDISM DUE TO MEDICATION: ICD-10-CM

## 2025-04-02 DIAGNOSIS — C64.1 RENAL CELL CARCINOMA OF RIGHT KIDNEY (HCC): Primary | ICD-10-CM

## 2025-04-02 LAB
ALBUMIN SERPL-MCNC: 4 G/DL (ref 3.2–4.8)
ALBUMIN/GLOB SERPL: 1 {RATIO} (ref 1–2)
ALP LIVER SERPL-CCNC: 79 U/L
ALT SERPL-CCNC: 18 U/L
ANION GAP SERPL CALC-SCNC: 7 MMOL/L (ref 0–18)
BASOPHILS # BLD AUTO: 0.05 X10(3) UL (ref 0–0.2)
BASOPHILS NFR BLD AUTO: 0.6 %
BILIRUB SERPL-MCNC: 0.3 MG/DL (ref 0.2–1.1)
BILIRUB UR QL: NEGATIVE
CALCIUM BLD-MCNC: 8.7 MG/DL (ref 8.7–10.4)
CHLORIDE SERPL-SCNC: 104 MMOL/L (ref 98–112)
CO2 SERPL-SCNC: 29 MMOL/L (ref 21–32)
COLOR UR: YELLOW
CREAT BLD-MCNC: 0.78 MG/DL
DEPRECATED RDW RBC AUTO: 51.1 FL (ref 35.1–46.3)
EGFRCR SERPLBLD CKD-EPI 2021: 83 ML/MIN/1.73M2 (ref 60–?)
EOSINOPHIL # BLD AUTO: 0.15 X10(3) UL (ref 0–0.7)
EOSINOPHIL NFR BLD AUTO: 1.9 %
ERYTHROCYTE [DISTWIDTH] IN BLOOD BY AUTOMATED COUNT: 15.9 % (ref 11–15)
GLOBULIN PLAS-MCNC: 4.1 G/DL (ref 2–3.5)
GLUCOSE BLD-MCNC: 97 MG/DL (ref 70–99)
GLUCOSE UR-MCNC: NORMAL MG/DL
HCT VFR BLD AUTO: 37.1 %
HGB BLD-MCNC: 11.7 G/DL
IMM GRANULOCYTES # BLD AUTO: 0.02 X10(3) UL (ref 0–1)
IMM GRANULOCYTES NFR BLD: 0.3 %
KETONES UR-MCNC: NEGATIVE MG/DL
LEUKOCYTE ESTERASE UR QL STRIP.AUTO: 500
LYMPHOCYTES # BLD AUTO: 1.93 X10(3) UL (ref 1–4)
LYMPHOCYTES NFR BLD AUTO: 24.7 %
MAGNESIUM SERPL-MCNC: 2 MG/DL (ref 1.6–2.6)
MCH RBC QN AUTO: 27.7 PG (ref 26–34)
MCHC RBC AUTO-ENTMCNC: 31.5 G/DL (ref 31–37)
MCV RBC AUTO: 87.7 FL
MONOCYTES # BLD AUTO: 0.46 X10(3) UL (ref 0.1–1)
MONOCYTES NFR BLD AUTO: 5.9 %
NEUTROPHILS # BLD AUTO: 5.21 X10 (3) UL (ref 1.5–7.7)
NEUTROPHILS # BLD AUTO: 5.21 X10(3) UL (ref 1.5–7.7)
NEUTROPHILS NFR BLD AUTO: 66.6 %
NITRITE UR QL STRIP.AUTO: NEGATIVE
PH UR: 5 [PH] (ref 5–8)
PLATELET # BLD AUTO: 362 10(3)UL (ref 150–450)
PROT SERPL-MCNC: 8.1 G/DL (ref 5.7–8.2)
PROT UR-MCNC: NEGATIVE MG/DL
RBC # BLD AUTO: 4.23 X10(6)UL
RBC #/AREA URNS AUTO: >10 /HPF
SODIUM SERPL-SCNC: 140 MMOL/L (ref 136–145)
SP GR UR STRIP: 1.02 (ref 1–1.03)
T4 FREE SERPL-MCNC: 1.2 NG/DL (ref 0.8–1.7)
TSI SER-ACNC: 2.75 UIU/ML (ref 0.55–4.78)
UROBILINOGEN UR STRIP-ACNC: NORMAL
WBC # BLD AUTO: 7.8 X10(3) UL (ref 4–11)

## 2025-04-02 RX ORDER — FAMOTIDINE 10 MG/ML
20 INJECTION, SOLUTION INTRAVENOUS 2 TIMES DAILY
Status: CANCELLED
Start: 2025-04-02

## 2025-04-02 RX ORDER — FAMOTIDINE 10 MG/ML
INJECTION, SOLUTION INTRAVENOUS
Status: DISCONTINUED
Start: 2025-04-02 | End: 2025-04-02

## 2025-04-02 RX ORDER — FAMOTIDINE 10 MG/ML
20 INJECTION, SOLUTION INTRAVENOUS ONCE
Status: COMPLETED | OUTPATIENT
Start: 2025-04-02 | End: 2025-04-02

## 2025-04-02 RX ADMIN — FAMOTIDINE 20 MG: 10 INJECTION, SOLUTION INTRAVENOUS at 12:45:00

## 2025-04-02 NOTE — PROGRESS NOTES
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 She began Nivolumab Cabozantinib 6/12/24 and presents for followup.     Interval history:  Lary continues to do well, remains on 20mg cabozantinib daily, tolerating well. Her energy and appetite remain the same. Her weight is stable. She denies any paresthesias, n/v/d, SOB, mouth sores, or pain. She is looking forward to an anniversary trip with her  next weekend and planning a trip to [place] next month.     Oncology history:  Briefly, the patient began to feel fatigued in the Fall 2023 and she was found to have a hemoglobin of 8.5 with an MCV of 71 with last lab values in 2017 showing a normal hemoglobin.  In March 2024, iron saturation was 15% and ferritin was 612.  She was given IV iron and referred to GI which prompted a CT abdomen pelvis which unfortunately revealed an 11 cm enhancing mass within the right kidney and extensive bibasilar pleural metastases concerning for metastatic renal cell carcinoma.     5/21/24: Initial medical oncology consultation, will obtain brain imaging and refer to IR for kidney biopsy.  Expressed concern for metastatic kidney cancer.  -Brain MRI showed a meningioma, no intracranial metastatic disease.  -IR guided biopsy 6/5 shows high-grade clear-cell renal cell carcinoma, with extensive necrosis but no sarcomatoid or rhabdoid features    6/6/24: Medical oncology follow-up.  Patient continues to decline.  Patient has poor risk disease, very symptomatic and concerned with need for rapid response  upfront.  Therefore, we will proceed with cabozantinib nivolumab per CheckMate 9ER    6/12/24: began Cabozantinib Nivolumab.     6/26/24: slight improvement, no dose limiting toxicities, continue treatment.     7/24/24: Feeling little stronger, tolerating therapy well    8/7/24: Grade 1 transaminitis, continue therapy, repeat LFTs next week, follow-up in 2 weeks, restaging at the end of August 9/4/24: Restaging CT shows partial response to treatment. Continue current therapy plan.     10/3/24: Nearly grade 2 PPE persists despite cabozantinib 40 mg every other day.  Hold for 1 week.  We will dose reduce to 20 mg daily, request new prescription.  Proceed with nivolumab 480 mg monthly dosing today.  Follow-up in 1 month which will be 2 to 3 weeks on cabozantinib 20 mg daily.    10/30/24: Doing well with dose reduced cabozantinib 20 mg daily.  No immune related toxicities, cleared for next month of treatment.    11/21-11/24/24: Admitted to [facility] with pneumonia and failure to thrive.    12/4/24: Remains very weak, now hypothyroid.  We will prescribe levothyroxine.  Rule out adrenal insufficiency.  Restaging at this visit also shows stable disease.  Okay to continue treatment for now.    12/19/24: Found to have adrenal insufficiency, ACTH and cortisol low.  Started on hydrocortisone 20/10 mg and referred to endocrinology.  Also started on levothyroxine.  Energy is improving, appetite is better she is slowly improving.    1/2/24: feeling well, cleared for nivolumab, continue cabozantinib.     1/30/25: Cleared for nivolumab, continue cabozantinib 20 mg daily.  Decrease hydrocortisone to 10 mg a.m. 5 mg p.m.    3/5/25: restaging this visit shows SD. One small left lower lobe pleural nodule slightly increased in size but overall other areas continue to improve or are stable. Continue cabozantinb 20mg daily, continue nivolumab monthly.     4/2/25: Continue cabozantinb 20mg daily, cleared for nivolumab today.    Review of  Systems:  Hematology/Oncology ROS performed and negative except as above in HPI    History/Other:   Current Medications:   hydrocortisone 10 MG Oral Tab Take 2 tablets (20 mg total) by mouth daily with breakfast AND 1 tablet (10 mg total) Every afternoon at 2:00 pm. 90 tablet 2    levothyroxine 75 MCG Oral Tab Take 1 tablet (75 mcg total) by mouth before breakfast. 90 tablet 2    metoprolol succinate  MG Oral Tablet 24 Hr Take 1 tablet (100 mg total) by mouth daily.      sertraline 25 MG Oral Tab Take 1 tablet (25 mg total) by mouth daily. 90 tablet 1    pantoprazole 40 MG Oral Tab EC Take 1 tablet (40 mg total) by mouth at bedtime. 90 tablet 1    acetaminophen 500 MG Oral Tab Take 1 tablet (500 mg total) by mouth every 4 (four) hours as needed for Fever (equal to or greater than 100.4). 200 tablet 3    sennosides 17.2 MG Oral Tab Take 1 tablet (17.2 mg total) by mouth nightly as needed (constipation, as needed if no bowel movement that day). 30 tablet 0    sodium chloride 0.65 % Nasal Solution 2 sprays by Nasal route every 4 (four) hours as needed for congestion. 1 each 0    triamcinolone 0.1 % External Lotion Apply 1 Application topically 2 (two) times daily. 1 each 0    prochlorperazine (COMPAZINE) 10 mg tablet Take 1 tablet (10 mg total) by mouth every 6 (six) hours as needed for Nausea. 60 tablet 3    docusate sodium 50 MG Oral Cap Take 1 capsule (50 mg total) by mouth as needed for constipation.      ondansetron 4 MG Oral Tablet Dispersible Take 1 tablet (4 mg total) by mouth every 8 (eight) hours as needed for Nausea. 30 tablet 2     Allergies:   Allergies   Allergen Reactions    Levaquin [Levofloxacin] OTHER (SEE COMMENTS)     Tendon pain    Ace Inhibitors Coughing    Azithromycin      Other reaction(s): AZITHROMYCIN    Erythromycin      Other reaction(s): Rash - Mild    Naproxen      Other reaction(s): NAPROXEN    Nortriptyline      Other reaction(s): vivid, scary dreams    Sulfa Antibiotics       Other reaction(s): Rash - Moderate    Ceftriaxone RASH     Objective:   not currently breastfeeding.  Physical Exam:  ECOG PS: 1  General: A&Ox3, NAD  HEENT: PERRL, anicteric, OP clear  CV: RRR, no murmurs  Pulm: CTA b/l, nl effort  Abd: soft, ntnd, no HSM or masses  Extremities: no edema, PPE resolved  Neurological: grossly intact    Results:   Labs:  Lab Results   Component Value Date    WBC 7.8 04/02/2025    HGB 11.7 (L) 04/02/2025    HCT 37.1 04/02/2025    .0 04/02/2025    CREATSERUM 0.78 04/02/2025    BUN  04/02/2025      Comment:      Test not reported due to hemolysis.  Test reordered by laboratory.         04/02/2025    K  04/02/2025      Comment:      Test not reported due to hemolysis.  Test reordered by laboratory.         04/02/2025    CO2 29.0 04/02/2025    GLU 97 04/02/2025    CA 8.7 04/02/2025    ALB 4.0 04/02/2025    ALKPHO 79 04/02/2025    BILT 0.3 04/02/2025    TP 8.1 04/02/2025    AST  04/02/2025      Comment:      Test not reported due to hemolysis.  Test reordered by laboratory.        ALT 18 04/02/2025    INR 1.09 11/19/2024    T4F 1.2 04/02/2025    TSH 2.754 04/02/2025    MG 2.0 04/02/2025    PHOS 3.2 11/21/2024    B12 435 07/14/2017     Imaging:        CT a/p 5/19/24:  CONCLUSION: 11.1 x 10.2 centimeter enhancing mass replacing the mid to lower right kidney with extensive bibasilar enhancing pleural tumor, trace pleural effusions, intrathoracic adenopathy.  This is likely renal cell carcinoma with extensive bibasilar thoracic involvement     CT chest 5/22/24:  CONCLUSION: Extensive pleural masses with associated small bilateral pleural effusions , as well as mediastinal and hilar lymphadenopathy and bilateral pulmonary nodules.  These are highly concerning for sites of metastatic disease in this setting.    A questioned focal sclerosis of the left lateral first rib adjacent to a tiny pleural mass could represent a site of osseous involvement of disease.     Right kidney,  mass; CT-guided core biopsy 6/5/24:   Clear-cell renal cell carcinoma in a background of extensive necrosis, see comment.    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 She began Nivolumab Cabozantinib 6/12/24 and presents for followup.     # Metastatic poor-risk ccRCC.   -large right renal mass, extensive pleural metastases, anemia, neutropenia, thrombocytosis, quite symptomatic on presentation. Given progressive symptoms and poor risk disease, discussed the need for a treatment response asap, therefore we discussed proceeding with Nivolumab and Cabozantinib per CM 9ER over ipilimumab and nivolumab with concerns for rapid response, and began treatment 6/12/24  -August 2024 imaging shows partial response to therapy, restaging in early December 2024 shows continued partial response  -PPE, dysgeusia, fatigue all improved with dose reduction of cabozantinib down to 20 mg daily, continue  -Recently developed adrenal insufficiency and hypothyroidism, treating as below and now much improved  -Restaging early March shows stable disease with only 1 small left lower lobe pleural nodule increased in size.  Other areas continue to improve or are decrease in size. discussed this at length, will continue to monitor with restaging late May/early June.  - Continue continue cabozantinib 20 mg daily, cleared for monthly nivo today  -Follow-up in 1 month or sooner if needed, restaging tentatively late May early June, tentatively after trip to [place].    #elevated WBC in urine  - noted on urinalysis today, will send urine c&s and follow up with patient regarding results    # Immune mediated adrenal insufficiency  # Immune mediated hypothyroidism  -Patient developed severe weakness and fatigue in November 2024, found to have a  high TSH in early December 2024.  -She is tolerating hydrocortisone 10/5mg daily, continue for now, does follow with Endocrinology   -On levothyroxine 75 mcg daily, TSH normalized, follows with endo    # Symptom management  -zofran ODT prn for nausea  -PPE, udderbalm cream and voltaren gel BID-TID.  Dose reduced cabozantinib to 20 mg daily as above    # Comorbidities  -HTN, hold metoprolol given hypotension with weight loss    MDM High: malignancy; review of results with independent interpretation and discussion of management; drug therapy requiring intensive monitoring for toxicity;  ongoing continuity of complex care    LUPE Chapman-C    [facility] Hematology/Oncology  [facility]     This note was created using a voice-recognition transcribing system. Incorrect words or phrases may have been missed during proofreading. Please interpret accordingly.

## 2025-04-02 NOTE — PROGRESS NOTES
Pt here for C11D1 Drug(s)OPDIVO.  Arrives Ambulating independently, accompanied by Spouse     Patient was evaluated today by DAVID and Treatment Nurse.    UA collected in lab, results suggest UTI due to white count. Patient denies symptoms. Urine culture collected. Instructed patient that EPIFANIO Schneider will call patient to follow up with results. Patient verbalized understanding.    R AC PIV started in lab, good blood return noted.    Oral medications included in this regimen:  no    Patient confirms comprehension of cancer treatment schedule:  yes    Pregnancy screening:  Not applicable    Modifications in dose or schedule:  No    Medications appearance and physical integrity checked by RN: yes.    Chemotherapy IV pump settings verified by 2 RNs:  No due to targeted therapy IV administration. Filter used.  Frequency of blood return and site check throughout administration: Prior to administration, Prior to each drug, and At completion of therapy     Infusion/treatment outcome:  patient tolerated treatment without incident    PIV removed.    Education Record    Learner:  Patient and Spouse  Barriers / Limitations:  None  Method:  Discussion and Printed material  Education / instructions given:  medication, plan of care, UA results (urine culture sent. Informed patient Wyatt GODFREY will follow up), schedule  Outcome:  Shows understanding    Discharged Home, Ambulating independently, accompanied by:Spouse    Patient/family verbalized understanding of future appointments: by printed AVS

## 2025-04-03 ENCOUNTER — TELEPHONE (OUTPATIENT)
Age: 70
End: 2025-04-03

## 2025-04-03 NOTE — TELEPHONE ENCOUNTER
LVM regarding urine test results. Advised Lary to call back if she develops any new symptoms or if anything changes.

## 2025-04-21 RX ORDER — PANTOPRAZOLE SODIUM 40 MG/1
40 TABLET, DELAYED RELEASE ORAL NIGHTLY
Qty: 90 TABLET | Refills: 1 | Status: SHIPPED | OUTPATIENT
Start: 2025-04-21 | End: 2025-05-13

## 2025-04-29 DIAGNOSIS — C64.1 RENAL CELL CARCINOMA OF RIGHT KIDNEY (HCC): Primary | ICD-10-CM

## 2025-04-29 DIAGNOSIS — Z51.12 ENCOUNTER FOR ANTINEOPLASTIC IMMUNOTHERAPY: ICD-10-CM

## 2025-04-29 DIAGNOSIS — E27.3 ADRENAL INSUFFICIENCY DUE TO CANCER THERAPY (HCC): ICD-10-CM

## 2025-04-29 DIAGNOSIS — C79.9 METASTATIC DISEASE (HCC): ICD-10-CM

## 2025-04-30 ENCOUNTER — OFFICE VISIT (OUTPATIENT)
Age: 70
End: 2025-04-30
Attending: INTERNAL MEDICINE
Payer: MEDICARE

## 2025-04-30 ENCOUNTER — NURSE ONLY (OUTPATIENT)
Age: 70
End: 2025-04-30
Attending: INTERNAL MEDICINE
Payer: MEDICARE

## 2025-04-30 VITALS
OXYGEN SATURATION: 98 % | RESPIRATION RATE: 16 BRPM | BODY MASS INDEX: 29.82 KG/M2 | DIASTOLIC BLOOD PRESSURE: 76 MMHG | WEIGHT: 179 LBS | HEART RATE: 79 BPM | SYSTOLIC BLOOD PRESSURE: 116 MMHG | HEIGHT: 65 IN | TEMPERATURE: 98 F

## 2025-04-30 DIAGNOSIS — C78.02 MALIGNANT NEOPLASM METASTATIC TO BOTH LUNGS (HCC): ICD-10-CM

## 2025-04-30 DIAGNOSIS — C78.01 MALIGNANT NEOPLASM METASTATIC TO BOTH LUNGS (HCC): ICD-10-CM

## 2025-04-30 DIAGNOSIS — C64.1 RENAL CELL CARCINOMA OF RIGHT KIDNEY (HCC): Primary | ICD-10-CM

## 2025-04-30 DIAGNOSIS — Z51.12 ENCOUNTER FOR ANTINEOPLASTIC IMMUNOTHERAPY: ICD-10-CM

## 2025-04-30 DIAGNOSIS — C64.1 RENAL CELL CARCINOMA OF RIGHT KIDNEY (HCC): ICD-10-CM

## 2025-04-30 DIAGNOSIS — Z51.11 CHEMOTHERAPY MANAGEMENT, ENCOUNTER FOR: Primary | ICD-10-CM

## 2025-04-30 DIAGNOSIS — Z51.11 CHEMOTHERAPY MANAGEMENT, ENCOUNTER FOR: ICD-10-CM

## 2025-04-30 DIAGNOSIS — E03.2 HYPOTHYROIDISM DUE TO MEDICATION: ICD-10-CM

## 2025-04-30 DIAGNOSIS — E27.3 ADRENAL INSUFFICIENCY DUE TO CANCER THERAPY (HCC): ICD-10-CM

## 2025-04-30 LAB
ALBUMIN SERPL-MCNC: 3.9 G/DL (ref 3.2–4.8)
ALBUMIN/GLOB SERPL: 1 {RATIO} (ref 1–2)
ALP LIVER SERPL-CCNC: 80 U/L (ref 55–142)
ALT SERPL-CCNC: 15 U/L (ref 10–49)
ANION GAP SERPL CALC-SCNC: 8 MMOL/L (ref 0–18)
AST SERPL-CCNC: 22 U/L (ref ?–34)
BASOPHILS # BLD AUTO: 0.04 X10(3) UL (ref 0–0.2)
BASOPHILS NFR BLD AUTO: 0.6 %
BILIRUB SERPL-MCNC: 0.3 MG/DL (ref 0.2–1.1)
BUN BLD-MCNC: 17 MG/DL (ref 9–23)
BUN/CREAT SERPL: 21 (ref 10–20)
CALCIUM BLD-MCNC: 8.8 MG/DL (ref 8.7–10.4)
CHLORIDE SERPL-SCNC: 104 MMOL/L (ref 98–112)
CHOLEST SERPL-MCNC: 155 MG/DL (ref ?–200)
CO2 SERPL-SCNC: 28 MMOL/L (ref 21–32)
CREAT BLD-MCNC: 0.81 MG/DL (ref 0.55–1.02)
DEPRECATED RDW RBC AUTO: 52.5 FL (ref 35.1–46.3)
EGFRCR SERPLBLD CKD-EPI 2021: 79 ML/MIN/1.73M2 (ref 60–?)
EOSINOPHIL # BLD AUTO: 0.16 X10(3) UL (ref 0–0.7)
EOSINOPHIL NFR BLD AUTO: 2.2 %
ERYTHROCYTE [DISTWIDTH] IN BLOOD BY AUTOMATED COUNT: 16.1 % (ref 11–15)
GLOBULIN PLAS-MCNC: 4 G/DL (ref 2–3.5)
GLUCOSE BLD-MCNC: 93 MG/DL (ref 70–99)
HCT VFR BLD AUTO: 35.4 % (ref 35–48)
HDLC SERPL-MCNC: 46 MG/DL (ref 40–59)
HGB BLD-MCNC: 10.8 G/DL (ref 12–16)
IMM GRANULOCYTES # BLD AUTO: 0.02 X10(3) UL (ref 0–1)
IMM GRANULOCYTES NFR BLD: 0.3 %
LDLC SERPL CALC-MCNC: 95 MG/DL (ref ?–100)
LYMPHOCYTES # BLD AUTO: 1.71 X10(3) UL (ref 1–4)
LYMPHOCYTES NFR BLD AUTO: 23.7 %
MCH RBC QN AUTO: 26.9 PG (ref 26–34)
MCHC RBC AUTO-ENTMCNC: 30.5 G/DL (ref 31–37)
MCV RBC AUTO: 88.1 FL (ref 80–100)
MONOCYTES # BLD AUTO: 0.51 X10(3) UL (ref 0.1–1)
MONOCYTES NFR BLD AUTO: 7.1 %
NEUTROPHILS # BLD AUTO: 4.78 X10 (3) UL (ref 1.5–7.7)
NEUTROPHILS # BLD AUTO: 4.78 X10(3) UL (ref 1.5–7.7)
NEUTROPHILS NFR BLD AUTO: 66.1 %
NONHDLC SERPL-MCNC: 109 MG/DL (ref ?–130)
OSMOLALITY SERPL CALC.SUM OF ELEC: 291 MOSM/KG (ref 275–295)
PLATELET # BLD AUTO: 356 10(3)UL (ref 150–450)
POTASSIUM SERPL-SCNC: 4.2 MMOL/L (ref 3.5–5.1)
PROT SERPL-MCNC: 7.9 G/DL (ref 5.7–8.2)
RBC # BLD AUTO: 4.02 X10(6)UL (ref 3.8–5.3)
SODIUM SERPL-SCNC: 140 MMOL/L (ref 136–145)
TRIGL SERPL-MCNC: 70 MG/DL (ref 30–149)
VLDLC SERPL CALC-MCNC: 11 MG/DL (ref 0–30)
WBC # BLD AUTO: 7.2 X10(3) UL (ref 4–11)

## 2025-04-30 RX ORDER — FAMOTIDINE 10 MG/ML
INJECTION, SOLUTION INTRAVENOUS
Status: DISPENSED
Start: 2025-04-30 | End: 2025-05-01

## 2025-04-30 RX ORDER — FAMOTIDINE 10 MG/ML
20 INJECTION, SOLUTION INTRAVENOUS 2 TIMES DAILY
Status: DISCONTINUED | OUTPATIENT
Start: 2025-04-30 | End: 2025-04-30

## 2025-04-30 RX ORDER — FAMOTIDINE 10 MG/ML
20 INJECTION, SOLUTION INTRAVENOUS 2 TIMES DAILY
Status: CANCELLED
Start: 2025-04-30

## 2025-04-30 RX ADMIN — FAMOTIDINE 20 MG: 10 INJECTION, SOLUTION INTRAVENOUS at 12:29:00

## 2025-04-30 NOTE — PROGRESS NOTES
Pt here for C12D1 Drug(s)Opdivo.  Arrives Ambulating independently, accompanied by Spouse.  PIV from lab with good blood return.      Patient was evaluated today by Treatment Nurse.    Oral medications included in this regimen:  no    Patient confirms comprehension of cancer treatment schedule:  yes    Pregnancy screening:  Denies possibility of pregnancy    Modifications in dose or schedule:  No    Medications appearance and physical integrity checked by RN: yes.    Chemotherapy IV pump settings verified by 2 RNs:  No due to targeted therapy IV administration.  Frequency of blood return and site check throughout administration: Prior to administration, Prior to each drug, and At completion of therapy     Infusion/treatment outcome:  patient tolerated treatment without incident    PIV dc'd and pt left without complaints.     Education Record    Learner:  Patient and Family Member  Barriers / Limitations:  None  Method:  Discussion  Education / instructions given:  reviewed plan of care, reviewed appointment time  Outcome:  Shows understanding    Discharged Home, Ambulating independently, accompanied by:Self and Family member    Patient/family verbalized understanding of future appointments: by printed AVS

## 2025-05-01 NOTE — PROGRESS NOTES
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 She began Nivolumab Cabozantinib 6/12/24 and presents for followup.     Interval history:  Overall patient continues to do well, hydrocortisone was lowered to 10/5 a month or 2 ago and she is doing well with this dose.  Tolerating cabozantinib 20 mg daily very well.  Denies mouth sores or sensitivity, hand-foot sensitivity, diarrhea.    Oncology history:  Briefly, the patient began to feel fatigued in the Fall 2023 and she was found to have a hemoglobin of 8.5 with an MCV of 71 with last lab values in 2017 showing a normal hemoglobin.  In March 2024, iron saturation was 15% and ferritin was 612.  She was given IV iron and referred to GI which prompted a CT abdomen pelvis which unfortunately revealed an 11 cm enhancing mass within the right kidney and extensive bibasilar pleural metastases concerning for metastatic renal cell carcinoma.     5/21/24: Initial medical oncology consultation, will obtain brain imaging and refer to IR for kidney biopsy.  Expressed concern for metastatic kidney cancer.  -Brain MRI showed a meningioma, no intracranial metastatic disease.  -IR guided biopsy 6/5 shows high-grade clear-cell renal cell carcinoma, with extensive necrosis but no sarcomatoid or rhabdoid features    6/6/24: Medical oncology follow-up.  Patient continues to decline.  Patient has poor risk disease, very symptomatic and concerned with need for rapid response upfront.  Therefore, we will proceed with cabozantinib nivolumab per [employer]ate  9ER    6/12/24: began Cabozantinib Nivolumab.     6/26/24: slight improvement, no dose limiting toxicities, continue treatment.     7/24/24: Feeling little stronger, tolerating therapy well    8/7/24: Grade 1 transaminitis, continue therapy, repeat LFTs next week, follow-up in 2 weeks, restaging at the end of August 9/4/24: Restaging CT shows partial response to treatment. Continue current therapy plan.     10/3/24: Nearly grade 2 PPE persists despite cabozantinib 40 mg every other day.  Hold for 1 week.  We will dose reduce to 20 mg daily, request new prescription.  Proceed with nivolumab 480 mg monthly dosing today.  Follow-up in 1 month which will be 2 to 3 weeks on cabozantinib 20 mg daily.    10/30/24: Doing well with dose reduced cabozantinib 20 mg daily.  No immune related toxicities, cleared for next month of treatment.    11/21-11/24/24: Admitted to [facility] with pneumonia and failure to thrive.    12/4/24: Remains very weak, now hypothyroid.  We will prescribe levothyroxine.  Rule out adrenal insufficiency.  Restaging at this visit also shows stable disease.  Okay to continue treatment for now.    12/19/24: Found to have adrenal insufficiency, ACTH and cortisol low.  Started on hydrocortisone 20/10 mg and referred to endocrinology.  Also started on levothyroxine.  Energy is improving, appetite is better she is slowly improving.    1/2/24: feeling well, cleared for nivolumab, continue cabozantinib.     1/30/25: Cleared for nivolumab, continue cabozantinib 20 mg daily.  Decrease hydrocortisone to 10 mg a.m. 5 mg p.m.    3/5/25: restaging this visit shows SD. One small left lower lobe pleural nodule slightly increased in size but overall other areas continue to improve or are stable. Continue cabozantinb 20mg daily, continue nivolumab monthly.     4/30/25: Continue cabozantinib and nivolumab, restaging next appointment.    Review of Systems:  Hematology/Oncology ROS performed and negative except as above in  HPI    History/Other:   Current Medications:   pantoprazole 40 MG Oral Tab EC Take 1 tablet (40 mg total) by mouth at bedtime. 90 tablet 1    hydrocortisone 10 MG Oral Tab Take 2 tablets (20 mg total) by mouth daily with breakfast AND 1 tablet (10 mg total) Every afternoon at 2:00 pm. 90 tablet 2    levothyroxine 75 MCG Oral Tab Take 1 tablet (75 mcg total) by mouth before breakfast. 90 tablet 2    metoprolol succinate  MG Oral Tablet 24 Hr Take 1 tablet (100 mg total) by mouth in the morning.      sertraline 25 MG Oral Tab Take 1 tablet (25 mg total) by mouth daily. 90 tablet 1    acetaminophen 500 MG Oral Tab Take 1 tablet (500 mg total) by mouth every 4 (four) hours as needed for Fever (equal to or greater than 100.4). 200 tablet 3    sodium chloride 0.65 % Nasal Solution 2 sprays by Nasal route every 4 (four) hours as needed for congestion. 1 each 0    triamcinolone 0.1 % External Lotion Apply 1 Application topically 2 (two) times daily. 1 each 0     Allergies:   Allergies   Allergen Reactions    Levaquin [Levofloxacin] OTHER (SEE COMMENTS)     Tendon pain    Ace Inhibitors Coughing    Azithromycin      Other reaction(s): AZITHROMYCIN    Erythromycin      Other reaction(s): Rash - Mild    Naproxen      Other reaction(s): NAPROXEN    Nortriptyline      Other reaction(s): vivid, scary dreams    Sulfa Antibiotics      Other reaction(s): Rash - Moderate    Ceftriaxone RASH     Objective:   Blood pressure 116/76, pulse 79, temperature 98.2 °F (36.8 °C), temperature source Oral, resp. rate 16, height 1.651 m (5' 5\"), weight 81.2 kg (179 lb), SpO2 98%, not currently breastfeeding.  Physical Exam:  ECOG PS: 1  General: A&Ox3, NAD  HEENT: PERRL, anicteric, OP clear  CV: RRR, no murmurs  Pulm: CTA b/l, nl effort  Abd: soft, ntnd, no HSM or masses  Extremities: no edema, PPE resolved  Neurological: grossly intact    Results:   Labs:  Lab Results   Component Value Date    WBC 7.2 04/30/2025    HGB 10.8 (L) 04/30/2025     HCT 35.4 04/30/2025    .0 04/30/2025    CREATSERUM 0.81 04/30/2025    BUN 17 04/30/2025     04/30/2025    K 4.2 04/30/2025     04/30/2025    CO2 28.0 04/30/2025    GLU 93 04/30/2025    CA 8.8 04/30/2025    ALB 3.9 04/30/2025    ALKPHO 80 04/30/2025    BILT 0.3 04/30/2025    TP 7.9 04/30/2025    AST 22 04/30/2025    ALT 15 04/30/2025    INR 1.09 11/19/2024    T4F 1.2 04/02/2025    TSH 2.754 04/02/2025    MG 2.0 04/02/2025    PHOS 3.2 11/21/2024    B12 435 07/14/2017     Imaging:        CT a/p 5/19/24:  CONCLUSION: 11.1 x 10.2 centimeter enhancing mass replacing the mid to lower right kidney with extensive bibasilar enhancing pleural tumor, trace pleural effusions, intrathoracic adenopathy.  This is likely renal cell carcinoma with extensive bibasilar thoracic involvement     CT chest 5/22/24:  CONCLUSION: Extensive pleural masses with associated small bilateral pleural effusions , as well as mediastinal and hilar lymphadenopathy and bilateral pulmonary nodules.  These are highly concerning for sites of metastatic disease in this setting.    A questioned focal sclerosis of the left lateral first rib adjacent to a tiny pleural mass could represent a site of osseous involvement of disease.     Right kidney, mass; CT-guided core biopsy 6/5/24:   Clear-cell renal cell carcinoma in a background of extensive necrosis, see comment.    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 She began Nivolumab Cabozantinib 6/12/24 and presents for followup.     # Metastatic poor-risk ccRCC.   -large right renal mass, extensive pleural metastases, anemia, neutropenia, thrombocytosis, quite symptomatic on presentation. Given progressive symptoms and poor risk disease, discussed the need for a treatment response  asap, therefore we discussed proceeding with Nivolumab and Cabozantinib per CM 9ER over ipilimumab and nivolumab with concerns for rapid response, and began treatment 6/12/24  -August 2024 imaging shows partial response to therapy, restaging in early December 2024 shows continued partial response  -PPE, dysgeusia, fatigue all improved with dose reduction of cabozantinib down to 20 mg daily, continue  -developed adrenal insufficiency and hypothyroidism in December 2024, treating as below and now much improved  -Restaging in early March 2025 shows stable disease with only 1 small left lower lobe pleural nodule increased in size.  Other areas continue to improve or are decrease in size.  We have continued monthly nivolumab and cabozantinib 20 mg daily.  -Cleared for nivolumab today, continue cabozantinib 20 mg daily, follow-up in 1 month with restaging    # Immune mediated adrenal insufficiency  # Immune mediated hypothyroidism  -Patient developed severe weakness and fatigue in November 2024, found to have a high in early December 2024.  -She is tolerating hydrocortisone 10/5mg daily, continue for now, does follow with Endocrinology   -On levothyroxine 50 mcg daily, TSH normalized    # Symptom management  -zofran ODT prn for nausea  -PPE, udderbalm cream and voltaren gel BID-TID.  Dose reduced cabozantinib to 20 mg daily as above  -Per Dentist, tooth extraction necessary, ok to proceed on Nivolumab/Cabozantinib (minor procedure)    # Comorbidities  -HTN, hold metoprolol given hypotension with weight loss  -Hypothyroidism, immune-mediated, start Synthroid 50 mcg daily    MDM High: malignancy; review of results with independent interpretation and discussion of management; drug therapy requiring intensive monitoring for toxicity;  ongoing continuity of complex care    Archie Crocker DO    [facility] Hematology/Oncology  [facility]     This note was created using a voice-recognition transcribing  system. Incorrect words or phrases may have been missed during proofreading. Please interpret accordingly.

## 2025-05-12 PROBLEM — E03.2 HYPOTHYROIDISM DUE TO MEDICATION: Status: RESOLVED | Noted: 2025-01-06 | Resolved: 2025-05-12

## 2025-05-12 PROBLEM — R21 RASH: Status: RESOLVED | Noted: 2024-10-30 | Resolved: 2025-05-12

## 2025-05-12 PROBLEM — J18.9 COMMUNITY ACQUIRED PNEUMONIA OF RIGHT LOWER LOBE OF LUNG: Status: RESOLVED | Noted: 2024-11-18 | Resolved: 2025-05-12

## 2025-05-12 PROBLEM — S31.000A SACRAL WOUND: Status: RESOLVED | Noted: 2024-10-02 | Resolved: 2025-05-12

## 2025-05-12 PROBLEM — R74.01 TRANSAMINITIS: Status: RESOLVED | Noted: 2024-08-21 | Resolved: 2025-05-12

## 2025-05-12 PROBLEM — D50.9 IRON DEFICIENCY ANEMIA: Status: RESOLVED | Noted: 2024-03-11 | Resolved: 2025-05-12

## 2025-05-12 PROBLEM — C64.9 RENAL CELL CARCINOMA (HCC): Status: RESOLVED | Noted: 2024-11-19 | Resolved: 2025-05-12

## 2025-05-12 PROBLEM — N28.89 RENAL MASS: Status: RESOLVED | Noted: 2024-05-21 | Resolved: 2025-05-12

## 2025-05-12 PROBLEM — K12.31 MUCOSITIS DUE TO ANTINEOPLASTIC THERAPY: Status: RESOLVED | Noted: 2024-08-21 | Resolved: 2025-05-12

## 2025-05-12 PROBLEM — N30.00 ACUTE CYSTITIS WITHOUT HEMATURIA: Status: RESOLVED | Noted: 2024-11-18 | Resolved: 2025-05-12

## 2025-05-13 ENCOUNTER — TELEPHONE (OUTPATIENT)
Dept: INTERNAL MEDICINE CLINIC | Facility: CLINIC | Age: 70
End: 2025-05-13

## 2025-05-13 ENCOUNTER — OFFICE VISIT (OUTPATIENT)
Dept: INTERNAL MEDICINE CLINIC | Facility: CLINIC | Age: 70
End: 2025-05-13
Payer: MEDICARE

## 2025-05-13 VITALS
TEMPERATURE: 97 F | SYSTOLIC BLOOD PRESSURE: 107 MMHG | HEIGHT: 65 IN | WEIGHT: 179 LBS | OXYGEN SATURATION: 99 % | DIASTOLIC BLOOD PRESSURE: 70 MMHG | HEART RATE: 101 BPM | BODY MASS INDEX: 29.82 KG/M2

## 2025-05-13 DIAGNOSIS — D64.9 ANEMIA OF UNKNOWN ETIOLOGY: ICD-10-CM

## 2025-05-13 DIAGNOSIS — E27.3 ADRENAL INSUFFICIENCY DUE TO CANCER THERAPY (HCC): ICD-10-CM

## 2025-05-13 DIAGNOSIS — M17.11 PRIMARY OSTEOARTHRITIS OF RIGHT KNEE: ICD-10-CM

## 2025-05-13 DIAGNOSIS — Z01.84 IMMUNITY STATUS TESTING: ICD-10-CM

## 2025-05-13 DIAGNOSIS — L30.4 INTERTRIGO: ICD-10-CM

## 2025-05-13 DIAGNOSIS — Z00.00 ENCOUNTER FOR ANNUAL HEALTH EXAMINATION: ICD-10-CM

## 2025-05-13 DIAGNOSIS — E78.2 MIXED HYPERLIPIDEMIA: ICD-10-CM

## 2025-05-13 DIAGNOSIS — I10 ESSENTIAL HYPERTENSION: ICD-10-CM

## 2025-05-13 DIAGNOSIS — Z00.00 ADULT GENERAL MEDICAL EXAM: ICD-10-CM

## 2025-05-13 DIAGNOSIS — C64.1 RENAL CELL CARCINOMA OF RIGHT KIDNEY (HCC): ICD-10-CM

## 2025-05-13 DIAGNOSIS — Z12.31 ENCOUNTER FOR SCREENING MAMMOGRAM FOR MALIGNANT NEOPLASM OF BREAST: ICD-10-CM

## 2025-05-13 DIAGNOSIS — E55.9 VITAMIN D DEFICIENCY: ICD-10-CM

## 2025-05-13 DIAGNOSIS — R73.9 HYPERGLYCEMIA: ICD-10-CM

## 2025-05-13 DIAGNOSIS — E03.9 ACQUIRED HYPOTHYROIDISM: Primary | ICD-10-CM

## 2025-05-13 DIAGNOSIS — F32.1 MODERATE SINGLE CURRENT EPISODE OF MAJOR DEPRESSIVE DISORDER (HCC): ICD-10-CM

## 2025-05-13 DIAGNOSIS — L27.1 PALMAR PLANTAR ERYTHRODYSAESTHESIA DUE TO CYTOTOXIC THERAPY: ICD-10-CM

## 2025-05-13 DIAGNOSIS — Z71.85 VACCINE COUNSELING: ICD-10-CM

## 2025-05-13 DIAGNOSIS — M81.0 AGE-RELATED OSTEOPOROSIS WITHOUT CURRENT PATHOLOGICAL FRACTURE: ICD-10-CM

## 2025-05-13 DIAGNOSIS — Z12.11 COLON CANCER SCREENING: ICD-10-CM

## 2025-05-13 DIAGNOSIS — H91.8X3 OTHER SPECIFIED HEARING LOSS OF BOTH EARS: ICD-10-CM

## 2025-05-13 PROCEDURE — G0439 PPPS, SUBSEQ VISIT: HCPCS | Performed by: INTERNAL MEDICINE

## 2025-05-13 PROCEDURE — 99499 UNLISTED E&M SERVICE: CPT | Performed by: INTERNAL MEDICINE

## 2025-05-13 PROCEDURE — 99214 OFFICE O/P EST MOD 30 MIN: CPT | Performed by: INTERNAL MEDICINE

## 2025-05-13 PROCEDURE — 99497 ADVNCD CARE PLAN 30 MIN: CPT | Performed by: INTERNAL MEDICINE

## 2025-05-13 RX ORDER — PANTOPRAZOLE SODIUM 40 MG/1
40 TABLET, DELAYED RELEASE ORAL NIGHTLY
Qty: 90 TABLET | Refills: 3 | Status: SHIPPED | OUTPATIENT
Start: 2025-05-13

## 2025-05-13 RX ORDER — CABOZANTINIB 20 MG/1
TABLET ORAL
COMMUNITY
Start: 2025-04-23 | End: 2025-05-19

## 2025-05-13 RX ORDER — ACETAMINOPHEN 500 MG
500 TABLET ORAL EVERY 4 HOURS PRN
Qty: 200 TABLET | Refills: 3 | Status: SHIPPED | OUTPATIENT
Start: 2025-05-13

## 2025-05-13 RX ORDER — METOPROLOL SUCCINATE 50 MG/1
100 TABLET, EXTENDED RELEASE ORAL DAILY PRN
Qty: 90 TABLET | Refills: 1 | Status: SHIPPED | OUTPATIENT
Start: 2025-05-13

## 2025-05-13 NOTE — PATIENT INSTRUCTIONS
ASSESSMENT/PLAN:     Encounter Diagnoses   Name Primary?    Acquired hypothyroidism Stable.    Yes    Adrenal insufficiency due to cancer therapy (HCC) Stable. Fu endoc.        Adult general medical exam Check urine.        Age-related osteoporosis without current pathological fracture Check dexa. Take calcium 600 every 12 hrs. With vitamin D 400 IU every  12 hrs. Excerise at least 30 minutes 3-4 times a week. May use calcium citrate as opposed to calcium carbonate which may be better absorbed in the setting of PPI use.  The preferred calcium supplement for people at risk of stone formation is calcium citrate because it helps to increase urinary citrate excretion. We recommend a dose of 200-400 mg if dietary calcium cannot be increased.    lifelong physical activity at all ages is strongly endorsed by the National Osteoporosis Foundation. Exercise recommendations generally should include weight-bearing, muscle-strengthening, and balance training exercises for 30 minutes 5 days per week or 75 minutes twice weekly, often consistent with other general health recommendations.   Weight Bearing  There are two types of osteoporosis exercises that are important for building and maintaining bone density: weight-bearing and muscle-strengthening exercises.  Weight-bearing Exercises  These exercises include activities that make you move against gravity while staying upright. Weight-bearing exercises can be high-impact or low-impact.  High-impact weight-bearing exercises help build bones and keep them strong. If you have broken a bone due to osteoporosis or are at risk of breaking a bone, you may need to avoid high-impact exercises. If you’re not sure, you should check with your healthcare provider.  Examples of high-impact weight-bearing exercises are:  Dancing   Doing high-impact aerobics   Hiking   Jogging/running   Jumping Rope   Stair climbing   Tennis  Low-impact weight-bearing exercises can also help keep bones strong and  are a safe alternative if you cannot do high-impact exercises. Examples of low-impact weight-bearing exercises are:  Using elliptical training machines   Doing low-impact aerobics   Using stair-step machines   Fast walking on a treadmill or outside          Anemia of unknown etiology Stable. FU hematology.           Encounter for screening mammogram for malignant neoplasm of breast Check mammogram. Continue self breast exam every month.         Colon cancer screening Up to date.        Essential hypertension Stable. Careful with diet and excercise at least 30 minutes 3-4 times a week. Check blood pressures at different times on different days. Can purchase own blood pressure monitor. If not, check at local pharmacy. Bake foods more and grill occasionally. Avoid fried foods. No salt. Use other seasonings.         Hyperglycemia Check blood.        Intertrigo Improved.        Mixed hyperlipidemia Stable.        Moderate single current episode of major depressive disorder (HCC) Improved.        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        Renal cell carcinoma of right kidney (HCC) Stable FU oncology.        Vaccine counseling Recc. Tdap. Check on insurance coverage. Recommend RSV vaccine.  Would check on insurance coverage at local pharmacy.  RSV is a one-time vaccine as of now.  Potential side effects with RSV vaccine are low-grade fevers body aches etc.  Also would recommend flu shot.  Separate from RSV vaccine by 2 weeks.  Also would recommend new COVID-vaccine.  Separate from other 2 vaccines by 2 weeks. Recommend shingles vaccine.  There is 2 doses of the vaccine  by 2 months 6 months apart.  Check on insurance coverage on shingles vaccine.  May be covered better at the pharmacy.  Separate shingles vaccine from all of the vaccines by at least 1 to 2 weeks.  Discussed about side effects of vaccine. Recc. PCV 20.        Vitamin D  deficiency Check blood.        Encounter for annual health examination Check blood.       Mass inner mouth . Stable. FU dentist.      Urine inc.  Improved.     Elevated BP. Use metoprolol as needed. If BP > 140/85 or HR > 80.     Orders Placed This Encounter   Procedures    Vitamin D    Measles Ab IGG,IGM    Mumps Antibodies, IGG-Immunity    Rubella Antibodies, IgG    Advance Care Planning- 1st 30 minutes       Meds This Visit:  Requested Prescriptions     Signed Prescriptions Disp Refills    metoprolol succinate ER 50 MG Oral Tablet 24 Hr 90 tablet 1     Sig: Take 2 tablets (100 mg total) by mouth daily as needed.    pantoprazole 40 MG Oral Tab EC 90 tablet 3     Sig: Take 1 tablet (40 mg total) by mouth at bedtime.    acetaminophen 500 MG Oral Tab 200 tablet 3     Sig: Take 1 tablet (500 mg total) by mouth every 4 (four) hours as needed for Fever (equal to or greater than 100.4).       Imaging & Referrals:  XR DEXA BONE DENSITOMETRY (CPT=77080)  DEV CAMILO 2D+3D SCREENING BILAT (CPT=77067/77183)      RTC 6 months for FU.

## 2025-05-13 NOTE — PROGRESS NOTES
HPI:    Patient ID: Lary Wallis is a 69 year old female.    Lary Wallis is a 69 year old female who presents for a complete physical exam.   Lary Wallis is a 69 year old female who presents for a Medicare Assessment.     Chief Complaint   Patient presents with    Physical     Patient states she is here for an Annual Physical Examination.         Labs:   Lab Results   Component Value Date/Time    WBC 7.2 04/30/2025 10:27 AM    HGB 10.8 (L) 04/30/2025 10:27 AM    .0 04/30/2025 10:27 AM      Lab Results   Component Value Date/Time    GLU 93 04/30/2025 10:27 AM     04/30/2025 10:27 AM    K 4.2 04/30/2025 10:27 AM     04/30/2025 10:27 AM    CO2 28.0 04/30/2025 10:27 AM    CREATSERUM 0.81 04/30/2025 10:27 AM    CA 8.8 04/30/2025 10:27 AM    ALB 3.9 04/30/2025 10:27 AM    TP 7.9 04/30/2025 10:27 AM    ALKPHO 80 04/30/2025 10:27 AM    AST 22 04/30/2025 10:27 AM    ALT 15 04/30/2025 10:27 AM    BILT 0.3 04/30/2025 10:27 AM    TSH 2.754 04/02/2025 10:59 AM    T4F 1.2 04/02/2025 10:59 AM        Lab Results   Component Value Date/Time    CHOLEST 155 04/30/2025 10:27 AM    HDL 46 04/30/2025 10:27 AM    TRIG 70 04/30/2025 10:27 AM    LDL 95 04/30/2025 10:27 AM    NONHDLC 109 04/30/2025 10:27 AM       Lab Results   Component Value Date/Time    A1C 5.5 12/04/2024 09:55 AM      Lab Results   Component Value Date    VITD 58.9 12/05/2024         Health Maintenance   Topic Date Due    Mammogram  11/15/2024       Allergies:  Allergies   Allergen Reactions    Levaquin [Levofloxacin] OTHER (SEE COMMENTS)     Tendon pain    Ace Inhibitors Coughing    Azithromycin      Other reaction(s): AZITHROMYCIN    Erythromycin      Other reaction(s): Rash - Mild    Naproxen      Other reaction(s): NAPROXEN    Nortriptyline      Other reaction(s): vivid, scary dreams    Sulfa Antibiotics      Other reaction(s): Rash - Moderate    Ceftriaxone RASH     Current Outpatient Medications   Medication Sig Dispense Refill     metoprolol succinate ER 50 MG Oral Tablet 24 Hr Take 2 tablets (100 mg total) by mouth daily as needed. 90 tablet 1    pantoprazole 40 MG Oral Tab EC Take 1 tablet (40 mg total) by mouth at bedtime. 90 tablet 3    acetaminophen 500 MG Oral Tab Take 1 tablet (500 mg total) by mouth every 4 (four) hours as needed for Fever (equal to or greater than 100.4). 200 tablet 3    hydrocortisone 10 MG Oral Tab Take 2 tablets (20 mg total) by mouth daily with breakfast AND 1 tablet (10 mg total) Every afternoon at 2:00 pm. 90 tablet 2    levothyroxine 75 MCG Oral Tab Take 1 tablet (75 mcg total) by mouth before breakfast. 90 tablet 2    sertraline 25 MG Oral Tab Take 1 tablet (25 mg total) by mouth daily. 90 tablet 1    sodium chloride 0.65 % Nasal Solution 2 sprays by Nasal route every 4 (four) hours as needed for congestion. 1 each 0    triamcinolone 0.1 % External Lotion Apply 1 Application topically 2 (two) times daily. 1 each 0      Past Medical History:    Acquired hypothyroidism    Essential hypertension    High blood pressure    History of pulmonary embolism    Post op, treated for 6 months      Iron deficiency anemia    Mixed hyperlipidemia    Moderate single current episode of major depressive disorder (HCC)    Osteoarthritis    KNEE - RIGHT    Osteoarthrosis, pelvic region and thigh    PONV (postoperative nausea and vomiting)    Pulmonary embolism (HCC)    after hip surgery    Renal cell carcinoma (HCC)    Oncology history:  Briefly, the patient began to feel fatigued in the Fall 2023 and she was found to have a hemoglobin of 8.5 with an MCV of 71 with last lab values in 2017 showing a normal hemoglobin.  In March 2024, iron saturation was 15% and ferritin was 612.  She was given IV iron and referred to GI which prompted a CT abdomen pelvis which unfortunately revealed an 11 cm enhancing mass within    Renal mass      Past Surgical History:   Procedure Laterality Date    Cholecystectomy      Colonoscopy N/A 10/1/2018     Procedure: COLONOSCOPY;  Surgeon: Shiva Clemente MD;  Location: [facility] ENDOSCOPY    Hc implant ocular device intraoperative detached retina [id number]      Hip replacement surgery  2012    Hysterectomy      2010    Laparoscopic cholecystectomy  04/01/1990      Family History   Problem Relation Age of Onset    Diabetes Father     Arthritis Mother     Macular degeneration Mother     Other (spinal stenosis) Mother     Breast Cancer Sister 62    Pacemaker Sister       Social History:  Social History     Socioeconomic History    Marital status:     Number of children: 4   Tobacco Use    Smoking status: Never    Smokeless tobacco: Never   Vaping Use    Vaping status: Never Used   Substance and Sexual Activity    Alcohol use: Yes     Comment: occasional    Drug use: No   Other Topics Concern     Service No    Blood Transfusions No    Caffeine Concern Yes    Occupational Exposure No    Hobby Hazards No    Sleep Concern Yes    Stress Concern No    Weight Concern Yes    Special Diet No    Back Care No    Exercise No    Bike Helmet No    Seat Belt Yes    Self-Exams Yes       History/Other:     Patient Active Problem List   Diagnosis    Osteoarthrosis, pelvic region and thigh    Essential hypertension    Moderate single current episode of major depressive disorder (HCC)    Breast cancer screening    Bilateral hearing loss    Adult general medical exam    Vitamin D deficiency    Vaccine counseling    Primary osteoarthritis of right knee    Colon cancer screening    Anemia of unknown etiology    Hyperglycemia    Mixed hyperlipidemia    Age-related osteoporosis without current pathological fracture    Renal cell carcinoma of right kidney (HCC)    Encounter for antineoplastic immunotherapy    Chemotherapy management, encounter for    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 Patient has had a hysterectomy.    Hypertension  Patient is here for follow up of hypertension. BP at home: 110/70's. TID. Night occ.  130/80's.  Blood pressures are more stable since treating the adrenal insufficiency.  But occasionally blood pressures do hot go high takes Toprol as needed.  Has been compliant with medications.  Exercise level: somewhat active (doing ADL's. Walks with sister)  and has been following low salt diet.  Weight has been stable.  cooks and pt. cooks.   Wt Readings from Last 3 Encounters:   25 179 lb (81.2 kg)   25 179 lb (81.2 kg)   25 176 lb (79.8 kg)     BP Readings from Last 3 Encounters:   25 107/70   25 116/76   25 133/83     Labs:   Lab Results   Component Value Date/Time    GLU 93 2025 10:27 AM     2025 10:27 AM    K 4.2 2025 10:27 AM     2025 10:27 AM    CO2 28.0 2025 10:27 AM    CREATSERUM 0.81 2025 10:27 AM    CA 8.8 2025 10:27 AM    AST 22 2025 10:27 AM    ALT 15 2025 10:27 AM    TSH 2.754 2025 10:59 AM    T4F 1.2 2025 10:59 AM        Lab Results   Component Value Date/Time    CHOLEST 155 2025 10:27 AM    HDL 46 2025 10:27 AM    TRIG 70 2025 10:27 AM    LDL 95 2025 10:27 AM    NONHDLC 109 2025 10:27 AM            Tobacco:  She has never smoked tobacco.    CAGE Alcohol Screen:   CAGE screening score of 0 on 2025, showing low risk of alcohol abuse.        Patient Care Team:  Fernanda Stover MD as PCP - General (Internal Medicine)  Archie Crocker DO as Consulting Physician (Hematology and Oncology)  Was having more urinary incontinence when mass was pressing against bladder.  But now that the mass has shrunk a little bit and much improvement.  Occasionally has accidents of  urine but not as much.    Mass  This is a chronic problem. The current episode started more than 1 year ago. The problem occurs constantly. The problem has been unchanged. Pertinent negatives include no abdominal pain, change in bowel habit, chest pain, chills, congestion, coughing, diaphoresis, fatigue, fever, headaches, nausea, numbness, rash, sore throat, swollen glands, vertigo, visual change, vomiting or weakness. Nothing (Occ. bites it when chewing.) aggravates the symptoms. She has tried nothing for the symptoms.       Review of Systems   Constitutional:  Positive for activity change. Negative for appetite change, chills, diaphoresis, fatigue, fever and unexpected weight change.   HENT:  Negative for congestion, dental problem, drooling, ear discharge, ear pain, facial swelling, hearing loss, mouth sores, nosebleeds, postnasal drip, rhinorrhea, sinus pressure, sinus pain, sneezing, sore throat, tinnitus, trouble swallowing and voice change.    Eyes:  Negative for photophobia, pain, discharge, redness, itching and visual disturbance.   Respiratory:  Negative for apnea, cough, choking, chest tightness, shortness of breath, wheezing and stridor.    Cardiovascular:  Negative for chest pain, palpitations and leg swelling.   Gastrointestinal:  Negative for abdominal distention, abdominal pain, anal bleeding, blood in stool, change in bowel habit, constipation, diarrhea, nausea, rectal pain and vomiting.   Endocrine: Negative for cold intolerance, heat intolerance, polydipsia, polyphagia and polyuria.   Genitourinary:  Negative for decreased urine volume, difficulty urinating, dysuria, flank pain, frequency, hematuria, menstrual problem, pelvic pain, urgency, vaginal bleeding, vaginal discharge and vaginal pain.   Skin:  Negative for rash.   Neurological:  Negative for dizziness, vertigo, tremors, seizures, syncope, facial asymmetry, speech difficulty, weakness, light-headedness, numbness and headaches.    Psychiatric/Behavioral:  Negative for agitation, behavioral problems, confusion, decreased concentration, dysphoric mood, hallucinations, self-injury, sleep disturbance and suicidal ideas. The patient is not nervous/anxious and is not hyperactive.    All other systems reviewed and are negative.          Functional Ability/Status:   Lary Wallis has a completely normal functional assessment. See flowsheet for details.        Fall Risk Assessment:   She has been screened for Falls and is High Risk. Fall Prevention information provided to patient in After Visit Summary.    Do you feel unsteady when standing or walking?: (Patient-Rptd) Yes  Do you worry about falling?: (Patient-Rptd) No  Have you fallen in the past year?: (Patient-Rptd) No       Medicare Hearing Assessment:   Hearing Screening    Time taken: 5/13/2025 12:26 PM  Entry User: Juanis Gama  Screening Method: Finger Rub  Finger Rub Result: Pass         Depression Screening (PHQ-2/PHQ-9): PHQ-2 SCORE: 0  , done 5/12/2025          Cognitive Assessment:   She had a completely normal cognitive assessment - see flowsheet entries       Supplementary Documentation:     In the past six months, have you lost more than 10 pounds without trying?: (Patient-Rptd) 2 - No  Has your appetite been poor?: (Patient-Rptd) No  Type of Diet: (Patient-Rptd) Balanced  How does the patient maintain a good energy level?: (Patient-Rptd) Other  How would you describe your daily physical activity?: (Patient-Rptd) Light  How would you describe your current health state?: (Patient-Rptd) Good  How do you maintain positive mental well-being?: (Patient-Rptd) Social Interaction, Puzzles, Visiting Friends, Visiting Family  On a scale of 0 to 10, with 0 being no pain and 10 being severe pain, what is your pain level?: (Patient-Rptd) 3 - (Mild)  In the past six months, have you experienced urine leakage?: (Patient-Rptd) 0-No  At any time do you feel concerned for the safety/well-being  of yourself and/or your children, in your home or elsewhere?: (Patient-Rptd) No  Have you had any immunizations at another office such as Influenza, Hepatitis B, Tetanus, or Pneumococcal?: (Patient-Rptd) No    Visual Acuity:   Right Eye Visual Acuity: Corrected Right Eye Chart Acuity: 20/70   Left Eye Visual Acuity: Corrected Left Eye Chart Acuity: 20/70   Both Eyes Visual Acuity: Corrected Both Eyes Chart Acuity: 20/70   Able To Tolerate Visual Acuity: Yes        PHYSICAL EXAM:   /70 (BP Location: Right arm, Patient Position: Sitting, Cuff Size: large)   Pulse 101   Temp 97.3 °F (36.3 °C) (Temporal)   Ht 5' 5\" (1.651 m)   Wt 179 lb (81.2 kg)   LMP  (LMP Unknown)   SpO2 99%   BMI 29.79 kg/m²   BP Readings from Last 3 Encounters:   05/13/25 107/70   04/30/25 116/76   04/02/25 133/83     Wt Readings from Last 3 Encounters:   05/13/25 179 lb (81.2 kg)   04/30/25 179 lb (81.2 kg)   04/02/25 176 lb (79.8 kg)      Body mass index is 29.79 kg/m².   Physical Exam  Vitals and nursing note reviewed.   Constitutional:       General: She is not in acute distress.     Appearance: Normal appearance. She is well-developed and well-groomed. She is not ill-appearing, toxic-appearing or diaphoretic.      Interventions: She is not intubated.  HENT:      Head: Normocephalic and atraumatic.      Right Ear: Tympanic membrane, ear canal and external ear normal. No decreased hearing noted. No laceration, drainage, swelling or tenderness. No middle ear effusion. There is no impacted cerumen. No foreign body. No mastoid tenderness. No PE tube. No hemotympanum. Tympanic membrane is not injected, scarred, perforated, erythematous, retracted or bulging. Tympanic membrane has normal mobility.      Left Ear: Tympanic membrane, ear canal and external ear normal. No decreased hearing noted. No laceration, drainage, swelling or tenderness.  No middle ear effusion. There is no impacted cerumen. No foreign body. No mastoid tenderness. No  PE tube. No hemotympanum. Tympanic membrane is not injected, scarred, perforated, erythematous, retracted or bulging. Tympanic membrane has normal mobility.      Nose:      Right Sinus: No maxillary sinus tenderness or frontal sinus tenderness.      Left Sinus: No maxillary sinus tenderness or frontal sinus tenderness.      Mouth/Throat:      Lips: Pink. No lesions.      Mouth: Mucous membranes are moist. No injury, lacerations, oral lesions or angioedema.      Dentition: No dental tenderness, gingival swelling, dental caries, dental abscesses or gum lesions.      Tongue: No lesions. Tongue does not deviate from midline.      Palate: No mass and lesions.      Pharynx: Oropharynx is clear. Uvula midline. No pharyngeal swelling, oropharyngeal exudate, posterior oropharyngeal erythema or uvula swelling.      Tonsils: No tonsillar exudate or tonsillar abscesses.     Eyes:      General: Lids are normal. Gaze aligned appropriately. No scleral icterus.        Right eye: No foreign body, discharge or hordeolum.         Left eye: No foreign body, discharge or hordeolum.      Extraocular Movements: Extraocular movements intact.      Right eye: Normal extraocular motion and no nystagmus.      Left eye: Normal extraocular motion and no nystagmus.      Conjunctiva/sclera: Conjunctivae normal.      Right eye: Right conjunctiva is not injected. No chemosis, exudate or hemorrhage.     Left eye: Left conjunctiva is not injected. No chemosis, exudate or hemorrhage.     Pupils: Pupils are equal, round, and reactive to light.   Neck:      Thyroid: No thyroid mass, thyromegaly or thyroid tenderness.      Vascular: Normal carotid pulses. No carotid bruit, hepatojugular reflux or JVD.      Trachea: Trachea and phonation normal. No tracheal tenderness, tracheostomy, abnormal tracheal secretions or tracheal deviation.   Cardiovascular:      Rate and Rhythm: Normal rate and regular rhythm.      Pulses: Normal pulses.           Carotid pulses  are 2+ on the right side and 2+ on the left side.       Radial pulses are 2+ on the right side and 2+ on the left side.        Dorsalis pedis pulses are 2+ on the right side and 2+ on the left side.        Posterior tibial pulses are 2+ on the right side and 2+ on the left side.      Heart sounds: Normal heart sounds, S1 normal and S2 normal.   Pulmonary:      Effort: Pulmonary effort is normal. No tachypnea, bradypnea, accessory muscle usage, prolonged expiration, respiratory distress or retractions. She is not intubated.      Breath sounds: Normal breath sounds and air entry. No stridor, decreased air movement or transmitted upper airway sounds. No decreased breath sounds, wheezing, rhonchi or rales.   Chest:      Chest wall: No tenderness.   Breasts:     Breasts are symmetrical.      Right: No swelling, bleeding, inverted nipple, mass, nipple discharge, skin change or tenderness.      Left: No swelling, bleeding, inverted nipple, mass, nipple discharge, skin change or tenderness.   Abdominal:      General: Bowel sounds are normal. There is no distension.      Palpations: Abdomen is soft. Abdomen is not rigid. There is no fluid wave, hepatomegaly, splenomegaly or mass.      Tenderness: There is no abdominal tenderness. There is no right CVA tenderness, left CVA tenderness, guarding or rebound.   Genitourinary:     Exam position: Supine.   Musculoskeletal:      Cervical back: Neck supple. No tenderness.      Right lower leg: No edema.      Left lower leg: No edema.   Lymphadenopathy:      Head:      Right side of head: No submental, submandibular, preauricular, posterior auricular or occipital adenopathy.      Left side of head: No submental, submandibular, preauricular, posterior auricular or occipital adenopathy.      Cervical: No cervical adenopathy.      Right cervical: No superficial, deep or posterior cervical adenopathy.     Left cervical: No superficial, deep or posterior cervical adenopathy.      Upper  Body:      Right upper body: No supraclavicular, axillary or pectoral adenopathy.      Left upper body: No supraclavicular, axillary or pectoral adenopathy.   Skin:     General: Skin is warm and dry.      Coloration: Skin is not pale.      Findings: No erythema or rash.   Neurological:      Mental Status: She is alert and oriented to person, place, and time.   Psychiatric:         Mood and Affect: Mood normal.         Speech: Speech normal.         Behavior: Behavior normal. Behavior is cooperative.              ASSESSMENT/PLAN:     Encounter Diagnoses   Name Primary?    Acquired hypothyroidism Stable.    Yes    Adrenal insufficiency due to cancer therapy (HCC) Stable. Fu endoc.        Adult general medical exam Check urine.        Age-related osteoporosis without current pathological fracture Check dexa. Take calcium 600 every 12 hrs. With vitamin D 400 IU every  12 hrs. Excerise at least 30 minutes 3-4 times a week. May use calcium citrate as opposed to calcium carbonate which may be better absorbed in the setting of PPI use.  The preferred calcium supplement for people at risk of stone formation is calcium citrate because it helps to increase urinary citrate excretion. We recommend a dose of 200-400 mg if dietary calcium cannot be increased.    lifelong physical activity at all ages is strongly endorsed by the National Osteopor[employer] Foundation. Exercise recommendations generally should include weight-bearing, muscle-strengthening, and balance training exercises for 30 minutes 5 days per week or 75 minutes twice weekly, often consistent with other general health recommendations.   Weight Bearing  There are two types of osteoporosis exercises that are important for building and maintaining bone density: weight-bearing and muscle-strengthening exercises.  Weight-bearing Exercises  These exercises include activities that make you move against gravity while staying upright. Weight-bearing exercises can be high-impact  or low-impact.  High-impact weight-bearing exercises help build bones and keep them strong. If you have broken a bone due to osteoporosis or are at risk of breaking a bone, you may need to avoid high-impact exercises. If you’re not sure, you should check with your healthcare provider.  Examples of high-impact weight-bearing exercises are:  Dancing   Doing high-impact aerobics   Hiking   Jogging/running   Jumping Rope   Stair climbing   Tennis  Low-impact weight-bearing exercises can also help keep bones strong and are a safe alternative if you cannot do high-impact exercises. Examples of low-impact weight-bearing exercises are:  Using elliptical training machines   Doing low-impact aerobics   Using stair-step machines   Fast walking on a treadmill or outside          Anemia of unknown etiology Stable. Fu hematology.           Encounter for screening mammogram for malignant neoplasm of breast Check mammogram. Continue self breast exam every month.         Colon cancer screening Up to date.        Essential hypertension Stable. Careful with diet and excercise at least 30 minutes 3-4 times a week. Check blood pressures at different times on different days. Can purchase own blood pressure monitor. If not, check at local pharmacy. Bake foods more and grill occasionally. Avoid fried foods. No salt. Use other seasonings.         Hyperglycemia Check blood.        Intertrigo Improved.        Mixed hyperlipidemia Stable.        Moderate single current episode of major depressive disorder (HCC) Improved.        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        Renal cell carcinoma of right kidney (HCC) Stable FU oncology.        Vaccine counseling Recc. Tdap. Check on insurance coverage. Recommend RSV vaccine.  Would check on insurance coverage at local pharmacy.  RSV is a one-time vaccine as of now.  Potential side effects with RSV vaccine are low-grade  fevers body aches etc.  Also would recommend flu shot.  Separate from RSV vaccine by 2 weeks.  Also would recommend new COVID-vaccine.  Separate from other 2 vaccines by 2 weeks. Recommend shingles vaccine.  There is 2 doses of the vaccine  by 2 months 6 months apart.  Check on insurance coverage on shingles vaccine.  May be covered better at the pharmacy.  Separate shingles vaccine from all of the vaccines by at least 1 to 2 weeks.  Discussed about side effects of vaccine. Recc. PCV 20.        Vitamin D deficiency Check blood.        Encounter for annual health examination Check blood.       Mass inner mouth . Stable. FU dentist.      Urine inc.  Improved.     Elevated BP. Use metoprolol as needed. If BP > 140/85 or HR > 80.     Orders Placed This Encounter   Procedures    Vitamin D    Measles Ab IGG,IGM    Mumps Antibodies, IGG-Immunity    Rubella Antibodies, IgG    Advance Care Planning- 1st 30 minutes       Meds This Visit:  Requested Prescriptions     Signed Prescriptions Disp Refills    metoprolol succinate ER 50 MG Oral Tablet 24 Hr 90 tablet 1     Sig: Take 2 tablets (100 mg total) by mouth daily as needed.    pantoprazole 40 MG Oral Tab EC 90 tablet 3     Sig: Take 1 tablet (40 mg total) by mouth at bedtime.    acetaminophen 500 MG Oral Tab 200 tablet 3     Sig: Take 1 tablet (500 mg total) by mouth every 4 (four) hours as needed for Fever (equal to or greater than 100.4).       Imaging & Referrals:  XR DEXA BONE DENSITOMETRY (CPT=77080)  [facility] CAMILO 2D+3D SCREENING BILAT (CPT=77067/75997)      RTC 6 months for FU.     1. Acquired hypothyroidism (Primary)  Overview:  Immune mediated adrenal insufficiency  # Immune mediated hypothyroidism  -Patient developed severe weakness and fatigue in November 2024, found to have a high in early December 2024.  -On hydrocortisone 20/10 mg daily, symptoms are much improved.  She is following with endocrinology -> we discussed tapering hydrocortisone to 10/5 daily  and she will start this later this week.  -On levothyroxine 50 mcg daily, TSH normalized  2. 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  -On hydrocortisone 20/10 mg daily, symptoms are much improved.  She is following with endocrinology -> we discussed tapering hydrocortisone to 10/5 daily and she will start this later this week.  -On levothyroxine 50 mcg daily, TSH normalized  3. Adult general medical exam  -     Cancel: URINALYSIS, AUTO, W/O SCOPE  4. Age-related osteoporosis without current pathological fracture  -     XR DEXA BONE DENSITOMETRY (CPT=77080); Future; Expected date: 05/13/2025  5. Anemia of unknown etiology  Overview:  Endoscopy and colonoscopy done May 10, 2024: 1.  Uncomplicated colonic diverticulosis as described above  2.  Otherwise normal colonoscopy to the terminal ileum  3.  2-2.5 cm hiatal hernia without Leroy's erosions  4.  Gastric polyp likely fundic gland in nature  5.  No cause for the patient's symptoms/signs seen on these studies  6. Other specified hearing loss of both ears  7. Encounter for screening mammogram for malignant neoplasm of breast  -     [facility]ILO 2D+3D SCREENING BILAT (CPT=77067/32947); Future; Expected date: 05/13/2025  8. Colon cancer screening  9. Essential hypertension  10. Hyperglycemia  -     Cancel: URINALYSIS, AUTO, W/O SCOPE  11. Intertrigo  12. Mixed hyperlipidemia  13. Moderate single current episode of major depressive disorder (HCC)  14. 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 Primary osteoarthritis of right knee  16. Renal cell carcinoma of right kidney (HCC)  Overview:  Oncology history:  Briefly, the patient began to feel fatigued in the Fall 2023 and she was found to have a hemoglobin of 8.5 with an MCV of 71 with last lab values  in 2017 showing a normal hemoglobin.  In March 2024, iron saturation was 15% and ferritin was 612.  She was given IV iron and referred to GI which prompted a CT abdomen pelvis which unfortunately revealed an 11 cm enhancing mass within the right kidney and extensive bibasilar pleural metastases concerning for metastatic renal cell carcinoma.      5/21/24: Initial medical oncology consultation, will obtain brain imaging and refer to IR for kidney biopsy.  Expressed concern for metastatic kidney cancer.  -Brain MRI showed a meningioma, no intracranial metastatic disease.  -IR guided biopsy 6/5 shows high-grade clear-cell renal cell carcinoma, with extensive necrosis but no sarcomatoid or rhabdoid features    IV Cancer Treatment Name(s): Nivolumab  IV Cancer Treatment Frequency Once every 2 weeks                            Number of cycles planned Based on response and tolerability     Oral Cancer Treatment Medication(s):  Medication Name Dose/Strength Frequency   Cabozantinib 40 mg ONE Tablet Once daily                  Metastatic poor-risk ccRCC.   -large right renal mass, extensive pleural metastases, anemia, neutropenia, thrombocytosis, quite symptomatic on presentation. 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 She began Nivolumab Cabozantinib 6/12/24 and presents for followup.      Interval history:  Patient continues to clinically improve, feels stronger,  walking more.  Wants to feel her best for her daughter's 10/4/24 wedding.  New rash on fingers and feet, sore to touch.  Lamin/pedi in August, not recent.  Sore on buttocks persists despite Lotrisone cream.  Per her dentist, needs a tooth extraction.  Wants to plan 2 vacations - one to [place] and another to [place].      Denies mouth sores, cough, dyspnea, abdominal pain, nausea/vomiting, diarrhea, constipation and peripheral edema.      Oncology history:  Briefly, the patient began to feel fatigued in the Fall 2023 and she was found to have a hemoglobin of 8.5 with an MCV of 71 with last lab values in 2017 showing a normal hemoglobin.  In March 2024, iron saturation was 15% and ferritin was 612.  She was given IV iron and referred to GI which prompted a CT abdomen pelvis which unfortunately revealed an 11 cm enhancing mass within the right kidney and extensive bibasilar pleural metastases concerning for metastatic renal cell carcinoma.      5/21/24: Initial medical oncology consultation, will obtain brain imaging and refer to IR for kidney biopsy.  Expressed concern for metastatic kidney cancer.  -Brain MRI showed a meningioma, no intracranial metastatic disease.  -IR guided biopsy 6/5 shows high-grade clear-cell renal cell carcinoma, with extensive necrosis but no sarcomatoid or rhabdoid features     6/6/24: Medical oncology follow-up.  Patient continues to decline.  Patient has poor risk disease, very symptomatic and concerned with need for rapid response upfront.  Therefore, we will proceed with cabozantinib nivolumab per [employer] 9ER     6/12/24: began Cabozantinib Nivolumab.      6/26/24: slight improvement, no dose limiting toxicities, continue treatment.      7/24/24: Feeling little stronger, tolerating therapy well     8/7/24: Grade 1 transaminitis, continue therapy, repeat LFTs next week, follow-up in 2 weeks, restaging at the end of August 9/4/24: Restaging CT shows partial response to treatment.  Continue current therapy plan.      17. Vaccine counseling  18. Vitamin D deficiency  -     Vitamin D  19. Encounter for annual health examination  -     Advance Care Planning- 1st 30 minutes  20. Immunity status testing  -     Measles Ab IGG,IGM; Future; Expected date: 05/27/2025  -     Mumps Antibodies, IGG-Immunity; Future; Expected date: 05/27/2025  -     Rubella Antibodies, IgG; Future; Expected date: 05/27/2025  Other orders  -     Metoprolol Succinate ER; Take 2 tablets (100 mg total) by mouth daily as needed.  Dispense: 90 tablet; Refill: 1  -     Pantoprazole Sodium; Take 1 tablet (40 mg total) by mouth at bedtime.  Dispense: 90 tablet; Refill: 3  -     Acetaminophen; Take 1 tablet (500 mg total) by mouth every 4 (four) hours as needed for Fever (equal to or greater than 100.4).  Dispense: 200 tablet; Refill: 3      The patient indicates understanding of these issues and agrees to the plan.  Consult ordered.  Further testing ordered.  Imaging studies ordered.  Lab work ordered.  Reinforced healthy diet, lifestyle, and exercise.      No follow-ups on file.    Advanced Directives:   She does NOT have a Living Will. [Do you have a living will?: No]  She does NOT have a Power of  for Health Care. [Do you have a healthcare power of ?: No]  Discussed Advance Care Planning with patient (and family/surrogate if present). Standard forms made available to patient in After Visit Summary.  16+ minutes was spent with all Advanced Care Planning elements today (up to 30 minutes for CPT 83375)    MD Lary Chan's SCREENING SCHEDULE   Tests on this list are recommended by your physician but may not be covered, or covered at this frequency, by your insurer.   Please check with your insurance carrier before scheduling to verify coverage.   PREVENTATIVE SERVICES FREQUENCY &  COVERAGE DETAILS LAST COMPLETION DATE   Diabetes Screening    Fasting Blood Sugar /  Glucose    One screening  every 12 months if never tested or if previously tested but not diagnosed with pre-diabetes   One screening every 6 months if diagnosed with pre-diabetes Lab Results   Component Value Date    GLUCOSE 93 05/21/2007    GLU 93 04/30/2025        Cardiovascular Disease Screening    Lipid Panel  Cholesterol  Lipoprotein (HDL)  Triglycerides Covered every 5 years for all Medicare beneficiaries without apparent signs or symptoms of cardiovascular disease Lab Results   Component Value Date    CHOLEST 155 04/30/2025    HDL 46 04/30/2025    LDL 95 04/30/2025    LDLD 68 12/06/2023    TRIG 70 04/30/2025         Electrocardiogram (EKG)   Covered if needed at Welcome to Medicare, and non-screening if indicated for medical reasons 11/18/2024      Ultrasound Screening for Abdominal Aortic Aneurysm (AAA) Covered once in a lifetime for one of the following risk factors    Men who are 65-75 years old and have ever smoked    Anyone with a family history -     Colorectal Cancer Screening  Covered for ages 50-85; only need ONE of the following:    Colonoscopy   Covered every 10 years    Covered every 2 years if patient is at high risk or previous colonoscopy was abnormal 05/10/2024    Health Maintenance   Topic Date Due    Colorectal Cancer Screening  05/10/2034       Flexible Sigmoidoscopy   Covered every 4 years -    Fecal Occult Blood Test Covered annually -   Bone Density Screening    Bone density screening    Covered every 2 years after age 65 if diagnosed with risk of osteoporosis or estrogen deficiency.    Covered yearly for long-term glucocorticoid medication use (Steroids) Last Dexa Scan:    XR DEXA BONE DENSITOMETRY (CPT=77080) 12/28/2023      No recommendations at this time   Pap and Pelvic    Pap   Covered every 2 years for women at normal risk; Annually if at high risk -  No recommendations at this time    Chlamydia Annually if high risk -  No recommendations at this time   Screening Mammogram    Mammogram     Recommend  annually for all female patients aged 40 and older    One baseline mammogram covered for patients aged 35-39 11/15/2023    Health Maintenance   Topic Date Due    Mammogram  11/15/2024       Immunizations    Influenza Covered once per flu season  Please get every year -  No recommendations at this time    Pneumococcal Each vaccine (Jasukbg93 & Byghmpfhi40) covered once after 65 Prevnar 13: -    Chpiiqovk96: -     Pneumococcal Vaccination(1 of 2 - PCV) Never done    Hepatitis B One screening covered for patients with certain risk factors   -  No recommendations at this time    Tetanus Toxoid Not covered by Medicare Part B unless medically necessary (cut with metal); may be covered with your pharmacy prescription benefits -    Tetanus, Diptheria and Pertusis TD and TDaP Not covered by Medicare Part B -  No recommendations at this time    Zoster Not covered by Medicare Part B; may be covered with your pharmacy  prescription benefits -  Zoster Vaccines(1 of 2) Never done                Understanding Advance Care Planning  Advance care planning is the process of deciding one’s own future medical care. It helps assure that if you can’t speak for yourself, your wishes can still be carried out. The plan is a series of legal documents that note a person’s wishes. The documents vary by state. Advance care planning should be discussed at a regular office visit with your primary care provider before an acute illness. Advance care planning is encouraged when a person has a serious illness that is expected to get worse. It may also be done before major surgery. And it can help you and your family be prepared in case of a major illness or injury. Advance care planning helps with making decisions at these times.     Who will speak on your behalf?  A healthcare proxy is a person who acts as the voice of a patient when the patient can’t speak for himself or herself. The name of this role varies by state. It may be called a Durable  Medical Power of  or Durable Power of  for Healthcare. It may be called an agent, surrogate, or advocate. Or it may be called a representative or decision maker. It's an official duty that is identified by a legal document. The document also varies by state.   Why is advance care planning important?   If a person communicates his or her healthcare wishes:   He or she will be given medical care that matches his or her values and goals.  Family members will not be forced to make decisions in a crisis with no guidance.  Creating a plan  Making an advance care plan is often done in 3 steps:   Thinking about one’s wishes. To create an advance care plan, think about what kind of medical treatment you would want if you lose the ability to communicate. Are there any situations in which you would refuse or stop treatment? Are there therapies you would want or not want? And whom do you want to make decisions for you? There are many places to learn more about how to plan for your care. Ask your healthcare provider or  for resources.  Picking a healthcare proxy. This means choosing a trusted person to speak for you only when you can’t speak for yourself. When you can't make medical decisions, your proxy makes sure the instructions in your advance care plan are followed. A proxy doesn't make decisions based on his or her own opinions. They must put aside those opinions and values if needed, and carry out your wishes.  Filling out the legal documents. There are several kinds of legal documents for advance care planning. Each one tells healthcare providers your wishes. The documents may vary by state. They must be signed and may need to be witnessed or notarized. You can cancel or change them whenever you wish. Depending on your state, the documents may include a Healthcare Proxy form, Living Will, Durable Medical Power of , and Advance Directive.  The family’s role  The best help a family can  give is to support their loved one’s wishes. Open and honest communication is vital. Family should express any concerns they have about the patient’s choices while the patient can still make decisions in the event that his or her illness prevents communicating those wishes at a later time.    [employer] last reviewed this educational content on 12/1/2019    © 1112-4033 [employer]. All rights reserved. This information is not intended as a substitute for professional medical care. Always follow your healthcare professional's instructions.          Advance Medical Directive  An advance medical directive is a form that lets you plan ahead for the care you’d want if you could no longer express your wishes. This statement outlines the medical treatment you’d want or names the person you’d wish to make healthcare decisions for you. Be aware that laws vary from state to state, and it may be worthwhile to talk with an .     Writing down your wishes  An advance directive is important whether you’re young or old. Injury or illness can strike at any age.  Decide what is important to you and the kind of treatment you’d want, or not want to have.  Some states allow only one kind of advance directive. Some let you do both a durable power of  for healthcare and a living will. Some states put both kinds on the same form.    A durable power of  (POA) for healthcare   This form lets you name someone else that you have chosen and trust to speak and make decisions on your behalf.  This person can decide on treatment for you only when you can’t speak for yourself.  You don't need to be at the end of your life. They could speak for you if you were in a coma but were likely to recover.    A living will  This form lets you list the care you want at the end of your life.  A living will applies only if you won’t live without medical treatment. It would apply if you had advanced cancer, a massive stroke,  or other serious illness from which you will not recover.  It takes effect only when you can no longer express your wishes yourself.    [employer] last reviewed this educational content on 2/1/2021    © 5599-8744 [employer]. All rights reserved. This information is not intended as a substitute for professional medical care. Always follow your healthcare professional's instructions.          Choosing an Agent  A durable power of  for healthcare is only as good as the person you name to be your agent. Your agent is the person you have chosen to speak and make decisions on your behalf. If this person knows your treatment wishes and is willing to carry them out, you’ll probably be well represented. Be sure to tell your agent what’s important to you.     Who to choose  Here are suggestions for choosing an agent:  You can name a family member, close friend, , , or rabbi.  You should name one person as your agent. Then name one or two alternates. You need a backup person in case your first choice can’t be reached when needed.  Talk with each person you're thinking of naming as your agent or alternate. Do this before you decide who should carry out your wishes.  Your agent should be ...  A competent adult, age 18 or older  Someone you trust and can talk to about the care you want and what's important to you  Someone who supports your treatment choices    In many states, your agent can’t be ...   Your healthcare provider  An employee of your provider or of a hospital, nursing home, or hospice program where you receive care  Some states have other restrictions on who can be named as an agent for an advance directive.   Be prepared  Tip: It's a good idea to write down your wishes and give a copy to your agent and all others who are involved with your healthcare.   [employer] last reviewed this educational content on 8/1/2021    © 3677-7446 [employer]. All rights reserved. This  information is not intended as a substitute for professional medical care. Always follow your healthcare professional's instructions.          Understanding DNR Orders  Do not resuscitate (DNR) orders tell hospital staff not to do potentially life-restoring measures, such as CPR, if you or your loved one's heart and lungs stop working. It allows a natural death, and prevents healthcare staff from artificially reviving a person and placing them on life support. In many states, a DNR order also applies to staff outside the hospital such as in nursing homes and emergency medical services. A DNR order must be written by a healthcare provider. Or in some cases, certain other healthcare workers write it. This can only be done with the person’s or family’s consent. If a person has not written an advance directive, their family will decide on a DNR with the help of the healthcare team.   The person can cancel a DNR order at any time. The healthcare team can answer questions about the DNR form. Have copies of a DNR form are readily available so that your wishes can be faithfully followed.   Writing a DNR order  When might a DNR order be written? When the person’s health condition is such that, in the case of cardiac arrest, CPR and other resuscitation methods are not desired. This could be because the chance of successful resuscitation is very low. Or it could be because the care plan now focuses on comfort measures instead of life-sustaining measures. Coma and terminal illness are instances when a DNR order might be used.   Irreversible coma  In a coma, a person does not respond to sight, sound, or touch. The heart and lungs could be working, but brain function is damaged due to trauma or disease.   Terminal illness  In the last stages of heart disease, AIDS, cancer, and other illnesses, some people don’t want to prolong their suffering. If recovery isn’t likely and quality of life is poor or getting worse, a person or  their family may agree to a DNR order.   DNR orders and hospice care  A hospice program can offer care during the final weeks of life. Hospice programs provide dignity, pain control and comfort care in the home or at special facilities. Hospice does not provide aggressive treatment. In fact, a DNR order will likely be discussed before a person is admitted to hospice. A  or  may be able to help you arrange for hospice support.   Documenting end-of-life wishes  In addition to DNR orders, a person with a serious, life-limiting illness may wish to document their treatment wishes. This is called a POST form or by different names, depending on the state. It's meant to provide a portable medical order to help guide healthcare providers on the specific medical treatments a person wants during a medical emergency. It's meant to complement a DNR order, not replace it. Your healthcare provider can tell you more and help you complete the forms. The form may be called one of these:   MOLST (medical orders for life-sustaining treatment)  POLST (physician orders for life-sustaining treatment)  MOST (medical orders for scope of treatment)  POST (physician orders for scope of treatment)  TPOPP (transportable physician orders for patient preferences)  [employer] last reviewed this educational content on 7/1/2021 © 2000-2021 [employer]. All rights reserved. This information is not intended as a substitute for professional medical care. Always follow your healthcare professional's instructions.          An Agent’s Role for Durable Power of  for Health Care   It’s impossible to know which medical treatment choices you might face in the future. What if you aren't able to make these decisions for yourself? A durable power of  for healthcare lets you name an agent to speak and carry out your wishes on your behalf. This happens only if you can’t express your wishes yourself.     An  agent’s duty  Your agent respects your wishes in the following ways:    Your agent’s duty is to see that your wishes are followed.  If your wishes aren’t known, your agent should try to decide what you want.  Your agent’s choices come before anyone else’s wishes for you.  A durable power of  for healthcare doesn't not give your agent control over your money . Your agent also can’t be made to pay your bills.  Find out what your agent can do  Restrictions on what an agent can and can’t do vary by state. Check your state laws. In most states your agent can:   Choose or refuse life-sustaining and other medical treatment on your behalf  Consent to treatment, and then stop treatment if your condition doesn’t improve  Access and release your medical records  Request an autopsy and donate your organs, unless you’ve stated otherwise in your advance directive  Find out whether your state allows your agent to do the following:   Refuse or withdraw life-enhancing care  Refuse or stop tube feeding or other life-sustaining care--even if you haven’t stated on your advance directive that you don’t want these treatments  Order sterilization or   [employer] last reviewed this educational content on 2021    © 6684-1619 [employer]. All rights reserved. This information is not intended as a substitute for professional medical care. Always follow your healthcare professional's instructions.          Being a Healthcare Proxy  A healthcare proxy is someone who represents a person who can’t speak for themself. The name of this role varies by state. It may be called a Durable Medical Power of . It may be called a Durable Power of  for Healthcare. It may be called an agent, surrogate, or advocate. Or it may be called a representative or decision maker. It's an official duty that is noted by a legal document. The document also varies by state. The person must name you as his or her proxy on the  document.      A healthcare proxy speaks for another person when he or she is not able to do so. The proxy helps make sure the person’s healthcare wishes are known and followed.     What it means to be a healthcare proxy   Your role as healthcare proxy starts when the person can’t make medical decisions. This assessment can only be made by a licensed doctor. You then make the healthcare decisions as needed. You do this by carrying out the person’s wishes. These wishes are noted in his or her advance care planning documents. These declare what kind of treatment the person wishes to have or not have. You may need to put aside your own values and opinions to carry out the person’s wishes. This may include refusing or stopping life-sustaining treatments.   Documenting end-of-life wishes   As a healthcare proxy, encourage the person to discuss his or her wishes, while they are able. They can do this with their healthcare provider and then document the wishes as a medical order. The provider can help the person complete the form. The forms are known by different names depending on the state. The form may be called one of these:     MOLST (medical orders for life-sustaining treatment)  POLST (physician orders for life-sustaining treatment)  MOST (medical orders for scope of treatment)  POST (physician orders for scope of treatment)  TPOPP (transportable physician orders for patient preferences)    The form documents the person’s wishes at the end of life. It's not tied to a certain healthcare provider or facility. It's different than a living will. The form is an order written according to state regulations by a healthcare provider. To complete one, the person must express his or her wishes to an advanced healthcare provider. If the person can’t make his or her own decisions, then this is done by the person’s healthcare proxy.   Carrying out your role  Your duties depend on what the person’s advance care planning documents  say. Your duties may also depend on state law. In general:   Before accepting a role as a proxy, talk with the person. Be sure you know his or her wishes. Ask questions. This will help you be his or her voice if and when it is needed.  Be sure that the person’s healthcare team knows that you are his or her proxy. Carry a copy of the document and proof of your identity.  Make sure the healthcare team has a copy of the advance care planning documents.  Talk to the healthcare team. Ask questions as often as you need. Stay informed about the person’s condition.  Ask for any help you need to understand the medical situation. Ask about the person’s condition and prognosis. Ask about risks and benefits of tests and treatments. Find out all the facts and options.  Speak on the person’s behalf with the healthcare team when needed.  Talk with family members and keep them informed.  Know your rights. You have the right to ask for information. You can ask for consultations and second opinions. You have the right to request or refuse treatment for the person. You may be able to review his or her medical chart. You can authorize the person’s transfer to another facility. You can also request a new healthcare provider for him or her. If you are not sure what your rights are at any time, ask a .  When it’s time to make decisions  If the person’s wishes are clear in the advance care plan documents, ask for them to be carried out as noted. If they are not clear, talk with the healthcare team. Listen to the team’s recommendations. Talk with a  or counselor. It may be hard for you to make a decision at times. You may feel sad or upset about a decision. Being a healthcare proxy is not an easy role. But it's an important one. Remember that the person trusts you to carry out his or her wishes.   If you need help  Ask the healthcare team if you have trouble with a decision. The healthcare team will help  you.  Encourage the person you are helping to have a conversation with their provider about their end-of-life wishes. The provider can help them fill out the form.  You may need help in resolving family conflicts. Ask the hospital or clinic , ethics consultant, or a  for help.  If you are having trouble talking with the healthcare team while the person is in the hospital, reach out to the patient relations department. Or ask to speak to the hospital ombudsman or ethics committee.    [employer] last reviewed this educational content on 9/1/2019 © 2000-2021 [employer]. All rights reserved. This information is not intended as a substitute for professional medical care. Always follow your healthcare professional's instructions.          Life Support  If you understand how specific treatments may affect your quality of life, you can decide which ones you’d choose or refuse. You may want to talk to your healthcare provider about the possible benefits and risks of treatments. The chance of good results from these therapies varies based on each individual clinical situation and can be very hard to predict. Medical treatment, if your life is in danger, falls into 3 main categories.     Life supporting  This care keeps your heart and lungs going when they can no longer work on their own.  CPR restarts your heart and lungs if they stop working.  A respirator (or ventilator) keeps you breathing. Air is pumped into your lungs through a tube that’s put into your windpipe.    Life sustaining  This care keeps you alive longer when you have an illness that can’t be cured.  Tube feeding or TPN (total parenteral nutrition) provides food and fluids through a tube or IV (intravenous). It is given if you can’t chew or swallow on your own.  Dialysis is a kidney machine that cleans your blood when your kidneys can no longer work on their own.    Life enhancing  This care controls pain and  discomfort, such as nausea or trouble breathing. This type of care is not designed to prolong your life, but to enhance comfort and quality of life. Nothing is done to keep you alive longer.  Hospice care is comfort care. It might provide food and fluids by mouth or help with bathing. Hospice care is given during the last stages of a terminal illness.  Strong pain medicine can be given to help keep you comfortable.    Do Not Resuscitate (DNR)  Would you want CPR if your heart stops while you’re a patient in a hospital or nursing home? If not, talk to your healthcare provider about issuing a DNR (Do-Not-Resuscitate) order.   DNRs and advance directives may not apply during anesthesia, in emergency rooms, or when emergency medical teams respond to a 911 call. Ask your healthcare provider how you can make sure your wishes will be followed. Also, a DNR will not prevent you from getting other kinds of needed medical care such as treatment for pain, or bleeding.   [employer] last reviewed this educational content on 8/1/2021 © 2000-2021 [employer]. All rights reserved. This information is not intended as a substitute for professional medical care. Always follow your healthcare professional's instructions.          Stopping Life-Sustaining Treatments  Certain treatments can help sustain life when you have a serious illness. But as your illness progresses, there may come a time when these treatments are no longer a benefit. Decisions must then be made whether to continue or stop these treatments. This can be a hard task for you and your loved ones, but know that you’re not alone. Your healthcare provider and healthcare team will guide you through these treatment decisions. They will also help answer any questions you may have.     What are life-sustaining treatments?  Life-sustaining treatments help keep you alive if a vital body function fails. These treatments can include CPR and the use of machines to  help with heart, lung, or kidney function. They can also include the use of tubes to deliver food, fluids, blood, and medicines to the body. Blood transfusions and antibiotics are also types of life-sustaining treatments.   What happens if life-sustaining treatments are continued?  These treatments can help extend your life. But they will not cure your illness. If you are near the end of your life, you may find it hard to handle the side effects and problems that can occur with these treatments. In this case, the treatments may be too much of a burden on your body. They may cause more harm than good. They may also disrupt the natural dying process and prolong suffering.   What happens if life-sustaining treatments are stopped?  If these treatments are stopped, the focus of treatment will shift to comfort care. This involves measures to control pain and other symptoms you may have. These measures are not meant to cure your illness or help you live longer. Instead, they are meant to improve your quality of life during the time you have left. If you are in the end stage of your illness, you may be referred to hospice by your healthcare provider. Hospice provides end-of-life care. This includes emotional, spiritual, and social support for you and your loved ones.   How do I decide whether to stop life-sustaining treatments?  Your healthcare provider and healthcare team will talk with you about the specific treatments that are part of your care plan. If you want, you may include family and friends in these meetings. Here are some questions to think about or ask your healthcare team:   Is there is a chance that my illness will improve? Or will it continue to get worse?  What are my goals of care? Do I want to extend the time I have left? Or do I want to focus my care on comfort and managing symptoms?  How will stopping or continuing these treatments affect my health? Will they enhance my comfort and quality of life? Or  will they cause more problems?  Consider your own values or sarah beth. Also ask for advice from those who share your values.  How do I state my decisions about life-sustaining treatments?  Once you’ve made your decision to continue or stop specific treatments, you can tell your healthcare provider directly. It's best to also put your treatment wishes in writing with advance directives. These are legal forms related to healthcare decisions. Laws about advance directives vary from state to state. Ask your healthcare provider about what forms are needed to make sure your wishes will be followed. Some common forms include:   A durable power of  for healthcare or a healthcare proxy form.  This form lets you name a person to make treatment decisions for you when you can’t. This person is often called a healthcare proxy, medical or healthcare power of , or agent.  A living will.  This form tells others the kinds of treatment you want or don’t want if you become too ill or injured to speak for yourself.  Orders for life-sustaining treatments.  These are actual healthcare provider's orders that must be followed by other medical providers. The form belongs to you, not to the healthcare provider or hospital.). These are legal forms obtained from your healthcare provider or hospital that document your wishes. The forms are known by different names depending on the state. Common names include:  MOLST (medical orders for life-sustaining treatment)  POLST (physician orders for life-sustaining treatment)  MOST (medical orders for scope of treatment)  POST (physician orders for scope of treatment)  TPOPP (transportable physician orders for patient preferences)     Keep in mind that you can change or cancel an advance directive at any time. Make it a practice to review your decisions each time there is a change in your health or goals of care. Also be sure to tell your healthcare proxy and loved ones of any changes in  your decisions.   Deciding whether to stop life-sustaining treatments for a loved one   The decision to stop treatment ideally is made with the person’s consent. If the person is not capable of making decisions and has no advance directive, the decision falls to the person’s healthcare proxy or other adult. If you need to make a decision about stopping treatment for a loved one, start by talking to his or her healthcare provider. Review the goals of care and the benefits and burdens of specific treatments on your loved one’s health. Also think about your loved one’s wishes and values. If needed, seek advice from other healthcare team members, like a  or .   [employer] last reviewed this educational content on 12/1/2019    © 5770-4551 [employer]. All rights reserved. This information is not intended as a substitute for professional medical care. Always follow your healthcare professional's instructions.  Advance Care Planning done today:   She does NOT have a Living Will. [Do you have a living will?: No]  She does NOT have a Power of  for Health Care. [Do you have a healthcare power of ?: No]     Discussed Advance Care Planning with patient and patient declined resource information.  16+ minutes was spent with all Advanced Care Planning elements today (up to 30 minutes for CPT 82285)  Advance care planning including the explanation and discussion of advance directives standard forms performed Face to Face with patient and Family/surrogate (if present), and forms available to patient in AVS

## 2025-05-13 NOTE — TELEPHONE ENCOUNTER
To complete the PA for this pt:    1. Go to  [email or web address] and click \"Enter a Key\"  2. Enter the pts last name, , and key.   Key:[id number]     Pts last name:Thurnall     : 1955             Pantoprazole 40MG Tabs  3. Complete the form and click \"Send to Plan\"    Please notify us when you receive the determination from the plan.

## 2025-05-14 ENCOUNTER — TELEPHONE (OUTPATIENT)
Dept: INTERNAL MEDICINE CLINIC | Facility: CLINIC | Age: 70
End: 2025-05-14

## 2025-05-14 NOTE — TELEPHONE ENCOUNTER
Please advise diagnosis not listed in last office visit: Diagnosis needed for prior authorization. Routing to Dr. Stover Please advise.

## 2025-05-19 DIAGNOSIS — Z51.11 CHEMOTHERAPY MANAGEMENT, ENCOUNTER FOR: ICD-10-CM

## 2025-05-19 DIAGNOSIS — C64.1 RENAL CELL CARCINOMA OF RIGHT KIDNEY (HCC): Primary | ICD-10-CM

## 2025-05-19 RX ORDER — CABOZANTINIB 20 MG/1
1 TABLET ORAL DAILY
Qty: 30 TABLET | Refills: 11 | Status: SHIPPED | OUTPATIENT
Start: 2025-05-19

## 2025-05-22 ENCOUNTER — HOSPITAL ENCOUNTER (OUTPATIENT)
Dept: CT IMAGING | Facility: HOSPITAL | Age: 70
Discharge: HOME | End: 2025-05-22
Attending: STUDENT IN AN ORGANIZED HEALTH CARE EDUCATION/TRAINING PROGRAM
Payer: MEDICARE

## 2025-05-22 DIAGNOSIS — C64.1 RENAL CELL CARCINOMA OF RIGHT KIDNEY (HCC): ICD-10-CM

## 2025-05-22 DIAGNOSIS — Z51.12 ENCOUNTER FOR ANTINEOPLASTIC IMMUNOTHERAPY: ICD-10-CM

## 2025-05-22 DIAGNOSIS — C79.9 METASTATIC DISEASE (HCC): ICD-10-CM

## 2025-05-22 DIAGNOSIS — E27.3 ADRENAL INSUFFICIENCY DUE TO CANCER THERAPY (HCC): ICD-10-CM

## 2025-05-22 PROCEDURE — 74177 CT ABD & PELVIS W/CONTRAST: CPT | Performed by: STUDENT IN AN ORGANIZED HEALTH CARE EDUCATION/TRAINING PROGRAM

## 2025-05-22 PROCEDURE — 71260 CT THORAX DX C+: CPT | Performed by: STUDENT IN AN ORGANIZED HEALTH CARE EDUCATION/TRAINING PROGRAM

## 2025-05-28 ENCOUNTER — OFFICE VISIT (OUTPATIENT)
Age: 70
End: 2025-05-28
Attending: INTERNAL MEDICINE
Payer: MEDICARE

## 2025-05-28 ENCOUNTER — NURSE ONLY (OUTPATIENT)
Age: 70
End: 2025-05-28
Attending: INTERNAL MEDICINE
Payer: MEDICARE

## 2025-05-28 VITALS
DIASTOLIC BLOOD PRESSURE: 82 MMHG | BODY MASS INDEX: 29.82 KG/M2 | RESPIRATION RATE: 18 BRPM | WEIGHT: 179 LBS | SYSTOLIC BLOOD PRESSURE: 130 MMHG | OXYGEN SATURATION: 96 % | HEIGHT: 65 IN | HEART RATE: 75 BPM | TEMPERATURE: 98 F

## 2025-05-28 DIAGNOSIS — Z51.11 CHEMOTHERAPY MANAGEMENT, ENCOUNTER FOR: Primary | ICD-10-CM

## 2025-05-28 DIAGNOSIS — Z51.12 ENCOUNTER FOR ANTINEOPLASTIC IMMUNOTHERAPY: ICD-10-CM

## 2025-05-28 DIAGNOSIS — E03.2 HYPOTHYROIDISM DUE TO MEDICATION: ICD-10-CM

## 2025-05-28 DIAGNOSIS — E27.3 ADRENAL INSUFFICIENCY DUE TO CANCER THERAPY (HCC): ICD-10-CM

## 2025-05-28 DIAGNOSIS — C78.01 MALIGNANT NEOPLASM METASTATIC TO BOTH LUNGS (HCC): ICD-10-CM

## 2025-05-28 DIAGNOSIS — C64.1 RENAL CELL CARCINOMA OF RIGHT KIDNEY (HCC): ICD-10-CM

## 2025-05-28 DIAGNOSIS — Z01.84 IMMUNITY STATUS TESTING: ICD-10-CM

## 2025-05-28 DIAGNOSIS — Z51.11 CHEMOTHERAPY MANAGEMENT, ENCOUNTER FOR: ICD-10-CM

## 2025-05-28 DIAGNOSIS — C64.1 RENAL CELL CARCINOMA OF RIGHT KIDNEY (HCC): Primary | ICD-10-CM

## 2025-05-28 DIAGNOSIS — C78.02 MALIGNANT NEOPLASM METASTATIC TO BOTH LUNGS (HCC): ICD-10-CM

## 2025-05-28 LAB
ALBUMIN SERPL-MCNC: 3.9 G/DL (ref 3.2–4.8)
ALBUMIN/GLOB SERPL: 1.1 {RATIO} (ref 1–2)
ALP LIVER SERPL-CCNC: 77 U/L (ref 55–142)
ALT SERPL-CCNC: 15 U/L (ref 10–49)
ANION GAP SERPL CALC-SCNC: 7 MMOL/L (ref 0–18)
AST SERPL-CCNC: 23 U/L (ref ?–34)
BASOPHILS # BLD AUTO: 0.05 X10(3) UL (ref 0–0.2)
BASOPHILS NFR BLD AUTO: 0.8 %
BILIRUB SERPL-MCNC: 0.3 MG/DL (ref 0.2–1.1)
BUN BLD-MCNC: 15 MG/DL (ref 9–23)
BUN/CREAT SERPL: 20.8 (ref 10–20)
CALCIUM BLD-MCNC: 8.8 MG/DL (ref 8.7–10.4)
CHLORIDE SERPL-SCNC: 107 MMOL/L (ref 98–112)
CHOLEST SERPL-MCNC: 150 MG/DL (ref ?–200)
CO2 SERPL-SCNC: 28 MMOL/L (ref 21–32)
CREAT BLD-MCNC: 0.72 MG/DL (ref 0.55–1.02)
DEPRECATED RDW RBC AUTO: 52.6 FL (ref 35.1–46.3)
EGFRCR SERPLBLD CKD-EPI 2021: 91 ML/MIN/1.73M2 (ref 60–?)
EOSINOPHIL # BLD AUTO: 0.13 X10(3) UL (ref 0–0.7)
EOSINOPHIL NFR BLD AUTO: 2 %
ERYTHROCYTE [DISTWIDTH] IN BLOOD BY AUTOMATED COUNT: 15.9 % (ref 11–15)
GLOBULIN PLAS-MCNC: 3.7 G/DL (ref 2–3.5)
GLUCOSE BLD-MCNC: 92 MG/DL (ref 70–99)
HCT VFR BLD AUTO: 36 % (ref 35–48)
HDLC SERPL-MCNC: 45 MG/DL (ref 40–59)
HGB BLD-MCNC: 10.9 G/DL (ref 12–16)
IMM GRANULOCYTES # BLD AUTO: 0.01 X10(3) UL (ref 0–1)
IMM GRANULOCYTES NFR BLD: 0.2 %
LDLC SERPL CALC-MCNC: 90 MG/DL (ref ?–100)
LYMPHOCYTES # BLD AUTO: 1.74 X10(3) UL (ref 1–4)
LYMPHOCYTES NFR BLD AUTO: 26.2 %
MCH RBC QN AUTO: 27.3 PG (ref 26–34)
MCHC RBC AUTO-ENTMCNC: 30.3 G/DL (ref 31–37)
MCV RBC AUTO: 90 FL (ref 80–100)
MONOCYTES # BLD AUTO: 0.41 X10(3) UL (ref 0.1–1)
MONOCYTES NFR BLD AUTO: 6.2 %
NEUTROPHILS # BLD AUTO: 4.3 X10 (3) UL (ref 1.5–7.7)
NEUTROPHILS # BLD AUTO: 4.3 X10(3) UL (ref 1.5–7.7)
NEUTROPHILS NFR BLD AUTO: 64.6 %
NONHDLC SERPL-MCNC: 105 MG/DL (ref ?–130)
OSMOLALITY SERPL CALC.SUM OF ELEC: 294 MOSM/KG (ref 275–295)
PLATELET # BLD AUTO: 356 10(3)UL (ref 150–450)
POTASSIUM SERPL-SCNC: 4.7 MMOL/L (ref 3.5–5.1)
PROT SERPL-MCNC: 7.6 G/DL (ref 5.7–8.2)
RBC # BLD AUTO: 4 X10(6)UL (ref 3.8–5.3)
RUBV IGG SER QL: POSITIVE
RUBV IGG SER-ACNC: 54 IU/ML (ref 10–?)
SODIUM SERPL-SCNC: 142 MMOL/L (ref 136–145)
T4 FREE SERPL-MCNC: 1.4 NG/DL (ref 0.8–1.7)
TRIGL SERPL-MCNC: 77 MG/DL (ref 30–149)
TSI SER-ACNC: 0.85 UIU/ML (ref 0.55–4.78)
VLDLC SERPL CALC-MCNC: 12 MG/DL (ref 0–30)
WBC # BLD AUTO: 6.6 X10(3) UL (ref 4–11)

## 2025-05-28 RX ORDER — FAMOTIDINE 10 MG/ML
20 INJECTION, SOLUTION INTRAVENOUS 2 TIMES DAILY
Status: CANCELLED
Start: 2025-05-28

## 2025-05-28 RX ORDER — FAMOTIDINE 10 MG/ML
20 INJECTION, SOLUTION INTRAVENOUS ONCE
Status: COMPLETED | OUTPATIENT
Start: 2025-05-28 | End: 2025-05-28

## 2025-05-28 RX ORDER — FAMOTIDINE 10 MG/ML
INJECTION, SOLUTION INTRAVENOUS
Status: DISPENSED
Start: 2025-05-28 | End: 2025-05-29

## 2025-05-28 RX ADMIN — FAMOTIDINE 20 MG: 10 INJECTION, SOLUTION INTRAVENOUS at 13:26:00

## 2025-05-28 NOTE — PROGRESS NOTES
Pt here for C13D1 Drug(s)Opdivo.  Arrives Ambulating independently, accompanied by Spouse.  PIV from lab with good blood return.      Patient was evaluated today by Treatment Nurse.    Oral medications included in this regimen:  no    Patient confirms comprehension of cancer treatment schedule:  yes    Pregnancy screening:  Denies possibility of pregnancy    Modifications in dose or schedule:  No    Medications appearance and physical integrity checked by RN: yes.    Chemotherapy IV pump settings verified by 2 RNs:  No due to targeted therapy IV administration.  Frequency of blood return and site check throughout administration: Prior to administration, Prior to each drug, and At completion of therapy     Infusion/treatment outcome:  patient tolerated treatment without incident    PIV dc'd and pt left without complaints.     Education Record    Learner:  Patient and Family Member  Barriers / Limitations:  None  Method:  Discussion  Education / instructions given:  reviewed plan of care, reviewed appointment time  Outcome:  Shows understanding    Discharged Home, Ambulating independently, accompanied by:Self and Family member    Patient/family verbalized understanding of future appointments: by printed AVS

## 2025-05-29 NOTE — PROGRESS NOTES
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 She began Nivolumab Cabozantinib 6/12/24 and presents for followup.     Interval history:  Overall patient continues to do well, hydrocortisone was lowered to 10/5 a month or 2 ago and she is doing well with this dose.  Tolerating cabozantinib 20 mg daily very well.  Denies mouth sores or sensitivity, hand-foot sensitivity, diarrhea.    Oncology history:  Briefly, the patient began to feel fatigued in the Fall 2023 and she was found to have a hemoglobin of 8.5 with an MCV of 71 with last lab values in 2017 showing a normal hemoglobin.  In March 2024, iron saturation was 15% and ferritin was 612.  She was given IV iron and referred to GI which prompted a CT abdomen pelvis which unfortunately revealed an 11 cm enhancing mass within the right kidney and extensive bibasilar pleural metastases concerning for metastatic renal cell carcinoma.     5/21/24: Initial medical oncology consultation, will obtain brain imaging and refer to IR for kidney biopsy.  Expressed concern for metastatic kidney cancer.  -Brain MRI showed a meningioma, no intracranial metastatic disease.  -IR guided biopsy 6/5 shows high-grade clear-cell renal cell carcinoma, with extensive necrosis but no sarcomatoid or rhabdoid features    6/6/24: Medical oncology follow-up.  Patient continues to decline.  Patient has poor risk disease, very symptomatic and concerned with need for rapid response upfront.  Therefore, we will proceed with cabozantinib nivolumab per CheckMate  9ER    6/12/24: began Cabozantinib Nivolumab.     6/26/24: slight improvement, no dose limiting toxicities, continue treatment.     7/24/24: Feeling little stronger, tolerating therapy well    8/7/24: Grade 1 transaminitis, continue therapy, repeat LFTs next week, follow-up in 2 weeks, restaging at the end of August 9/4/24: Restaging CT shows partial response to treatment. Continue current therapy plan.     10/3/24: Nearly grade 2 PPE persists despite cabozantinib 40 mg every other day.  Hold for 1 week.  We will dose reduce to 20 mg daily, request new prescription.  Proceed with nivolumab 480 mg monthly dosing today.  Follow-up in 1 month which will be 2 to 3 weeks on cabozantinib 20 mg daily.    10/30/24: Doing well with dose reduced cabozantinib 20 mg daily.  No immune related toxicities, cleared for next month of treatment.    11/21-11/24/24: Admitted to [facility] with pneumonia and failure to thrive.    12/4/24: Remains very weak, now hypothyroid.  We will prescribe levothyroxine.  Rule out adrenal insufficiency.  Restaging at this visit also shows stable disease.  Okay to continue treatment for now.    12/19/24: Found to have adrenal insufficiency, ACTH and cortisol low.  Started on hydrocortisone 20/10 mg and referred to endocrinology.  Also started on levothyroxine.  Energy is improving, appetite is better she is slowly improving.    1/2/24: feeling well, cleared for nivolumab, continue cabozantinib.     1/30/25: Cleared for nivolumab, continue cabozantinib 20 mg daily.  Decrease hydrocortisone to 10 mg a.m. 5 mg p.m.    3/5/25: restaging this visit shows SD. One small left lower lobe pleural nodule slightly increased in size but overall other areas continue to improve or are stable. Continue cabozantinb 20mg daily, continue nivolumab monthly.     4/30/25: Continue cabozantinib and nivolumab, restaging next appointment.    5/28/25: Restaging shows stable disease.  Continue cabozantinib and nivolumab.    Review  of Systems:  Hematology/Oncology ROS performed and negative except as above in HPI    History/Other:   Current Medications:   Cabozantinib S-Malate (CABOMETYX) 20 MG Oral Tab Take 1 tablet by mouth daily. 30 tablet 11    metoprolol succinate ER 50 MG Oral Tablet 24 Hr Take 2 tablets (100 mg total) by mouth daily as needed. 90 tablet 1    pantoprazole 40 MG Oral Tab EC Take 1 tablet (40 mg total) by mouth at bedtime. 90 tablet 3    acetaminophen 500 MG Oral Tab Take 1 tablet (500 mg total) by mouth every 4 (four) hours as needed for Fever (equal to or greater than 100.4). 200 tablet 3    hydrocortisone 10 MG Oral Tab Take 2 tablets (20 mg total) by mouth daily with breakfast AND 1 tablet (10 mg total) Every afternoon at 2:00 pm. 90 tablet 2    levothyroxine 75 MCG Oral Tab Take 1 tablet (75 mcg total) by mouth before breakfast. 90 tablet 2    sertraline 25 MG Oral Tab Take 1 tablet (25 mg total) by mouth daily. 90 tablet 1    sodium chloride 0.65 % Nasal Solution 2 sprays by Nasal route every 4 (four) hours as needed for congestion. 1 each 0    triamcinolone 0.1 % External Lotion Apply 1 Application topically 2 (two) times daily. 1 each 0     Allergies:   Allergies   Allergen Reactions    Levaquin [Levofloxacin] OTHER (SEE COMMENTS)     Tendon pain    Ace Inhibitors Coughing    Azithromycin      Other reaction(s): AZITHROMYCIN    Erythromycin      Other reaction(s): Rash - Mild    Naproxen      Other reaction(s): NAPROXEN    Nortriptyline      Other reaction(s): vivid, scary dreams    Sulfa Antibiotics      Other reaction(s): Rash - Moderate    Ceftriaxone RASH     Objective:   Blood pressure 130/82, pulse 75, temperature 97.8 °F (36.6 °C), temperature source Tympanic, resp. rate 18, height 1.651 m (5' 5\"), weight 81.2 kg (179 lb), SpO2 96%, not currently breastfeeding.  Physical Exam:  ECOG PS: 1  General: A&Ox3, NAD  HEENT: PERRL, anicteric, OP clear  CV: RRR, no murmurs  Pulm: CTA b/l, nl effort  Abd: soft, ntnd,  no HSM or masses  Extremities: no edema, hands wnl   Neurological: grossly intact    Results:   Labs:  Lab Results   Component Value Date    WBC 6.6 05/28/2025    HGB 10.9 (L) 05/28/2025    HCT 36.0 05/28/2025    .0 05/28/2025    CREATSERUM 0.72 05/28/2025    BUN 15 05/28/2025     05/28/2025    K 4.7 05/28/2025     05/28/2025    CO2 28.0 05/28/2025    GLU 92 05/28/2025    CA 8.8 05/28/2025    ALB 3.9 05/28/2025    ALKPHO 77 05/28/2025    BILT 0.3 05/28/2025    TP 7.6 05/28/2025    AST 23 05/28/2025    ALT 15 05/28/2025    INR 1.09 11/19/2024    T4F 1.4 05/28/2025    TSH 0.852 05/28/2025    MG 2.0 04/02/2025    PHOS 3.2 11/21/2024    B12 435 07/14/2017     Imaging:  CT CHEST+ABDOMEN+PELVIS(ALL CNTRST ONLY)(LCS=[id number])  Result Date: 5/28/2025  CONCLUSION:  1. Grossly stable exam.  2. A largely necrotic right renal mass appears grossly stable.  3. Extensive pleural-based nodularity is re-identified, not substantially changed in the interim.  4. Necrotic-appearing mediastinal lymphadenopathy appears similar to previous.  5. Dilatation of the main pulmonary artery trunk may relate to underlying pulmonary hypertension.  6. Uncomplicated distal colonic diverticulosis.  7. Status post hysterectomy.  8. Possible appendectomy.  9. Large fat containing periumbilical hernia.  10. Lesser incidental findings as above.    Dictated by (CST): Hammad Hewitt MD on 5/28/2025 at 9:02 AM     Finalized by (CST): Hammad Hewitt MD on 5/28/2025 at 9:24 AM          CT a/p 5/19/24:  CONCLUSION: 11.1 x 10.2 centimeter enhancing mass replacing the mid to lower right kidney with extensive bibasilar enhancing pleural tumor, trace pleural effusions, intrathoracic adenopathy.  This is likely renal cell carcinoma with extensive bibasilar thoracic involvement     CT chest 5/22/24:  CONCLUSION: Extensive pleural masses with associated small bilateral pleural effusions , as well as mediastinal and hilar lymphadenopathy and  bilateral pulmonary nodules.  These are highly concerning for sites of metastatic disease in this setting.    A questioned focal sclerosis of the left lateral first rib adjacent to a tiny pleural mass could represent a site of osseous involvement of disease.     Right kidney, mass; CT-guided core biopsy 6/5/24:   Clear-cell renal cell carcinoma in a background of extensive necrosis, see comment.    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 She began Nivolumab Cabozantinib 6/12/24 and presents for followup.     # Metastatic poor-risk ccRCC.   -large right renal mass, extensive pleural metastases, anemia, neutropenia, thrombocytosis, quite symptomatic on presentation. Given progressive symptoms and poor risk disease, discussed the need for a treatment response asap, therefore we discussed proceeding with Nivolumab and Cabozantinib per CM 9ER over ipilimumab and nivolumab with concerns for rapid response, and began treatment 6/12/24  -August 2024 imaging shows partial response to therapy, restaging in early December 2024 shows continued partial response  -PPE, dysgeusia, fatigue all improved with dose reduction of cabozantinib down to 20 mg daily, continue  -developed adrenal insufficiency and hypothyroidism in December 2024, treating as below and now much improved  -Restaging in late May 2025 continues to show stable disease after partial response.  Continue monthly nivolumab and cabozantinib 20 mg daily.  - Follow-up in 1 month, she and her  are traveling to [place] for 2 weeks in the month of June.    # Immune mediated adrenal insufficiency  # Immune mediated hypothyroidism  -Patient developed severe weakness and fatigue in November 2024, found to have a high in early December 2024.  -She is tolerating  hydrocortisone 10/5mg daily, continue for now, does follow with Endocrinology   -On levothyroxine 50 mcg daily, TSH normalized    # Symptom management  -zofran ODT prn for nausea  -PPE, udderbalm cream and voltaren gel BID-TID.  Has since resolved with dose-reduced cabozantinib to 20 mg daily as above  -Per Dentist, tooth extraction necessary, ok to proceed on Nivolumab/Cabozantinib (minor procedure)    # Comorbidities  -HTN, hold metoprolol given hypotension with weight loss  -Hypothyroidism, immune-mediated, start Synthroid 50 mcg daily    MDM High: malignancy; review of results with independent interpretation and discussion of management; drug therapy requiring intensive monitoring for toxicity;  ongoing continuity of complex care    Archie Crocker,     [facility] Hematology/Oncology  [facility]     This note was created using a voice-recognition transcribing system. Incorrect words or phrases may have been missed during proofreading. Please interpret accordingly.

## 2025-05-30 LAB
MEASLES (RUBEOLA) AB, IGM, S: NEGATIVE
MEV IGG SER-ACNC: >300 AU/ML (ref 16.5–?)
MUV IGG SER IA-ACNC: 214 AU/ML (ref 11–?)

## 2025-06-25 ENCOUNTER — OFFICE VISIT (OUTPATIENT)
Age: 70
End: 2025-06-25
Attending: INTERNAL MEDICINE
Payer: MEDICARE

## 2025-06-25 ENCOUNTER — NURSE ONLY (OUTPATIENT)
Age: 70
End: 2025-06-25
Attending: INTERNAL MEDICINE
Payer: MEDICARE

## 2025-06-25 VITALS
WEIGHT: 174 LBS | RESPIRATION RATE: 18 BRPM | OXYGEN SATURATION: 96 % | HEIGHT: 65 IN | DIASTOLIC BLOOD PRESSURE: 85 MMHG | HEART RATE: 113 BPM | TEMPERATURE: 98 F | BODY MASS INDEX: 28.99 KG/M2 | SYSTOLIC BLOOD PRESSURE: 121 MMHG

## 2025-06-25 DIAGNOSIS — C78.01 MALIGNANT NEOPLASM METASTATIC TO BOTH LUNGS (HCC): ICD-10-CM

## 2025-06-25 DIAGNOSIS — C64.1 RENAL CELL CARCINOMA OF RIGHT KIDNEY (HCC): Primary | ICD-10-CM

## 2025-06-25 DIAGNOSIS — C78.02 MALIGNANT NEOPLASM METASTATIC TO BOTH LUNGS (HCC): ICD-10-CM

## 2025-06-25 DIAGNOSIS — Z51.11 CHEMOTHERAPY MANAGEMENT, ENCOUNTER FOR: Primary | ICD-10-CM

## 2025-06-25 DIAGNOSIS — E03.2 HYPOTHYROIDISM DUE TO MEDICATION: ICD-10-CM

## 2025-06-25 DIAGNOSIS — Z51.12 ENCOUNTER FOR ANTINEOPLASTIC IMMUNOTHERAPY: ICD-10-CM

## 2025-06-25 DIAGNOSIS — Z51.11 CHEMOTHERAPY MANAGEMENT, ENCOUNTER FOR: ICD-10-CM

## 2025-06-25 DIAGNOSIS — E27.3 ADRENAL INSUFFICIENCY DUE TO CANCER THERAPY (HCC): ICD-10-CM

## 2025-06-25 DIAGNOSIS — C64.1 RENAL CELL CARCINOMA OF RIGHT KIDNEY (HCC): ICD-10-CM

## 2025-06-25 LAB
ALBUMIN SERPL-MCNC: 4.1 G/DL (ref 3.2–4.8)
ALBUMIN/GLOB SERPL: 1 {RATIO} (ref 1–2)
ALP LIVER SERPL-CCNC: 103 U/L (ref 55–142)
ALT SERPL-CCNC: 34 U/L (ref 10–49)
ANION GAP SERPL CALC-SCNC: 6 MMOL/L (ref 0–18)
AST SERPL-CCNC: 36 U/L (ref ?–34)
BASOPHILS # BLD AUTO: 0.04 X10(3) UL (ref 0–0.2)
BASOPHILS NFR BLD AUTO: 0.6 %
BILIRUB SERPL-MCNC: 0.3 MG/DL (ref 0.2–1.1)
BUN BLD-MCNC: 14 MG/DL (ref 9–23)
BUN/CREAT SERPL: 17.9 (ref 10–20)
CALCIUM BLD-MCNC: 9.1 MG/DL (ref 8.7–10.4)
CHLORIDE SERPL-SCNC: 106 MMOL/L (ref 98–112)
CHOLEST SERPL-MCNC: 150 MG/DL (ref ?–200)
CO2 SERPL-SCNC: 26 MMOL/L (ref 21–32)
CREAT BLD-MCNC: 0.78 MG/DL (ref 0.55–1.02)
DEPRECATED RDW RBC AUTO: 51.1 FL (ref 35.1–46.3)
EGFRCR SERPLBLD CKD-EPI 2021: 83 ML/MIN/1.73M2 (ref 60–?)
EOSINOPHIL # BLD AUTO: 0.13 X10(3) UL (ref 0–0.7)
EOSINOPHIL NFR BLD AUTO: 1.9 %
ERYTHROCYTE [DISTWIDTH] IN BLOOD BY AUTOMATED COUNT: 16.1 % (ref 11–15)
GLOBULIN PLAS-MCNC: 4.1 G/DL (ref 2–3.5)
GLUCOSE BLD-MCNC: 94 MG/DL (ref 70–99)
HCT VFR BLD AUTO: 36.8 % (ref 35–48)
HDLC SERPL-MCNC: 42 MG/DL (ref 40–59)
HGB BLD-MCNC: 11.6 G/DL (ref 12–16)
IMM GRANULOCYTES # BLD AUTO: 0.03 X10(3) UL (ref 0–1)
IMM GRANULOCYTES NFR BLD: 0.4 %
LDLC SERPL CALC-MCNC: 91 MG/DL (ref ?–100)
LYMPHOCYTES # BLD AUTO: 1.63 X10(3) UL (ref 1–4)
LYMPHOCYTES NFR BLD AUTO: 24 %
MCH RBC QN AUTO: 27.2 PG (ref 26–34)
MCHC RBC AUTO-ENTMCNC: 31.5 G/DL (ref 31–37)
MCV RBC AUTO: 86.4 FL (ref 80–100)
MONOCYTES # BLD AUTO: 0.42 X10(3) UL (ref 0.1–1)
MONOCYTES NFR BLD AUTO: 6.2 %
NEUTROPHILS # BLD AUTO: 4.54 X10 (3) UL (ref 1.5–7.7)
NEUTROPHILS # BLD AUTO: 4.54 X10(3) UL (ref 1.5–7.7)
NEUTROPHILS NFR BLD AUTO: 66.9 %
NONHDLC SERPL-MCNC: 108 MG/DL (ref ?–130)
OSMOLALITY SERPL CALC.SUM OF ELEC: 286 MOSM/KG (ref 275–295)
PLATELET # BLD AUTO: 357 10(3)UL (ref 150–450)
POTASSIUM SERPL-SCNC: 4.3 MMOL/L (ref 3.5–5.1)
PROT SERPL-MCNC: 8.2 G/DL (ref 5.7–8.2)
RBC # BLD AUTO: 4.26 X10(6)UL (ref 3.8–5.3)
SODIUM SERPL-SCNC: 138 MMOL/L (ref 136–145)
TRIGL SERPL-MCNC: 89 MG/DL (ref 30–149)
VLDLC SERPL CALC-MCNC: 14 MG/DL (ref 0–30)
WBC # BLD AUTO: 6.8 X10(3) UL (ref 4–11)

## 2025-06-25 RX ORDER — FAMOTIDINE 10 MG/ML
20 INJECTION, SOLUTION INTRAVENOUS ONCE
Status: COMPLETED | OUTPATIENT
Start: 2025-06-25 | End: 2025-06-25

## 2025-06-25 RX ORDER — FAMOTIDINE 10 MG/ML
20 INJECTION, SOLUTION INTRAVENOUS 2 TIMES DAILY
Status: CANCELLED
Start: 2025-06-25

## 2025-06-25 RX ORDER — FAMOTIDINE 10 MG/ML
INJECTION, SOLUTION INTRAVENOUS
Status: DISPENSED
Start: 2025-06-25 | End: 2025-06-25

## 2025-06-25 RX ADMIN — FAMOTIDINE 20 MG: 10 INJECTION, SOLUTION INTRAVENOUS at 11:34:00

## 2025-06-25 NOTE — PROGRESS NOTES
Pt here for C14D1 Drug(s)OPDIVO.  Arrives Ambulating independently, accompanied by Spouse     Patient was evaluated today by MD and Treatment Nurse.    L AC PIV started, good blood return noted.    Oral medications included in this regimen:  yes - Cabozantinib    Patient confirms comprehension of cancer treatment schedule:  yes    Pregnancy screening:  Not applicable    Modifications in dose or schedule:  No    Medications appearance and physical integrity checked by RN: yes.    Chemotherapy IV pump settings verified by 2 RNs:  No due to targeted therapy IV administration. Filter used.  Frequency of blood return and site check throughout administration: Prior to administration, Prior to each drug, and At completion of therapy     Infusion/treatment outcome:  patient tolerated treatment without incident    PIV removed.    Education Record    Learner:  Patient and Spouse  Barriers / Limitations:  None  Method:  Discussion and Printed material  Education / instructions given:  medication, plan of care, schedule  Outcome:  Shows understanding    Discharged Home, Ambulating independently, accompanied by:Spouse    Patient/family verbalized understanding of future appointments: by printed AVS

## 2025-06-26 NOTE — PROGRESS NOTES
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 She began Nivolumab Cabozantinib 6/12/24 and presents for followup.     Interval history:  Overall patient continues to do well, hydrocortisone was lowered to 10/5 in late winter.  Tolerating cabozantinib 20 mg daily very well.  Denies mouth sores or sensitivity, hand-foot sensitivity, diarrhea.  She and her  had a wonderful trip to [place] earlier in June.  She is in excellent spirits today.    Oncology history:  Briefly, the patient began to feel fatigued in the Fall 2023 and she was found to have a hemoglobin of 8.5 with an MCV of 71 with last lab values in 2017 showing a normal hemoglobin.  In March 2024, iron saturation was 15% and ferritin was 612.  She was given IV iron and referred to GI which prompted a CT abdomen pelvis which unfortunately revealed an 11 cm enhancing mass within the right kidney and extensive bibasilar pleural metastases concerning for metastatic renal cell carcinoma.     5/21/24: Initial medical oncology consultation, will obtain brain imaging and refer to IR for kidney biopsy.  Expressed concern for metastatic kidney cancer.  -Brain MRI showed a meningioma, no intracranial metastatic disease.  -IR guided biopsy 6/5 shows high-grade clear-cell renal cell carcinoma, with extensive necrosis but no sarcomatoid or rhabdoid features    6/6/24: Medical oncology follow-up.  Patient continues to decline.  Patient has poor risk disease, very symptomatic and concerned with need for rapid response upfront.  Therefore,  we will proceed with cabozantinib nivolumab per CheckMate 9ER    6/12/24: began Cabozantinib Nivolumab.     6/26/24: slight improvement, no dose limiting toxicities, continue treatment.     7/24/24: Feeling little stronger, tolerating therapy well    8/7/24: Grade 1 transaminitis, continue therapy, repeat LFTs next week, follow-up in 2 weeks, restaging at the end of August 9/4/24: Restaging CT shows partial response to treatment. Continue current therapy plan.     10/3/24: Nearly grade 2 PPE persists despite cabozantinib 40 mg every other day.  Hold for 1 week.  We will dose reduce to 20 mg daily, request new prescription.  Proceed with nivolumab 480 mg monthly dosing today.  Follow-up in 1 month which will be 2 to 3 weeks on cabozantinib 20 mg daily.    10/30/24: Doing well with dose reduced cabozantinib 20 mg daily.  No immune related toxicities, cleared for next month of treatment.    11/21-11/24/24: Admitted to [facility] with pneumonia and failure to thrive.    12/4/24: Remains very weak, now hypothyroid.  We will prescribe levothyroxine.  Rule out adrenal insufficiency.  Restaging at this visit also shows stable disease.  Okay to continue treatment for now.    12/19/24: Found to have adrenal insufficiency, ACTH and cortisol low.  Started on hydrocortisone 20/10 mg and referred to endocrinology.  Also started on levothyroxine.  Energy is improving, appetite is better she is slowly improving.    1/2/24: feeling well, cleared for nivolumab, continue cabozantinib.     1/30/25: Cleared for nivolumab, continue cabozantinib 20 mg daily.  Decrease hydrocortisone to 10 mg a.m. 5 mg p.m.    3/5/25: restaging this visit shows SD. One small left lower lobe pleural nodule slightly increased in size but overall other areas continue to improve or are stable. Continue cabozantinb 20mg daily, continue nivolumab monthly.     4/30/25: Continue cabozantinib and nivolumab, restaging next appointment.    5/28/25: Restaging shows stable  disease.  Continue cabozantinib and nivolumab.    Review of Systems:  Hematology/Oncology ROS performed and negative except as above in HPI    History/Other:   Current Medications:   Cabozantinib S-Malate (CABOMETYX) 20 MG Oral Tab Take 1 tablet by mouth daily. 30 tablet 11    metoprolol succinate ER 50 MG Oral Tablet 24 Hr Take 2 tablets (100 mg total) by mouth daily as needed. 90 tablet 1    pantoprazole 40 MG Oral Tab EC Take 1 tablet (40 mg total) by mouth at bedtime. 90 tablet 3    acetaminophen 500 MG Oral Tab Take 1 tablet (500 mg total) by mouth every 4 (four) hours as needed for Fever (equal to or greater than 100.4). 200 tablet 3    hydrocortisone 10 MG Oral Tab Take 2 tablets (20 mg total) by mouth daily with breakfast AND 1 tablet (10 mg total) Every afternoon at 2:00 pm. 90 tablet 2    levothyroxine 75 MCG Oral Tab Take 1 tablet (75 mcg total) by mouth before breakfast. 90 tablet 2    sertraline 25 MG Oral Tab Take 1 tablet (25 mg total) by mouth daily. 90 tablet 1    sodium chloride 0.65 % Nasal Solution 2 sprays by Nasal route every 4 (four) hours as needed for congestion. 1 each 0    triamcinolone 0.1 % External Lotion Apply 1 Application topically 2 (two) times daily. 1 each 0     Allergies:   Allergies   Allergen Reactions    Levaquin [Levofloxacin] OTHER (SEE COMMENTS)     Tendon pain    Ace Inhibitors Coughing    Azithromycin      Other reaction(s): AZITHROMYCIN    Erythromycin      Other reaction(s): Rash - Mild    Naproxen      Other reaction(s): NAPROXEN    Nortriptyline      Other reaction(s): vivid, scary dreams    Sulfa Antibiotics      Other reaction(s): Rash - Moderate    Ceftriaxone RASH     Objective:   Blood pressure 121/85, pulse 113, temperature 98.1 °F (36.7 °C), temperature source Tympanic, resp. rate 18, height 1.651 m (5' 5\"), weight 78.9 kg (174 lb), SpO2 96%, not currently breastfeeding.  Physical Exam:  ECOG PS: 1  General: A&Ox3, NAD  HEENT: PERRL, anicteric, OP clear  CV:  RRR, no murmurs  Pulm: CTA b/l, nl effort  Abd: soft, ntnd, no HSM or masses  Extremities: no edema, hands wnl   Neurological: grossly intact    Results:   Labs:  Lab Results   Component Value Date    WBC 6.8 06/25/2025    HGB 11.6 (L) 06/25/2025    HCT 36.8 06/25/2025    .0 06/25/2025    CREATSERUM 0.78 06/25/2025    BUN 14 06/25/2025     06/25/2025    K 4.3 06/25/2025     06/25/2025    CO2 26.0 06/25/2025    GLU 94 06/25/2025    CA 9.1 06/25/2025    ALB 4.1 06/25/2025    ALKPHO 103 06/25/2025    BILT 0.3 06/25/2025    TP 8.2 06/25/2025    AST 36 (H) 06/25/2025    ALT 34 06/25/2025    INR 1.09 11/19/2024    T4F 1.4 05/28/2025    TSH 0.852 05/28/2025    MG 2.0 04/02/2025    PHOS 3.2 11/21/2024    B12 435 07/14/2017     Imaging:        CT a/p 5/19/24:  CONCLUSION: 11.1 x 10.2 centimeter enhancing mass replacing the mid to lower right kidney with extensive bibasilar enhancing pleural tumor, trace pleural effusions, intrathoracic adenopathy.  This is likely renal cell carcinoma with extensive bibasilar thoracic involvement     CT chest 5/22/24:  CONCLUSION: Extensive pleural masses with associated small bilateral pleural effusions , as well as mediastinal and hilar lymphadenopathy and bilateral pulmonary nodules.  These are highly concerning for sites of metastatic disease in this setting.    A questioned focal sclerosis of the left lateral first rib adjacent to a tiny pleural mass could represent a site of osseous involvement of disease.     CT c/a/p 5/28/25:  CONCLUSION:   1. Grossly stable exam.    2. A largely necrotic right renal mass appears grossly stable.    3. Extensive pleural-based nodularity is re-identified, not substantially changed in the interim.    4. Necrotic-appearing mediastinal lymphadenopathy appears similar to previous.    5. Dilatation of the main pulmonary artery trunk may relate to underlying pulmonary hypertension.    6. Uncomplicated distal colonic diverticulosis.    7.  Status post hysterectomy.    8. Possible appendectomy.    9. Large fat containing periumbilical hernia.    10. Lesser incidental findings as above.     Right kidney, mass; CT-guided core biopsy 6/5/24:   Clear-cell renal cell carcinoma in a background of extensive necrosis, see comment.    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 She began Nivolumab Cabozantinib 6/12/24 and presents for followup.     # Metastatic poor-risk ccRCC.   -large right renal mass, extensive pleural metastases, anemia, neutropenia, thrombocytosis, quite symptomatic on presentation. Given progressive symptoms and poor risk disease, discussed the need for a treatment response asap, therefore we discussed proceeding with Nivolumab and Cabozantinib per CM 9ER over ipilimumab and nivolumab with concerns for rapid response, and began treatment 6/12/24  -August 2024 imaging shows partial response to therapy, restaging in early December 2024 shows continued partial response  -PPE, dysgeusia, fatigue all improved with dose reduction of cabozantinib down to 20 mg daily, continue  -developed adrenal insufficiency and hypothyroidism in December 2024, treating as below and now much improved  -Restaging in late May 2025 continues to show stable disease after partial response.  Continue monthly nivolumab and cabozantinib 20 mg daily.  - Follow-up in 1 month, she will be due for restaging in August which we will schedule at her next appointment    # Immune mediated adrenal insufficiency  # Immune mediated hypothyroidism  -Patient developed severe weakness and fatigue in November 2024, found to have a high in early December 2024.  -She is tolerating hydrocortisone 10/5mg daily, continue for now, does follow with Endocrinology   -On levothyroxine 50  mcg daily, TSH normalized    # Symptom management  -zofran ODT prn for nausea  -PPE, udderbalm cream and voltaren gel BID-TID.  Has since resolved with dose-reduced cabozantinib to 20 mg daily as above  -Per Dentist, tooth extraction necessary, ok to proceed on Nivolumab/Cabozantinib (minor procedure)    # Comorbidities  -HTN, hold metoprolol given hypotension with weight loss  -Hypothyroidism, immune-mediated, start Synthroid 50 mcg daily    MDM High: malignancy; review of results with independent interpretation and discussion of management; drug therapy requiring intensive monitoring for toxicity;  ongoing continuity of complex care    Archie Crocker,     [facility] Hematology/Oncology  [facility]     This note was created using a voice-recognition transcribing system. Incorrect words or phrases may have been missed during proofreading. Please interpret accordingly.

## 2025-07-23 ENCOUNTER — NURSE ONLY (OUTPATIENT)
Facility: LOCATION | Age: 70
End: 2025-07-23
Attending: INTERNAL MEDICINE
Payer: MEDICARE

## 2025-07-23 ENCOUNTER — OFFICE VISIT (OUTPATIENT)
Facility: LOCATION | Age: 70
End: 2025-07-23
Attending: INTERNAL MEDICINE
Payer: MEDICARE

## 2025-07-23 VITALS
OXYGEN SATURATION: 97 % | HEIGHT: 65 IN | BODY MASS INDEX: 29.16 KG/M2 | WEIGHT: 175 LBS | RESPIRATION RATE: 18 BRPM | TEMPERATURE: 98 F | DIASTOLIC BLOOD PRESSURE: 81 MMHG | HEART RATE: 84 BPM | SYSTOLIC BLOOD PRESSURE: 135 MMHG

## 2025-07-23 DIAGNOSIS — Z51.11 CHEMOTHERAPY MANAGEMENT, ENCOUNTER FOR: Primary | ICD-10-CM

## 2025-07-23 DIAGNOSIS — C64.1 RENAL CELL CARCINOMA OF RIGHT KIDNEY (HCC): ICD-10-CM

## 2025-07-23 DIAGNOSIS — Z51.12 ENCOUNTER FOR ANTINEOPLASTIC IMMUNOTHERAPY: ICD-10-CM

## 2025-07-23 DIAGNOSIS — E27.3 ADRENAL INSUFFICIENCY DUE TO CANCER THERAPY (HCC): ICD-10-CM

## 2025-07-23 DIAGNOSIS — C78.02 MALIGNANT NEOPLASM METASTATIC TO BOTH LUNGS (HCC): ICD-10-CM

## 2025-07-23 DIAGNOSIS — C64.1 RENAL CELL CARCINOMA OF RIGHT KIDNEY (HCC): Primary | ICD-10-CM

## 2025-07-23 DIAGNOSIS — C79.31 MALIGNANT NEOPLASM METASTATIC TO BRAIN (HCC): ICD-10-CM

## 2025-07-23 DIAGNOSIS — C78.01 MALIGNANT NEOPLASM METASTATIC TO BOTH LUNGS (HCC): ICD-10-CM

## 2025-07-23 LAB
ALBUMIN SERPL-MCNC: 4.1 G/DL (ref 3.2–4.8)
ALBUMIN/GLOB SERPL: 1 {RATIO} (ref 1–2)
ALP LIVER SERPL-CCNC: 90 U/L (ref 55–142)
ALT SERPL-CCNC: 20 U/L (ref 10–49)
ANION GAP SERPL CALC-SCNC: 7 MMOL/L (ref 0–18)
AST SERPL-CCNC: 26 U/L (ref ?–34)
BASOPHILS # BLD AUTO: 0.05 X10(3) UL (ref 0–0.2)
BASOPHILS NFR BLD AUTO: 0.7 %
BILIRUB SERPL-MCNC: 0.3 MG/DL (ref 0.2–1.1)
BUN BLD-MCNC: 16 MG/DL (ref 9–23)
BUN/CREAT SERPL: 19.3 (ref 10–20)
CALCIUM BLD-MCNC: 9.4 MG/DL (ref 8.7–10.4)
CHLORIDE SERPL-SCNC: 103 MMOL/L (ref 98–112)
CHOLEST SERPL-MCNC: 156 MG/DL (ref ?–200)
CO2 SERPL-SCNC: 28 MMOL/L (ref 21–32)
CREAT BLD-MCNC: 0.83 MG/DL (ref 0.55–1.02)
DEPRECATED RDW RBC AUTO: 52.8 FL (ref 35.1–46.3)
EGFRCR SERPLBLD CKD-EPI 2021: 77 ML/MIN/1.73M2 (ref 60–?)
EOSINOPHIL # BLD AUTO: 0.15 X10(3) UL (ref 0–0.7)
EOSINOPHIL NFR BLD AUTO: 2.1 %
ERYTHROCYTE [DISTWIDTH] IN BLOOD BY AUTOMATED COUNT: 16 % (ref 11–15)
FASTING PATIENT LIPID ANSWER: NO
FASTING STATUS PATIENT QL REPORTED: NO
GLOBULIN PLAS-MCNC: 4.1 G/DL (ref 2–3.5)
GLUCOSE BLD-MCNC: 96 MG/DL (ref 70–99)
HCT VFR BLD AUTO: 38.1 % (ref 35–48)
HDLC SERPL-MCNC: 48 MG/DL (ref 40–59)
HGB BLD-MCNC: 11.6 G/DL (ref 12–16)
IMM GRANULOCYTES # BLD AUTO: 0.03 X10(3) UL (ref 0–1)
IMM GRANULOCYTES NFR BLD: 0.4 %
LDLC SERPL CALC-MCNC: 91 MG/DL (ref ?–100)
LYMPHOCYTES # BLD AUTO: 1.94 X10(3) UL (ref 1–4)
LYMPHOCYTES NFR BLD AUTO: 27.1 %
MCH RBC QN AUTO: 27.4 PG (ref 26–34)
MCHC RBC AUTO-ENTMCNC: 30.4 G/DL (ref 31–37)
MCV RBC AUTO: 89.9 FL (ref 80–100)
MONOCYTES # BLD AUTO: 0.46 X10(3) UL (ref 0.1–1)
MONOCYTES NFR BLD AUTO: 6.4 %
NEUTROPHILS # BLD AUTO: 4.54 X10 (3) UL (ref 1.5–7.7)
NEUTROPHILS # BLD AUTO: 4.54 X10(3) UL (ref 1.5–7.7)
NEUTROPHILS NFR BLD AUTO: 63.3 %
NONHDLC SERPL-MCNC: 108 MG/DL (ref ?–130)
OSMOLALITY SERPL CALC.SUM OF ELEC: 287 MOSM/KG (ref 275–295)
PLATELET # BLD AUTO: 380 10(3)UL (ref 150–450)
POTASSIUM SERPL-SCNC: 4.2 MMOL/L (ref 3.5–5.1)
PROT SERPL-MCNC: 8.2 G/DL (ref 5.7–8.2)
RBC # BLD AUTO: 4.24 X10(6)UL (ref 3.8–5.3)
SODIUM SERPL-SCNC: 138 MMOL/L (ref 136–145)
TRIGL SERPL-MCNC: 90 MG/DL (ref 30–149)
VLDLC SERPL CALC-MCNC: 15 MG/DL (ref 0–30)
WBC # BLD AUTO: 7.2 X10(3) UL (ref 4–11)

## 2025-07-23 RX ORDER — FAMOTIDINE 10 MG/ML
20 INJECTION, SOLUTION INTRAVENOUS 2 TIMES DAILY
Status: CANCELLED
Start: 2025-07-23

## 2025-07-23 RX ORDER — FAMOTIDINE 10 MG/ML
20 INJECTION, SOLUTION INTRAVENOUS ONCE
Status: COMPLETED | OUTPATIENT
Start: 2025-07-23 | End: 2025-07-23

## 2025-07-23 RX ADMIN — FAMOTIDINE 20 MG: 10 INJECTION, SOLUTION INTRAVENOUS at 12:18:00

## 2025-07-23 NOTE — PROGRESS NOTES
Pt here for C15D1 Drug(s)OPDIVO.  Arrives Ambulating independently, accompanied by Spouse      Patient was evaluated today by MD and Treatment Nurse.     L AC PIV started in lab, good blood return noted.     Oral medications included in this regimen:  yes - Cabozantinib     Patient confirms comprehension of cancer treatment schedule:  yes     Pregnancy screening:  Not applicable     Modifications in dose or schedule:  No     Medications appearance and physical integrity checked by RN: yes.     Chemotherapy IV pump settings verified by 2 RNs:  No due to targeted therapy IV administration. Filter used.  Frequency of blood return and site check throughout administration: Prior to administration, Prior to each drug, and At completion of therapy      Infusion/treatment outcome:  patient tolerated treatment without incident     PIV removed.     Education Record     Learner:  Patient and Spouse  Barriers / Limitations:  None  Method:  Discussion and Printed material  Education / instructions given:  medication, plan of care, schedule  Outcome:  Shows understanding     Discharged Home, Ambulating independently, accompanied by:Spouse     Patient/family verbalized understanding of future appointments: [place]

## 2025-07-23 NOTE — PROGRESS NOTES
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 She began Nivolumab Cabozantinib 6/12/24 and presents for followup.     Interval history:  Overall, patient continues to do well, hydrocortisone was lowered to 10/5 in late winter 2024.  Tolerating cabozantinib 20 mg daily, compliant.  On occasion, she may experience diarrhea, max 2 episodes per 24 hours.  Since Monday, she has felt right flank pain, lasts < 1\", unsure if associated with AROM, no recent injury    Denies headache, vision changes, mouth sores, cough, dyspnea, abdominal pain, constipation, peripheral edema and rash.      She and her  had a wonderful trip to [place] earlier in June.  In July 2025, her family enjoyed a trip to a [employer] [place] in [place].    Oncology history:  Briefly, the patient began to feel fatigued in the Fall 2023 and she was found to have a hemoglobin of 8.5 with an MCV of 71 with last lab values in 2017 showing a normal hemoglobin.  In March 2024, iron saturation was 15% and ferritin was 612.  She was given IV iron and referred to GI which prompted a CT abdomen pelvis which unfortunately revealed an 11 cm enhancing mass within the right kidney and extensive bibasilar pleural metastases concerning for metastatic renal cell carcinoma.     5/21/24: Initial medical oncology consultation, will obtain brain imaging and refer to IR for kidney biopsy.  Expressed concern for metastatic kidney cancer.  -Brain MRI showed a meningioma, no intracranial metastatic disease.  -IR guided biopsy 6/5 shows  high-grade clear-cell renal cell carcinoma, with extensive necrosis but no sarcomatoid or rhabdoid features    6/6/24: Medical oncology follow-up.  Patient continues to decline.  Patient has poor risk disease, very symptomatic and concerned with need for rapid response upfront.  Therefore, we will proceed with cabozantinib nivolumab per CheckMate 9ER    6/12/24: began Cabozantinib Nivolumab.     6/26/24: slight improvement, no dose limiting toxicities, continue treatment.     7/24/24: Feeling little stronger, tolerating therapy well    8/7/24: Grade 1 transaminitis, continue therapy, repeat LFTs next week, follow-up in 2 weeks, restaging at the end of August 9/4/24: Restaging CT shows partial response to treatment. Continue current therapy plan.     10/3/24: Nearly grade 2 PPE persists despite cabozantinib 40 mg every other day.  Hold for 1 week.  We will dose reduce to 20 mg daily, request new prescription.  Proceed with nivolumab 480 mg monthly dosing today.  Follow-up in 1 month which will be 2 to 3 weeks on cabozantinib 20 mg daily.    10/30/24: Doing well with dose reduced cabozantinib 20 mg daily.  No immune related toxicities, cleared for next month of treatment.    11/21-11/24/24: Admitted to [facility] with pneumonia and failure to thrive.    12/4/24: Remains very weak, now hypothyroid.  We will prescribe levothyroxine.  Rule out adrenal insufficiency.  Restaging at this visit also shows stable disease.  Okay to continue treatment for now.    12/19/24: Found to have adrenal insufficiency, ACTH and cortisol low.  Started on hydrocortisone 20/10 mg and referred to endocrinology.  Also started on levothyroxine.  Energy is improving, appetite is better she is slowly improving.    1/2/24: feeling well, cleared for nivolumab, continue cabozantinib.     1/30/25: Cleared for nivolumab, continue cabozantinib 20 mg daily.  Decrease hydrocortisone to 10 mg a.m. 5 mg p.m.    3/5/25: restaging this visit shows SD. One small  left lower lobe pleural nodule slightly increased in size but overall other areas continue to improve or are stable. Continue cabozantinb 20mg daily, continue nivolumab monthly.     4/30/25: Continue cabozantinib and nivolumab, restaging next appointment.    5/28/25: Restaging shows stable disease.  Continue cabozantinib and nivolumab.    Review of Systems:  Hematology/Oncology ROS performed and negative except as above in HPI    History/Other:   Current Medications:   Cabozantinib S-Malate (CABOMETYX) 20 MG Oral Tab Take 1 tablet by mouth daily. 30 tablet 11    metoprolol succinate ER 50 MG Oral Tablet 24 Hr Take 2 tablets (100 mg total) by mouth daily as needed. 90 tablet 1    pantoprazole 40 MG Oral Tab EC Take 1 tablet (40 mg total) by mouth at bedtime. 90 tablet 3    acetaminophen 500 MG Oral Tab Take 1 tablet (500 mg total) by mouth every 4 (four) hours as needed for Fever (equal to or greater than 100.4). 200 tablet 3    hydrocortisone 10 MG Oral Tab Take 2 tablets (20 mg total) by mouth daily with breakfast AND 1 tablet (10 mg total) Every afternoon at 2:00 pm. 90 tablet 2    levothyroxine 75 MCG Oral Tab Take 1 tablet (75 mcg total) by mouth before breakfast. 90 tablet 2    sertraline 25 MG Oral Tab Take 1 tablet (25 mg total) by mouth daily. 90 tablet 1    sodium chloride 0.65 % Nasal Solution 2 sprays by Nasal route every 4 (four) hours as needed for congestion. 1 each 0    triamcinolone 0.1 % External Lotion Apply 1 Application topically 2 (two) times daily. 1 each 0     Allergies:   Allergies   Allergen Reactions    Levaquin [Levofloxacin] OTHER (SEE COMMENTS)     Tendon pain    Ace Inhibitors Coughing    Azithromycin      Other reaction(s): AZITHROMYCIN    Erythromycin      Other reaction(s): Rash - Mild    Naproxen      Other reaction(s): NAPROXEN    Nortriptyline      Other reaction(s): vivid, scary dreams    Sulfa Antibiotics      Other reaction(s): Rash - Moderate    Ceftriaxone RASH     Objective:    Blood pressure 135/81, pulse 84, temperature 97.6 °F (36.4 °C), temperature source Tympanic, resp. rate 18, height 1.651 m (5' 5\"), weight 79.4 kg (175 lb), SpO2 97%, not currently breastfeeding.  Physical Exam:  ECOG PS: 1  General: A&Ox3, NAD  HEENT: PERRL, anicteric, OP clear  CV: RRR, no murmurs  Pulm: CTA b/l, nl effort  Abd: soft, ntnd, no HSM or masses  Extremities: no edema, hands wnl   Neurological: grossly intact    Results:   Labs:  Lab Results   Component Value Date    WBC 7.2 07/23/2025    HGB 11.6 (L) 07/23/2025    HCT 38.1 07/23/2025    .0 07/23/2025    CREATSERUM 0.83 07/23/2025    BUN 16 07/23/2025     07/23/2025    K 4.2 07/23/2025     07/23/2025    CO2 28.0 07/23/2025    GLU 96 07/23/2025    CA 9.4 07/23/2025    ALB 4.1 07/23/2025    ALKPHO 90 07/23/2025    BILT 0.3 07/23/2025    TP 8.2 07/23/2025    AST 26 07/23/2025    ALT 20 07/23/2025    INR 1.09 11/19/2024    T4F 1.4 05/28/2025    TSH 0.852 05/28/2025    MG 2.0 04/02/2025    PHOS 3.2 11/21/2024    B12 435 07/14/2017     Imaging:        CT a/p 5/19/24:  CONCLUSION: 11.1 x 10.2 centimeter enhancing mass replacing the mid to lower right kidney with extensive bibasilar enhancing pleural tumor, trace pleural effusions, intrathoracic adenopathy.  This is likely renal cell carcinoma with extensive bibasilar thoracic involvement     CT chest 5/22/24:  CONCLUSION: Extensive pleural masses with associated small bilateral pleural effusions , as well as mediastinal and hilar lymphadenopathy and bilateral pulmonary nodules.  These are highly concerning for sites of metastatic disease in this setting.    A questioned focal sclerosis of the left lateral first rib adjacent to a tiny pleural mass could represent a site of osseous involvement of disease.     CT c/a/p 5/28/25:  CONCLUSION:   1. Grossly stable exam.    2. A largely necrotic right renal mass appears grossly stable.    3. Extensive pleural-based nodularity is re-identified,  not substantially changed in the interim.    4. Necrotic-appearing mediastinal lymphadenopathy appears similar to previous.    5. Dilatation of the main pulmonary artery trunk may relate to underlying pulmonary hypertension.    6. Uncomplicated distal colonic diverticulosis.    7. Status post hysterectomy.    8. Possible appendectomy.    9. Large fat containing periumbilical hernia.    10. Lesser incidental findings as above.     Right kidney, mass; CT-guided core biopsy 6/5/24:   Clear-cell renal cell carcinoma in a background of extensive necrosis, see comment.    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 She began Nivolumab Cabozantinib 6/12/24 and presents for followup.     # Metastatic poor-risk ccRCC.   -large right renal mass, extensive pleural metastases, anemia, neutropenia, thrombocytosis, quite symptomatic on presentation. Given progressive symptoms and poor risk disease, discussed the need for a treatment response asap, therefore we discussed proceeding with Nivolumab and Cabozantinib per CM 9ER over ipilimumab and nivolumab with concerns for rapid response, and began treatment 6/12/24  -August 2024 imaging shows partial response to therapy, restaging in early December 2024 shows continued partial response  -PPE, dysgeusia, fatigue all improved with dose reduction of cabozantinib down to 20 mg daily, continue  -developed adrenal insufficiency and hypothyroidism in December 2024, treating as below and now much improved  -Restaging in late May 2025 continues to show stable disease after partial response.  Continue monthly nivolumab and cabozantinib 20 mg daily.  Repeat imaging prior to next cycle in 4 weeks.      Patient inquired about subcutaneous Nivolumab.  Per [facility] pharmacy, subcutaneous  Nivolumab is in the final logistical stages prior to being available here.    Patient/spouse inquired about surgical resection or CyberKnife/GammaKnife of primary malignancy.  Explained that systemic treatment is recommended in this metastatic setting given the extent of her pulmonary findings from initial diagnosis in 2024.  Other treatment modalities can be added/considered based on future restaging imaging.  She also asked about duration of treatment, citing 2 years per her support group.  Informed that does not apply to her given the extent of her disease.  Concern of holding/stopping treatment is disease recurrence and she has responding extremely well to treatment.      # Immune mediated adrenal insufficiency  # Immune mediated hypothyroidism  -Patient developed severe weakness and fatigue in November 2024, found to have a high in early December 2024.  -She is tolerating hydrocortisone 10/5mg daily, continue for now, does follow with Endocrinology   -On levothyroxine 50 mcg daily, TSH normalized    # Symptom management  -zofran ODT prn for nausea  -H/o PPE, udderbalm cream and voltaren gel BID-TID.  Has since resolved with dose-reduced cabozantinib to 20 mg daily as above  -Per Dentist, tooth extraction necessary, ok to proceed on Nivolumab/Cabozantinib (minor procedure)    # Comorbidities  -HTN, hold metoprolol given hypotension with weight loss  -Hypothyroidism, immune-mediated, start Synthroid 50 mcg daily    MDM High: malignancy; review of results with independent interpretation and discussion of management; drug therapy requiring intensive monitoring for toxicity;  ongoing continuity of complex care    Ramakrishna Desai PA-C    [facility] Hematology/Oncology  [facility]     This note was created using a voice-recognition transcribing system. Incorrect words or phrases may have been missed during proofreading. Please interpret accordingly.

## 2025-07-24 RX ORDER — SERTRALINE HYDROCHLORIDE 25 MG/1
25 TABLET, FILM COATED ORAL DAILY
Qty: 90 TABLET | Refills: 1 | OUTPATIENT
Start: 2025-07-24

## 2025-07-24 RX ORDER — SERTRALINE HYDROCHLORIDE 25 MG/1
25 TABLET, FILM COATED ORAL DAILY
Qty: 90 TABLET | Refills: 1 | Status: SHIPPED | OUTPATIENT
Start: 2025-07-24

## 2025-07-25 NOTE — TELEPHONE ENCOUNTER
Refill passed per [facility] protocol.    Requested Prescriptions   Pending Prescriptions Disp Refills    SERTRALINE 25 MG Oral Tab [Pharmacy Med Name: SERTRALINE 25MG TABLETS] 90 tablet 1     Sig: TAKE 1 TABLET(25 MG) BY MOUTH DAILY       Psychiatric Non-Scheduled (Anti-Anxiety) Passed - 7/24/2025  7:40 PM        Passed - In person appointment or virtual visit in the past 6 mos or appointment in next 3 mos     Recent Outpatient Visits              Yesterday Chemotherapy management, encounter for    [facility]    Office Visit    Yesterday Renal cell carcinoma of right kidney (HCC)    [facility] Hematology Oncology [place] Ramakrishna Desai PA    Office Visit    Yesterday Chemotherapy management, encounter for    [facility]    Nurse Only    4 weeks ago Chemotherapy management, encounter for    [facility]    Office Visit    4 weeks ago Renal cell carcinoma of right kidney (HCC)    [facility] Hematology Oncology [place] Archie Crocker,     Office Visit          Future Appointments         Provider Department Appt Notes    In 2 weeks [facility] CT RM1 [facility] CT Put ok per [place], cancer center request-DM 7/23/25    In 3 weeks [facility] LAB2 [facility] [id number]    In 3 weeks [facility] Cro[facility] [place] PRE CHEMO VISIT    In 3 weeks [facility] INFRN 3 [facility] SD-C16 [id number]    In 1 month [facility] LAB2 [facility] [id number]    In 1  month Archie Crocker, DO [facility] Hematology Oncology [place] PRE CHEMO VISIT    In 1 month [facility] INFRN 2 [facility] [place] SD-C17 [id number]                    Passed - Depression Screening completed within the past 12 months        Passed - Medication is active on med list           Future Appointments         Provider Department Appt Notes    In 2 weeks [facility] CT RM1 [facility] CT Put ok per [place], cancer center request-DM 7/23/25    In 3 weeks [facility] LAB2 [facility] [place] [id number]    In 3 weeks Archie Crocker,  [facility] Hematology Oncology [place] PRE CHEMO VISIT    In 3 weeks [facility] INFRN 3 [facility] [place] SD-C16 [id number]    In 1 month [facility] LAB2 [facility] [place] [id number]    In 1 month Archie Crocker, DO [facility] Hematology Oncology [place] PRE CHEMO VISIT    In 1 month [facility] INFRN 2 [facility] [place] SD-C17 [id number]          Recent Outpatient Visits              Yesterday Chemotherapy management, encounter for    [facility] [place]    Office Visit    Yesterday Renal cell carcinoma of right kidney (HCC)    [facility] Hematology Oncology [place] Ramakrishna Desai PA    Office Visit    Yesterday Chemotherapy management, encounter for    [facility] [place]    Nurse Only    4 weeks ago Chemotherapy management, encounter for    [facility]  [facility] [place]    Office Visit    4 weeks ago Renal cell carcinoma of right kidney (HCC)    [facility] Hematology [place]cology [place],     Office Visit

## 2025-08-13 ENCOUNTER — HOSPITAL ENCOUNTER (OUTPATIENT)
Dept: CT IMAGING | Facility: HOSPITAL | Age: 70
Discharge: HOME | End: 2025-08-13
Attending: PHYSICIAN ASSISTANT

## 2025-08-13 DIAGNOSIS — C78.02 MALIGNANT NEOPLASM METASTATIC TO BOTH LUNGS (HCC): ICD-10-CM

## 2025-08-13 DIAGNOSIS — C64.1 RENAL CELL CARCINOMA OF RIGHT KIDNEY (HCC): ICD-10-CM

## 2025-08-13 DIAGNOSIS — C78.01 MALIGNANT NEOPLASM METASTATIC TO BOTH LUNGS (HCC): ICD-10-CM

## 2025-08-13 PROCEDURE — 74177 CT ABD & PELVIS W/CONTRAST: CPT | Performed by: PHYSICIAN ASSISTANT

## 2025-08-13 PROCEDURE — 71260 CT THORAX DX C+: CPT | Performed by: PHYSICIAN ASSISTANT

## 2025-08-20 ENCOUNTER — OFFICE VISIT (OUTPATIENT)
Facility: LOCATION | Age: 70
End: 2025-08-20
Attending: INTERNAL MEDICINE

## 2025-08-20 ENCOUNTER — NURSE ONLY (OUTPATIENT)
Facility: LOCATION | Age: 70
End: 2025-08-20
Attending: INTERNAL MEDICINE

## 2025-08-20 VITALS
TEMPERATURE: 97 F | DIASTOLIC BLOOD PRESSURE: 80 MMHG | RESPIRATION RATE: 18 BRPM | WEIGHT: 174 LBS | BODY MASS INDEX: 29 KG/M2 | HEART RATE: 81 BPM | SYSTOLIC BLOOD PRESSURE: 127 MMHG | OXYGEN SATURATION: 96 %

## 2025-08-20 DIAGNOSIS — C64.1 RENAL CELL CARCINOMA OF RIGHT KIDNEY (HCC): ICD-10-CM

## 2025-08-20 DIAGNOSIS — Z51.11 CHEMOTHERAPY MANAGEMENT, ENCOUNTER FOR: ICD-10-CM

## 2025-08-20 DIAGNOSIS — Z51.12 ENCOUNTER FOR ANTINEOPLASTIC IMMUNOTHERAPY: ICD-10-CM

## 2025-08-20 DIAGNOSIS — C64.1 RENAL CELL CARCINOMA OF RIGHT KIDNEY (HCC): Primary | ICD-10-CM

## 2025-08-20 DIAGNOSIS — Z51.11 CHEMOTHERAPY MANAGEMENT, ENCOUNTER FOR: Primary | ICD-10-CM

## 2025-08-20 DIAGNOSIS — C78.02 MALIGNANT NEOPLASM METASTATIC TO BOTH LUNGS (HCC): ICD-10-CM

## 2025-08-20 DIAGNOSIS — C78.01 MALIGNANT NEOPLASM METASTATIC TO BOTH LUNGS (HCC): ICD-10-CM

## 2025-08-20 LAB
ALBUMIN SERPL-MCNC: 4 G/DL (ref 3.2–4.8)
ALBUMIN/GLOB SERPL: 1 (ref 1–2)
ALP LIVER SERPL-CCNC: 91 U/L (ref 55–142)
ALT SERPL-CCNC: 23 U/L (ref 10–49)
ANION GAP SERPL CALC-SCNC: 7 MMOL/L (ref 0–18)
AST SERPL-CCNC: 30 U/L (ref ?–34)
BASOPHILS # BLD AUTO: 0.04 X10(3) UL (ref 0–0.2)
BASOPHILS NFR BLD AUTO: 0.6 %
BILIRUB SERPL-MCNC: 0.3 MG/DL (ref 0.2–1.1)
BUN BLD-MCNC: 11 MG/DL (ref 9–23)
BUN/CREAT SERPL: 14.7 (ref 10–20)
CALCIUM BLD-MCNC: 9.1 MG/DL (ref 8.7–10.4)
CHLORIDE SERPL-SCNC: 104 MMOL/L (ref 98–112)
CHOLEST SERPL-MCNC: 156 MG/DL (ref ?–200)
CO2 SERPL-SCNC: 27 MMOL/L (ref 21–32)
CREAT BLD-MCNC: 0.75 MG/DL (ref 0.55–1.02)
DEPRECATED RDW RBC AUTO: 53.1 FL (ref 35.1–46.3)
EGFRCR SERPLBLD CKD-EPI 2021: 86 ML/MIN/1.73M2 (ref 60–?)
EOSINOPHIL # BLD AUTO: 0.14 X10(3) UL (ref 0–0.7)
EOSINOPHIL NFR BLD AUTO: 2 %
ERYTHROCYTE [DISTWIDTH] IN BLOOD BY AUTOMATED COUNT: 16.2 % (ref 11–15)
FASTING PATIENT LIPID ANSWER: NO
FASTING STATUS PATIENT QL REPORTED: NO
GLOBULIN PLAS-MCNC: 4 G/DL (ref 2–3.5)
GLUCOSE BLD-MCNC: 89 MG/DL (ref 70–99)
HCT VFR BLD AUTO: 36.9 % (ref 35–48)
HDLC SERPL-MCNC: 46 MG/DL (ref 40–59)
HGB BLD-MCNC: 11.4 G/DL (ref 12–16)
IMM GRANULOCYTES # BLD AUTO: 0.02 X10(3) UL (ref 0–1)
IMM GRANULOCYTES NFR BLD: 0.3 %
LDLC SERPL CALC-MCNC: 91 MG/DL (ref ?–100)
LYMPHOCYTES # BLD AUTO: 1.98 X10(3) UL (ref 1–4)
LYMPHOCYTES NFR BLD AUTO: 28.9 %
MCH RBC QN AUTO: 27.9 PG (ref 26–34)
MCHC RBC AUTO-ENTMCNC: 30.9 G/DL (ref 31–37)
MCV RBC AUTO: 90.2 FL (ref 80–100)
MONOCYTES # BLD AUTO: 0.46 X10(3) UL (ref 0.1–1)
MONOCYTES NFR BLD AUTO: 6.7 %
NEUTROPHILS # BLD AUTO: 4.21 X10 (3) UL (ref 1.5–7.7)
NEUTROPHILS # BLD AUTO: 4.21 X10(3) UL (ref 1.5–7.7)
NEUTROPHILS NFR BLD AUTO: 61.5 %
NONHDLC SERPL-MCNC: 110 MG/DL (ref ?–130)
OSMOLALITY SERPL CALC.SUM OF ELEC: 285 MOSM/KG (ref 275–295)
PLATELET # BLD AUTO: 333 10(3)UL (ref 150–450)
POTASSIUM SERPL-SCNC: 4.3 MMOL/L (ref 3.5–5.1)
PROT SERPL-MCNC: 8 G/DL (ref 5.7–8.2)
RBC # BLD AUTO: 4.09 X10(6)UL (ref 3.8–5.3)
SODIUM SERPL-SCNC: 138 MMOL/L (ref 136–145)
TRIGL SERPL-MCNC: 101 MG/DL (ref 30–149)
VLDLC SERPL CALC-MCNC: 16 MG/DL (ref 0–30)
WBC # BLD AUTO: 6.9 X10(3) UL (ref 4–11)

## 2025-08-20 RX ORDER — FAMOTIDINE 10 MG/ML
INJECTION, SOLUTION INTRAVENOUS
Status: COMPLETED
Start: 2025-08-20 | End: 2025-08-20

## 2025-08-20 RX ORDER — FAMOTIDINE 10 MG/ML
20 INJECTION, SOLUTION INTRAVENOUS 2 TIMES DAILY
Status: DISCONTINUED | OUTPATIENT
Start: 2025-08-20 | End: 2025-08-20

## 2025-08-20 RX ORDER — FAMOTIDINE 10 MG/ML
20 INJECTION, SOLUTION INTRAVENOUS 2 TIMES DAILY
Status: CANCELLED
Start: 2025-08-20

## 2025-08-20 RX ADMIN — FAMOTIDINE 20 MG: 10 INJECTION, SOLUTION INTRAVENOUS at 12:43:00

## (undated) DEVICE — Device: Brand: DEFENDO AIR/WATER/SUCTION AND BIOPSY VALVE

## (undated) DEVICE — ENDOSCOPY PACK - LOWER: Brand: MEDLINE INDUSTRIES, INC.

## (undated) DEVICE — SNARE OPTMZ PLPCTM TRP

## (undated) DEVICE — FORCEP RADIAL JAW 4

## (undated) DEVICE — SNARE CAPTIFLEX MICRO-OVL OLY

## (undated) NOTE — MR AVS SNAPSHOT
[address] [facility] [address]  [phone]               Thank you for choosing us for your health care visit with Jessica Hercules MD.  We are glad to serve you and happy to provide you with this summary of your 152/96 mmHg 92 98.4 °F (36.9 °C) (Tympanic)    Height Weight BMI    5' 6\" (1.676 m) 306 lb 9.6 oz (139.073 kg) 49.51 kg/m2    Breastfeeding?           No           Current Medications          This list is accurate as of: 3/22/17 10:48 AM.  Always use you

## (undated) NOTE — LETTER
Date: 10/2/2024  Patient name: Lary Wallis  YOB: 1955  Medical Record Number: [id number]  Primary Coverage: Payor: MEDICARE / Plan: MEDICARE PART A&B / Product Type: *No Product type* /   Secondary Coverage: MEDICARE - MEDICARE PART A&B  Insurance ID: [id number]  Patient Address: [address] [place] [address]  Telephone Information:   Home Phone [phone]   Work Phone [phone]   Mobile [phone]         Encounter Date: 10/2/2024  Provider: LAILA Bullard  Diagnosis:     ICD-10-CM   1. Wound of sacral region, initial encounter  S31.000A   2. Renal cell carcinoma of right kidney (HCC)  C64.1       Progress Note:  Weekly Wound Education Note    Teaching Provided To: Patient;Family ()  Training Topics: Cleasing and general instructions;Skin care;Signs and symptoms of infection;Dressing  Training Method: Demonstration;Explain/Verbal  Training Response: Patient responds and understands        Notes: Buttocks-triad and thick layer of zinc paste.        Subjective  Lary Wallis is a 69 year old female.    Chief Complaint   Patient presents with    Wound Care     Pt reports pain is lower back and on right buttocks. Pt reports pain ranges from 3-8/10 on pain scale.      HPI  10/2/2024: Patient is 69 year old with medical history significant for HTN and HLD, depression and osteoarthritis, and recent history of metastatic renal cell carcinoma with extensive lung and pleural metastases. She stated her renal tumor caused incontinent of urine, she was wearing pull up incontinent brief and rubbing of the edges and incontinent caused skin irritation and wound on her coccyx. She has use zinc oxide cream and clotrimazole-betamethasone 1-0.05 % External Cream without any improvement. She stats she is walking at home, sleeps on her stomach and at times sits in her chair. She also stated as her treatment has been successful in shrinking the tumor, she is no longer incontinent and  not using any pads. She is referred to clinic for management of sacral wound.     Review of Systems   Constitutional:  Positive for fatigue. Negative for activity change, chills and fever.   HENT:  Negative for congestion.    Respiratory:  Negative for cough and shortness of breath.    Cardiovascular:  Negative for chest pain and leg swelling.   Gastrointestinal:  Negative for abdominal pain.   Musculoskeletal:  Positive for arthralgias and gait problem.   Skin:  Positive for wound.        Sacral wounds   Neurological:  Negative for weakness.   Psychiatric/Behavioral:  Positive for dysphoric mood. Negative for agitation.        Objective    Physical Exam  Vitals reviewed.   Constitutional:       Appearance: Normal appearance. She is normal weight.   Cardiovascular:      Rate and Rhythm: Normal rate and regular rhythm.      Pulses: Normal pulses.      Heart sounds: Normal heart sounds.   Pulmonary:      Effort: Pulmonary effort is normal.   Abdominal:      General: Bowel sounds are normal.   Musculoskeletal:      Cervical back: Normal range of motion.      Right lower leg: No edema.      Left lower leg: No edema.   Feet:      Left foot:      Skin integrity: Blister present.      Toenail Condition: Left toenails are normal.      Comments: Painful not open, areas in her right feet, (heel, base of the 1st toe and lateral 5th toe)  Skin:     General: Skin is warm and dry.      Capillary Refill: Capillary refill takes 2 to 3 seconds.      Findings: No erythema.      Comments: Sacral gluteal dennys, wounds, cluster   Neurological:      Mental Status: She is alert and oriented to person, place, and time.   Psychiatric:         Mood and Affect: Mood is depressed.         Speech: Speech normal.         Behavior: Behavior is cooperative.       Wound Assessment  Wound 10/02/24 1 Buttocks Upper (Active)   Date First Assessed/Time First Assessed: 10/02/24 1539    Wound Number (Wound Clinic Only): 1  Primary Wound Type: Abrasion   Location: Buttocks  Wound Location Orientation: Upper      Assessments 10/2/2024  3:43 PM   Wound Image     Site Assessment Yellow;Pink;Moist   Closure Not approximated   Drainage Amount Moderate   Drainage Description Yellow   Treatments Cleansed;Topical (Barrier/Moisturizer/Ointment);Saline   Dressing Triad (thick layer of zinc paste.)   Dressing Changed New   Dressing Status Clean;Dry;Intact   Wound Length (cm) 4.2 cm   Wound Width (cm) 2.1 cm   Wound Surface Area (cm^2) 8.82 cm^2   Wound Depth (cm) 0.1 cm   Wound Volume (cm^3) 0.882 cm^3   Margins Attached edges   Non-staged Wound Description Full thickness   Mitzi-wound Assessment Hyperpigmented;Moist   Wound Granulation Tissue Pink   Wound Bed Granulation (%) 10 %   Wound Bed Epithelium (%) 0 %   Wound Bed Slough (%) 90 %   Wound Bed Eschar (%) 0 %   Wound Bed Fibrin (%) 0 %   State of Healing Early/partial granulation   Wound Odor None   Pressure Injury Stage Stage 3       No associated orders.     Vital Signs    10/02/24 1540   BP: 105/58   Pulse: 77   Temp: 97.5 °F (36.4 °C)   PainSc: 3 - (Mild)     Allergies  Allergies   Allergen Reactions    Ace Inhibitors Coughing    Azithromycin      Other reaction(s): AZITHROMYCIN    Erythromycin      Other reaction(s): Rash - Mild    Naproxen      Other reaction(s): NAPROXEN    Nortriptyline      Other reaction(s): vivid, scary dreams    Sulfa Antibiotics      Other reaction(s): Rash - Moderate     Assessment  Sacral wound:   -cluster of wounds, slough cover with few spots of granulation  -no erythema  -no palpable bone     Right foot with intact blisters (patient stated side effect of chemo), painful lesion on her feet  -no erythema  -no open wounds      Reviewed note and labs in [facility] and care every where.  Recent labs: 10/2/2024: BUN 18, Creatinine 0.63, eGFR 97, albumin 3.7, H&H 11.8&37.3    Encounter Diagnosis  1. Wound of sacral region, initial encounter    2. Renal cell carcinoma of right kidney (HCC)       Problem List  Patient Active Problem List   Diagnosis    History of pulmonary embolism    Osteoarthrosis, pelvic region and thigh    Essential hypertension    Moderate single current episode of major depressive disorder (HCC)    Breast cancer screening    Bilateral hearing loss    Adult general medical exam    Vitamin D deficiency    Vaccine counseling    Primary osteoarthritis of right knee    Colon cancer screening    Anemia of unknown etiology    Hyperglycemia    Mixed hyperlipidemia    Age-related osteoporosis without current pathological fracture    Iron deficiency anemia    Renal mass    Renal cell carcinoma of right kidney (HCC)    Encounter for antineoplastic immunotherapy    Chemotherapy management, encounter for    Mucositis due to antineoplastic therapy    Transaminitis    Palmar plantar erythrodysaesthesia due to cytotoxic therapy    Intertrigo    Sacral wound     Plan  Orders  No orders of the defined types were placed in this encounter.  .pre    Follow-Up  Return in about 2 weeks (around 10/16/2024) for APN visit for 30 min.           Wound Treatment Orders:  Orders Placed This Encounter   Procedures    OP Wound Dressing     Standing Status:   Future     Standing Expiration Date:   11/1/2024     Order Specific Question:   Cleansing     Answer:   Cleanse with normal saline or wound cleanser     Order Specific Question:   Additional Wound Dressing Information     Answer:   triad, thick layer of zinc paste.     Order Specific Question:   Frequency     Answer:   Change dressing daily and PRN         Wound Information/Order:  Wound Number: All wounds  Product: Triad coloplast wound cream, medipore cloth tape  Dispense: 15 days  Dressing Frequency:Change dressing every other day and/or as needed    Was a Debridement performed: Yes, Debridement type: mechanical        Notes: Please call patient with out of pocket cost, prior to shipment, thank you!       EITAN ZHONG DNP, CWS, NPI [id number]

## (undated) NOTE — MR AVS SNAPSHOT
[address] [facility] [address]  [phone]               Thank you for choosing us for your health care visit with Dae Corey MD.  We are glad to serve you and happy to provide you with this summary of your 2 sprays by Each Nare route daily. Commonly known as:  FLONASE           hydrochlorothiazide 12.5 MG Tabs   Take 1 tablet (12.5 mg total) by mouth daily.    Commonly known as:  HYDRODIURIL           ibuprofen 600 MG Tabs   Take 1 tablet (600 mg total) by

## (undated) NOTE — LETTER
12/17/2024    Lary MAGANA Thurnall  [address] [facility] [address]         Dear Lary,    This letter is to inform you that our office has made several attempts to reach you by phone without success.  We were attempting to contact you by phone regarding illness or problem    Please contact our office at the number listed below as soon as you receive this letter to discuss this issue and to make the necessary changes in our system to your contact information.  Thank you for your cooperation.        Sincerely,    Fernanda Stover MD  [address] [place], [place] [address] [address]  Ph: [phone]  Fax: [phone]         Document electronically generated by:  Lillian ORELLANA RN

## (undated) NOTE — MR AVS SNAPSHOT
[address] [facility] [address]  [phone]               Thank you for choosing us for your health care visit with Joshua De La Cruz MD.  We are glad to serve you and happy to provide you with this summary of your kg) 50.06 kg/m2         Current Medications          This list is accurate as of: 4/21/17  2:57 PM.  Always use your most recent med list.                AmLODIPine Besylate 2.5 MG Tabs   Take 1 tablet (2.5 mg total) by mouth daily.    Commonly known as:  N HOW TO GET STARTED: HOW TO STAY MOTIVATED:   Start activities slowly and build up over time Do what you like   Get your heart pumping – brisk walking, biking, swimming Even 10 minute increments are effective and add up over the week   2 ½ hours per week –

## (undated) NOTE — LETTER
12/18/2024    Lary MAGANA Thurnall  [address] [facility] [address]         Dear Lary,    This letter is to inform you that our office has made several attempts to reach you by phone without success.  We were attempting to contact you by phone regarding a condition update requested by your physician.     Please contact our office at the number listed below as soon as you receive this letter to discuss this issue and to make the necessary changes in our system to your contact information.  Thank you for your cooperation.        Sincerely,    Fernanda Stover MD  [address] [place], [place] [address] [address]  Ph: [phone]  Fax: [phone]         Document electronically generated by:  Alma ORELLANA RN

## (undated) NOTE — LETTER
12/2/2019          To Whom It May Concern:    Yris Mott is currently under my medical care and may not return to work at this time. Please excuse Adams Fatou for 3 days. She may return to work on December 5, 2019 with no restrictions.     If you requir